# Patient Record
Sex: FEMALE | Race: WHITE | Employment: OTHER | URBAN - METROPOLITAN AREA
[De-identification: names, ages, dates, MRNs, and addresses within clinical notes are randomized per-mention and may not be internally consistent; named-entity substitution may affect disease eponyms.]

---

## 2017-01-04 ENCOUNTER — OFFICE VISIT (OUTPATIENT)
Dept: PEDIATRICS | Facility: CLINIC | Age: 77
End: 2017-01-04
Payer: COMMERCIAL

## 2017-01-04 VITALS
OXYGEN SATURATION: 98 % | BODY MASS INDEX: 30.45 KG/M2 | HEART RATE: 88 BPM | WEIGHT: 161.3 LBS | SYSTOLIC BLOOD PRESSURE: 118 MMHG | DIASTOLIC BLOOD PRESSURE: 70 MMHG | TEMPERATURE: 98.7 F | HEIGHT: 61 IN

## 2017-01-04 DIAGNOSIS — I10 BENIGN ESSENTIAL HYPERTENSION: Primary | ICD-10-CM

## 2017-01-04 DIAGNOSIS — M72.0 DUPUYTREN'S CONTRACTURE OF BOTH HANDS: ICD-10-CM

## 2017-01-04 DIAGNOSIS — R32 URINARY INCONTINENCE, UNSPECIFIED TYPE: ICD-10-CM

## 2017-01-04 DIAGNOSIS — G47.00 PERSISTENT INSOMNIA: ICD-10-CM

## 2017-01-04 DIAGNOSIS — M19.91 PRIMARY OSTEOARTHRITIS, UNSPECIFIED SITE: ICD-10-CM

## 2017-01-04 PROCEDURE — 99214 OFFICE O/P EST MOD 30 MIN: CPT | Performed by: PEDIATRICS

## 2017-01-04 RX ORDER — TRAZODONE HYDROCHLORIDE 100 MG/1
100 TABLET ORAL
Qty: 30 TABLET | Refills: 3 | Status: CANCELLED | OUTPATIENT
Start: 2017-01-04

## 2017-01-04 RX ORDER — TRAZODONE HYDROCHLORIDE 50 MG/1
100 TABLET, FILM COATED ORAL AT BEDTIME
Qty: 60 TABLET | Refills: 11 | Status: SHIPPED | OUTPATIENT
Start: 2017-01-04 | End: 2017-03-22

## 2017-01-04 NOTE — PATIENT INSTRUCTIONS
Split dosing of trazodone ordered    Try tylenol at night for arthritis - dose of 500-1000mg at night to control your pain.    Let me know if your hands start to bother you    Ok to keep off lisinopril - just keep an eye on your blood pressure every 1-2 months    Start kegel exercises for your bladder - let me know if worsening    The next time we do labs for your physical, we will check your thyroid.  Your last physical was 7/13/16.

## 2017-01-04 NOTE — PROGRESS NOTES
"  SUBJECTIVE:                                                    Krystal Parra is a 76 year old female who presents to clinic today for the following health issues:      Hypertension Follow-up      Outpatient blood pressures are being checked [facility].  Results are less than 120/80.    Low Salt Diet: no added salt       Amount of exercise or physical activity: 2-3 days/week for an average of 30-45 minutes    Problems taking medications regularly: No but patient has not been taking lisinopril for the past month     Medication side effects: cough from lisinopril, improved since no longer taking   Diet: low salt      Stopped lisinopril about one month ago and has been keeping a close eye on her pressures.   No cardiac symptoms.    Medication Followup of Trazodone 100 MG    Taking Medication as prescribed: yes however patient would like 50 MG tabs so that she is able to take one tab before bed and one tab during night     Side Effects:  None    Medication Helping Symptoms:  Yes    No side effects, controls symptoms well.       Arthritis - has everywhere - known in lower back.  Able to manage symptoms, but wondering what is the safest pain regimen OTC.    Dupytren's contractures in both hands - do not bother her and unchanged from our last visit    Starting to notice some urinary leakage - doesn't seem to be related to cough/laugh or urgency.  Improving slightly since cough related to lisinopril is improving.      Problem list and histories reviewed & adjusted, as indicated.  Additional history: as documented    Problem list, Medication list, Allergies, and Medical/Social/Surgical histories reviewed in EPIC and updated as appropriate.    ROS:  Constitutional, msk, cardiovascular, pulmonary, gi and gu systems are negative, except as otherwise noted.    OBJECTIVE:                                                    /70 mmHg  Pulse 88  Temp(Src) 98.7  F (37.1  C) (Tympanic)  Ht 5' 1.02\" (1.55 m)  " Wt 161 lb 4.8 oz (73.165 kg)  BMI 30.45 kg/m2  SpO2 98%  Body mass index is 30.45 kg/(m^2).  GENERAL: healthy, alert and no distress  RESP: lungs clear to auscultation - no rales, rhonchi or wheezes  CV: regular rates and rhythm, normal S1 S2, no S3 or S4 and no murmur, click or rub  MS: palmar contractures, nontender and without limitation in hand ROM bilaterally  PSYCH: mentation appears normal, affect normal/bright    Diagnostic Test Results:  none      ASSESSMENT/PLAN:                                                        ICD-10-CM    1. Benign essential hypertension I10 Doing well off medications - continue to hold lisinopril and follow.  Patient has bp checked regularly.   2. Persistent insomnia G47.00 traZODone (DESYREL) 50 MG tablet  Continue, but with lower dose tablets.  Tolerates well without side effect.   3. Dupuytren's contracture of both hands M72.0 Doesn't bother patient and she wishes to monitor - to alert me if progressing for referral to hand specialist   4. Primary osteoarthritis, unspecified site M19.91 Discussed use of tylenol and appropriate dosing   5. Urinary incontinence, unspecified type R32 Improving after cough improved from lisinopril - plan to monitor and patient to start kegel exercises.  To alert me if worsening.       Patient Instructions   Split dosing of trazodone ordered    Try tylenol at night for arthritis - dose of 500-1000mg at night to control your pain.    Let me know if your hands start to bother you    Ok to keep off lisinopril - just keep an eye on your blood pressure every 1-2 months    Start kegel exercises for your bladder - let me know if worsening    The next time we do labs for your physical, we will check your thyroid.  Your last physical was 7/13/16.        Tasha Winkler MD  [facility]

## 2017-01-04 NOTE — NURSING NOTE
"Chief Complaint   Patient presents with     Hypertension     Recheck Medication       Initial /70 mmHg  Pulse 88  Temp(Src) 98.7  F (37.1  C) (Tympanic)  Ht 5' 1.02\" (1.55 m)  Wt 161 lb 4.8 oz (73.165 kg)  BMI 30.45 kg/m2  SpO2 98% Estimated body mass index is 30.45 kg/(m^2) as calculated from the following:    Height as of this encounter: 5' 1.02\" (1.55 m).    Weight as of this encounter: 161 lb 4.8 oz (73.165 kg).  BP completed using cuff size: regular    "

## 2017-01-04 NOTE — MR AVS SNAPSHOT
"              After Visit Summary   1/4/2017    Krystal Parra    MRN: [id number]           Patient Information     Date Of Birth          1940        Visit Information        Provider Department      1/4/2017 3:00 PM Tasha Winkler MD [facility]        Today's Diagnoses     Persistent insomnia    -  1     Benign essential hypertension           Care Instructions    Split dosing of trazodone ordered    Try tylenol at night for arthritis - dose of 500-1000mg at night to control your pain.    Let me know if your hands start to bother you    Ok to keep off lisinopril - just keep an eye on your blood pressure every 1-2 months    Start kegel exercises for your bladder - let me know if worsening    The next time we do labs for your physical, we will check your thyroid.  Your last physical was 7/13/16.        Follow-ups after your visit        Who to contact     If you have questions or need follow up information about today's clinic visit or your schedule please contact [facility] directly at [phone].  Normal or non-critical lab and imaging results will be communicated to you by [employer], letter or phone within 4 business days after the clinic has received the results. If you do not hear from us within 7 days, please contact the clinic through [email or web address] or phone. If you have a critical or abnormal lab result, we will notify you by phone as soon as possible.  Submit refill requests through [employer] or call your pharmacy and they will forward the refill request to us. Please allow 3 business days for your refill to be completed.          Additional Information About Your Visit        [employer] Information     [employer] lets you send messages to your doctor, view your test results, renew your prescriptions, schedule appointments and more. To sign up, go to [email or web address] . Click on \"Log in\" on the left side of the screen, which will take you to the Welcome page. Then click on \"Sign " "up Now\" on the right side of the page.     You will be asked to enter the access code listed below, as well as some personal information. Please follow the directions to create your username and password.     Your access code is: [email or web address]  Expires: 2017  3:40 PM     Your access code will  in 90 days. If you need help or a new code, please call your [facility] clinic or [phone].        Care EveryWhere ID     This is your Care EveryWhere ID. This could be used by other organizations to access your [facility] medical records  [id number]        Your Vitals Were     Pulse Temperature Height BMI (Body Mass Index) Pulse Oximetry       88 98.7  F (37.1  C) (Tympanic) 5' 1.02\" (1.55 m) 30.45 kg/m2 98%        Blood Pressure from Last 3 Encounters:   17 118/70   10/13/16 118/61   16 120/82    Weight from Last 3 Encounters:   17 161 lb 4.8 oz (73.165 kg)   16 160 lb 6.4 oz (72.757 kg)   16 159 lb (72.122 kg)              Today, you had the following     No orders found for display         Today's Medication Changes          These changes are accurate as of: 17  3:40 PM.  If you have any questions, ask your nurse or doctor.               These medicines have changed or have updated prescriptions.        Dose/Directions    * traZODone 100 MG tablet   Commonly known as:  DESYREL   This may have changed:  Another medication with the same name was added. Make sure you understand how and when to take each.   Used for:  Persistent insomnia   Changed by:  Syed Rojas MD        Dose:  100 mg   Take 1 tablet (100 mg) by mouth nightly as needed for sleep   Quantity:  30 tablet   Refills:  3       * traZODone 50 MG tablet   Commonly known as:  DESYREL   This may have changed:  You were already taking a medication with the same name, and this prescription was added. Make sure you understand how and when to take each.   Used for:  Persistent insomnia   Changed by:  Tasha Winkler " MD Luis Fernando        Dose:  100 mg   Take 2 tablets (100 mg) by mouth At Bedtime   Quantity:  60 tablet   Refills:  11       * Notice:  This list has 2 medication(s) that are the same as other medications prescribed for you. Read the directions carefully, and ask your doctor or other care provider to review them with you.      Stop taking these medicines if you haven't already. Please contact your care team if you have questions.     lisinopril 5 MG tablet   Commonly known as:  PRINIVIL/ZESTRIL   Stopped by:  Tasha Winkler MD                Where to get your medicines      These medications were sent to [id number] - [place] - [address]  [address], [place] [address]     Phone:  [phone]    - traZODone 50 MG tablet             Primary Care Provider Office Phone # Fax #    Tasha Winkler [phone]       [facility] [address] [place] [address]        Thank you!     Thank you for choosing [facility]  for your care. Our goal is always to provide you with excellent care. Hearing back from our patients is one way we can continue to improve our services. Please take a few minutes to complete the written survey that you may receive in the mail after your visit with us. Thank you!             Your Updated Medication List - Protect others around you: Learn how to safely use, store and throw away your medicines at www.disposemymeds.org.          This list is accurate as of: 1/4/17  3:40 PM.  Always use your most recent med list.                   Brand Name Dispense Instructions for use    aspirin 81 MG EC tablet      Take 1 tablet (81 mg) by mouth daily       fish oil-omega-3 fatty acids 1000 MG capsule      Take 2 g by mouth 2 times daily       GINKOBA PO          OVER-THE-COUNTER      Take 1 tablet by mouth daily (Melaleuca vitamins)       * traZODone 100 MG tablet    DESYREL    30 tablet    Take 1 tablet (100 mg) by mouth nightly  as needed for sleep       * traZODone 50 MG tablet    DESYREL    60 tablet    Take 2 tablets (100 mg) by mouth At Bedtime       vitamin D 2000 UNITS Caps      Take 1 tablet by mouth daily.       * Notice:  This list has 2 medication(s) that are the same as other medications prescribed for you. Read the directions carefully, and ask your doctor or other care provider to review them with you.

## 2017-03-22 ENCOUNTER — OFFICE VISIT (OUTPATIENT)
Dept: PEDIATRICS | Facility: CLINIC | Age: 77
End: 2017-03-22
Payer: COMMERCIAL

## 2017-03-22 VITALS
DIASTOLIC BLOOD PRESSURE: 80 MMHG | TEMPERATURE: 98.2 F | SYSTOLIC BLOOD PRESSURE: 122 MMHG | HEART RATE: 92 BPM | WEIGHT: 161 LBS | HEIGHT: 62 IN | OXYGEN SATURATION: 99 % | BODY MASS INDEX: 29.63 KG/M2

## 2017-03-22 DIAGNOSIS — J40 BRONCHITIS: Primary | ICD-10-CM

## 2017-03-22 PROCEDURE — 99213 OFFICE O/P EST LOW 20 MIN: CPT | Performed by: PEDIATRICS

## 2017-03-22 RX ORDER — AZITHROMYCIN 250 MG/1
TABLET, FILM COATED ORAL
Qty: 6 TABLET | Refills: 0 | Status: SHIPPED | OUTPATIENT
Start: 2017-03-22 | End: 2017-07-18

## 2017-03-22 NOTE — MR AVS SNAPSHOT
"              After Visit Summary   3/22/2017    Krystal Parra    MRN: [id number]           Patient Information     Date Of Birth          1940        Visit Information        Provider Department      3/22/2017 2:40 PM [facility]ler MD [facility]        Today's Diagnoses     Bronchitis    -  1      Care Instructions    Start azithromycin for your cough - take 2 pills today, then 1 pill daily for 4 days thereafter.    Keep pushing fluids - tea with honey    Think about delsym for cough        Follow-ups after your visit        Who to contact     If you have questions or need follow up information about today's clinic visit or your schedule please contact [facility] directly at [phone].  Normal or non-critical lab and imaging results will be communicated to you by MyChart, letter or phone within 4 business days after the clinic has received the results. If you do not hear from us within 7 days, please contact the clinic through [employer] or phone. If you have a critical or abnormal lab result, we will notify you by phone as soon as possible.  Submit refill requests through [employer] or call your pharmacy and they will forward the refill request to us. Please allow 3 business days for your refill to be completed.          Additional Information About Your Visit        MyChart Information     [employer] lets you send messages to your doctor, view your test results, renew your prescriptions, schedule appointments and more. To sign up, go to [email or web address] . Click on \"Log in\" on the left side of the screen, which will take you to the Welcome page. Then click on \"Sign up Now\" on the right side of the page.     You will be asked to enter the access code listed below, as well as some personal information. Please follow the directions to create your username and password.     Your access code is: [email or web address]  Expires: 2017  4:40 PM     Your access code will  in 90 days. " "If you need help or a new code, please call your [facility] clinic or [phone].        Care EveryWhere ID     This is your Care EveryWhere ID. This could be used by other organizations to access your [facility] medical records  [id number]        Your Vitals Were     Pulse Temperature Height Pulse Oximetry BMI (Body Mass Index)       92 98.2  F (36.8  C) (Tympanic) 5' 1.5\" (1.562 m) 99% 29.93 kg/m2        Blood Pressure from Last 3 Encounters:   03/22/17 122/80   01/04/17 118/70   10/13/16 118/61    Weight from Last 3 Encounters:   03/22/17 161 lb (73 kg)   01/04/17 161 lb 4.8 oz (73.2 kg)   09/14/16 160 lb 6.4 oz (72.8 kg)              Today, you had the following     No orders found for display         Today's Medication Changes          These changes are accurate as of: 3/22/17  3:08 PM.  If you have any questions, ask your nurse or doctor.               Start taking these medicines.        Dose/Directions    azithromycin 250 MG tablet   Commonly known as:  ZITHROMAX   Used for:  Bronchitis   Started by:  Tasha Winkler MD        Two tablets first day, then one tablet daily for four days.   Quantity:  6 tablet   Refills:  0            Where to get your medicines      These medications were sent to [facility] [place], [place] - [address]  [address], [place] [address]     Phone:  [phone]     azithromycin 250 MG tablet                Primary Care Provider Office Phone # Fax #    Tasha Winkler [phone]       [address] [place] [address]        Thank you!     Thank you for choosing [facility]  for your care. Our goal is always to provide you with excellent care. Hearing back from our patients is one way we can continue to improve our services. Please take a few minutes to complete the written survey that you may receive in the mail after your visit with us. Thank you!             Your Updated " Medication List - Protect others around you: Learn how to safely use, store and throw away your medicines at www.disposemymeds.org.          This list is accurate as of: 3/22/17  3:08 PM.  Always use your most recent med list.                   Brand Name Dispense Instructions for use    aspirin 81 MG EC tablet      Take 1 tablet (81 mg) by mouth daily       azithromycin 250 MG tablet    ZITHROMAX    6 tablet    Two tablets first day, then one tablet daily for four days.       fish oil-omega-3 fatty acids 1000 MG capsule      Take 2 g by mouth 2 times daily       GINKOBA PO          OVER-THE-COUNTER      Take 1 tablet by mouth daily (Melaleuca vitamins)       traZODone 100 MG tablet    DESYREL    30 tablet    Take 1 tablet (100 mg) by mouth nightly as needed for sleep       vitamin D 2000 UNITS Caps      Take 1 tablet by mouth daily.

## 2017-03-22 NOTE — NURSING NOTE
"Chief Complaint   Patient presents with     URI       Initial /80 (BP Location: Right arm, Patient Position: Chair, Cuff Size: Adult Regular)  Pulse 92  Temp 98.2  F (36.8  C) (Tympanic)  Ht 5' 1.5\" (1.562 m)  Wt 161 lb (73 kg)  SpO2 99%  BMI 29.93 kg/m2 Estimated body mass index is 29.93 kg/(m^2) as calculated from the following:    Height as of this encounter: 5' 1.5\" (1.562 m).    Weight as of this encounter: 161 lb (73 kg).  Medication Reconciliation: complete  "

## 2017-03-22 NOTE — PATIENT INSTRUCTIONS
Start azithromycin for your cough - take 2 pills today, then 1 pill daily for 4 days thereafter.    Keep pushing fluids - tea with honey    Think about delsym for cough

## 2017-03-22 NOTE — PROGRESS NOTES
"  SUBJECTIVE:                                                    Krystal Parra is a 77 year old female who presents to clinic today for the following health issues:      RESPIRATORY SYMPTOMS      Duration: started last week    Description  nasal congestion, rhinorrhea, facial pain/pressure, cough and hoarse voice    Severity: moderate    Accompanying signs and symptoms: productive cough     History (predisposing factors):  none    Precipitating or alleviating factors: None    Therapies tried and outcome:  rest and fluids oral decongestant       Started with sore throat, then cough, then blowing nose about a week ago.   Sore on sides from coughing.  Occasionally coughing up phlegm.  Propping self up at night so doesn't cough as much.  Slight fevers at night and feeling chilled.  Good appetite.  Sleep impaired by cough.    Facial pain started yesterday.  No headache.  History of sinus infections in the past.      Going to see her sister next week in a care center in [place] - doesn't want to get her ill sister sick.    Feeling a little chest tightness, no wheezing.      Problem list and histories reviewed & adjusted, as indicated.  Additional history: as documented      Reviewed and updated as needed this visit by clinical staff       Reviewed and updated as needed this visit by Provider         ROS:  Constitutional, HEENT, cardiovascular, pulmonary, gi and neuro systems are negative, except as otherwise noted.    OBJECTIVE:                                                    /80 (BP Location: Right arm, Patient Position: Chair, Cuff Size: Adult Regular)  Pulse 92  Temp 98.2  F (36.8  C) (Tympanic)  Ht 5' 1.5\" (1.562 m)  Wt 161 lb (73 kg)  SpO2 99%  BMI 29.93 kg/m2  Body mass index is 29.93 kg/(m^2).  GENERAL: alert and no distress  EYES: Eyes grossly normal to inspection, PERRL and conjunctivae and sclerae normal  HENT: normal cephalic/atraumatic, both ears: clear effusion, nasal mucosa edematous , " oropharynx clear, oral mucous membranes moist, sinuses: mild maxillary tenderness on both sides and erythematous posterior OP without exudate  NECK:supple, no LAD  RESP: frequent harsh, deep cough, no rales, no increased work of breathing, no wheeze, moving air well  CV: regular rate and rhythm, normal S1 S2, no S3 or S4, no murmur, click or rub, no peripheral edema and peripheral pulses strong  NEURO: Normal strength and tone, mentation intact and speech normal  PSYCH: mentation appears normal, affect normal/bright       ASSESSMENT/PLAN:                                                        ICD-10-CM    1. Bronchitis J40 azithromycin (ZITHROMAX) 250 MG tablet    Discussed that symptoms may still be viral in nature.  Elected to treat as she is traveling in the near future to visit her chronically ill sister.  To alert me if not improving or worsening over time.       Patient Instructions   Start azithromycin for your cough - take 2 pills today, then 1 pill daily for 4 days thereafter.    Keep pushing fluids - tea with honey    Think about delsym for cough      Tasha Winkler MD  [facility] [place]

## 2017-07-18 ENCOUNTER — OFFICE VISIT (OUTPATIENT)
Dept: PEDIATRICS | Facility: CLINIC | Age: 77
End: 2017-07-18
Payer: COMMERCIAL

## 2017-07-18 VITALS
TEMPERATURE: 99.2 F | HEIGHT: 62 IN | SYSTOLIC BLOOD PRESSURE: 126 MMHG | WEIGHT: 161 LBS | DIASTOLIC BLOOD PRESSURE: 80 MMHG | BODY MASS INDEX: 29.63 KG/M2 | HEART RATE: 79 BPM | OXYGEN SATURATION: 98 %

## 2017-07-18 DIAGNOSIS — I10 BENIGN ESSENTIAL HYPERTENSION: ICD-10-CM

## 2017-07-18 DIAGNOSIS — Z86.73 HISTORY OF TIA (TRANSIENT ISCHEMIC ATTACK): ICD-10-CM

## 2017-07-18 DIAGNOSIS — I35.1 MODERATE AORTIC REGURGITATION: ICD-10-CM

## 2017-07-18 DIAGNOSIS — E78.5 HYPERLIPIDEMIA LDL GOAL <100: ICD-10-CM

## 2017-07-18 DIAGNOSIS — Z00.00 ROUTINE GENERAL MEDICAL EXAMINATION AT A HEALTH CARE FACILITY: Primary | ICD-10-CM

## 2017-07-18 DIAGNOSIS — R22.1 NECK MASS: ICD-10-CM

## 2017-07-18 DIAGNOSIS — G47.00 PERSISTENT INSOMNIA: ICD-10-CM

## 2017-07-18 PROCEDURE — 90714 TD VACC NO PRESV 7 YRS+ IM: CPT | Performed by: PEDIATRICS

## 2017-07-18 PROCEDURE — 99397 PER PM REEVAL EST PAT 65+ YR: CPT | Mod: 25 | Performed by: PEDIATRICS

## 2017-07-18 PROCEDURE — 90471 IMMUNIZATION ADMIN: CPT | Performed by: PEDIATRICS

## 2017-07-18 PROCEDURE — 99212 OFFICE O/P EST SF 10 MIN: CPT | Mod: 25 | Performed by: PEDIATRICS

## 2017-07-18 RX ORDER — TRAZODONE HYDROCHLORIDE 50 MG/1
100 TABLET, FILM COATED ORAL
Qty: 180 TABLET | Refills: 3 | Status: SHIPPED | OUTPATIENT
Start: 2017-07-18 | End: 2018-07-23

## 2017-07-18 NOTE — NURSING NOTE
Screening Questionnaire for Adult Immunization    Are you sick today?   No   Do you have allergies to medications, food, a vaccine component or latex?   Yes   Have you ever had a serious reaction after receiving a vaccination?   No   Do you have a long-term health problem with heart disease, lung disease, asthma, kidney disease, metabolic disease (e.g. diabetes), anemia, or other blood disorder?   No   Do you have cancer, leukemia, HIV/AIDS, or any other immune system problem?   No   In the past 3 months, have you taken medications that affect  your immune system, such as prednisone, other steroids, or anticancer drugs; drugs for the treatment of rheumatoid arthritis, Crohn s disease, or psoriasis; or have you had radiation treatments?   No   Have you had a seizure, or a brain or other nervous system problem?   No   During the past year, have you received a transfusion of blood or blood     products, or been given immune (gamma) globulin or antiviral drug?   No   For women: Are you pregnant or is there a chance you could become        pregnant during the next month?   No   Have you received any vaccinations in the past 4 weeks?   No     Immunization questionnaire was positive for at least one answer.  Notified provider.      MNVFC doesn't apply on this patient    Per orders of Dr. Winkler, injection of TD given by Mary Ramirez. Patient instructed to remain in clinic for 15 minutes afterwards, and to report any adverse reaction to me immediately.       Screening performed by Mary Ramirez on 7/18/2017 at 12:04 PM.

## 2017-07-18 NOTE — Clinical Note
Please call Krystal to let her know that I forgot to remind her that it is time to do a repeat echocardiogram to follow her leaky aortic valve.  I placed an order for this to be done at our clinic.  Can you help her schedule?  Thanks!

## 2017-07-18 NOTE — MR AVS SNAPSHOT
After Visit Summary   7/18/2017    Krystal Parra    MRN: [id number]           Patient Information     Date Of Birth          1940        Visit Information        Provider Department      7/18/2017 11:20 AM Tasha Winkler MD [facility] [place]        Today's Diagnoses     Routine general medical examination at a health care facility    -  1    Hyperlipidemia LDL goal <100        Benign essential hypertension        Persistent insomnia        Neck mass          Care Instructions    Tetanus shot today    Stop at the lab downstairs on your way out    [facility] Radiology: [phone] - call to schedule your thyroid ultrasound    Keep up your incredible exercise!    Trazodone refilled today.          Preventive Health Recommendations    Female Ages 65 +    Yearly exam:     See your health care provider every year in order to  o Review health changes.   o Discuss preventive care.    o Review your medicines if your doctor has prescribed any.      You no longer need a yearly Pap test unless you've had an abnormal Pap test in the past 10 years. If you have vaginal symptoms, such as bleeding or discharge, be sure to talk with your provider about a Pap test.      Every 1 to 2 years, have a mammogram.  If you are over 69, talk with your health care provider about whether or not you want to continue having screening mammograms.      Every 10 years, have a colonoscopy. Or, have a yearly FIT test (stool test). These exams will check for colon cancer.       Have a cholesterol test every 5 years, or more often if your doctor advises it.       Have a diabetes test (fasting glucose) every three years. If you are at risk for diabetes, you should have this test more often.       At age 65, have a bone density scan (DEXA) to check for osteoporosis (brittle bone disease).    Shots:    Get a flu shot each year.    Get a tetanus shot every 10 years.    Talk to your doctor about your pneumonia vaccines. There  are now two you should receive - Pneumovax (PPSV 23) and Prevnar (PCV 13).    Talk to your doctor about the shingles vaccine.    Talk to your doctor about the hepatitis B vaccine.    Nutrition:     Eat at least 5 servings of fruits and vegetables each day.      Eat whole-grain bread, whole-wheat pasta and brown rice instead of white grains and rice.      Talk to your provider about Calcium and Vitamin D.     Lifestyle    Exercise at least 150 minutes a week (30 minutes a day, 5 days a week). This will help you control your weight and prevent disease.      Limit alcohol to one drink per day.      No smoking.       Wear sunscreen to prevent skin cancer.       See your dentist twice a year for an exam and cleaning.      See your eye doctor every 1 to 2 years to screen for conditions such as glaucoma, macular degeneration and cataracts.          Follow-ups after your visit        Future tests that were ordered for you today     Open Future Orders        Priority Expected Expires Ordered    US Thyroid Routine  7/18/2018 7/18/2017            Who to contact     If you have questions or need follow up information about today's clinic visit or your schedule please contact [facility] directly at [phone].  Normal or non-critical lab and imaging results will be communicated to you by MyChart, letter or phone within 4 business days after the clinic has received the results. If you do not hear from us within 7 days, please contact the clinic through WALTOPhart or phone. If you have a critical or abnormal lab result, we will notify you by phone as soon as possible.  Submit refill requests through Epic! or call your pharmacy and they will forward the refill request to us. Please allow 3 business days for your refill to be completed.          Additional Information About Your Visit        WALTOPharCtrip Information     Epic! lets you send messages to your doctor, view your test results, renew your prescriptions, schedule  "appointments and more. To sign up, go to [email or web address] . Click on \"Log in\" on the left side of the screen, which will take you to the Welcome page. Then click on \"Sign up Now\" on the right side of the page.     You will be asked to enter the access code listed below, as well as some personal information. Please follow the directions to create your username and password.     Your access code is: [email or web address]  Expires: 10/16/2017 12:00 PM     Your access code will  in 90 days. If you need help or a new code, please call your [facility] clinic or [phone].        Care EveryWhere ID     This is your Care EveryWhere ID. This could be used by other organizations to access your [facility] medical records  [id number]        Your Vitals Were     Pulse Temperature Height Pulse Oximetry BMI (Body Mass Index)       79 99.2  F (37.3  C) (Tympanic) 5' 1.5\" (1.562 m) 98% 29.93 kg/m2        Blood Pressure from Last 3 Encounters:   17 126/80   17 122/80   17 118/70    Weight from Last 3 Encounters:   17 161 lb (73 kg)   17 161 lb (73 kg)   17 161 lb 4.8 oz (73.2 kg)              We Performed the Following     Comprehensive metabolic panel     Lipid panel reflex to direct LDL     TD PRESERV FREE >=7 YRS ADS IM     TSH with free T4 reflex          Today's Medication Changes          These changes are accurate as of: 17 12:00 PM.  If you have any questions, ask your nurse or doctor.               These medicines have changed or have updated prescriptions.        Dose/Directions    * traZODone 100 MG tablet   Commonly known as:  DESYREL   This may have changed:  Another medication with the same name was added. Make sure you understand how and when to take each.   Used for:  Persistent insomnia   Changed by:  Syed Rojas MD        Dose:  100 mg   Take 1 tablet (100 mg) by mouth nightly as needed for sleep   Quantity:  30 tablet   Refills:  3       * traZODone 50 MG tablet "   Commonly known as:  DESYREL   This may have changed:  You were already taking a medication with the same name, and this prescription was added. Make sure you understand how and when to take each.   Used for:  Persistent insomnia   Changed by:  Tasha Winkler MD        Dose:  100 mg   Take 2 tablets (100 mg) by mouth nightly as needed for sleep   Quantity:  180 tablet   Refills:  3       * Notice:  This list has 2 medication(s) that are the same as other medications prescribed for you. Read the directions carefully, and ask your doctor or other care provider to review them with you.         Where to get your medicines      These medications were sent to [id number] - [place], [place] - [address]  [address], [place] [address]     Phone:  [phone]     traZODone 50 MG tablet                Primary Care Provider Office Phone # Fax #    Tasha Winkler [phone]       [facility] [address] [place] [address]        Equal Access to Services     [facility] AH: Hadii aad ku hadasho Soomaali, waaxda luqadaha, qaybta kaalmada adeegyada, waxay idiin hayaan margo mccloudarabassam temple . So [facility] [phone].    ATENCIÓN: Si habla español, tiene a freire disposición servicios gratuitos de asistencia lingüística. [facility] [phone].    We comply with applicable federal civil rights laws and [place] laws. We do not discriminate on the basis of race, color, national origin, age, disability sex, sexual orientation or gender identity.            Thank you!     Thank you for choosing [facility]  for your care. Our goal is always to provide you with excellent care. Hearing back from our patients is one way we can continue to improve our services. Please take a few minutes to complete the written survey that you may receive in the mail after your visit with us. Thank you!             Your Updated Medication List - Protect others around you:  Learn how to safely use, store and throw away your medicines at www.disposemymeds.org.          This list is accurate as of: 7/18/17 12:00 PM.  Always use your most recent med list.                   Brand Name Dispense Instructions for use Diagnosis    aspirin 81 MG EC tablet      Take 1 tablet (81 mg) by mouth daily    Transient cerebral ischemia, unspecified type       fish oil-omega-3 fatty acids 1000 MG capsule      Take 2 g by mouth 2 times daily        GINKOBA PO           OVER-THE-COUNTER      Take 1 tablet by mouth daily (Melaleuca vitamins)        * traZODone 100 MG tablet    DESYREL    30 tablet    Take 1 tablet (100 mg) by mouth nightly as needed for sleep    Persistent insomnia       * traZODone 50 MG tablet    DESYREL    180 tablet    Take 2 tablets (100 mg) by mouth nightly as needed for sleep    Persistent insomnia       vitamin D 2000 UNITS Caps      Take 1 tablet by mouth daily.        * Notice:  This list has 2 medication(s) that are the same as other medications prescribed for you. Read the directions carefully, and ask your doctor or other care provider to review them with you.

## 2017-07-18 NOTE — PROGRESS NOTES
SUBJECTIVE:   Krystal Parra is a 77 year old female who presents for Preventive Visit.      Are you in the first 12 months of your Medicare coverage?  No    Physical   Annual:     Getting at least 3 servings of Calcium per day::  Yes    Bi-annual eye exam::  Yes    Dental care twice a year::  Yes    Sleep apnea or symptoms of sleep apnea::  Daytime drowsiness    Diet::  Regular (no restrictions)    Taking medications regularly::  Not Applicable    Medication side effects::  Not applicable    Additional concerns today::  YES      COGNITIVE SCREEN  1) Repeat 3 items (Banana, Sunrise, Chair)    2) Clock draw: NORMAL  3) 3 item recall: Recalls 3 objects  Results: NORMAL clock, 1-2 items recalled: COGNITIVE IMPAIRMENT LESS LIKELY    Mini-CogTM Copyright S Jean. Licensed by the author for use in [employer] [employer]; reprinted with permission (jaskaran@[facility]). All rights reserved.        Reviewed and updated as needed this visit by clinical staff  Tobacco  Allergies  Med Hx  Surg Hx  Fam Hx  Soc Hx        Reviewed and updated as needed this visit by Provider        Social History   Substance Use Topics     Smoking status: Passive Smoke Exposure - Never Smoker     Smokeless tobacco: Never Used      Comment:  smoked in house     Alcohol use No       The patient does not drink >3 drinks per day nor >7 drinks per week.          Today's PHQ-2 Score:   PHQ-2 ( 1999 Pfizer) 7/18/2017   Q1: Little interest or pleasure in doing things 0   Q2: Feeling down, depressed or hopeless 0   PHQ-2 Score 0   Q1: Little interest or pleasure in doing things Not at all   Q2: Feeling down, depressed or hopeless Not at all   PHQ-2 Score 0       Do you feel safe in your environment - Yes    Do you have a Health Care Directive?: Yes: Patient states has Advance Directive and will bring in a copy to clinic.    Current providers sharing in care for this patient include:   Patient Care Team:  Tasha Winkler MD as PCP - General  "(Internal Medicine)      Hearing impairment: No    Ability to successfully perform activities of daily living: Yes, no assistance needed     Fall risk:  Fallen 2 or more times in the past year?: No  Any fall with injury in the past year?: No    Home safety:  none identified      The following health maintenance items are reviewed in Epic and correct as of today:  Health Maintenance   Topic Date Due     TSH Q1 YEAR  01/15/2017     FALL RISK ASSESSMENT  06/16/2017     TETANUS Q10 YR  10/04/2017     DEXA Q3 YR  05/15/2018     MAMMO Q2 YR  07/20/2018     ADVANCE DIRECTIVE PLANNING Q5 YRS  07/15/2020     LIPID MONITORING Q5 YEARS  06/08/2021     COLONOSCOPY Q10 YR  10/15/2023     PNEUMOCOCCAL  Addressed     HPI:  Three weeks ago, was painting and could feel something strange in anterior neck.  When swallows, if she thinks about, she feels it - notices it when head is down.  Not painful - just an awareness of something in her anterior neck.    Blood pressure at home has been normal.      Intermittent swelling of the left ankle that doesn't bother her very much.      No new cardiac or neurologic symptoms.  Taking aspiring daily.    Lumbar disc herniation - goes to chiropractor every 5-7 days for adjustments and this keeps her functioning.     Goes to [facility] three times per week - hips and legs get so tired from her back that she is spent.  Back pain limits her yard work.      Bladder leakage - stress incontinence.  Uses small pad daily.  Doing kegel exercises.    Sleep - better with the trazodone - uses every night.  Denies side effects.      ROS:  Constitutional, HEENT, cardiovascular, pulmonary, GI, , musculoskeletal, neuro, skin, endocrine and psych systems are negative, except as otherwise noted.    OBJECTIVE:   /80 (BP Location: Right arm, Patient Position: Right side, Cuff Size: Adult Regular)  Pulse 79  Temp 99.2  F (37.3  C) (Tympanic)  Ht 5' 1.5\" (1.562 m)  Wt 161 lb (73 kg)  SpO2 98%  BMI 29.93 kg/m2 " "Estimated body mass index is 29.93 kg/(m^2) as calculated from the following:    Height as of this encounter: 5' 1.5\" (1.562 m).    Weight as of this encounter: 161 lb (73 kg).  EXAM:   GENERAL: healthy, alert and no distress  EYES: Eyes grossly normal to inspection, PERRL and conjunctivae and sclerae normal  HENT: ear canals and TM's normal, nose and mouth without ulcers or lesions  NECK: no adenopathy, thyroid nodule palpable - right side of gland and trachea midline and normal to palpation  RESP: lungs clear to auscultation - no rales, rhonchi or wheezes  CV: regular rate and rhythm, normal S1 S2, no S3 or S4, no murmur, click or rub, no peripheral edema and peripheral pulses strong  ABDOMEN: soft, nontender, no hepatosplenomegaly, no masses and bowel sounds normal  MS: no gross musculoskeletal defects noted, no edema  SKIN: no suspicious lesions or rashes  NEURO: Normal strength and tone, mentation intact and speech normal  PSYCH: mentation appears normal, affect normal/bright    ASSESSMENT / PLAN:       ICD-10-CM    1. Routine general medical examination at a health care facility Z00.00 TD PRESERV FREE >=7 YRS ADS IM     TSH with free T4 reflex     Lipid panel reflex to direct LDL     Comprehensive metabolic panel                  2. Hyperlipidemia LDL goal <100 E78.5 Recheck lipids today   3. Benign essential hypertension I10 Has been well controlled now off of medications - follow - patient tracks at home   4. Persistent insomnia G47.00 traZODone (DESYREL) 50 MG tablet  Well controlled, continue current medications  No side effects   5. Neck mass R22.1 US Thyroid - feels like thyroid nodule - ultrasound for further information   6. History of TIA (transient ischemic attack) Z86.73 On aspirin daily   7. Moderate aortic regurgitation I35.1 Due for repeat echocardiolgram       End of Life Planning:  Patient currently has an advanced directive: Yes.  Practitioner is supportive of decision.    COUNSELING:  Special " "attention given to:       Regular exercise       Healthy diet/nutrition       Vision screening        Estimated body mass index is 29.93 kg/(m^2) as calculated from the following:    Height as of this encounter: 5' 1.5\" (1.562 m).    Weight as of this encounter: 161 lb (73 kg).  Weight management plan: Discussed healthy diet and exercise guidelines and patient will follow up in 12 months in clinic to re-evaluate.   reports that she is a non-smoker but has been exposed to tobacco smoke. She has never used smokeless tobacco.      Appropriate preventive services were discussed with this patient, including applicable screening as appropriate for cardiovascular disease, diabetes, osteopenia/osteoporosis, and glaucoma.  As appropriate for age/gender, discussed screening for colorectal cancer, prostate cancer, breast cancer, and cervical cancer. Checklist reviewing preventive services available has been given to the patient.    Reviewed patients plan of care and provided an AVS. The Intermediate Care Plan ( asthma action plan, low back pain action plan, and migraine action plan) for Krystal meets the Care Plan requirement. This Care Plan has been established and reviewed with the Patient.    Counseling Resources:  ATP IV Guidelines  Pooled Cohorts Equation Calculator  Breast Cancer Risk Calculator  FRAX Risk Assessment  ICSI Preventive Guidelines  Dietary Guidelines for Americans, 2010  USDA's MyPlate  ASA Prophylaxis  Lung CA Screening    Tasha Winkler MD  [facility] [place]  "

## 2017-07-18 NOTE — PATIENT INSTRUCTIONS
Tetanus shot today    Stop at the lab downstairs on your way out    [facility] Radiology: [phone] - call to schedule your thyroid ultrasound    Keep up your incredible exercise!    Trazodone refilled today.          Preventive Health Recommendations    Female Ages 65 +    Yearly exam:     See your health care provider every year in order to  o Review health changes.   o Discuss preventive care.    o Review your medicines if your doctor has prescribed any.      You no longer need a yearly Pap test unless you've had an abnormal Pap test in the past 10 years. If you have vaginal symptoms, such as bleeding or discharge, be sure to talk with your provider about a Pap test.      Every 1 to 2 years, have a mammogram.  If you are over 69, talk with your health care provider about whether or not you want to continue having screening mammograms.      Every 10 years, have a colonoscopy. Or, have a yearly FIT test (stool test). These exams will check for colon cancer.       Have a cholesterol test every 5 years, or more often if your doctor advises it.       Have a diabetes test (fasting glucose) every three years. If you are at risk for diabetes, you should have this test more often.       At age 65, have a bone density scan (DEXA) to check for osteoporosis (brittle bone disease).    Shots:    Get a flu shot each year.    Get a tetanus shot every 10 years.    Talk to your doctor about your pneumonia vaccines. There are now two you should receive - Pneumovax (PPSV 23) and Prevnar (PCV 13).    Talk to your doctor about the shingles vaccine.    Talk to your doctor about the hepatitis B vaccine.    Nutrition:     Eat at least 5 servings of fruits and vegetables each day.      Eat whole-grain bread, whole-wheat pasta and brown rice instead of white grains and rice.      Talk to your provider about Calcium and Vitamin D.     Lifestyle    Exercise at least 150 minutes a week (30 minutes a day, 5 days a week). This will help you  control your weight and prevent disease.      Limit alcohol to one drink per day.      No smoking.       Wear sunscreen to prevent skin cancer.       See your dentist twice a year for an exam and cleaning.      See your eye doctor every 1 to 2 years to screen for conditions such as glaucoma, macular degeneration and cataracts.

## 2017-07-18 NOTE — NURSING NOTE
"Chief Complaint   Patient presents with     Wellness Visit       Initial /80 (BP Location: Right arm, Patient Position: Right side, Cuff Size: Adult Regular)  Pulse 79  Temp 99.2  F (37.3  C) (Tympanic)  Ht 5' 1.5\" (1.562 m)  Wt 161 lb (73 kg)  SpO2 98%  BMI 29.93 kg/m2 Estimated body mass index is 29.93 kg/(m^2) as calculated from the following:    Height as of this encounter: 5' 1.5\" (1.562 m).    Weight as of this encounter: 161 lb (73 kg).  Medication Reconciliation: complete  "

## 2017-07-19 DIAGNOSIS — Z00.00 ROUTINE GENERAL MEDICAL EXAMINATION AT A HEALTH CARE FACILITY: ICD-10-CM

## 2017-07-19 LAB
ALBUMIN SERPL-MCNC: 3.2 G/DL (ref 3.4–5)
ALP SERPL-CCNC: 64 U/L (ref 40–150)
ALT SERPL W P-5'-P-CCNC: 28 U/L (ref 0–50)
ANION GAP SERPL CALCULATED.3IONS-SCNC: 7 MMOL/L (ref 3–14)
AST SERPL W P-5'-P-CCNC: 15 U/L (ref 0–45)
BILIRUB SERPL-MCNC: 0.3 MG/DL (ref 0.2–1.3)
BUN SERPL-MCNC: 17 MG/DL (ref 7–30)
CALCIUM SERPL-MCNC: 9.2 MG/DL (ref 8.5–10.1)
CHLORIDE SERPL-SCNC: 109 MMOL/L (ref 94–109)
CHOLEST SERPL-MCNC: 228 MG/DL
CO2 SERPL-SCNC: 28 MMOL/L (ref 20–32)
CREAT SERPL-MCNC: 0.8 MG/DL (ref 0.52–1.04)
GFR SERPL CREATININE-BSD FRML MDRD: 69 ML/MIN/1.7M2
GLUCOSE SERPL-MCNC: 94 MG/DL (ref 70–99)
HDLC SERPL-MCNC: 53 MG/DL
LDLC SERPL CALC-MCNC: 153 MG/DL
NONHDLC SERPL-MCNC: 175 MG/DL
POTASSIUM SERPL-SCNC: 4.3 MMOL/L (ref 3.4–5.3)
PROT SERPL-MCNC: 7 G/DL (ref 6.8–8.8)
SODIUM SERPL-SCNC: 144 MMOL/L (ref 133–144)
T4 FREE SERPL-MCNC: 0.88 NG/DL (ref 0.76–1.46)
TRIGL SERPL-MCNC: 109 MG/DL
TSH SERPL DL<=0.005 MIU/L-ACNC: 5.42 MU/L (ref 0.4–4)

## 2017-07-19 PROCEDURE — 36415 COLL VENOUS BLD VENIPUNCTURE: CPT | Performed by: PEDIATRICS

## 2017-07-19 PROCEDURE — 84439 ASSAY OF FREE THYROXINE: CPT | Performed by: PEDIATRICS

## 2017-07-19 PROCEDURE — 80061 LIPID PANEL: CPT | Performed by: PEDIATRICS

## 2017-07-19 PROCEDURE — 84443 ASSAY THYROID STIM HORMONE: CPT | Performed by: PEDIATRICS

## 2017-07-19 PROCEDURE — 80053 COMPREHEN METABOLIC PANEL: CPT | Performed by: PEDIATRICS

## 2017-07-19 NOTE — LETTER
[facility]  [address]  [place] [address]                  [phone]   July 20, 2017    Krystal Parra  [address] [place] [address]      Dear Krystal,    It was wonderful to see you in clinic this week.  Your lab work returned and is released for your review and records.      Results of your electrolytes, kidney function, liver function, and blood sugar remain normal.   Your LDL (bad) cholesterol is up slightly compared to last year.    Results of your thyroid function remain slightly outside of the normal range, but are stable over time.  We will continue to watch all of these labs.      Please contact me with any new questions or concerns.    Warm regards,    Tasha Winkler MD  Internal Medicine - Pediatrics      Results for orders placed or performed in visit on 07/19/17   TSH with free T4 reflex   Result Value Ref Range    TSH 5.42 (H) 0.40 - 4.00 mU/L   Lipid panel reflex to direct LDL   Result Value Ref Range    Cholesterol 228 (H) <200 mg/dL    Triglycerides 109 <150 mg/dL    HDL Cholesterol 53 >49 mg/dL    LDL Cholesterol Calculated 153 (H) <100 mg/dL    Non HDL Cholesterol 175 (H) <130 mg/dL   Comprehensive metabolic panel   Result Value Ref Range    Sodium 144 133 - 144 mmol/L    Potassium 4.3 3.4 - 5.3 mmol/L    Chloride 109 94 - 109 mmol/L    Carbon Dioxide 28 20 - 32 mmol/L    Anion Gap 7 3 - 14 mmol/L    Glucose 94 70 - 99 mg/dL    Urea Nitrogen 17 7 - 30 mg/dL    Creatinine 0.80 0.52 - 1.04 mg/dL    GFR Estimate 69 >60 mL/min/1.7m2    GFR Estimate If Black 84 >60 mL/min/1.7m2    Calcium 9.2 8.5 - 10.1 mg/dL    Bilirubin Total 0.3 0.2 - 1.3 mg/dL    Albumin 3.2 (L) 3.4 - 5.0 g/dL    Protein Total 7.0 6.8 - 8.8 g/dL    Alkaline Phosphatase 64 40 - 150 U/L    ALT 28 0 - 50 U/L    AST 15 0 - 45 U/L   T4 free   Result Value Ref Range    T4 Free 0.88 0.76 - 1.46 ng/dL

## 2017-07-20 ENCOUNTER — HOSPITAL ENCOUNTER (OUTPATIENT)
Dept: ULTRASOUND IMAGING | Facility: CLINIC | Age: 77
Discharge: HOME | End: 2017-07-20
Attending: PEDIATRICS | Admitting: PEDIATRICS
Payer: MEDICARE

## 2017-07-20 DIAGNOSIS — E04.1 THYROID NODULE: Primary | ICD-10-CM

## 2017-07-20 DIAGNOSIS — R22.1 NECK MASS: ICD-10-CM

## 2017-07-20 PROCEDURE — 76536 US EXAM OF HEAD AND NECK: CPT

## 2017-08-08 ENCOUNTER — HOSPITAL ENCOUNTER (OUTPATIENT)
Dept: CARDIOLOGY | Facility: CLINIC | Age: 77
End: 2017-08-08
Attending: PEDIATRICS
Payer: COMMERCIAL

## 2017-08-08 DIAGNOSIS — I35.1 MODERATE AORTIC REGURGITATION: ICD-10-CM

## 2017-08-08 PROCEDURE — 93306 TTE W/DOPPLER COMPLETE: CPT | Performed by: INTERNAL MEDICINE

## 2017-09-05 ENCOUNTER — OFFICE VISIT (OUTPATIENT)
Dept: ENDOCRINOLOGY | Facility: CLINIC | Age: 77
End: 2017-09-05
Payer: COMMERCIAL

## 2017-09-05 VITALS
HEART RATE: 80 BPM | TEMPERATURE: 98.1 F | HEIGHT: 61 IN | SYSTOLIC BLOOD PRESSURE: 128 MMHG | BODY MASS INDEX: 30.19 KG/M2 | WEIGHT: 159.9 LBS | DIASTOLIC BLOOD PRESSURE: 56 MMHG | OXYGEN SATURATION: 98 %

## 2017-09-05 DIAGNOSIS — E04.1 THYROID NODULE: Primary | ICD-10-CM

## 2017-09-05 DIAGNOSIS — E03.8 SUBCLINICAL HYPOTHYROIDISM: ICD-10-CM

## 2017-09-05 PROCEDURE — 99204 OFFICE O/P NEW MOD 45 MIN: CPT | Performed by: INTERNAL MEDICINE

## 2017-09-05 NOTE — PROGRESS NOTES
Name: Krystal Parra  Seen at the request of Tasha Winkler for thyroid nodule.   HPI:  Krystal Parra is a 77 year old female who presents for the evaluation of thyroid nodule .  In July 2017- when she was bending the neck - feels like something in throat.  So had US thyroid done which showed thyroid  Nodule as noted below.  Its is not painful  But more like discomfort.  Not tender to touch.    H/o thyroid nodule X 10 years back-- was seen by endo and had FNA and was benign per her report.   clinic. Records requested.    History of radiation exposure: NO  FH of thyroid problem: no thyroid Cancer  History of thyroid dysfunction: h/o subclinical hypothyroidism. Not on repalcement.   Palpitations:  No  Changes to hair or skin: No  Diarrhea/Constipation:No  Changes in vision:No  Dysphagia or Shortness of breath:No  Tremors:No  Difficulty sleeping:Yes: does not sleep well. Chronic problem.  Changes in weight: No  Wt Readings from Last 2 Encounters:   09/05/17 72.5 kg (159 lb 14.4 oz)   07/18/17 73 kg (161 lb)     PMH/PSH:  Past Medical History:   Diagnosis Date     Bone spur 4th toe Right      Lumbar disc herniation      Moderate aortic regurgitation 6/18/2016     Osteopenia      Pathologic fracture of distal radius and ulna     right in 2003, left 1992     Past Surgical History:   Procedure Laterality Date     C LIGATE FALLOPIAN TUBE,POSTPARTUM      Tubal Ligation     cataract  2011    bilateral     HC TOOTH EXTRACTION W/FORCEP       Family Hx:  Family History   Problem Relation Age of Onset     Cardiovascular Mother      Mild MI 80's     CEREBROVASCULAR DISEASE Father      DIABETES Father      Neurologic Disorder Sister      brain tumor     Lipids Sister      Hypertension Sister      GASTROINTESTINAL DISEASE Sister      diverticulitis     CANCER Sister      Breast cancer     Thyroid disease: No           Social Hx:  Social History     Social History     Marital status:      Spouse name: N/A  "    Number of children: 4     Years of education: N/A     Occupational History     Retired      Social History Main Topics     Smoking status: Passive Smoke Exposure - Never Smoker     Smokeless tobacco: Never Used      Comment:  smoked in house     Alcohol use No     Drug use: No     Sexual activity: Not Currently     Other Topics Concern     Parent/Sibling W/ Cabg, Mi Or Angioplasty Before 65f 55m? No     Social History Narrative          MEDICATIONS:  has a current medication list which includes the following prescription(s): trazodone, trazodone, ginkgo biloba, aspirin, OVER-THE-COUNTER, fish oil-omega-3 fatty acids, and vitamin d.    Review of Systems  10 point ROS neg other than the symptoms noted above in the HPI.    Physical Exam   VS: /56 (BP Location: Right arm, Patient Position: Chair, Cuff Size: Adult Regular)  Pulse 80  Temp 98.1  F (36.7  C) (Oral)  Ht 1.549 m (5' 1\")  Wt 72.5 kg (159 lb 14.4 oz)  SpO2 98%  BMI 30.21 kg/m2  GENERAL: AXOX3, NAD, well dressed, answering questions appropriately, appears stated age.  HEENT: OP clear, no LAD, no TM, non-tender, no exopthalmous, no proptosis, EOMI, no lig lag, no retraction  NECK: Thyroid normal in size, non tender, a small nodule on right side??  CV: RRR, no rubs, gallops, no murmurs  LUNGS: CTAB, no wheezes, rales, or ronchi  ABDOMEN: +BS  EXTREMITIES: no edema, +pulses, no rashes, no lesions  NEUROLOGY: CN grossly intact, + DTR upper and lower extremity, no tremors  MSK: grossly intact  SKIN: no rashes, no lesions    LABS:  TFTs:  ENDO THYROID LABS-[facility] Latest Ref Rng & Units 7/19/2017 1/15/2016   TSH 0.40 - 4.00 mU/L 5.42 (H) 5.68 (H)   T4 FREE 0.76 - 1.46 ng/dL 0.88 0.97     ENDO THYROID LABS-[facility] Latest Ref Rng & Units 4/20/2015 2/25/2014   TSH 0.40 - 4.00 mU/L 8.12 (H) 8.38 (H)   T4 FREE 0.76 - 1.46 ng/dL 0.91 0.78     Thyroid Ultrasound:  ULTRASOUND THYROID 7/20/2017 1:13 PM      HISTORY: Localized swelling, mass and lump, neck. "      FINDINGS: Thyroid ultrasound demonstrates a normal sized gland. The  right lobe measures 3.8 x 1.5 x 1.6 cm. The left lobe measures 3.1 x  1.2 x 0.9 cm. The isthmus is normal in thickness. Thyroid parenchyma  is mildly heterogeneous in echotexture.     Thyroid nodules as follows:   Right Lobe:   1. Slightly hypoechoic upper pole nodule measures 1.3 x 1.0 x 0.7 cm.  2. Cystic lesion lower pole with single calcification measures 0.8 x  0.7 x 0.6 cm and is most likely a colloid cyst.     Isthmus: None.     Left Lobe: None.         IMPRESSION: Normal-sized thyroid gland with hypoechoic upper pole  right lobe nodule. Cystic lesion lower pole right thyroid lobe is of  doubtful clinical significance.    All pertinent notes, labs, and images personally reviewed by me.     A/P  Ms.Marie DENILSON Parra is a 77 year old here for the evaluation of thyroid nodule:  #1 Thyroid Nodule:  Thyroid nodules are common and are frequently benign. Data suggest that the prevalence of palpable thyroid nodules is 3% to 7% in [place]; the prevalence is as high as 50% based on ultrasonography (US) or autopsy data. All patients with a palpable thyroid nodule, however, should undergo US examination. US-guided FNA (US-FNA) is recommended for nodules ?10 mm; US-FNA is suggested for nodules <10 mm only if clinical information or US features are suspicious.  The frequency of thyroid nodules in general population was discussed. Also discussed possibility of malignancy, potential for thyroid autonomy. Discussed possible compressive symptoms and signs to watch for.  Discussed possible outcomes of biopsy including possible benign, possible malignancy and possible AUS. If AUS indication for molecular marker testing.  No h/o radiation  No FH of thyroid Cancer  As noted about thyroid ultrasound showing two small nodules on the right side of thyroid gland.  Nodule #1-solid, hypoechoic, taller than wide-- recommend biopsy of this nodule.  Nodule  #2-subcentimeter and cystic.  We'll continue to monitor this.    #2 subclinical hypothyroidism:  TPO negative.  Clinically looks euthyroid.  Not on replacement.  -- Continue to monitor    More than 50% of the time spent with Ms. Parra on counseling / coordinating her care.Total appointment time was 25 minutes.      Follow-up:  2 weeks after biopsy.    Amy Hernandez MD  Endocrinology   [facility]    Cc: Tasha Winkler

## 2017-09-05 NOTE — NURSING NOTE
"Chief Complaint   Patient presents with     New Patient     Thyroid Nodule        Initial /56 (BP Location: Right arm, Patient Position: Chair, Cuff Size: Adult Regular)  Pulse 80  Temp 98.1  F (36.7  C) (Oral)  Ht 5' 1\" (1.549 m)  Wt 159 lb 14.4 oz (72.5 kg)  SpO2 98%  BMI 30.21 kg/m2 Estimated body mass index is 30.21 kg/(m^2) as calculated from the following:    Height as of this encounter: 5' 1\" (1.549 m).    Weight as of this encounter: 159 lb 14.4 oz (72.5 kg).  Medication Reconciliation: complete   Avril Garcia MA    \  "

## 2017-09-05 NOTE — MR AVS SNAPSHOT
After Visit Summary   2017    Krystal Parra    MRN: [id number]           Patient Information     Date Of Birth          1940        Visit Information        Provider Department      2017 1:00 PM Amy Hernandez MD [facility]        Today's Diagnoses     Thyroid nodule    -  1    Subclinical hypothyroidism          Care Instructions    [facility]   Dr Hernandez, Endocrinology Department      [facility]   [address] [address]  Appointment [phone]  Fax: [phone]   Monday and Tuesday         [facility]   [address] [place] [address]  Appointment [phone]  Fax: [phone]  Wednesday and Thursday            [facility] radiology scheduleing  [phone]   [facility] Radiology scheduling  [phone]     Please call and schedule the recommended test as discussed in clinic visit. These are the numbers to call.    Follow up in 2 weeks after biopsy.          Follow-ups after your visit        Future tests that were ordered for you today     Open Future Orders        Priority Expected Expires Ordered    US Biopsy Thyroid Fine Needle Aspiration Routine  2018            Who to contact     If you have questions or need follow up information about today's clinic visit or your schedule please contact [facility] directly at [phone].  Normal or non-critical lab and imaging results will be communicated to you by MyChart, letter or phone within 4 business days after the clinic has received the results. If you do not hear from us within 7 days, please contact the clinic through MyChart or phone. If you have a critical or abnormal lab result, we will notify you by phone as soon as possible.  Submit refill requests through [employer] or call your pharmacy and they will forward the refill request  "to us. Please allow 3 business days for your refill to be completed.          Additional Information About Your Visit        [employer] Information     [employer] lets you send messages to your doctor, view your test results, renew your prescriptions, schedule appointments and more. To sign up, go to [email or web address] . Click on \"Log in\" on the left side of the screen, which will take you to the Welcome page. Then click on \"Sign up Now\" on the right side of the page.     You will be asked to enter the access code listed below, as well as some personal information. Please follow the directions to create your username and password.     Your access code is: [email or web address]  Expires: 10/16/2017 12:00 PM     Your access code will  in 90 days. If you need help or a new code, please call your [facility] clinic or [phone].        Care EveryWhere ID     This is your [facility] EveryWhere ID. This could be used by other organizations to access your [facility] medical records  [id number]        Your Vitals Were     Pulse Temperature Height Pulse Oximetry BMI (Body Mass Index)       80 98.1  F (36.7  C) (Oral) 1.549 m (5' 1\") 98% 30.21 kg/m2        Blood Pressure from Last 3 Encounters:   17 128/56   17 126/80   17 122/80    Weight from Last 3 Encounters:   17 72.5 kg (159 lb 14.4 oz)   17 73 kg (161 lb)   17 73 kg (161 lb)               Primary Care Provider Office Phone # Fax #    Tasha Winkler [phone] [address] [place] [address]        Equal Access to Services     : Roslyn Vicente, mariaelena barnett, brett mendez, sury garcia. So [facility] [phone].    ATENCIÓN: Si habla español, tiene a freire disposición servicios gratuitos de asistencia lingüística. Llame al [phone].    We comply with applicable federal civil rights laws and [place] laws. We do not discriminate on " the basis of race, color, national origin, age, disability sex, sexual orientation or gender identity.            Thank you!     Thank you for choosing [facility]  for your care. Our goal is always to provide you with excellent care. Hearing back from our patients is one way we can continue to improve our services. Please take a few minutes to complete the written survey that you may receive in the mail after your visit with us. Thank you!             Your Updated Medication List - Protect others around you: Learn how to safely use, store and throw away your medicines at www.disposemymeds.org.          This list is accurate as of: 9/5/17  1:24 PM.  Always use your most recent med list.                   Brand Name Dispense Instructions for use Diagnosis    aspirin 81 MG EC tablet      Take 1 tablet (81 mg) by mouth daily    Transient cerebral ischemia, unspecified type       fish oil-omega-3 fatty acids 1000 MG capsule      Take 2 g by mouth 2 times daily        GINKOBA PO           OVER-THE-COUNTER      Take 1 tablet by mouth daily (Melaleuca vitamins)        * traZODone 100 MG tablet    DESYREL    30 tablet    Take 1 tablet (100 mg) by mouth nightly as needed for sleep    Persistent insomnia       * traZODone 50 MG tablet    DESYREL    180 tablet    Take 2 tablets (100 mg) by mouth nightly as needed for sleep    Persistent insomnia       vitamin D 2000 UNITS Caps      Take 1 tablet by mouth daily.        * Notice:  This list has 2 medication(s) that are the same as other medications prescribed for you. Read the directions carefully, and ask your doctor or other care provider to review them with you.

## 2017-09-05 NOTE — PATIENT INSTRUCTIONS
[facility]   Dr Hernandez, Endocrinology Department      [facility]   [address] [address]  Appointment [phone]  Fax: [phone]   Monday and Tuesday         [place] [address] [place] [address]  Appointment [phone]  Fax: [phone]  Wednesday and Thursday            [facility] radiology scheduleing  [phone]   [facility] Radiology scheduling  [phone]     Please call and schedule the recommended test as discussed in clinic visit. These are the numbers to call.    Follow up in 2 weeks after biopsy.

## 2017-09-07 ENCOUNTER — TELEPHONE (OUTPATIENT)
Dept: GENERAL RADIOLOGY | Facility: CLINIC | Age: 77
End: 2017-09-07

## 2017-09-13 ENCOUNTER — HOSPITAL ENCOUNTER (OUTPATIENT)
Dept: ULTRASOUND IMAGING | Facility: CLINIC | Age: 77
Discharge: HOME | End: 2017-09-13
Attending: INTERNAL MEDICINE | Admitting: INTERNAL MEDICINE
Payer: MEDICARE

## 2017-09-13 DIAGNOSIS — E04.1 THYROID NODULE: ICD-10-CM

## 2017-09-13 PROCEDURE — 88173 CYTOPATH EVAL FNA REPORT: CPT | Mod: 26 | Performed by: INTERNAL MEDICINE

## 2017-09-13 PROCEDURE — 00000102 ZZHCL STATISTIC CYTO WRIGHT STAIN TC: Performed by: INTERNAL MEDICINE

## 2017-09-13 PROCEDURE — 25000125 ZZHC RX 250: Performed by: RADIOLOGY

## 2017-09-13 PROCEDURE — 88173 CYTOPATH EVAL FNA REPORT: CPT | Performed by: INTERNAL MEDICINE

## 2017-09-13 PROCEDURE — 76942 ECHO GUIDE FOR BIOPSY: CPT

## 2017-09-13 RX ADMIN — LIDOCAINE HYDROCHLORIDE 3 ML: 10 INJECTION, SOLUTION EPIDURAL; INFILTRATION; INTRACAUDAL; PERINEURAL at 13:06

## 2017-09-13 NOTE — PROCEDURES
RADIOLOGY PROCEDURE NOTE  Patient name: Krystal Parra  MRN: [id number]  : 1940    Pre-procedure diagnosis:  Right thyroid nodule  Post-procedure diagnosis: Same    Procedure Date/Time: 2017  1:20 PM  Procedure: US guided right thyroid nodule FNA.  3 samples obtained and sent to lab for evaluation.  No complications.  Tolerated well.  Estimated blood loss: < 5 ml  Specimen(s) collected with description: 3 FNA samples  The patient tolerated the procedure well with no immediate complications.  Significant findings:  Please see above.    See imaging dictation for procedural details.    Provider name: Gildardo Vides  Assistant(s):None

## 2017-09-13 NOTE — PROGRESS NOTES
Right thyroid nodule biopsy completed per Dr. Vides without difficulty, patient tolerated well. All discharge criteria were met and patient discharged to home ambulatory in stable condition by self.

## 2017-09-15 LAB — COPATH REPORT: NORMAL

## 2017-09-18 NOTE — PROGRESS NOTES
Labs noted. Will discuss in next clinic visit.9/19/17  FNA c/w benign pathology.  This is reassuring.

## 2017-09-19 ENCOUNTER — OFFICE VISIT (OUTPATIENT)
Dept: ENDOCRINOLOGY | Facility: CLINIC | Age: 77
End: 2017-09-19
Payer: COMMERCIAL

## 2017-09-19 VITALS
TEMPERATURE: 98.1 F | BODY MASS INDEX: 29.59 KG/M2 | HEART RATE: 76 BPM | WEIGHT: 156.7 LBS | SYSTOLIC BLOOD PRESSURE: 114 MMHG | DIASTOLIC BLOOD PRESSURE: 72 MMHG | OXYGEN SATURATION: 99 % | HEIGHT: 61 IN

## 2017-09-19 DIAGNOSIS — E04.1 THYROID NODULE: Primary | ICD-10-CM

## 2017-09-19 DIAGNOSIS — E03.8 SUBCLINICAL HYPOTHYROIDISM: ICD-10-CM

## 2017-09-19 PROCEDURE — 99213 OFFICE O/P EST LOW 20 MIN: CPT | Performed by: INTERNAL MEDICINE

## 2017-09-19 NOTE — PROGRESS NOTES
Name: Krystal Parra  Seen at the request of Tasha Winkler for thyroid nodule.   HPI:  Krystal Parra is a 77 year old female who presents for the evaluation of thyroid nodule .  In July 2017- when she was bending the neck - feels like something in throat.  So had US thyroid done which showed thyroid  Nodule as noted below.  Its is not painful  But more like discomfort.  Not tender to touch.    H/o thyroid nodule X 10 years back-- was seen by endo and had FNA and was benign per her report.   clinic. Records requested.    S/p FNA 2017- right nodule- c/w benign finding as noted below.    History of radiation exposure: NO  FH of thyroid problem: no thyroid Cancer  History of thyroid dysfunction: h/o subclinical hypothyroidism. Not on repalcement.   Palpitations:  No  Changes to hair or skin: No  Diarrhea/Constipation:No  Changes in vision:No  Dysphagia or Shortness of breath:No  Tremors:No  Difficulty sleeping:Yes: does not sleep well. Chronic problem.  Changes in weight: No  Wt Readings from Last 2 Encounters:   09/19/17 71.1 kg (156 lb 11.2 oz)   09/05/17 72.5 kg (159 lb 14.4 oz)     PMH/PSH:  Past Medical History:   Diagnosis Date     Bone spur 4th toe Right      Lumbar disc herniation      Moderate aortic regurgitation 6/18/2016     Osteopenia      Pathologic fracture of distal radius and ulna     right in 2003, left 1992     Past Surgical History:   Procedure Laterality Date     C LIGATE FALLOPIAN TUBE,POSTPARTUM      Tubal Ligation     cataract  2011    bilateral     HC TOOTH EXTRACTION W/FORCEP       Family Hx:  Family History   Problem Relation Age of Onset     Cardiovascular Mother      Mild MI 80's     CEREBROVASCULAR DISEASE Father      DIABETES Father      Neurologic Disorder Sister      brain tumor     Lipids Sister      Hypertension Sister      GASTROINTESTINAL DISEASE Sister      diverticulitis     CANCER Sister      Breast cancer     Thyroid disease: No           Social Hx:  Social  "History     Social History     Marital status:      Spouse name: N/A     Number of children: 4     Years of education: N/A     Occupational History     Retired      Social History Main Topics     Smoking status: Passive Smoke Exposure - Never Smoker     Smokeless tobacco: Never Used      Comment:  smoked in house     Alcohol use No     Drug use: No     Sexual activity: Not Currently     Other Topics Concern     Parent/Sibling W/ Cabg, Mi Or Angioplasty Before 65f 55m? No     Social History Narrative          MEDICATIONS:  has a current medication list which includes the following prescription(s): trazodone, trazodone, ginkgo biloba, aspirin, OVER-THE-COUNTER, fish oil-omega-3 fatty acids, and vitamin d.    Review of Systems  10 point ROS neg other than the symptoms noted above in the HPI.    Physical Exam   VS: /72 (BP Location: Right arm, Patient Position: Chair, Cuff Size: Adult Regular)  Pulse 76  Temp 98.1  F (36.7  C) (Oral)  Ht 1.549 m (5' 1\")  Wt 71.1 kg (156 lb 11.2 oz)  SpO2 99%  BMI 29.61 kg/m2  GENERAL: AXOX3, NAD, well dressed, answering questions appropriately, appears stated age.  HEENT: OP clear, no LAD, no TM, non-tender, no exopthalmous, no proptosis, EOMI, no lig lag, no retraction  NECK: Thyroid normal in size, non tender, a small nodule on right side??  CV: RRR, no rubs, gallops, no murmurs  LUNGS: CTAB, no wheezes, rales, or ronchi  ABDOMEN: +BS  EXTREMITIES: no edema, +pulses, no rashes, no lesions  NEUROLOGY: CN grossly intact, + DTR upper and lower extremity, no tremors  MSK: grossly intact  SKIN: no rashes, no lesions    LABS:  TFTs:  ENDO THYROID LABS-[facility] Latest Ref Rng & Units 7/19/2017 1/15/2016   TSH 0.40 - 4.00 mU/L 5.42 (H) 5.68 (H)   T4 FREE 0.76 - 1.46 ng/dL 0.88 0.97     ENDO THYROID LABS-[facility] Latest Ref Rng & Units 4/20/2015 2/25/2014   TSH 0.40 - 4.00 mU/L 8.12 (H) 8.38 (H)   T4 FREE 0.76 - 1.46 ng/dL 0.91 0.78     Thyroid Ultrasound:  ULTRASOUND THYROID " 7/20/2017 1:13 PM      HISTORY: Localized swelling, mass and lump, neck.      FINDINGS: Thyroid ultrasound demonstrates a normal sized gland. The  right lobe measures 3.8 x 1.5 x 1.6 cm. The left lobe measures 3.1 x  1.2 x 0.9 cm. The isthmus is normal in thickness. Thyroid parenchyma  is mildly heterogeneous in echotexture.     Thyroid nodules as follows:   Right Lobe:   1. Slightly hypoechoic upper pole nodule measures 1.3 x 1.0 x 0.7 cm.  2. Cystic lesion lower pole with single calcification measures 0.8 x  0.7 x 0.6 cm and is most likely a colloid cyst.     Isthmus: None.     Left Lobe: None.         IMPRESSION: Normal-sized thyroid gland with hypoechoic upper pole  right lobe nodule. Cystic lesion lower pole right thyroid lobe is of  doubtful clinical significance.    FNA 2017:  SPECIMEN/STAIN PROCESS:   FNA-thyroid, right thyroid nodule        Pap-Cyto x 5, Richardson's stain-cyto x 1     ----------------------------------------------------------------     CYTOLOGIC INTERPRETATION:     FNA-thyroid, right thyroid nodule:   Benign.  See description.   Consistent with a benign nodule (includes adenomatoid nodule, colloid   nodule, etc.).     The [place] implied risk of malignancy and recommended clinical   management:   Benign has a 0-3% risk of malignancy, recommended management is clinical   follow-up.     Specimen Adequacy: Satisfactory for evaluation.     All pertinent notes, labs, and images personally reviewed by me.     A/P  Ms.Marie DENILSON Parra is a 77 year old here for the evaluation of thyroid nodule:  #1 Thyroid Nodule:  Thyroid nodules are common and are frequently benign. Data suggest that the prevalence of palpable thyroid nodules is 3% to 7% in [place]; the prevalence is as high as 50% based on ultrasonography (US) or autopsy data. All patients with a palpable thyroid nodule, however, should undergo US examination. US-guided FNA (US-FNA) is recommended for nodules ?10 mm; US-FNA is suggested for  nodules <10 mm only if clinical information or US features are suspicious.  The frequency of thyroid nodules in general population was discussed. Also discussed possibility of malignancy, potential for thyroid autonomy. Discussed possible compressive symptoms and signs to watch for.  Discussed possible outcomes of biopsy including possible benign, possible malignancy and possible AUS. If AUS indication for molecular marker testing.  No h/o radiation  No FH of thyroid Cancer  As noted about thyroid ultrasound showing two small nodules on the right side of thyroid gland.  Nodule #1-solid, hypoechoic, taller than wide-- s/p FNA 2017- benign  Nodule #2-subcentimeter and cystic.  We'll continue to monitor this.  Will recommend repeat thyroid US in 1-2 year to assess for interval change in size and characteristics of thyroid nodules.    #2 subclinical hypothyroidism:  TPO negative.  Clinically looks euthyroid.  Not on replacement.  -- Continue to monitor    More than 50% of the time spent with Ms. Parra on counseling / coordinating her care.Total appointment time was 15 minutes.      Follow-up:  lizet Hernandez MD  Endocrinology   [facility]    Cc: Tasha Winkler

## 2017-09-19 NOTE — PATIENT INSTRUCTIONS
[facility]   Dr Hernandez, Endocrinology Department      [facility]   [address]  [place] [address]  Appointment [phone]  Fax: [phone]  [place]: Monday and Tuesday         [place] [address]  [place] [address]  Appointment [phone]  Fax: [phone]  [place]: Wednesday and Thursday            Will recommend repeat thyroid US in 1-2 year to assess for interval change in size and characteristics of thyroid nodules.

## 2017-09-19 NOTE — MR AVS SNAPSHOT
After Visit Summary   2017    Krystal Parra    MRN: [id number]           Patient Information     Date Of Birth          1940        Visit Information        Provider Department      2017 11:30 AM Amy Hernandez MD [facility]        Today's Diagnoses     Thyroid nodule    -  1    Subclinical hypothyroidism          Care Instructions    [facility] & [place] locations   Dr Hernandez, Endocrinology Department      [facility]   [address]  [place] [address]  Appointment [phone]  Fax: [phone]  [place]: Monday and Tuesday         [facility]   [address]  [place] [address]  Appointment [phone]  Fax: [phone]  [place]: Wednesday and Thursday            Will recommend repeat thyroid US in 1-2 year to assess for interval change in size and characteristics of thyroid nodules.                 Follow-ups after your visit        Who to contact     If you have questions or need follow up information about today's clinic visit or your schedule please contact [facility] directly at [phone].  Normal or non-critical lab and imaging results will be communicated to you by MyChart, letter or phone within 4 business days after the clinic has received the results. If you do not hear from us within 7 days, please contact the clinic through MyChart or phone. If you have a critical or abnormal lab result, we will notify you by phone as soon as possible.  Submit refill requests through Eloquii or call your pharmacy and they will forward the refill request to us. Please allow 3 business days for your refill to be completed.          Additional Information About Your Visit        Nuevolutionhart Information     Eloquii lets you send messages to your doctor, view your test results, renew your prescriptions, schedule appointments and more. To sign  "up, go to [email or web address] . Click on \"Log in\" on the left side of the screen, which will take you to the Welcome page. Then click on \"Sign up Now\" on the right side of the page.     You will be asked to enter the access code listed below, as well as some personal information. Please follow the directions to create your username and password.     Your access code is: [email or web address]  Expires: 10/16/2017 12:00 PM     Your access code will  in 90 days. If you need help or a new code, please call your [place] clinic or [phone].        Care EveryWhere ID     This is your Care EveryWhere ID. This could be used by other organizations to access your [place] medical records  [id number]        Your Vitals Were     Pulse Temperature Height Pulse Oximetry BMI (Body Mass Index)       76 98.1  F (36.7  C) (Oral) 1.549 m (5' 1\") 99% 29.61 kg/m2        Blood Pressure from Last 3 Encounters:   17 114/72   17 128/56   17 126/80    Weight from Last 3 Encounters:   17 71.1 kg (156 lb 11.2 oz)   17 72.5 kg (159 lb 14.4 oz)   17 73 kg (161 lb)              Today, you had the following     No orders found for display       Primary Care Provider Office Phone # Fax #    Tasha Winkler [phone] [address] [place] [address]        Equal Access to Services     [facility]: Hadii yemi schmidt hadasho Socheyanne, waaxda luqadaha, qaybta kaalmada sury mendez . So [facility] [phone].    ATENCIÓN: Si habla español, tiene a freire disposición servicios gratuitos de asistencia lingüística. Llame al [phone].    We comply with applicable federal civil rights laws and [place] laws. We do not discriminate on the basis of race, color, national origin, age, disability sex, sexual orientation or gender identity.            Thank you!     Thank you for choosing [facility] [place]  for your care. Our goal is always to " provide you with excellent care. Hearing back from our patients is one way we can continue to improve our services. Please take a few minutes to complete the written survey that you may receive in the mail after your visit with us. Thank you!             Your Updated Medication List - Protect others around you: Learn how to safely use, store and throw away your medicines at www.disposemymeds.org.          This list is accurate as of: 9/19/17 11:48 AM.  Always use your most recent med list.                   Brand Name Dispense Instructions for use Diagnosis    aspirin 81 MG EC tablet      Take 1 tablet (81 mg) by mouth daily    Transient cerebral ischemia, unspecified type       fish oil-omega-3 fatty acids 1000 MG capsule      Take 2 g by mouth 2 times daily        GINKOBA PO           OVER-THE-COUNTER      Take 1 tablet by mouth daily (Melaleuca vitamins)        * traZODone 100 MG tablet    DESYREL    30 tablet    Take 1 tablet (100 mg) by mouth nightly as needed for sleep    Persistent insomnia       * traZODone 50 MG tablet    DESYREL    180 tablet    Take 2 tablets (100 mg) by mouth nightly as needed for sleep    Persistent insomnia       vitamin D 2000 UNITS Caps      Take 1 tablet by mouth daily.        * Notice:  This list has 2 medication(s) that are the same as other medications prescribed for you. Read the directions carefully, and ask your doctor or other care provider to review them with you.

## 2017-09-19 NOTE — NURSING NOTE
"Chief Complaint   Patient presents with     RECHECK     follow up thyroid nodule and hypothyroidism        Initial /72 (BP Location: Right arm, Patient Position: Chair, Cuff Size: Adult Regular)  Pulse 76  Temp 98.1  F (36.7  C) (Oral)  Ht 1.549 m (5' 1\")  Wt 71.1 kg (156 lb 11.2 oz)  SpO2 99%  BMI 29.61 kg/m2 Estimated body mass index is 29.61 kg/(m^2) as calculated from the following:    Height as of this encounter: 1.549 m (5' 1\").    Weight as of this encounter: 71.1 kg (156 lb 11.2 oz).  Medication Reconciliation: complete     ENDOCRINOLOGY INTAKE FORM    Patient Name:  Krystal Parra  :  1940    Is patient Diabetic?   No  Does patient have non-diabetic or other endocrine issues?  Yes: thyroid nodule and subclinical hypothyroidism     Vitals: /72 (BP Location: Right arm, Patient Position: Chair, Cuff Size: Adult Regular)  Pulse 76  Temp 98.1  F (36.7  C) (Oral)  Ht 1.549 m (5' 1\")  Wt 71.1 kg (156 lb 11.2 oz)  SpO2 99%  BMI 29.61 kg/m2  BMI= Body mass index is 29.61 kg/(m^2).    Flu vaccine:  No  Pneumonia vaccine:  Yes: 05    Smoking and Alcohol use:  Social History   Substance Use Topics     Smoking status: Passive Smoke Exposure - Never Smoker     Smokeless tobacco: Never Used      Comment:  smoked in house     Alcohol use No     Staff Signature:  Romelia Rose CMA ([facility])        "

## 2017-10-30 ENCOUNTER — DOCUMENTATION ONLY (OUTPATIENT)
Dept: ENDOCRINOLOGY | Facility: CLINIC | Age: 77
End: 2017-10-30

## 2017-10-30 NOTE — PROGRESS NOTES
Reviewed records from  clinic.  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  The nodule was unchanged in size.  The patient was opposed to any further fine needle aspiration biopsy.  Present plans are observation and the patient to return in one year.

## 2018-07-09 ENCOUNTER — TELEPHONE (OUTPATIENT)
Dept: PEDIATRICS | Facility: CLINIC | Age: 78
End: 2018-07-09

## 2018-07-09 DIAGNOSIS — E78.5 HYPERLIPIDEMIA LDL GOAL <100: ICD-10-CM

## 2018-07-09 DIAGNOSIS — E03.8 SUBCLINICAL HYPOTHYROIDISM: Primary | ICD-10-CM

## 2018-07-09 DIAGNOSIS — I10 BENIGN ESSENTIAL HYPERTENSION: ICD-10-CM

## 2018-07-09 DIAGNOSIS — E04.1 THYROID NODULE: ICD-10-CM

## 2018-07-09 NOTE — TELEPHONE ENCOUNTER
Patient calling requesting lab orders. She has a lab appointment scheduled for 7/19 and the following week she has an appointment with Dr Winkler and would like to review the labs at that appointment. She would like the yearly routine labs ordered that Dr Winkler recommends.

## 2018-07-19 ENCOUNTER — DOCUMENTATION ONLY (OUTPATIENT)
Dept: LAB | Facility: CLINIC | Age: 78
End: 2018-07-19

## 2018-07-19 DIAGNOSIS — E04.1 THYROID NODULE: ICD-10-CM

## 2018-07-19 DIAGNOSIS — I10 BENIGN ESSENTIAL HYPERTENSION: ICD-10-CM

## 2018-07-19 DIAGNOSIS — E03.8 SUBCLINICAL HYPOTHYROIDISM: ICD-10-CM

## 2018-07-19 DIAGNOSIS — E78.5 HYPERLIPIDEMIA LDL GOAL <100: ICD-10-CM

## 2018-07-19 LAB
ALBUMIN SERPL-MCNC: 3.2 G/DL (ref 3.4–5)
ALP SERPL-CCNC: 56 U/L (ref 40–150)
ALT SERPL W P-5'-P-CCNC: 25 U/L (ref 0–50)
ANION GAP SERPL CALCULATED.3IONS-SCNC: 5 MMOL/L (ref 3–14)
AST SERPL W P-5'-P-CCNC: 14 U/L (ref 0–45)
BILIRUB SERPL-MCNC: 0.5 MG/DL (ref 0.2–1.3)
BUN SERPL-MCNC: 23 MG/DL (ref 7–30)
CALCIUM SERPL-MCNC: 8.8 MG/DL (ref 8.5–10.1)
CHLORIDE SERPL-SCNC: 108 MMOL/L (ref 94–109)
CHOLEST SERPL-MCNC: 227 MG/DL
CO2 SERPL-SCNC: 29 MMOL/L (ref 20–32)
CREAT SERPL-MCNC: 0.8 MG/DL (ref 0.52–1.04)
GFR SERPL CREATININE-BSD FRML MDRD: 69 ML/MIN/1.7M2
GLUCOSE SERPL-MCNC: 96 MG/DL (ref 70–99)
HDLC SERPL-MCNC: 50 MG/DL
LDLC SERPL CALC-MCNC: 155 MG/DL
NONHDLC SERPL-MCNC: 177 MG/DL
POTASSIUM SERPL-SCNC: 4 MMOL/L (ref 3.4–5.3)
PROT SERPL-MCNC: 6.8 G/DL (ref 6.8–8.8)
SODIUM SERPL-SCNC: 142 MMOL/L (ref 133–144)
T4 FREE SERPL-MCNC: 0.85 NG/DL (ref 0.76–1.46)
TRIGL SERPL-MCNC: 111 MG/DL
TSH SERPL DL<=0.005 MIU/L-ACNC: 5.09 MU/L (ref 0.4–4)

## 2018-07-19 PROCEDURE — 84439 ASSAY OF FREE THYROXINE: CPT | Performed by: PEDIATRICS

## 2018-07-19 PROCEDURE — 80053 COMPREHEN METABOLIC PANEL: CPT | Performed by: PEDIATRICS

## 2018-07-19 PROCEDURE — 84443 ASSAY THYROID STIM HORMONE: CPT | Performed by: PEDIATRICS

## 2018-07-19 PROCEDURE — 85025 COMPLETE CBC W/AUTO DIFF WBC: CPT | Performed by: NURSE PRACTITIONER

## 2018-07-19 PROCEDURE — 80061 LIPID PANEL: CPT | Performed by: PEDIATRICS

## 2018-07-19 PROCEDURE — 36415 COLL VENOUS BLD VENIPUNCTURE: CPT | Performed by: PEDIATRICS

## 2018-07-19 PROCEDURE — 85027 COMPLETE CBC AUTOMATED: CPT | Performed by: PEDIATRICS

## 2018-07-19 NOTE — PROGRESS NOTES
When we run CBC, we noticed that patient's Lymphocyte percentage is 50.4, which is slightly higher than normal. Our protocol require us to check if you want to add differential.  Thank you,  Adelaida malave

## 2018-07-20 LAB
BASOPHILS # BLD AUTO: 0 10E9/L (ref 0–0.2)
BASOPHILS NFR BLD AUTO: 0.5 %
DIFFERENTIAL METHOD BLD: NORMAL
EOSINOPHIL # BLD AUTO: 0.4 10E9/L (ref 0–0.7)
EOSINOPHIL NFR BLD AUTO: 6.7 %
ERYTHROCYTE [DISTWIDTH] IN BLOOD BY AUTOMATED COUNT: 13.4 % (ref 10–15)
ERYTHROCYTE [DISTWIDTH] IN BLOOD BY AUTOMATED COUNT: NORMAL % (ref 10–15)
HCT VFR BLD AUTO: 36.4 % (ref 35–47)
HCT VFR BLD AUTO: NORMAL % (ref 35–47)
HGB BLD-MCNC: 12 G/DL (ref 11.7–15.7)
HGB BLD-MCNC: NORMAL G/DL (ref 11.7–15.7)
LYMPHOCYTES # BLD AUTO: 3.2 10E9/L (ref 0.8–5.3)
LYMPHOCYTES NFR BLD AUTO: 50.4 %
MCH RBC QN AUTO: 30.8 PG (ref 26.5–33)
MCH RBC QN AUTO: NORMAL PG (ref 26.5–33)
MCHC RBC AUTO-ENTMCNC: 33 G/DL (ref 31.5–36.5)
MCHC RBC AUTO-ENTMCNC: NORMAL G/DL (ref 31.5–36.5)
MCV RBC AUTO: 93 FL (ref 78–100)
MCV RBC AUTO: NORMAL FL (ref 78–100)
MONOCYTES # BLD AUTO: 0.6 10E9/L (ref 0–1.3)
MONOCYTES NFR BLD AUTO: 9.4 %
NEUTROPHILS # BLD AUTO: 2.1 10E9/L (ref 1.6–8.3)
NEUTROPHILS NFR BLD AUTO: 33 %
PLATELET # BLD AUTO: 207 10E9/L (ref 150–450)
PLATELET # BLD AUTO: NORMAL 10E9/L (ref 150–450)
RBC # BLD AUTO: 3.9 10E12/L (ref 3.8–5.2)
RBC # BLD AUTO: NORMAL 10E12/L (ref 3.8–5.2)
WBC # BLD AUTO: 6.4 10E9/L (ref 4–11)
WBC # BLD AUTO: NORMAL 10E9/L (ref 4–11)

## 2018-07-23 ENCOUNTER — OFFICE VISIT (OUTPATIENT)
Dept: PEDIATRICS | Facility: CLINIC | Age: 78
End: 2018-07-23
Payer: COMMERCIAL

## 2018-07-23 VITALS
OXYGEN SATURATION: 98 % | SYSTOLIC BLOOD PRESSURE: 118 MMHG | HEART RATE: 76 BPM | DIASTOLIC BLOOD PRESSURE: 76 MMHG | BODY MASS INDEX: 30.4 KG/M2 | TEMPERATURE: 98.3 F | HEIGHT: 61 IN | WEIGHT: 161 LBS

## 2018-07-23 DIAGNOSIS — E03.8 SUBCLINICAL HYPOTHYROIDISM: ICD-10-CM

## 2018-07-23 DIAGNOSIS — Z12.31 VISIT FOR SCREENING MAMMOGRAM: ICD-10-CM

## 2018-07-23 DIAGNOSIS — I35.1 MODERATE AORTIC REGURGITATION: ICD-10-CM

## 2018-07-23 DIAGNOSIS — Z78.0 ASYMPTOMATIC MENOPAUSAL STATE: ICD-10-CM

## 2018-07-23 DIAGNOSIS — Z00.00 MEDICARE ANNUAL WELLNESS VISIT, SUBSEQUENT: Primary | ICD-10-CM

## 2018-07-23 DIAGNOSIS — G47.00 PERSISTENT INSOMNIA: ICD-10-CM

## 2018-07-23 DIAGNOSIS — M85.80 OSTEOPENIA, UNSPECIFIED LOCATION: ICD-10-CM

## 2018-07-23 DIAGNOSIS — M51.26 DISPLACEMENT OF LUMBAR INTERVERTEBRAL DISC WITHOUT MYELOPATHY: ICD-10-CM

## 2018-07-23 DIAGNOSIS — I10 BENIGN ESSENTIAL HYPERTENSION: ICD-10-CM

## 2018-07-23 PROCEDURE — 99397 PER PM REEVAL EST PAT 65+ YR: CPT | Performed by: PEDIATRICS

## 2018-07-23 RX ORDER — TRAZODONE HYDROCHLORIDE 50 MG/1
100 TABLET, FILM COATED ORAL
Qty: 180 TABLET | Refills: 3 | Status: SHIPPED | OUTPATIENT
Start: 2018-07-23 | End: 2019-07-29

## 2018-07-23 NOTE — PROGRESS NOTES
SUBJECTIVE:   Krystal Parra is a 78 year old female who presents for Preventive Visit.      Are you in the first 12 months of your Medicare Part B coverage?  No    Healthy Habits:    Do you get at least three servings of calcium containing foods daily (dairy, green leafy vegetables, etc.)? yes    Amount of exercise or daily activities, outside of work: 2 day(s) per week    Problems taking medications regularly No    Medication side effects: possibly, tight muscles but could be related to back     Have you had an eye exam in the past two years? yes    Do you see a dentist twice per year? yes    Do you have sleep apnea, excessive snoring or daytime drowsiness?daytime drowsiness       Ability to successfully perform activities of daily living: Yes, no assistance needed    Home safety:  none identified     Hearing impairment: Yes, Difficulty following a conversation in a noisy restaurant or crowded room.    Fall risk:  Fallen 2 or more times in the past year?: No  Any fall with injury in the past year?: No        COGNITIVE SCREEN  1) Repeat 3 items (Leader, Season, Table)    2) Clock draw: NORMAL  3) 3 item recall: Recalls 3 objects  Results: 3 items recalled: COGNITIVE IMPAIRMENT LESS LIKELY    Mini-CogTM Copyright S Jean. Licensed by the author for use in [facility]; reprinted with permission (soparas@[facility]). All rights reserved.              Reviewed and updated as needed this visit by clinical staff  Tobacco  Allergies  Meds  Med Hx  Surg Hx  Fam Hx  Soc Hx        Reviewed and updated as needed this visit by Provider        Social History   Substance Use Topics     Smoking status: Passive Smoke Exposure - Never Smoker     Smokeless tobacco: Never Used      Comment:  smoked in house     Alcohol use No       If you drink alcohol do you typically have >3 drinks per day or >7 drinks per week? No                        Today's PHQ-2 Score:   PHQ-2 ( 1999 Pfizer) 7/23/2018 7/18/2017   Q1:  Little interest or pleasure in doing things 0 0   Q2: Feeling down, depressed or hopeless 0 0   PHQ-2 Score 0 0   Q1: Little interest or pleasure in doing things - Not at all   Q2: Feeling down, depressed or hopeless - Not at all   PHQ-2 Score - 0       Do you feel safe in your environment - Yes    Do you have a Health Care Directive?: No: Advance care planning reviewed with patient; information given to patient to review.    Current providers sharing in care for this patient include:   Patient Care Team:  Tasha Winkler MD as PCP - General (Internal Medicine)    The following health maintenance items are reviewed in Epic and correct as of today:  Health Maintenance   Topic Date Due     DEXA Q3 YR  05/15/2018     MAMMO Q2 YR  07/20/2018     TSH Q1 YEAR  07/19/2019     FALL RISK ASSESSMENT  07/23/2019     PHQ-2 Q1 YR  07/23/2019     ADVANCE DIRECTIVE PLANNING Q5 YRS  07/15/2020     LIPID MONITORING Q5 YEARS  07/19/2023     COLONOSCOPY Q10 YR  10/15/2023     TETANUS Q10 YR  07/18/2027     PNEUMOCOCCAL  Addressed        Hx of HTN - most recently, blood pressure has been well controlled    Going to [facility] twice per week - sometimes makes it there 3 times.  Likes the recumbent bike - walking is difficult due to chronic back pain.      Lumbar disc herniation with associated numbness in the left leg - chronic.    Hx of TIA - taking aspirin daily and has had no new neurologic symptoms.    Thyroid nodule - repeat ultrasound in 1-2 years.  REviewed recent endocrinology visit.  Patient hasn't noticed any changes.      Insomnia - stable on trazodone.  Wakes up once at night to go to bathroom, falls back asleep relatively easily.   No side effects related to trazodone use at present dose.    Still gets hot flashes and those wake her up.    No new cardiac symptoms.      Hx of moderate AR - patient wishes to defer repeat echocardiogram until next year - she is aware of cardiac symptoms for which to watch and will keep me  "updated.      ROS:  Constitutional, HEENT, cardiovascular, pulmonary, GI, , musculoskeletal, neuro, skin, endocrine and psych systems are negative, except as otherwise noted.    OBJECTIVE:   /76 (BP Location: Right arm, Patient Position: Right side, Cuff Size: Adult Regular)  Pulse 76  Temp 98.3  F (36.8  C) (Tympanic)  Ht 5' 1\" (1.549 m)  Wt 161 lb (73 kg)  SpO2 98%  BMI 30.42 kg/m2 Estimated body mass index is 30.42 kg/(m^2) as calculated from the following:    Height as of this encounter: 5' 1\" (1.549 m).    Weight as of this encounter: 161 lb (73 kg).  EXAM:   GENERAL: healthy, alert and no distress  EYES: Eyes grossly normal to inspection, PERRL and conjunctivae and sclerae normal  HENT: ear canals and TM's normal, nose and mouth without ulcers or lesions  NECK: no adenopathy, no asymmetry, masses, or scars and thyroid normal to palpation  RESP: lungs clear to auscultation - no rales, rhonchi or wheezes  CV: regular rate and rhythm, normal S1 S2, no S3 or S4, no murmur, click or rub, no peripheral edema and peripheral pulses strong  ABDOMEN: soft, nontender, no hepatosplenomegaly, no masses and bowel sounds normal  MS: no gross musculoskeletal defects noted, no edema  SKIN: no suspicious lesions or rashes  NEURO: Normal strength and tone, mentation intact and speech normal  PSYCH: mentation appears normal, affect normal/bright    Diagnostic Test Results:  Reviewed with patient results from last week    ASSESSMENT / PLAN:       ICD-10-CM    1. Medicare annual wellness visit, subsequent Z00.00 Reviewed labs today   2. Lumbar disc herniation M51.26 Stable symptoms, follow   3. Subclinical hypothyroidism E03.9 TSH stable on recheck, clinically euthyroid - due for follow up thyroid nodule US next year   4. Moderate aortic regurgitation - due for repeat echocardiogram 8/2018 I35.1 Patient wishes to defer echocardiogram until next year to follow AR - no new cardiac symptoms.  Reviewed symptoms for which " "to seek medical attention in the intermim   5. Benign essential hypertension I10 Well controlled off medications, follow   6. Persistent insomnia G47.00 traZODone (DESYREL) 50 MG tablet  Well controlled, continue current medications  Discussed risks of medication   7. Visit for screening mammogram Z12.31 *MA Screening Digital Bilateral   8. Osteopenia, unspecified location M85.80 DX Hip/Pelvis/Spine   9. Asymptomatic menopausal state Z78.0 DX Hip/Pelvis/Spine       End of Life Planning:  Patient currently has an advanced directive: Yes.  Practitioner is supportive of decision.    COUNSELING:  Special attention given to:       Regular exercise       Healthy diet/nutrition       Vision screening       Immunizations    Declined: Pneumococcal and Zoster due to Concerns about side effects/safety               Osteoporosis Prevention/Bone Health       Colon cancer screening    BP Readings from Last 1 Encounters:   07/23/18 118/76     Estimated body mass index is 30.42 kg/(m^2) as calculated from the following:    Height as of this encounter: 5' 1\" (1.549 m).    Weight as of this encounter: 161 lb (73 kg).      Weight management plan: Discussed healthy diet and exercise guidelines and patient will follow up in 12 months in clinic to re-evaluate.     reports that she is a non-smoker but has been exposed to tobacco smoke. She has never used smokeless tobacco.      Appropriate preventive services were discussed with this patient, including applicable screening as appropriate for cardiovascular disease, diabetes, osteopenia/osteoporosis, and glaucoma.  As appropriate for age/gender, discussed screening for colorectal cancer, prostate cancer, breast cancer, and cervical cancer. Checklist reviewing preventive services available has been given to the patient.    Reviewed patients plan of care and provided an AVS. The Intermediate Care Plan ( asthma action plan, low back pain action plan, and migraine action plan) for Krystal meets " the Care Plan requirement. This Care Plan has been established and reviewed with the Patient.    Counseling Resources:  ATP IV Guidelines  Pooled Cohorts Equation Calculator  Breast Cancer Risk Calculator  FRAX Risk Assessment  ICSI Preventive Guidelines  Dietary Guidelines for Americans, 2010  USDA's MyPlate  ASA Prophylaxis  Lung CA Screening    Tasha Winkler MD  [facility]

## 2018-07-23 NOTE — MR AVS SNAPSHOT
After Visit Summary   7/23/2018    Krystal Parra    MRN: [id number]           Patient Information     Date Of Birth          1940        Visit Information        Provider Department      7/23/2018 11:00 AM Tasha Winkler MD [facility]        Today's Diagnoses     Lumbar disc herniation    -  1    Subclinical hypothyroidism        Moderate aortic regurgitation - due for repeat echocardiogram 8/2018        Benign essential hypertension        Persistent insomnia        Visit for screening mammogram        Osteopenia, unspecified location        Asymptomatic menopausal state          Care Instructions    Schedule your mammogram and bone density test at your convenience - my schedulers can help with this        Preventive Health Recommendations    Female Ages 65 +    Yearly exam:     See your health care provider every year in order to  o Review health changes.   o Discuss preventive care.    o Review your medicines if your doctor has prescribed any.      You no longer need a yearly Pap test unless you've had an abnormal Pap test in the past 10 years. If you have vaginal symptoms, such as bleeding or discharge, be sure to talk with your provider about a Pap test.      Every 1 to 2 years, have a mammogram.  If you are over 69, talk with your health care provider about whether or not you want to continue having screening mammograms.      Every 10 years, have a colonoscopy. Or, have a yearly FIT test (stool test). These exams will check for colon cancer.       Have a cholesterol test every 5 years, or more often if your doctor advises it.       Have a diabetes test (fasting glucose) every three years. If you are at risk for diabetes, you should have this test more often.       At age 65, have a bone density scan (DEXA) to check for osteoporosis (brittle bone disease).    Shots:    Get a flu shot each year.    Get a tetanus shot every 10 years.    Talk to your doctor about your  pneumonia vaccines. There are now two you should receive - Pneumovax (PPSV 23) and Prevnar (PCV 13).    Talk to your pharmacist about the shingles vaccine.    Talk to your doctor about the hepatitis B vaccine.    Nutrition:     Eat at least 5 servings of fruits and vegetables each day.      Eat whole-grain bread, whole-wheat pasta and brown rice instead of white grains and rice.      Get adequate Calcium and Vitamin D.     Lifestyle    Exercise at least 150 minutes a week (30 minutes a day, 5 days a week). This will help you control your weight and prevent disease.      Limit alcohol to one drink per day.      No smoking.       Wear sunscreen to prevent skin cancer.       See your dentist twice a year for an exam and cleaning.      See your eye doctor every 1 to 2 years to screen for conditions such as glaucoma, macular degeneration and cataracts.          Follow-ups after your visit        Follow-up notes from your care team     Return in about 1 year (around 7/23/2019) for Routine Visit.      Future tests that were ordered for you today     Open Future Orders        Priority Expected Expires Ordered    *MA Screening Digital Bilateral Routine  7/23/2019 7/23/2018    DX Hip/Pelvis/Spine Routine  7/23/2019 7/23/2018            Who to contact     If you have questions or need follow up information about today's clinic visit or your schedule please [facility] directly at [phone].  Normal or non-critical lab and imaging results will be communicated to you by MyChart, letter or phone within 4 business days after the clinic has received the results. If you do not hear from us within 7 days, please contact the clinic through MyChart or phone. If you have a critical or abnormal lab result, we will notify you by phone as soon as possible.  Submit refill requests through AisleBuyer or call your pharmacy and they will forward the refill request to us. Please allow 3 business days for your refill to be  "completed.          Additional Information About Your Visit        Disrupt CKharGlobal Locate Information     Rethink Books lets you send messages to your doctor, view your test results, renew your prescriptions, schedule appointments and more. To sign up, go to [email or web address] . Click on \"Log in\" on the left side of the screen, which will take you to the Welcome page. Then click on \"Sign up Now\" on the right side of the page.     You will be asked to enter the access code listed below, as well as some personal information. Please follow the directions to create your username and password.     Your access code is: [email or web address]  Expires: 10/21/2018 11:38 AM     Your access code will  in 90 days. If you need help or a new code, please call your [place] clinic or [phone].        Care EveryWhere ID     This is your Care EveryWhere ID. This could be used by other organizations to access your [place] medical records  [id number]        Your Vitals Were     Pulse Temperature Height Pulse Oximetry BMI (Body Mass Index)       76 98.3  F (36.8  C) (Tympanic) 5' 1\" (1.549 m) 98% 30.42 kg/m2        Blood Pressure from Last 3 Encounters:   18 118/76   17 114/72   17 128/56    Weight from Last 3 Encounters:   18 161 lb (73 kg)   17 156 lb 11.2 oz (71.1 kg)   17 159 lb 14.4 oz (72.5 kg)                 Where to get your medicines      These medications were sent to [facility]  [place] - [address]  [address], [place] [address]     Phone:  [phone]     traZODone 50 MG tablet          Primary Care Provider Office Phone # Fax #    Tasha Winkler [phone]       [facility] [address] [place] [address]        Equal Access to Services     [facility]: Roslyn Vicente, mariaelena [place], sury pfeifferaan ah. [facility] [phone].    ATENCIÓN: Si efra mishra a freire " disposición servicios gratuitos de asistencia lingüística. Amaury jalloh [phone].    We comply with applicable federal civil rights laws and [place] laws. We do not discriminate on the basis of race, color, national origin, age, disability, sex, sexual orientation, or gender identity.            Thank you!     Thank you for choosing [facility] [place]  for your care. Our goal is always to provide you with excellent care. Hearing back from our patients is one way we can continue to improve our services. Please take a few minutes to complete the written survey that you may receive in the mail after your visit with us. Thank you!             Your Updated Medication List - Protect others around you: Learn how to safely use, store and throw away your medicines at www.disposemymeds.org.          This list is accurate as of 7/23/18 11:38 AM.  Always use your most recent med list.                   Brand Name Dispense Instructions for use Diagnosis    aspirin 81 MG EC tablet      Take 1 tablet (81 mg) by mouth daily    Transient cerebral ischemia, unspecified type       fish oil-omega-3 fatty acids 1000 MG capsule      Take 2 g by mouth 2 times daily        GINKOBA PO           OVER-THE-COUNTER      Take 1 tablet by mouth daily (Melaleuca vitamins)        traZODone 50 MG tablet    DESYREL    180 tablet    Take 2 tablets (100 mg) by mouth nightly as needed for sleep    Persistent insomnia       vitamin D 2000 units Caps      Take 1 tablet by mouth daily.

## 2018-07-23 NOTE — PATIENT INSTRUCTIONS
Schedule your mammogram and bone density test at your convenience - my schedulers can help with this        Preventive Health Recommendations    Female Ages 65 +    Yearly exam:     See your health care provider every year in order to  o Review health changes.   o Discuss preventive care.    o Review your medicines if your doctor has prescribed any.      You no longer need a yearly Pap test unless you've had an abnormal Pap test in the past 10 years. If you have vaginal symptoms, such as bleeding or discharge, be sure to talk with your provider about a Pap test.      Every 1 to 2 years, have a mammogram.  If you are over 69, talk with your health care provider about whether or not you want to continue having screening mammograms.      Every 10 years, have a colonoscopy. Or, have a yearly FIT test (stool test). These exams will check for colon cancer.       Have a cholesterol test every 5 years, or more often if your doctor advises it.       Have a diabetes test (fasting glucose) every three years. If you are at risk for diabetes, you should have this test more often.       At age 65, have a bone density scan (DEXA) to check for osteoporosis (brittle bone disease).    Shots:    Get a flu shot each year.    Get a tetanus shot every 10 years.    Talk to your doctor about your pneumonia vaccines. There are now two you should receive - Pneumovax (PPSV 23) and Prevnar (PCV 13).    Talk to your pharmacist about the shingles vaccine.    Talk to your doctor about the hepatitis B vaccine.    Nutrition:     Eat at least 5 servings of fruits and vegetables each day.      Eat whole-grain bread, whole-wheat pasta and brown rice instead of white grains and rice.      Get adequate Calcium and Vitamin D.     Lifestyle    Exercise at least 150 minutes a week (30 minutes a day, 5 days a week). This will help you control your weight and prevent disease.      Limit alcohol to one drink per day.      No smoking.       Wear sunscreen to  prevent skin cancer.       See your dentist twice a year for an exam and cleaning.      See your eye doctor every 1 to 2 years to screen for conditions such as glaucoma, macular degeneration and cataracts.

## 2018-07-26 PROBLEM — E04.1 THYROID NODULE: Status: ACTIVE | Noted: 2017-09-05

## 2018-08-22 ENCOUNTER — RADIANT APPOINTMENT (OUTPATIENT)
Dept: BONE DENSITY | Facility: CLINIC | Age: 78
End: 2018-08-22
Attending: PEDIATRICS
Payer: COMMERCIAL

## 2018-08-22 ENCOUNTER — RADIANT APPOINTMENT (OUTPATIENT)
Dept: MAMMOGRAPHY | Facility: CLINIC | Age: 78
End: 2018-08-22
Attending: PEDIATRICS
Payer: COMMERCIAL

## 2018-08-22 DIAGNOSIS — Z12.31 VISIT FOR SCREENING MAMMOGRAM: ICD-10-CM

## 2018-08-22 DIAGNOSIS — M85.80 OSTEOPENIA, UNSPECIFIED LOCATION: ICD-10-CM

## 2018-08-22 DIAGNOSIS — Z78.0 ASYMPTOMATIC MENOPAUSAL STATE: ICD-10-CM

## 2018-08-22 PROCEDURE — 77085 DXA BONE DENSITY AXL VRT FX: CPT | Performed by: FAMILY MEDICINE

## 2018-08-22 PROCEDURE — 77067 SCR MAMMO BI INCL CAD: CPT | Mod: TC

## 2018-08-22 PROCEDURE — 77081 DXA BONE DENSITY APPENDICULR: CPT | Performed by: FAMILY MEDICINE

## 2019-05-02 ENCOUNTER — OFFICE VISIT (OUTPATIENT)
Dept: PEDIATRICS | Facility: CLINIC | Age: 79
End: 2019-05-02
Payer: COMMERCIAL

## 2019-05-02 VITALS
HEIGHT: 61 IN | SYSTOLIC BLOOD PRESSURE: 122 MMHG | DIASTOLIC BLOOD PRESSURE: 62 MMHG | BODY MASS INDEX: 31.15 KG/M2 | WEIGHT: 165 LBS | HEART RATE: 88 BPM | OXYGEN SATURATION: 98 % | TEMPERATURE: 98 F

## 2019-05-02 DIAGNOSIS — M51.26 DISPLACEMENT OF LUMBAR INTERVERTEBRAL DISC WITHOUT MYELOPATHY: Primary | ICD-10-CM

## 2019-05-02 DIAGNOSIS — M54.16 LUMBAR RADICULOPATHY: ICD-10-CM

## 2019-05-02 DIAGNOSIS — D22.9 MULTIPLE PIGMENTED NEVI: ICD-10-CM

## 2019-05-02 DIAGNOSIS — M43.16 SPONDYLOLISTHESIS OF LUMBAR REGION: ICD-10-CM

## 2019-05-02 PROCEDURE — 99214 OFFICE O/P EST MOD 30 MIN: CPT | Performed by: PEDIATRICS

## 2019-05-02 ASSESSMENT — MIFFLIN-ST. JEOR: SCORE: 1150.82

## 2019-05-02 NOTE — PATIENT INSTRUCTIONS
[facility] [phone] - call to set up a repeat MRI    Expect a phone call to set up a visit with the spine specialist    Come back to see me for your annual visit at the end of July

## 2019-05-06 ENCOUNTER — HOSPITAL ENCOUNTER (OUTPATIENT)
Dept: MRI IMAGING | Facility: CLINIC | Age: 79
Discharge: HOME | End: 2019-05-06
Attending: PEDIATRICS | Admitting: PEDIATRICS
Payer: COMMERCIAL

## 2019-05-06 DIAGNOSIS — M54.16 LUMBAR RADICULOPATHY: ICD-10-CM

## 2019-05-06 DIAGNOSIS — M51.26 DISPLACEMENT OF LUMBAR INTERVERTEBRAL DISC WITHOUT MYELOPATHY: ICD-10-CM

## 2019-05-06 PROCEDURE — 72148 MRI LUMBAR SPINE W/O DYE: CPT

## 2019-05-13 ENCOUNTER — OFFICE VISIT (OUTPATIENT)
Dept: NEUROSURGERY | Facility: CLINIC | Age: 79
End: 2019-05-13
Attending: NEUROLOGICAL SURGERY
Payer: COMMERCIAL

## 2019-05-13 VITALS
HEIGHT: 62 IN | SYSTOLIC BLOOD PRESSURE: 135 MMHG | DIASTOLIC BLOOD PRESSURE: 78 MMHG | BODY MASS INDEX: 29.44 KG/M2 | WEIGHT: 160 LBS | HEART RATE: 89 BPM

## 2019-05-13 DIAGNOSIS — M54.16 LUMBAR RADICULOPATHY: Primary | ICD-10-CM

## 2019-05-13 PROCEDURE — G0463 HOSPITAL OUTPT CLINIC VISIT: HCPCS

## 2019-05-13 PROCEDURE — 99203 OFFICE O/P NEW LOW 30 MIN: CPT | Performed by: NURSE PRACTITIONER

## 2019-05-13 ASSESSMENT — MIFFLIN-ST. JEOR: SCORE: 1144.01

## 2019-05-13 ASSESSMENT — PAIN SCALES - GENERAL: PAINLEVEL: MILD PAIN (2)

## 2019-05-13 NOTE — PROGRESS NOTES
[facility]  Neurosurgery Clinic Visit      CC: low back and left leg pain    Primary care Provider: Tasha Winkler      Reason For Visit:   I was asked by Dr. Tasha Winkler to consult on the patient for lumbar radiculopathy.      HPI: Krystal Parra is a 79 year old female with a history of chronic low back pain with worsening over the past few months. She describes low back pain that raidates to the left posterior thigh to the knee. She reports intermittent numbness in her left calf as well. She states lifting, standing, and walking aggravate the pain. She intermittently takes tylenol with some relief. She denies alleviating factors. She underwent PT and injection therapy years ago, but nothing recent. She has not had surgery.      Past Medical History:   Diagnosis Date     Bone spur 4th toe Right      Lumbar disc herniation      Moderate aortic regurgitation 6/18/2016     Osteopenia      Pathologic fracture of distal radius and ulna     right in 2003, left 1992       Past Medical History reviewed with patient during visit.    Past Surgical History:   Procedure Laterality Date     C LIGATE FALLOPIAN TUBE,POSTPARTUM      Tubal Ligation     cataract  2011    bilateral     HC TOOTH EXTRACTION W/FORCEP       Past Surgical History reviewed with patient during visit.    Current Outpatient Medications   Medication     aspirin EC 81 MG EC tablet     Cholecalciferol (VITAMIN D) 2000 UNIT CAPS     fish oil-omega-3 fatty acids (FISH OIL) 1000 MG capsule     Ginkgo Biloba (GINKOBA PO)     OVER-THE-COUNTER     traZODone (DESYREL) 50 MG tablet     No current facility-administered medications for this visit.        Allergies   Allergen Reactions     Sulfa Drugs        Social History     Socioeconomic History     Marital status:      Spouse name: Not on file     Number of children: 4     Years of education: Not on file     Highest education level: Not on file   Occupational History      Occupation: Retired   Social Needs     Financial resource strain: Not on file     Food insecurity:     Worry: Not on file     Inability: Not on file     Transportation needs:     Medical: Not on file     Non-medical: Not on file   Tobacco Use     Smoking status: Passive Smoke Exposure - Never Smoker     Smokeless tobacco: Never Used     Tobacco comment:  smoked in house   Substance and Sexual Activity     Alcohol use: No     Drug use: No     Sexual activity: Not Currently   Lifestyle     Physical activity:     Days per week: Not on file     Minutes per session: Not on file     Stress: Not on file   Relationships     Social connections:     Talks on phone: Not on file     Gets together: Not on file     Attends Buddhism service: Not on file     Active member of club or organization: Not on file     Attends meetings of clubs or organizations: Not on file     Relationship status: Not on file     Intimate partner violence:     Fear of current or ex partner: Not on file     Emotionally abused: Not on file     Physically abused: Not on file     Forced sexual activity: Not on file   Other Topics Concern     Parent/sibling w/ CABG, MI or angioplasty before 65F 55M? No   Social History Narrative     Not on file       Family History   Problem Relation Age of Onset     Cardiovascular Mother         Mild MI 80's     Cerebrovascular Disease Father      Diabetes Father      Neurologic Disorder Sister         brain tumor     Lipids Sister      Hypertension Sister      Gastrointestinal Disease Sister         diverticulitis     Cancer Sister         Breast cancer         ROS: 10 point ROS neg other than the symptoms noted above in the HPI.    Vital Signs:       Examination:  Constitutional:  Alert, well nourished, NAD.  Memory: recent and remote memory   HEENT: Normocephalic, atraumatic.   Pulm:  Without shortness of breath   CV:  No pitting edema of BLE.    Neurological:  Awake  Alert  Oriented x 3  Speech clear  Motor exam     Hip Flexor:                Right: 5/5  Left:  5/5  Hip Adductor:             Right:  5/5  Left:  5/5  Hip Abductor:             Right:  5/5  Left:  5/5  Gastroc Soleus:        Right:  5/5  Left:  5/5  Tib/Ant:                      Right:  5/5  Left:  5/5  EHL:                          Right:  5/5  Left:  5/5   Sensation decreased in left lateral calf  Muscle tone to bilateral upper and lower extremities   Gait: Able to stand from a seated position. Normal non-antalgic, non-myelopathic gait.  Able to heel/toe walk without loss of balance    Lumbar examination reveals no tenderness of the spine or paraspinous muscles.  Hip height is symmetrical. Negative SI joint, sciatic notch or greater trochanteric tenderness to palpation bilaterally.  Straight leg raise is negative bilaterally.      Imaging: Lumbar MRI 5/6/2019  IMPRESSION:  1. Multilevel degenerative disc and facet disease. Scoliosis.  2. Central stenosis as follows: Very severe L4-L5, severe L3-L4,  mild-moderate L2-L3, mild L1-L2.  3. Multilevel foraminal stenosis, greatest on the right at L3-L4  (moderate-severe).      Assessment/Plan:   Krystal Parra is a 79 year old female with a history of chronic low back pain with worsening over the past few months. She describes low back pain that raidates to the left posterior thigh to the knee. She reports intermittent numbness in her left calf as well. Discussed lumbar MRI results. Recommended physical therapy and injection at this time. She states she is hesitent to initiate PT as she states this is why she had to undergo the injection last time. She states she is willing to try it again. She is agreeable to an injection. Recommended her to follow up if pain persists. She verbalized understanding and agreement.    Patient Instructions   -Physical therapy ordered. They will contact you to schedule.  -Lumbar injection ordered. They will contact you to schedule.  -Please contact the clinic if pain persists at  [phone].      Jasmin Ho CNP  [facility]  [address] [place] [address]    Tel [phone]  Pager [phone]

## 2019-05-13 NOTE — PATIENT INSTRUCTIONS
-Physical therapy ordered. They will contact you to schedule.  -Lumbar injection ordered. They will contact you to schedule.  -Please contact the clinic if pain persists at [phone].

## 2019-05-13 NOTE — LETTER
"    5/13/2019         RE: Krystal Parra  [address] [place] [address]        Dear Colleague,    Thank you for referring your patient, Krystal Parra, to the [facility]. Please see a copy of my visit note below.    Krystal Parra is a 79 year old female who presents for:  Chief Complaint   Patient presents with     Neurologic Problem        Initial Vitals:  There were no vitals taken for this visit. Estimated body mass index is 31.18 kg/m  as calculated from the following:    Height as of 5/2/19: 5' 1\" (1.549 m).    Weight as of 5/2/19: 165 lb (74.8 kg).. There is no height or weight on file to calculate BSA. BP completed using cuff size: regular  Data Unavailable        Nursing Comments: evaluation of lumbar disc, history of back pain        Leslie Lindsey      [facility]  Neurosurgery Clinic Visit      CC: low back and left leg pain    Primary care Provider: Tasha Winkler      Reason For Visit:   I was asked by Dr. Tasha Winkler to consult on the patient for lumbar radiculopathy.      HPI: Krystal Parra is a 79 year old female with a history of chronic low back pain with worsening over the past few months. She describes low back pain that raidates to the left posterior thigh to the knee. She reports intermittent numbness in her left calf as well. She states lifting, standing, and walking aggravate the pain. She intermittently takes tylenol with some relief. She denies alleviating factors. She underwent PT and injection therapy years ago, but nothing recent. She has not had surgery.      Past Medical History:   Diagnosis Date     Bone spur 4th toe Right      Lumbar disc herniation      Moderate aortic regurgitation 6/18/2016     Osteopenia      Pathologic fracture of distal radius and ulna     right in 2003, left 1992       Past Medical History reviewed with patient during visit.    Past Surgical History:   Procedure Laterality Date     C LIGATE " FALLOPIAN TUBE,POSTPARTUM      Tubal Ligation     cataract  2011    bilateral     HC TOOTH EXTRACTION W/FORCEP       Past Surgical History reviewed with patient during visit.    Current Outpatient Medications   Medication     aspirin EC 81 MG EC tablet     Cholecalciferol (VITAMIN D) 2000 UNIT CAPS     fish oil-omega-3 fatty acids (FISH OIL) 1000 MG capsule     Ginkgo Biloba (GINKOBA PO)     OVER-THE-COUNTER     traZODone (DESYREL) 50 MG tablet     No current facility-administered medications for this visit.        Allergies   Allergen Reactions     Sulfa Drugs        Social History     Socioeconomic History     Marital status:      Spouse name: Not on file     Number of children: 4     Years of education: Not on file     Highest education level: Not on file   Occupational History     Occupation: Retired   Social Needs     Financial resource strain: Not on file     Food insecurity:     Worry: Not on file     Inability: Not on file     Transportation needs:     Medical: Not on file     Non-medical: Not on file   Tobacco Use     Smoking status: Passive Smoke Exposure - Never Smoker     Smokeless tobacco: Never Used     Tobacco comment:  smoked in house   Substance and Sexual Activity     Alcohol use: No     Drug use: No     Sexual activity: Not Currently   Lifestyle     Physical activity:     Days per week: Not on file     Minutes per session: Not on file     Stress: Not on file   Relationships     Social connections:     Talks on phone: Not on file     Gets together: Not on file     Attends Church service: Not on file     Active member of club or organization: Not on file     Attends meetings of clubs or organizations: Not on file     Relationship status: Not on file     Intimate partner violence:     Fear of current or ex partner: Not on file     Emotionally abused: Not on file     Physically abused: Not on file     Forced sexual activity: Not on file   Other Topics Concern     Parent/sibling w/  CABG, MI or angioplasty before 65F 55M? No   Social History Narrative     Not on file       Family History   Problem Relation Age of Onset     Cardiovascular Mother         Mild MI 80's     Cerebrovascular Disease Father      Diabetes Father      Neurologic Disorder Sister         brain tumor     Lipids Sister      Hypertension Sister      Gastrointestinal Disease Sister         diverticulitis     Cancer Sister         Breast cancer         ROS: 10 point ROS neg other than the symptoms noted above in the HPI.    Vital Signs:       Examination:  Constitutional:  Alert, well nourished, NAD.  Memory: recent and remote memory   HEENT: Normocephalic, atraumatic.   Pulm:  Without shortness of breath   CV:  No pitting edema of BLE.    Neurological:  Awake  Alert  Oriented x 3  Speech clear  Motor exam    Hip Flexor:                Right: 5/5  Left:  5/5  Hip Adductor:             Right:  5/5  Left:  5/5  Hip Abductor:             Right:  5/5  Left:  5/5  Gastroc Soleus:        Right:  5/5  Left:  5/5  Tib/Ant:                      Right:  5/5  Left:  5/5  EHL:                          Right:  5/5  Left:  5/5   Sensation decreased in left lateral calf  Muscle tone to bilateral upper and lower extremities   Gait: Able to stand from a seated position. Normal non-antalgic, non-myelopathic gait.  Able to heel/toe walk without loss of balance    Lumbar examination reveals no tenderness of the spine or paraspinous muscles.  Hip height is symmetrical. Negative SI joint, sciatic notch or greater trochanteric tenderness to palpation bilaterally.  Straight leg raise is negative bilaterally.      Imaging: Lumbar MRI 5/6/2019  IMPRESSION:  1. Multilevel degenerative disc and facet disease. Scoliosis.  2. Central stenosis as follows: Very severe L4-L5, severe L3-L4,  mild-moderate L2-L3, mild L1-L2.  3. Multilevel foraminal stenosis, greatest on the right at L3-L4  (moderate-severe).      Assessment/Plan:   Krystal Parra is a 79 year  old female with a history of chronic low back pain with worsening over the past few months. She describes low back pain that raidates to the left posterior thigh to the knee. She reports intermittent numbness in her left calf as well. Discussed lumbar MRI results. Recommended physical therapy and injection at this time. She states she is hesitent to initiate PT as she states this is why she had to undergo the injection last time. She states she is willing to try it again. She is agreeable to an injection. Recommended her to follow up if pain persists. She verbalized understanding and agreement.    Patient Instructions   -Physical therapy ordered. They will contact you to schedule.  -Lumbar injection ordered. They will contact you to schedule.  -Please contact the clinic if pain persists at [phone].      Jasmin Ho CNP  [facility]  [address] [address]    Tel [phone]  Pager [phone]      Again, thank you for allowing me to participate in the care of your patient.        Sincerely,        Jasmin Ho NP

## 2019-05-13 NOTE — PROGRESS NOTES
"Krystal Parra is a 79 year old female who presents for:  Chief Complaint   Patient presents with     Neurologic Problem        Initial Vitals:  There were no vitals taken for this visit. Estimated body mass index is 31.18 kg/m  as calculated from the following:    Height as of 5/2/19: 5' 1\" (1.549 m).    Weight as of 5/2/19: 165 lb (74.8 kg).. There is no height or weight on file to calculate BSA. BP completed using cuff size: regular  Data Unavailable        Nursing Comments: evaluation of lumbar disc, history of back pain        Leslie Lindsey"

## 2019-05-14 ENCOUNTER — TELEPHONE (OUTPATIENT)
Dept: PALLIATIVE MEDICINE | Facility: CLINIC | Age: 79
End: 2019-05-14

## 2019-05-15 NOTE — TELEPHONE ENCOUNTER
Pre-screening Questions for Radiology Injections:    Injection to be done at which interventional clinic site? [facility]    Instruct patient to arrive as directed prior to the scheduled appointment time:    [place] minutes before      [place]: 30 minutes before; if IV needed 1 hour before     Procedure ordered by Jasmin Ho    Procedure ordered? Lumbar Epidural Steroid Injection    What insurance would patient like us to bill for this procedure? Medicare, [employer]      Worker's comp or MVA (motor vehicle accident) -Any injection DO NOT SCHEDULE and route to Ana Lilia Bedolla.      [employer] insurance - For SI joint injections, DO NOT SCHEDULE and route Ana Lilia Bedolla.       [employer] - Any injection besides hip/shoulder/knee joint DO NOT SCHEDULE and route to Ana Lilia Bedolla. She will obtain PA and call pt back to schedule procedure or notify pt of denial.        [employer]-Route to Ana Lilia for review        IF SCHEDULING IN [place] AND NEEDS A PA, IT IS OKAY TO SCHEDULE. [place] HANDLES THEIR OWN PA'S AFTER THE PATIENT IS SCHEDULED. PLEASE SCHEDULE AT LEAST 1 WEEK OUT SO A PA CAN BE OBTAINED.      Any chance of pregnancy? NO   If YES, do NOT schedule and route to RN pool    Is an  needed? No     Patient has a drive home? (mandatory) YES: INFORMED    Is patient taking any blood thinners (plavix, coumadin, jantoven, warfarin, heparin, pradaxa or dabigatran )? No   If hold needed, do NOT schedule, route to RN pool     Is patient taking any aspirin products (includes Excedrin and Fiorinal)? Yes - Pt takes 81mg daily; instructed to hold 0 day(s) prior to procedure.      If more than 325mg/day do NOT schedule; route to RN pool     For CERVICAL procedures, hold all aspirin products for 6 days.     Tell pt that if aspirin product is not held for 6 days, the procedure WILL BE cancelled.      Does the patient have a bleeding or clotting disorder? No     If YES, okay to schedule AND route to RN nurse  pool    For any patients with platelet count <100, must be forwarded to provider    Is patient diabetic?  No  If YES, have them bring their glucometer.    Does patient have an active infection or treated for one within the past week? No     Is patient currently taking any antibiotics?  No     For patients on chronic, preventative, or prophylactic antibiotics, procedures may be scheduled.     For patients on antibiotics for active or recent infection:antibiotic course must have been completed for 4 days    Is patient currently taking any steroid medications? (i.e. Prednisone, Medrol)  No     For patients on steroid medications, course must have been completed for 4 days    Reviewed with patient:  If you are started on any steroids or antibiotics between now and your appointment, you must contact us because the procedure may need to be cancelled.  Yes    Is patient actively being treated for cancer or immunocompromised? No  If YES, do NOT schedule and route to RN pool     Are you able to get on and off an exam table with minimal or no assistance? Yes  If NO, do NOT schedule and route to RN pool    Are you able to roll over and lay on your stomach with minimal or no assistance? Yes  If NO, do NOT schedule and route to RN pool     Any allergies to contrast dye, iodine, shellfish, or numbing and steroid medications? No  If YES, route to RN pool AND add allergy information to appointment notes    Allergies: Sulfa drugs      Has the patient had a flu shot or any other vaccinations within 7 days before or after the procedure.  No     Does patient have an MRI/CT?  YES: MRI  (SI joint, hip injections, lumbar sympathetic blocks, and stellate ganglion blocks do not require an MRI)    Was the MRI done w/in the last 3 years?  Yes    Was MRI done at [place]? Yes      If not, where was it done? N/A       If MRI was not done at [place], [facility] or [facility] do NOT schedule and route to nursing.  If pt has an imaging disc, the  injection may be scheduled but pt has to bring disc to appt. If they show up w/out disc the injection cannot be done    Reminders (please tell patient if applicable):       Instructed pt to arrive 30 minutes early for IV start if this is for a cervical procedure, ALL sympathetic (stellate ganglion, hypogastric, or lumbar sympathetic block) and all sedation procedures (RFA, spinal cord stimulation trials).  Not Applicable   -IVs are not routinely placed for Dr. Kilgore cervical cases   -Dr. Whyte: IVs for cervical ESIs and cervical TBDs (not CMBBs/facet inj)      If NPO for sedation, informed patient that it is okay to take medications with sips of water (except if they are to hold blood thinners).  Not Applicable   *DO take blood pressure medication if it is prescribed*      If this is for a cervical MERRILL, informed patient that aspirin needs to be held for 6 days.   Not Applicable      For all patients not having spinal cord stimulator (SCS) trials or radiofrequency ablations (RFAs), informed patient:    IV sedation is not provided for this procedure.  If you feel that an oral anti-anxiety medication is needed, you can discuss this further with your referring provider or primary care provider.  The Pain Clinic provider will discuss specifics of what the procedure includes at your appointment.  Most procedures last 10-20 minutes.  We use numbing medications to help with any discomfort during the procedure.  Not Applicable      Do not schedule procedures requiring IV placement in the first appointment of the day or first appointment after lunch. Do NOT schedule at 0745, 0815 or 1245. N/A      For patients 85 or older we recommend having an adult stay w/ them for the remainder of the day.   N/A    Does the patient have any questions?  NO  Sharon Nails  [facility]

## 2019-05-20 NOTE — PROGRESS NOTES
[facility] - Procedure Note    Date of Service: 5/21/2019    Procedure performed: Caudal epidural steroid injection with fluoroscopic guidance  Diagnosis: Lumbar spondylosis; Lumbar radiculitis/radiculopathy  : Mabel Murray MD  Anesthesia: None  Complications: None    Indications: Krystal Parra is a 79 year old female who is seen at the request of Jasmin Ho NP for a caudal epidural steroid injection. The patient describes bilateral low back pain with radiation into the posterior left lower extremity. The worst of the pain is in the bilateral low back. The patient has been exhibiting symptoms consistent with lumbar intraspinal inflammation and radiculopathy. Symptoms have been persistent, disabling, and intermittently severe. The patient reports minimal improvement with conservative treatment, including medications.    MRI was done on 5/6/2019 which showed   FINDINGS: Five lumbar vertebrae are assumed. Multilevel degenerative  disc disease. Levoscoliosis. Left lateral listhesis at L3-L4 and  L4-L5. Grade 1-2 degenerative spondylolisthesis at L4-L5. Incidentally  noted L4 hemangioma.      Findings by specific level:     There is facet degenerative change as follows: Mild right T11-T12,  mild left T12-L1, mild right L1-L2, mild bilateral L2-L3, mild right  and moderate left L3-L4, moderate right and moderate-prominent left  L4-L5, mild bilateral L5-S1.     There is ligamentum flavum thickening at L4-L5, L3-L4, L2-L3, L1-L2.  Based upon sagittal imaging there is disc bulging at T10-T11. There is  disc bulging at each level from L1 to S1.   These findings combine to result in central stenosis as follows: Mild  L1-L2, mild/moderate L2-L3, severe L3-L4, very severe L4-L5.   There is right foraminal stenosis as follows: Moderate-severe L3-L4,  mild/moderate L2-L3, mild/moderate L1-L2.   There is left foraminal stenosis as follows: Moderate L5-S1, moderate  L4-L5, mild/moderate  L3-L4.     Compare with the previous exam, the central stenosis at L3-L4 is  increased, and the central stenosis at L4-L5 is similar.                                                                       IMPRESSION:  1. Multilevel degenerative disc and facet disease. Scoliosis.  2. Central stenosis as follows: Very severe L4-L5, severe L3-L4,  mild-moderate L2-L3, mild L1-L2.  3. Multilevel foraminal stenosis, greatest on the right at L3-L4  (moderate-severe).      Allergies:      Allergies   Allergen Reactions     Sulfa Drugs       Vitals:  /85   Pulse 75   SpO2 98%     Review of Systems: The patient denies recent fever, chills, illness, use of antibiotics or anticoagulants. All other 10-point review of systems negative.   0.5 conttrast    Procedure: The procedure and risks were explained, and informed written consent was obtained from the patient. Risks include but are not limited to: infection, bleeding, increased pain, and damage to soft tissue, nerve, muscle, and vasculature structures. After getting informed consent, patient was brought into the procedure suite and was placed in a prone position on the procedure table. A Pause for the Cause was performed. Patient was prepped and draped in sterile fashion.     The sacral hiatus and cornua were palpated.  Under lateral fluoroscopic guidance sacral hiatus was identified.  A total of 3mL of Lidocaine 1%  used to anesthetize the skin and the needle track at a skin entry site.  A 22 gauge 5 inch spinal needle was advanced through the sacral hiatus using intermittent fluoroscopy. The needle angle was decreased to allow entrance into the sacral canal and epidural space.  This was verified in both the AP and lateral view for correct alignment.       The position was then inspected from anteroposterior and lateral views, and the needle adjusted appropriately.  After negative aspiration for heme and CSF, a total of 0.5 mL of Omnipaque-300 was injected using  static and continuous fluoroscopy confirming appropriate position, into the epidural space, with no intravascular or intrathecal uptake. 9.5 mL of Omnipaque-300 was wasted.    2 mL of 1% lidocaine, 2 mL of preservative free saline, with 40 mg of triamcinolone was injected.  The needle was removed. Hemostasis was achieved, the area was cleaned, and bandaids were placed when appropriate. Images were saved to PACS.    The patient tolerated the procedure well, and was taken to the recovery room, and there was no evidence of procedural complications. No new sensory or motor deficits were noted following the procedure. The patient was stable and able to ambulate on discharge home. Post-procedure instructions were provided.     Pre-procedure pain score: 4/10 in the back, 4/10 in the leg  Post-procedure pain score: 1/10 in the back, 1/10 in the leg    Assessment/Plan: Krystal Parra is a 79 year old female s/p caudal epidural steroid injection today for lumbar spondylosis, radiculitis/radiculopathy.     1. Following today's procedure, the patient was advised to contact the [facility] for any of the following:   Fever, chills, or night sweats   New onset of pain, numbness, or weakness   Any questions/concerns regarding the procedure  If unable to contact the Pain Center, the patient was instructed to go to a local Emergency Room for any complications.   2. The patient will receive a follow-up call in 1 week.  3. The patient should follow-up with the referring provider in 2 weeks for post-procedure evaluation.    Mabel Murray MD  [facility]

## 2019-05-21 ENCOUNTER — RADIOLOGY INJECTION OFFICE VISIT (OUTPATIENT)
Dept: PALLIATIVE MEDICINE | Facility: CLINIC | Age: 79
End: 2019-05-21
Payer: COMMERCIAL

## 2019-05-21 ENCOUNTER — ANCILLARY PROCEDURE (OUTPATIENT)
Dept: GENERAL RADIOLOGY | Facility: CLINIC | Age: 79
End: 2019-05-21
Attending: PHYSICAL MEDICINE & REHABILITATION
Payer: COMMERCIAL

## 2019-05-21 VITALS — HEART RATE: 75 BPM | SYSTOLIC BLOOD PRESSURE: 170 MMHG | DIASTOLIC BLOOD PRESSURE: 85 MMHG | OXYGEN SATURATION: 98 %

## 2019-05-21 DIAGNOSIS — M48.061 SPINAL STENOSIS OF LUMBAR REGION WITHOUT NEUROGENIC CLAUDICATION: Primary | ICD-10-CM

## 2019-05-21 DIAGNOSIS — M54.16 LUMBAR RADICULOPATHY: ICD-10-CM

## 2019-05-21 PROCEDURE — 62323 NJX INTERLAMINAR LMBR/SAC: CPT | Performed by: PHYSICAL MEDICINE & REHABILITATION

## 2019-05-21 NOTE — NURSING NOTE
Pre-procedure Intake    Have you been fasting? NA    If yes, for how long?     Are you taking a prescribed blood thinner such as coumadin, Plavix, Xarelto?    No    If yes, when did you take your last dose?     Do you take aspirin?  Yes -   ASA    If cervical procedure, have you held aspirin for 6 days?   NA    Do you have any allergies to contrast dye, iodine, steroid and/or numbing medications?  NO    Are you currently taking antibiotics or have an active infection?  NO    Have you had a fever/elevated temperature within the past week? NO    Are you currently taking oral steroids? NO    Do you have a ? Yes       Are you pregnant or breastfeeding?  NO    Are the vital signs normal?  Yes    Anabel ERICKSON-RN Care Coordinator  [facility]

## 2019-05-21 NOTE — NURSING NOTE
Discharge Information    IV Discontiued Time:  NA    Amount of Fluid Infused:  NA    Discharge Criteria = When patient returns to baseline or as per MD order    Consciousness:  Pt is fully awake    Circulation:  BP +/- 20% of pre-procedure level    Respiration:  Patient is able to breathe deeply    O2 Sat:  Patient is able to maintain O2 Sat >92% on room air    Activity:  Moves 4 extremities on command    Ambulation:  Patient is able to stand and walk or stand and pivot into wheelchair    Dressing:  Clean/dry or No Dressing    Notes:   Discharge instructions and AVS given to patient    Patient meets criteria for discharge?  YES    Admitted to PCU?  No    Responsible adult present to accompany patient home?  Yes    Signature/Title:    Anabel Blancas RN Care Coordinator  [facility] [place]

## 2019-05-21 NOTE — PATIENT INSTRUCTIONS
[facility] Procedure Discharge Instructions    Today you saw:  Dr. Mabel Murray     Your procedure: Lumbar  Epidural steroid injection     Medications used:  Lidocaine (anesthetic)  Bupivacaine (anesthetic)   Ropivicaine (anesthetic)    Betamethasone (steroid)     Dexamethasone (steroid)       Kenalog (steroid)  Omnipaque (contrast)    Gadolinium (contrast)  Omniscan      If you were holding your blood thinning medication, please restart taking it: N/A          Be cautious when walking as numbness and/or weakness in the legs may occur up to 6-8 hours after the procedure due to effect of the local anesthetic    Do not drive for 6 hours. The effect of the local anesthetic could slow your reflexes.     Avoid strenuous activity for the first 24 hours. You may resume your regular activities after that.     You may shower, however avoid swimming, tub baths or hot tubs for 24 hours following your procedure    You may have a mild to moderate increase in pain for several days following the injection.      You may use ice packs for 10-15 minutes, 3 to 4 times a day at the injection site for comfort    Do not use heat to painful areas for 6 to 8 hours. This will give the local anesthetic time to wear off and prevent you from accidentally burning your skin.    Unless you have been directed to avoid the use of anti-inflammatory medications (NSAIDS-ibuprofen, Aleve, Motrin), you may use these medications or Tylenol for pain control if needed.     With diabetes, check your blood sugar more frequently than usual as your blood sugar may be higher than normal for 10-14 days following a steroid injection. Contact your doctor who manages your diabetes if your blood sugar is higher than usual    Possible side effects of steroids that you may experience include flushing, elevated blood pressure, increased appetite, mild headaches and restlessness.  All of these symptoms will get better with time.    It may take up to 14 days  for the steroid medication to start working although you may feel the effect as early as a few days after the procedure.     Follow up with your referring provider in 2-3 weeks      If you experience any of the following, call the pain center line during work hours at [phone] or on-call physician after hours at [phone]:  -Fever over 100 degree F  -Swelling, bleeding, redness, drainage, warmth at the injection site  -Progressive weakness or numbness in your legs   -Loss of bowel or bladder function  -Unusual headache that is not relieved by Tylenol or your regular headache medication  -Unusual new onset of pain that is not improving

## 2019-05-29 ENCOUNTER — TELEPHONE (OUTPATIENT)
Dept: PALLIATIVE MEDICINE | Facility: CLINIC | Age: 79
End: 2019-05-29

## 2019-05-29 NOTE — TELEPHONE ENCOUNTER
Patient had a Caudal epidural steroid injection   on 5/21/19.  Called patient for an update.      Left message that we were calling for an update about how s/he was doing after the injection.  LM that if she has any problems or questions to call the clinic at [phone].

## 2019-06-14 ENCOUNTER — TRANSFERRED RECORDS (OUTPATIENT)
Dept: HEALTH INFORMATION MANAGEMENT | Facility: CLINIC | Age: 79
End: 2019-06-14

## 2019-06-19 ENCOUNTER — THERAPY VISIT (OUTPATIENT)
Dept: PHYSICAL THERAPY | Facility: CLINIC | Age: 79
End: 2019-06-19
Attending: NURSE PRACTITIONER
Payer: COMMERCIAL

## 2019-06-19 DIAGNOSIS — G89.29 CHRONIC LOW BACK PAIN: ICD-10-CM

## 2019-06-19 DIAGNOSIS — M54.50 CHRONIC LOW BACK PAIN: ICD-10-CM

## 2019-06-19 DIAGNOSIS — M54.16 LUMBAR RADICULOPATHY: ICD-10-CM

## 2019-06-19 PROCEDURE — 97110 THERAPEUTIC EXERCISES: CPT | Mod: GP | Performed by: PHYSICAL THERAPIST

## 2019-06-19 PROCEDURE — 97161 PT EVAL LOW COMPLEX 20 MIN: CPT | Mod: GP | Performed by: PHYSICAL THERAPIST

## 2019-06-19 NOTE — PROGRESS NOTES
[facility] Initial Evaluation  Subjective:  The history is provided by the patient. No  was used.   Krystal Parra being seen for low back pain.   Date of Onset: MD clancy 5/13/2019. Where condition occurred: for unknown reasons.Problem occurred: unknown  and reported as 4/10 on pain scale. General health as reported by patient is excellent. Pertinent medical history includes:  History of fractures, osteoarthritis, sleep disorder/apnea and stroke.  Medical allergies: other. Other medical allergies details: sulfa.  Surgeries include:  Other. Other surgery history details: hysterectomy.  Current medications:  Sleep medication.     Pain is described as aching and is intermittent. Pain is the same all the time. Since onset symptoms are unchanged. Special tests:  MRI. Previous treatment includes chiropractic. There was mild improvement following previous treatment.   Patient is retired.   Barriers include:  None as reported by patient.  Red flags:  None as reported by patient.  Type of problem:  Lumbar   Condition occurred with:  Insidious onset and degenerative joint disease. This is a recurrent condition   Problem details: Reports chronic, recurrent LBP for 30-40years. States the pain comes and goes, increases with walking (15-20 minutes) and standing (5 min max). Pain improves while sitting. Reports pain in the R buttock and L LE pain stopping at the knee. Recently underwent a steroid injection on 5/21/2019, feels this helped with the pain while transferring in/out of a car; however, did not help the low back pain. Also works with a chiropractor twice per week, reports temporary relief. To note, the patient underwent a [facility] physical therapy program many years, resulting in increased radicular symptoms. As a result, pt is fearful of PT..   Patient reports pain:  Lower lumbar spine. Radiates to:  Gluteals right and thigh left. Associated symptoms:  Numbness, loss of  motion/stiffness and loss of strength. Symptoms are exacerbated by walking, standing and lifting and relieved by rest, other and NSAID's (sitting).                      Objective:  Standing Alignment:        Lumbar:  Lordosis decr            Gait:    Gait Type:  Antalgic     Deviations:  Lumbar:  Trunk lean RGeneral Deviations:  Stance time decr and reanna decr               Lumbar/SI Evaluation  ROM:    AROM Lumbar:   Flexion:        Knees painful  Ext:                    15%   Side Bend:        Left:     Right:   Rotation:           Left:     Right:   Side Glide:        Left:     Right:         Strength: TrA Contraction: fair, Glute Strength: fair  Lumbar Myotomes:  normal                Lumbar Dermtomes:  normal                Neural Tension/Mobility:    Left side:  SLR w/DF positive.     Right side:   SLR w/DF  negative.   Lumbar Palpation:    Tenderness present at Left:    Quadratus Lumborum; Erector Spinae; PSIS and Vertebral  Tenderness present at Right: Quadratus Lumborum; Erector Spinae; PSIS and Vertebral                                                     General     ROS    Assessment/Plan:    Patient is a 79 year old female with lumbar complaints.    Patient has the following significant findings with corresponding treatment plan.                Diagnosis 1:  Chronic low back pain  Pain -  hot/cold therapy, manual therapy, education, directional preference exercise and home program  Decreased ROM/flexibility - manual therapy and therapeutic exercise  Decreased strength - therapeutic exercise and therapeutic activities  Impaired gait - gait training  Impaired muscle performance - neuro re-education  Decreased function - therapeutic activities  Impaired posture - neuro re-education    Therapy Evaluation Codes:   1) History comprised of:   Personal factors that impact the plan of care:      Age, Anxiety and Time since onset of symptoms.    Comorbidity factors that impact the plan of care are:       Osteoarthritis, Sleep disorder/apnea and Stroke.     Medications impacting care: Sleep.  2) Examination of Body Systems comprised of:   Body structures and functions that impact the plan of care:      Lumbar spine.   Activity limitations that impact the plan of care are:      Lifting, Standing and Walking.  3) Clinical presentation characteristics are:   Stable/Uncomplicated.  4) Decision-Making    Low complexity using standardized patient assessment instrument and/or measureable assessment of functional outcome.  Cumulative Therapy Evaluation is: Low complexity.    Previous and current functional limitations:  (See Goal Flow Sheet for this information)    Short term and Long term goals: (See Goal Flow Sheet for this information)     Communication ability:  Patient appears to be able to clearly communicate and understand verbal and written communication and follow directions correctly.  Treatment Explanation - The following has been discussed with the patient:   RX ordered/plan of care  Anticipated outcomes  Possible risks and side effects  This patient would benefit from PT intervention to resume normal activities.   Rehab potential is good.    Frequency:  1 X week, once daily  Duration:  for 6 weeks  Discharge Plan:  Achieve all LTG.  Independent in home treatment program.  Reach maximal therapeutic benefit.    Please refer to the daily flowsheet for treatment today, total treatment time and time spent performing 1:1 timed codes.

## 2019-06-26 ENCOUNTER — THERAPY VISIT (OUTPATIENT)
Dept: PHYSICAL THERAPY | Facility: CLINIC | Age: 79
End: 2019-06-26
Attending: NURSE PRACTITIONER
Payer: COMMERCIAL

## 2019-06-26 DIAGNOSIS — G89.29 CHRONIC LOW BACK PAIN: ICD-10-CM

## 2019-06-26 DIAGNOSIS — M54.50 CHRONIC LOW BACK PAIN: ICD-10-CM

## 2019-06-26 PROCEDURE — 97110 THERAPEUTIC EXERCISES: CPT | Mod: GP | Performed by: PHYSICAL THERAPIST

## 2019-06-26 PROCEDURE — 97112 NEUROMUSCULAR REEDUCATION: CPT | Mod: GP | Performed by: PHYSICAL THERAPIST

## 2019-07-03 ENCOUNTER — THERAPY VISIT (OUTPATIENT)
Dept: PHYSICAL THERAPY | Facility: CLINIC | Age: 79
End: 2019-07-03
Attending: NURSE PRACTITIONER
Payer: COMMERCIAL

## 2019-07-03 DIAGNOSIS — M54.50 CHRONIC LOW BACK PAIN: ICD-10-CM

## 2019-07-03 DIAGNOSIS — G89.29 CHRONIC LOW BACK PAIN: ICD-10-CM

## 2019-07-03 PROCEDURE — 97110 THERAPEUTIC EXERCISES: CPT | Mod: GP | Performed by: PHYSICAL THERAPIST

## 2019-07-03 PROCEDURE — 97112 NEUROMUSCULAR REEDUCATION: CPT | Mod: GP | Performed by: PHYSICAL THERAPIST

## 2019-07-11 ENCOUNTER — THERAPY VISIT (OUTPATIENT)
Dept: PHYSICAL THERAPY | Facility: CLINIC | Age: 79
End: 2019-07-11
Payer: COMMERCIAL

## 2019-07-11 DIAGNOSIS — G89.29 CHRONIC LOW BACK PAIN: ICD-10-CM

## 2019-07-11 DIAGNOSIS — M54.50 CHRONIC LOW BACK PAIN: ICD-10-CM

## 2019-07-11 PROCEDURE — 97112 NEUROMUSCULAR REEDUCATION: CPT | Mod: GP | Performed by: PHYSICAL THERAPIST

## 2019-07-11 PROCEDURE — 97110 THERAPEUTIC EXERCISES: CPT | Mod: GP | Performed by: PHYSICAL THERAPIST

## 2019-07-11 NOTE — PROGRESS NOTES
Subjective:  HPI                    Objective:  System    Physical Exam    General     ROS    Assessment/Plan:    DISCHARGE REPORT    Progress reporting period is from 6/19/2019 to 7/11/2019.       SUBJECTIVE  Subjective changes noted by patient:  No change.      Subjective: Reporting an increase in generalized lower body soreness, with occasional radiation down B LE. Did notice an increase in pain and soreness after last session, as a result stopped the clamshell and went back to the TrA contraction without the march, has continued the low back stretches.    Current pain level is 2/10(increases to 7/10 when standing).     Previous pain level was 4/10.   Changes in function:  None  Adverse reaction to treatment or activity: None    OBJECTIVE  Changes noted in objective findings:  Yes, improved lumbar ROM and core strength    Objective:   AROM Lumbar Flx: distal shins, Ext: 25%; TrA Contraction: good;   Gait: guarded, decreased reanna, decreased stride length     ASSESSMENT/PLAN  Updated problem list and treatment plan: Diagnosis 1:  Chronic low back pain  Pain -  home program  Decreased ROM/flexibility - home program  Impaired gait - home program  Impaired muscle performance - home program  Decreased function - home program  STG/LTGs have been met or progress has been made towards goals:  None  Assessment of Progress: The patient's progress has plateaued.  Self Management Plans:  Patient is independent in a home treatment program.  I have re-evaluated this patient and find that the nature, scope, duration and intensity of the therapy is appropriate for the medical condition of the patient.  Krystal continues to require the following intervention to meet STG and LTG's:  PT intervention is no longer required to meet STG/LTG.    Recommendations:  This patient is ready to be discharged from therapy and continue their home treatment program.  This patient would benefit from further evaluation.    Please refer to the daily  flowsheet for treatment today, total treatment time and time spent performing 1:1 timed codes.

## 2019-07-22 ENCOUNTER — TELEPHONE (OUTPATIENT)
Dept: NEUROSURGERY | Facility: CLINIC | Age: 79
End: 2019-07-22

## 2019-07-22 DIAGNOSIS — M54.16 LUMBAR RADICULOPATHY: Primary | ICD-10-CM

## 2019-07-22 NOTE — TELEPHONE ENCOUNTER
Patient called nurse line to follow-up from [facility] and PT. She saw Jasmin BAUER in clinic on 5/13/19 for chronic low back pain that radiates to left posterior thigh and knee. She has been working with PT, but pain continues. Caudal MERRILL was completed on 5/21/19, which provided mild pain relief at left SI joint area. Today, patient reports continued pain in low back and right leg. She would like to know next steps at this time.     Will discuss with care team and contact patient with recommendation.

## 2019-07-22 NOTE — TELEPHONE ENCOUNTER
Reviewed with Dr. Lazaro. We can order CT lumbar spine and scoli XR, then follow-up in clinic to discuss surgical options. Patient also being treated for osteoporosis by PCP.    Discussed option of CT and XR with patient. She voiced being more interested in repeat [facility] vs surgical options at this point.     Placed order for repeat [facility]. Advised patient to call back if symptoms persist, or if she would like to follow-up with Dr. Lazaro. Patient voiced agreement with plan.

## 2019-07-29 ENCOUNTER — OFFICE VISIT (OUTPATIENT)
Dept: PEDIATRICS | Facility: CLINIC | Age: 79
End: 2019-07-29
Payer: COMMERCIAL

## 2019-07-29 VITALS
TEMPERATURE: 98.3 F | SYSTOLIC BLOOD PRESSURE: 132 MMHG | HEART RATE: 95 BPM | BODY MASS INDEX: 30.43 KG/M2 | HEIGHT: 61 IN | DIASTOLIC BLOOD PRESSURE: 58 MMHG | WEIGHT: 161.2 LBS | RESPIRATION RATE: 14 BRPM | OXYGEN SATURATION: 99 %

## 2019-07-29 DIAGNOSIS — I35.1 MODERATE AORTIC REGURGITATION: ICD-10-CM

## 2019-07-29 DIAGNOSIS — G47.00 PERSISTENT INSOMNIA: ICD-10-CM

## 2019-07-29 DIAGNOSIS — E04.1 THYROID NODULE: ICD-10-CM

## 2019-07-29 DIAGNOSIS — Z00.00 MEDICARE ANNUAL WELLNESS VISIT, SUBSEQUENT: Primary | ICD-10-CM

## 2019-07-29 DIAGNOSIS — E03.8 SUBCLINICAL HYPOTHYROIDISM: ICD-10-CM

## 2019-07-29 DIAGNOSIS — M54.16 LUMBAR RADICULOPATHY: ICD-10-CM

## 2019-07-29 DIAGNOSIS — N95.1 MENOPAUSAL SYNDROME (HOT FLASHES): ICD-10-CM

## 2019-07-29 LAB
ERYTHROCYTE [DISTWIDTH] IN BLOOD BY AUTOMATED COUNT: 14.1 % (ref 10–15)
HCT VFR BLD AUTO: 37.9 % (ref 35–47)
HGB BLD-MCNC: 12.6 G/DL (ref 11.7–15.7)
MCH RBC QN AUTO: 31.1 PG (ref 26.5–33)
MCHC RBC AUTO-ENTMCNC: 33.2 G/DL (ref 31.5–36.5)
MCV RBC AUTO: 94 FL (ref 78–100)
PLATELET # BLD AUTO: 211 10E9/L (ref 150–450)
RBC # BLD AUTO: 4.05 10E12/L (ref 3.8–5.2)
WBC # BLD AUTO: 7.3 10E9/L (ref 4–11)

## 2019-07-29 PROCEDURE — 85027 COMPLETE CBC AUTOMATED: CPT | Performed by: PEDIATRICS

## 2019-07-29 PROCEDURE — 36415 COLL VENOUS BLD VENIPUNCTURE: CPT | Performed by: PEDIATRICS

## 2019-07-29 PROCEDURE — 84443 ASSAY THYROID STIM HORMONE: CPT | Performed by: PEDIATRICS

## 2019-07-29 PROCEDURE — 80053 COMPREHEN METABOLIC PANEL: CPT | Performed by: PEDIATRICS

## 2019-07-29 PROCEDURE — 99397 PER PM REEVAL EST PAT 65+ YR: CPT | Performed by: PEDIATRICS

## 2019-07-29 RX ORDER — TRAZODONE HYDROCHLORIDE 50 MG/1
100 TABLET, FILM COATED ORAL
Qty: 180 TABLET | Refills: 3 | Status: SHIPPED | OUTPATIENT
Start: 2019-07-29 | End: 2021-04-13

## 2019-07-29 ASSESSMENT — ENCOUNTER SYMPTOMS
SORE THROAT: 0
EYE PAIN: 0
NERVOUS/ANXIOUS: 0
HEMATOCHEZIA: 0
HEMATURIA: 0
NAUSEA: 0
FEVER: 0
HEADACHES: 0
COUGH: 1
SHORTNESS OF BREATH: 0
WEAKNESS: 0
JOINT SWELLING: 0
PARESTHESIAS: 0
ABDOMINAL PAIN: 0
CHILLS: 0
DIARRHEA: 0
DYSURIA: 0
CONSTIPATION: 0
HEARTBURN: 1
PALPITATIONS: 0
ARTHRALGIAS: 1
BREAST MASS: 0
MYALGIAS: 0
DIZZINESS: 0
FREQUENCY: 0

## 2019-07-29 ASSESSMENT — MIFFLIN-ST. JEOR: SCORE: 1133.58

## 2019-07-29 ASSESSMENT — ACTIVITIES OF DAILY LIVING (ADL): CURRENT_FUNCTION: HOUSEWORK REQUIRES ASSISTANCE

## 2019-07-29 NOTE — PROGRESS NOTES
"SUBJECTIVE:   Krystal Parra is a 79 year old female who presents for Preventive Visit.  Are you in the first 12 months of your Medicare coverage?  No    Healthy Habits:     In general, how would you rate your overall health?  Good    Frequency of exercise:  None    Do you usually eat at least 4 servings of fruit and vegetables a day, include whole grains    & fiber and avoid regularly eating high fat or \"junk\" foods?  Yes    Taking medications regularly:  Yes    Medication side effects:  None    Ability to successfully perform activities of daily living:  Housework requires assistance    Home Safety:  No safety concerns identified    Hearing Impairment:  Difficulty following a conversation in a noisy restaurant or crowded room and no hearing concerns    In the past 6 months, have you been bothered by leaking of urine? Yes    In general, how would you rate your overall mental or emotional health?  Excellent      PHQ-2 Total Score: 0    Additional concerns today:  Yes    Do you feel safe in your environment? Yes  Do you have a Health Care Directive? Yes: Patient states has Advance Directive and will bring in a copy to clinic.    Fall risk  Fallen 2 or more times in the past year?: No  Any fall with injury in the past year?: No    Cognitive Screening   1) Repeat 3 items (Leader, Season, Table)    2) Clock draw: NORMAL  3) 3 item recall: Recalls 3 objects  Results: 3 items recalled: COGNITIVE IMPAIRMENT LESS LIKELY    Mini-CogTM Copyright JOHNATHAN Pena. Licensed by the author for use in [facility]; reprinted with permission (jaskaran@.[facility]). All rights reserved.      Do you have sleep apnea, excessive snoring or daytime drowsiness?: no    Reviewed and updated as needed this visit by clinical staff  Tobacco  Allergies  Med Hx  Surg Hx  Fam Hx  Soc Hx        Reviewed and updated as needed this visit by Provider        Social History     Tobacco Use     Smoking status: Passive Smoke Exposure - Never Smoker     " Smokeless tobacco: Never Used     Tobacco comment:  smoked in house   Substance Use Topics     Alcohol use: No         Alcohol Use 7/29/2019   Prescreen: >3 drinks/day or >7 drinks/week? Not Applicable   Prescreen: >3 drinks/day or >7 drinks/week? -         Current providers sharing in care for this patient include:   Patient Care Team:  Tasha Winkler MD as PCP - General (Internal Medicine)  Tasha Winkler MD as Assigned PCP    The following health maintenance items are reviewed in Epic and correct as of today:  Health Maintenance   Topic Date Due     ANNUAL REVIEW OF HM ORDERS  1940     ZOSTER IMMUNIZATION (1 of 2) 02/22/1988     PHQ-2  01/01/2019     FALL RISK ASSESSMENT  07/23/2019     TSH W/FREE T4 REFLEX  07/19/2019     MEDICARE ANNUAL WELLNESS VISIT  07/23/2019     ADVANCE CARE PLANNING  07/15/2020     MAMMO SCREENING  08/22/2020     DEXA  08/22/2021     LIPID  07/19/2023     COLONOSCOPY  10/15/2023     DTAP/TDAP/TD IMMUNIZATION (4 - Td) 07/18/2027     IPV IMMUNIZATION  Aged Out     MENINGITIS IMMUNIZATION  Aged Out     Lumbar radiculopathy - has been very busy since our last visit with physical therapy and seeing Dr Murray.   First MERRILL didn't help and got worse with physical therapy.  Planned 2nd MERRILL tomorrow.      Moderate AR - no new cardiac symptoms.  Politely declines repeat echocardiogram at this time.      Thyroid nodule - due for repeat US this fall.  Last evaluated by Dr Hernandez in 2017 - 2 nodules previously present, one s/p FNA 2017 that was benign.  Recommended US in 1-2 years for interval size change and characteristics of nodule surveillance.  Patient politely declines repeat US today.    Hx of HTN - bp has been under better control recently without any antihypertensive agents - continue to follow.    Hx of TIA - taking asa daily, no new neurologic concerns apart from lumbar radiculopathy as above.  With her last TIA, had weakness of her left side that rapidly  "resolved.      Insomnia - on trazodone - has switched to taking 1 tablet with melatonin.  Still doesn't sleep great.      Still gets hot flashes at night that wake her up - usually twice or three times per night.  More rare in the daytime hours.    Thyroid studies slightly abnormal last year.    Heartburn - gets better with drinking water, happens intermittently    Cough - recently had a head cold and has a residual cough.        Review of Systems   Constitutional: Negative for chills and fever.   HENT: Negative for congestion, ear pain, hearing loss and sore throat.    Eyes: Negative for pain and visual disturbance.   Respiratory: Positive for cough. Negative for shortness of breath.    Cardiovascular: Negative for chest pain, palpitations and peripheral edema.   Gastrointestinal: Positive for heartburn. Negative for abdominal pain, constipation, diarrhea, hematochezia and nausea.   Breasts:  Negative for tenderness, breast mass and discharge.   Genitourinary: Positive for vaginal discharge. Negative for dysuria, frequency, genital sores, hematuria, pelvic pain, urgency and vaginal bleeding.   Musculoskeletal: Positive for arthralgias. Negative for joint swelling and myalgias.   Skin: Negative for rash.   Neurological: Negative for dizziness, weakness, headaches and paresthesias.   Psychiatric/Behavioral: Negative for mood changes. The patient is not nervous/anxious.        OBJECTIVE:   /58 (BP Location: Right arm, Patient Position: Sitting, Cuff Size: Adult Regular)   Pulse 95   Temp 98.3  F (36.8  C) (Oral)   Resp 14   Ht 1.549 m (5' 1\")   Wt 73.1 kg (161 lb 3.2 oz)   SpO2 99%   BMI 30.46 kg/m   Estimated body mass index is 30.46 kg/m  as calculated from the following:    Height as of this encounter: 1.549 m (5' 1\").    Weight as of this encounter: 73.1 kg (161 lb 3.2 oz).  Physical Exam  GENERAL: healthy, alert and no distress  EYES: Eyes grossly normal to inspection, PERRL and conjunctivae and " sclerae normal  HENT: ear canals and TM's normal, nose and mouth without ulcers or lesions  NECK: no adenopathy, no asymmetry, masses, or scars and thyroid normal to palpation  RESP: lungs clear to auscultation - no rales, rhonchi or wheezes  CV: regular rates and rhythm, normal S1 S2, no S3 or S4, no murmur, click or rub, peripheral pulses strong and trace bilateral lower extremity pitting edema to feet    ABDOMEN: soft, nontender, no hepatosplenomegaly, no masses and bowel sounds normal  MS: no gross musculoskeletal defects noted, no edema  SKIN: no suspicious lesions or rashes  NEURO: Normal strength and tone, mentation intact and speech normal  PSYCH: mentation appears normal, affect normal/bright    Diagnostic Test Results:  pending    ASSESSMENT / PLAN:       ICD-10-CM    1. Medicare annual wellness visit, subsequent Z00.00 Comprehensive metabolic panel     coenzyme Q10 (CO-Q10) 30 MG capsule     CBC with platelets   2. Moderate aortic regurgitation - due for repeat echocardiogram 8/2018 I35.1 CBC with platelets    Politely declines repeat echocardiogram at this time, plan for next year and patient to alert me sooner with changes   3. Lumbar radiculopathy M54.16 coenzyme Q10 (CO-Q10) 30 MG capsule     CBC with platelets    Has repeat MERRILL tomorrow   4. Subclinical hypothyroidism E03.9 TSH with free T4 reflex  Due for repeat labs   5. Thyroid nodule E04.1 TSH with free T4 reflex     coenzyme Q10 (CO-Q10) 30 MG capsule  Politely declines repeat US at this time - to alert me with any changes   6. Persistent insomnia G47.00 traZODone (DESYREL) 50 MG tablet  Under reasonable control without side effects - continue   7. Menopausal syndrome (hot flashes) N95.1 traZODone (DESYREL) 50 MG tablet     CBC with platelets       End of Life Planning:  Patient currently has an advanced directive: Yes.  Practitioner is supportive of decision.    COUNSELING:  Special attention given to:       Regular exercise       Healthy  "diet/nutrition       Vision screening       Aspirin Prophylaxsis    Estimated body mass index is 30.46 kg/m  as calculated from the following:    Height as of this encounter: 1.549 m (5' 1\").    Weight as of this encounter: 73.1 kg (161 lb 3.2 oz).    Weight management plan: Discussed healthy diet and exercise guidelines     reports that she is a non-smoker but has been exposed to tobacco smoke. She has never used smokeless tobacco.      Appropriate preventive services were discussed with this patient, including applicable screening as appropriate for cardiovascular disease, diabetes, osteopenia/osteoporosis, and glaucoma.  As appropriate for age/gender, discussed screening for colorectal cancer, prostate cancer, breast cancer, and cervical cancer. Checklist reviewing preventive services available has been given to the patient.    Reviewed patients plan of care and provided an AVS. The Intermediate Care Plan ( asthma action plan, low back pain action plan, and migraine action plan) for Krystal meets the Care Plan requirement. This Care Plan has been established and reviewed with the Patient.    Counseling Resources:  ATP IV Guidelines  Pooled Cohorts Equation Calculator  Breast Cancer Risk Calculator  FRAX Risk Assessment  ICSI Preventive Guidelines  Dietary Guidelines for Americans, 2010  [facility]'s MyPlate  ASA Prophylaxis  Lung CA Screening    Tasha Winkler MD  [facility]    Identified Health Risks:  "

## 2019-07-29 NOTE — PATIENT INSTRUCTIONS
Send spine injection tomorrow    Stop for labs on your way out today on the 1st floor    Keep me posted with any changes

## 2019-07-29 NOTE — PROGRESS NOTES
[facility] - Procedure Note    Date of Service: 7/30/2019    Procedure performed: Caudal epidural steroid injection with fluoroscopic guidance  Diagnosis: Lumbar spondylosis; Lumbar radiculitis/radiculopathy  : Mabel Murray MD  Anesthesia: None  Complications: None    Indications: Krystal Parra is a 79 year old female who is seen at the request of Jasmin Ho NP for a caudal epidural steroid injection. The patient describes bilateral low back pain with radiation into the posterior left lower extremity. The worst of the pain is in the bilateral low back, right > left. The patient has been exhibiting symptoms consistent with lumbar intraspinal inflammation and radiculopathy. Symptoms have been persistent, disabling, and intermittently severe. The patient reports minimal improvement with conservative treatment, including medications.    This is a repeat injection. She had a caudal MERRILL on 5/21/2019 without improvement in her main areas of pain.    MRI was done on 5/6/2019 which showed   FINDINGS: Five lumbar vertebrae are assumed. Multilevel degenerative  disc disease. Levoscoliosis. Left lateral listhesis at L3-L4 and  L4-L5. Grade 1-2 degenerative spondylolisthesis at L4-L5. Incidentally  noted L4 hemangioma.      Findings by specific level:     There is facet degenerative change as follows: Mild right T11-T12,  mild left T12-L1, mild right L1-L2, mild bilateral L2-L3, mild right  and moderate left L3-L4, moderate right and moderate-prominent left  L4-L5, mild bilateral L5-S1.     There is ligamentum flavum thickening at L4-L5, L3-L4, L2-L3, L1-L2.  Based upon sagittal imaging there is disc bulging at T10-T11. There is  disc bulging at each level from L1 to S1.   These findings combine to result in central stenosis as follows: Mild  L1-L2, mild/moderate L2-L3, severe L3-L4, very severe L4-L5.   There is right foraminal stenosis as follows: Moderate-severe L3-L4,  mild/moderate  L2-L3, mild/moderate L1-L2.   There is left foraminal stenosis as follows: Moderate L5-S1, moderate  L4-L5, mild/moderate L3-L4.     Compare with the previous exam, the central stenosis at L3-L4 is  increased, and the central stenosis at L4-L5 is similar.                                                                       IMPRESSION:  1. Multilevel degenerative disc and facet disease. Scoliosis.  2. Central stenosis as follows: Very severe L4-L5, severe L3-L4,  mild-moderate L2-L3, mild L1-L2.  3. Multilevel foraminal stenosis, greatest on the right at L3-L4  (moderate-severe).      Allergies:      Allergies   Allergen Reactions     Sulfa Drugs       Vitals:  /68   Pulse 80   SpO2 98%     Review of Systems: The patient denies recent fever, chills, illness, use of antibiotics or anticoagulants. All other 10-point review of systems negative.   0.5 conttrast    Procedure: The procedure and risks were explained, and informed written consent was obtained from the patient. Risks include but are not limited to: infection, bleeding, increased pain, and damage to soft tissue, nerve, muscle, and vasculature structures. After getting informed consent, patient was brought into the procedure suite and was placed in a prone position on the procedure table. A Pause for the Cause was performed. Patient was prepped and draped in sterile fashion.     The sacral hiatus and cornua were palpated.  Under lateral fluoroscopic guidance sacral hiatus was identified.  A total of 3mL of Lidocaine 1%  used to anesthetize the skin and the needle track at a skin entry site.  A 22 gauge 5 inch spinal needle was advanced through the sacral hiatus using intermittent fluoroscopy. The needle angle was decreased to allow entrance into the sacral canal and epidural space.  This was verified in both the AP and lateral view for correct alignment.       The position was then inspected from anteroposterior and lateral views, and the needle  adjusted appropriately.  After negative aspiration for heme and CSF, a total of 1 mL of Omnipaque-300 was injected using static and continuous fluoroscopy confirming appropriate position, into the epidural space, with no intravascular or intrathecal uptake. 9 mL of Omnipaque-300 was wasted.    2 mL of 1% lidocaine, 2 mL of preservative free saline, with 40 mg of triamcinolone was injected.  The needle was removed. Hemostasis was achieved, the area was cleaned, and bandaids were placed when appropriate. Images were saved to PACS.    The patient tolerated the procedure well, and was taken to the recovery room, and there was no evidence of procedural complications. No new sensory or motor deficits were noted following the procedure. The patient was stable and able to ambulate on discharge home. Post-procedure instructions were provided.     Pre-procedure pain score: 2/10 in the back, 2/10 in the leg  Post-procedure pain score: 2/10 in the back, 3/10 in the leg    Assessment/Plan: Krystal Parra is a 79 year old female s/p caudal epidural steroid injection today for lumbar spondylosis, radiculitis/radiculopathy.     1. Following today's procedure, the patient was advised to contact the [facility] for any of the following:   Fever, chills, or night sweats   New onset of pain, numbness, or weakness   Any questions/concerns regarding the procedure  If unable to contact the Pain Center, the patient was instructed to go to a local Emergency Room for any complications.   2. The patient will receive a follow-up call in 1 week.  3. The patient should follow-up with the referring provider in 2 weeks for post-procedure evaluation.    Mabel Murray MD  [facility]

## 2019-07-29 NOTE — LETTER
[facility]  [address]  [place] [address]                  [phone]   July 30, 2019    Krystal Parra  [address] [place] [address]      Dear Krystal,    Here is a summary of your recent test results:    It was wonderful to see you in clinic this week!  Please find your recent lab results enclosed for your review and records.   I'm happy to report that your thyroid function is now in the normal range.     Results of your blood counts, kidney function, and liver function are normal.  Your blood sugar is flagged as being high, but this is only because the lab assumes you were fasting.  As a non fasting lab, your glucose result is normal.     Please contact me with any new questions or concerns.     Your test results are enclosed.             Thank you very much for choosing [facility]    Best regards,    Tasha Winkler MD        Results for orders placed or performed in visit on 07/29/19   TSH with free T4 reflex   Result Value Ref Range    TSH 3.16 0.40 - 4.00 mU/L   Comprehensive metabolic panel   Result Value Ref Range    Sodium 140 133 - 144 mmol/L    Potassium 4.3 3.4 - 5.3 mmol/L    Chloride 111 (H) 94 - 109 mmol/L    Carbon Dioxide 22 20 - 32 mmol/L    Anion Gap 7 3 - 14 mmol/L    Glucose 111 (H) 70 - 99 mg/dL    Urea Nitrogen 24 7 - 30 mg/dL    Creatinine 0.66 0.52 - 1.04 mg/dL    GFR Estimate 83 >60 mL/min/[1.73_m2]    GFR Estimate If Black >90 >60 mL/min/[1.73_m2]    Calcium 8.8 8.5 - 10.1 mg/dL    Bilirubin Total 0.4 0.2 - 1.3 mg/dL    Albumin 3.2 (L) 3.4 - 5.0 g/dL    Protein Total 7.0 6.8 - 8.8 g/dL    Alkaline Phosphatase 59 40 - 150 U/L    ALT 32 0 - 50 U/L    AST 20 0 - 45 U/L   CBC with platelets   Result Value Ref Range    WBC 7.3 4.0 - 11.0 10e9/L    RBC Count 4.05 3.8 - 5.2 10e12/L    Hemoglobin 12.6 11.7 - 15.7 g/dL    Hematocrit 37.9 35.0 - 47.0 %    MCV 94 78 - 100 fl    MCH 31.1 26.5 - 33.0 pg    MCHC 33.2 31.5 - 36.5 g/dL     RDW 14.1 10.0 - 15.0 %    Platelet Count 211 150 - 450 10e9/L

## 2019-07-30 ENCOUNTER — RADIOLOGY INJECTION OFFICE VISIT (OUTPATIENT)
Dept: PALLIATIVE MEDICINE | Facility: CLINIC | Age: 79
End: 2019-07-30
Payer: COMMERCIAL

## 2019-07-30 ENCOUNTER — ANCILLARY PROCEDURE (OUTPATIENT)
Dept: GENERAL RADIOLOGY | Facility: CLINIC | Age: 79
End: 2019-07-30
Attending: PHYSICAL MEDICINE & REHABILITATION
Payer: COMMERCIAL

## 2019-07-30 VITALS — SYSTOLIC BLOOD PRESSURE: 136 MMHG | DIASTOLIC BLOOD PRESSURE: 68 MMHG | OXYGEN SATURATION: 98 % | HEART RATE: 80 BPM

## 2019-07-30 DIAGNOSIS — M48.061 SPINAL STENOSIS OF LUMBAR REGION WITHOUT NEUROGENIC CLAUDICATION: Primary | ICD-10-CM

## 2019-07-30 DIAGNOSIS — M54.16 LUMBAR RADICULOPATHY: ICD-10-CM

## 2019-07-30 LAB
ALBUMIN SERPL-MCNC: 3.2 G/DL (ref 3.4–5)
ALP SERPL-CCNC: 59 U/L (ref 40–150)
ALT SERPL W P-5'-P-CCNC: 32 U/L (ref 0–50)
ANION GAP SERPL CALCULATED.3IONS-SCNC: 7 MMOL/L (ref 3–14)
AST SERPL W P-5'-P-CCNC: 20 U/L (ref 0–45)
BILIRUB SERPL-MCNC: 0.4 MG/DL (ref 0.2–1.3)
BUN SERPL-MCNC: 24 MG/DL (ref 7–30)
CALCIUM SERPL-MCNC: 8.8 MG/DL (ref 8.5–10.1)
CHLORIDE SERPL-SCNC: 111 MMOL/L (ref 94–109)
CO2 SERPL-SCNC: 22 MMOL/L (ref 20–32)
CREAT SERPL-MCNC: 0.66 MG/DL (ref 0.52–1.04)
GFR SERPL CREATININE-BSD FRML MDRD: 83 ML/MIN/{1.73_M2}
GLUCOSE SERPL-MCNC: 111 MG/DL (ref 70–99)
POTASSIUM SERPL-SCNC: 4.3 MMOL/L (ref 3.4–5.3)
PROT SERPL-MCNC: 7 G/DL (ref 6.8–8.8)
SODIUM SERPL-SCNC: 140 MMOL/L (ref 133–144)
TSH SERPL DL<=0.005 MIU/L-ACNC: 3.16 MU/L (ref 0.4–4)

## 2019-07-30 PROCEDURE — 62323 NJX INTERLAMINAR LMBR/SAC: CPT | Performed by: PHYSICAL MEDICINE & REHABILITATION

## 2019-07-30 NOTE — PATIENT INSTRUCTIONS
[facility] Procedure Discharge Instructions    Today you saw:   Dr. Mabel Murray      Your procedure:  Epidural steroid injection    Medications used:  Lidocaine (anesthetic)  Kenalog (steroid) Normal Saline  Omnipaque (contrast)              Be cautious when walking as numbness and/or weakness in the legs may occur up to 6-8 hours after the procedure due to effect of the local anesthetic    Do not drive for 6 hours. The effect of the local anesthetic could slow your reflexes.     Avoid strenuous activity for the first 24 hours. You may resume your regular activities after that.     You may shower, however avoid swimming, tub baths or hot tubs for 24 hours following your procedure    You may have a mild to moderate increase in pain for several days following the injection.      You may use ice packs for 10-15 minutes, 3 to 4 times a day at the injection site for comfort    Do not use heat to painful areas for 6 to 8 hours. This will give the local anesthetic time to wear off and prevent you from accidentally burning your skin.    Unless you have been directed to avoid the use of anti-inflammatory medications (NSAIDS-ibuprofen, Aleve, Motrin), you may use these medications or Tylenol for pain control if needed.     With diabetes, check your blood sugar more frequently than usual as your blood sugar may be higher than normal for 10-14 days following a steroid injection. Contact your doctor who manages your diabetes if your blood sugar is higher than usual    Possible side effects of steroids that you may experience include flushing, elevated blood pressure, increased appetite, mild headaches and restlessness.  All of these symptoms will get better with time.    It may take up to 14 days for the steroid medication to start working although you may feel the effect as early as a few days after the procedure.     Follow up with your referring provider in 2-3 weeks      If you experience any of the following,  call the pain center line during work hours at [phone] or on-call physician after hours at [phone]:  -Fever over 100 degree F  -Swelling, bleeding, redness, drainage, warmth at the injection site  -Progressive weakness or numbness in your legs or arms  -Loss of bowel or bladder function  -Unusual headache that is not relieved by Tylenol or your regular headache medication  -Unusual new onset of pain that is not improving

## 2019-07-30 NOTE — NURSING NOTE
Discharge Information    IV Discontiued Time:  NA    Amount of Fluid Infused:  NA    Discharge Criteria = When patient returns to baseline or as per MD order    Consciousness:  Pt is fully awake    Circulation:  BP +/- 20% of pre-procedure level    Respiration:  Patient is able to breathe deeply    O2 Sat:  Patient is able to maintain O2 Sat >92% on room air    Activity:  Moves 4 extremities on command    Ambulation:  Patient is able to stand and walk or stand and pivot into wheelchair    Dressing:  Clean/dry or No Dressing    Notes:   Discharge instructions and AVS given to patient    Patient meets criteria for discharge?  YES    Admitted to PCU?  No    Responsible adult present to accompany patient home?  Yes    Signature/Title:    Avril Caballero  RN Care Coordinator  [facility]

## 2019-11-30 ENCOUNTER — OFFICE VISIT (OUTPATIENT)
Dept: URGENT CARE | Facility: URGENT CARE | Age: 79
End: 2019-11-30
Payer: COMMERCIAL

## 2019-11-30 VITALS
SYSTOLIC BLOOD PRESSURE: 122 MMHG | RESPIRATION RATE: 20 BRPM | OXYGEN SATURATION: 96 % | DIASTOLIC BLOOD PRESSURE: 60 MMHG | BODY MASS INDEX: 30.23 KG/M2 | TEMPERATURE: 98.1 F | HEART RATE: 85 BPM | WEIGHT: 160 LBS

## 2019-11-30 DIAGNOSIS — J20.8 ACUTE BRONCHITIS DUE TO OTHER SPECIFIED ORGANISMS: Primary | ICD-10-CM

## 2019-11-30 PROCEDURE — 99202 OFFICE O/P NEW SF 15 MIN: CPT | Performed by: FAMILY MEDICINE

## 2019-11-30 RX ORDER — BENZONATATE 100 MG/1
100 CAPSULE ORAL 3 TIMES DAILY PRN
Qty: 30 CAPSULE | Refills: 1 | Status: SHIPPED | OUTPATIENT
Start: 2019-11-30 | End: 2020-01-27

## 2019-11-30 NOTE — PATIENT INSTRUCTIONS

## 2019-12-02 NOTE — PROGRESS NOTES
SUBJECTIVE:  Krystal Parra, a 79 year old female scheduled an appointment to discuss the following issues:  Acute bronchitis due to other specified organisms    Medical, social, surgical, and family histories reviewed.    Urgent Care    URI (coughing, fever on and off 3 weeks)     Onset of symptoms with 3-4 days of cough, thought it was a common cold initially.  This has lingered for 3 weeks.  Increased cough and fever last night, fever up to 100.6F.  No hx of asthma or COPD or diabetes.  No SOB.  Pt's late  smoked before he  32 years ago.      ROS:  See HPI.  No nausea/vomiting.  No chest pain/SOB.  No BM/urine problems.  No dizziness or syncope.      OBJECTIVE:  /60 (BP Location: Right arm)   Pulse 85   Temp 98.1  F (36.7  C) (Tympanic)   Resp 20   Wt 72.6 kg (160 lb)   SpO2 96%   BMI 30.23 kg/m    EXAM:  GENERAL APPEARANCE:  alert and minimal distress; afebrile; no cyanosis or accessory muscle use; moist mucus membrane, not septic  EYES: Eyes grossly normal to inspection, PERRL and conjunctivae and sclerae normal  HENT: ear canals and TM's normal and nose and mouth without ulcers or lesions  NECK: no adenopathy, no asymmetry, masses, or scars and neck normal to palpation  RESP: mild scattered rhonchi, no crackles or wheezes; good air entry otherwise  CV: regular rates and rhythm, normal S1 S2, no S3 or S4 and no murmur, click or rub  LYMPHATICS: no cervical adenopathy  ABDOMEN: soft, nontender, without hepatosplenomegaly or masses and bowel sounds normal  MS: extremities normal- no gross deformities noted  SKIN: no suspicious lesions or rashes  NEURO: Normal strength and tone, mentation intact and speech normal    ASSESSMENT/PLAN:  (J20.8) Acute bronchitis due to other specified organisms  (primary encounter diagnosis)  Comments:  Offered CXR but pt declined  Plan: amoxicillin-clavulanate (AUGMENTIN) 875-125 MG         tablet, benzonatate (TESSALON) 100 MG capsule   Care instructions  given.  Pt to f/up PCP within 1 week for recheck, sooner if no improvement or worsening.  Warning signs and symptoms explained.

## 2019-12-03 ENCOUNTER — TELEPHONE (OUTPATIENT)
Dept: PEDIATRICS | Facility: CLINIC | Age: 79
End: 2019-12-03

## 2019-12-03 NOTE — TELEPHONE ENCOUNTER
Pt called back to check status of request. Please contact to discuss and advise when available.    Suzy Langley on 12/3/2019 at 2:06 PM

## 2019-12-03 NOTE — TELEPHONE ENCOUNTER
Reason for call:  Symptom   Symptom or request: uri     Duration (how long have symptoms been present): a while  Have you been treated for this before? Yes    Additional comments: Seen in  11/30  Not feeling better.  Would like to speak to a nurse    Phone number to reach patient:  Home number on file [phone] (home)    Best Time:  any    Can we leave a detailed message on this number?  YES

## 2019-12-03 NOTE — TELEPHONE ENCOUNTER
"Returned call to patient. Patient wondering if she is having side effects to medications reports she feels \"lousy\" still and has been having some mild nausea. Advised to be taking medication with food or small snack and continue to push fluids and gt plenty of rest. Feeling \"lousy\" may be due to illness vs med SE. Patient verbalized understanding.   "

## 2019-12-09 ENCOUNTER — TELEPHONE (OUTPATIENT)
Dept: PEDIATRICS | Facility: CLINIC | Age: 79
End: 2019-12-09

## 2019-12-09 NOTE — TELEPHONE ENCOUNTER
Dr. Winkler-    She has been taking Augmentin for bronchitis.Has taken 17 of the 20 pills. She is just so nauseated she can't take it again. Is hoping you will be ok with that.    She feels much better. Cough is much improved. No fever.     Understands Dr. Winkler will not be in until Wednesday. Is ok to wait for her return to address this then.   Sandy Anne RN on 12/9/2019 at 9:30 AM

## 2019-12-09 NOTE — TELEPHONE ENCOUNTER
Spoke with Pt, given info. Will call back if nausea persists     Mechelle Del Rosario MA on 12/9/2019 at 2:56 PM

## 2019-12-12 NOTE — TELEPHONE ENCOUNTER
Patient calling. Finished with the Augmentin course and still feeling nauseated.   No other symptoms reported.     Advised patient to take Prilosec prn and/or Maalox or Pepto-Bismul.     Try this and call back if not improved.     Patient agrees with plan.

## 2019-12-12 NOTE — TELEPHONE ENCOUNTER
Pt is calling back and has the nausea and have not taking the medication since Sunday night. Please call her.  [phone].

## 2020-01-13 ENCOUNTER — OFFICE VISIT (OUTPATIENT)
Dept: NEUROSURGERY | Facility: CLINIC | Age: 80
End: 2020-01-13
Attending: NEUROLOGICAL SURGERY
Payer: COMMERCIAL

## 2020-01-13 VITALS
WEIGHT: 160 LBS | DIASTOLIC BLOOD PRESSURE: 82 MMHG | SYSTOLIC BLOOD PRESSURE: 142 MMHG | HEIGHT: 62 IN | RESPIRATION RATE: 18 BRPM | TEMPERATURE: 98.3 F | OXYGEN SATURATION: 97 % | BODY MASS INDEX: 29.44 KG/M2 | HEART RATE: 90 BPM

## 2020-01-13 DIAGNOSIS — M54.16 LUMBAR RADICULOPATHY: Primary | ICD-10-CM

## 2020-01-13 DIAGNOSIS — Z78.0 ASYMPTOMATIC AGE-RELATED POSTMENOPAUSAL STATE: ICD-10-CM

## 2020-01-13 PROCEDURE — G0463 HOSPITAL OUTPT CLINIC VISIT: HCPCS

## 2020-01-13 PROCEDURE — 99213 OFFICE O/P EST LOW 20 MIN: CPT | Performed by: NEUROLOGICAL SURGERY

## 2020-01-13 ASSESSMENT — MIFFLIN-ST. JEOR: SCORE: 1136.07

## 2020-01-13 NOTE — PROGRESS NOTES
Krystal Parra is a 79 year old female with a history of chronic low back pain with worsening over the past few months. She describes low back pain that raidates to the left posterior thigh to the knee. She reports intermittent numbness in her left calf as well. She states lifting, standing, and walking aggravate the pain. She intermittently takes tylenol with some relief. She denies alleviating factors. She underwent PT and injection therapy years ago, but nothing recent. She has not had surgery.    Continued back pain as above.  PT, Chiropractic care, and [facility] without improvement.  Imaging with lumbar scoliosis, spondylolisthesis and stenosis.  Daily, severe back pain, without radiculopathy.  Osteoporosis diagnosed in 2018, not treated to this point.      Past Medical History:   Diagnosis Date     Bone spur 4th toe Right      Lumbar disc herniation      Moderate aortic regurgitation 6/18/2016     Osteopenia      Pathologic fracture of distal radius and ulna     right in 2003, left 1992       Past Medical History reviewed with patient during visit.    Past Surgical History:   Procedure Laterality Date     C LIGATE FALLOPIAN TUBE,POSTPARTUM      Tubal Ligation     cataract  2011    bilateral     HC TOOTH EXTRACTION W/FORCEP       Past Surgical History reviewed with patient during visit.    Current Outpatient Medications   Medication     aspirin EC 81 MG EC tablet     Cholecalciferol (VITAMIN D) 2000 UNIT CAPS     coenzyme Q10 (CO-Q10) 30 MG capsule     fish oil-omega-3 fatty acids (FISH OIL) 1000 MG capsule     Ginkgo Biloba (GINKOBA PO)     OVER-THE-COUNTER     traZODone (DESYREL) 50 MG tablet     No current facility-administered medications for this visit.        Allergies   Allergen Reactions     Sulfa Drugs        Social History     Socioeconomic History     Marital status:      Spouse name: Not on file     Number of children: 4     Years of education: Not on file     Highest education level: Not on file    Occupational History     Occupation: Retired   Social Needs     Financial resource strain: Not on file     Food insecurity:     Worry: Not on file     Inability: Not on file     Transportation needs:     Medical: Not on file     Non-medical: Not on file   Tobacco Use     Smoking status: Passive Smoke Exposure - Never Smoker     Smokeless tobacco: Never Used     Tobacco comment:  smoked in house   Substance and Sexual Activity     Alcohol use: No     Drug use: No     Sexual activity: Not Currently   Lifestyle     Physical activity:     Days per week: Not on file     Minutes per session: Not on file     Stress: Not on file   Relationships     Social connections:     Talks on phone: Not on file     Gets together: Not on file     Attends L[employer] day service: Not on file     Active member of club or organization: Not on file     Attends meetings of clubs or organizations: Not on file     Relationship status: Not on file     Intimate partner violence:     Fear of current or ex partner: Not on file     Emotionally abused: Not on file     Physically abused: Not on file     Forced sexual activity: Not on file   Other Topics Concern     Parent/sibling w/ CABG, MI or angioplasty before 65F 55M? No   Social History Narrative     Not on file       Family History   Problem Relation Age of Onset     Cardiovascular Mother         Mild MI 80's     Cerebrovascular Disease Father      Diabetes Father      Neurologic Disorder Sister         brain tumor     Lipids Sister      Hypertension Sister      Gastrointestinal Disease Sister         diverticulitis     Cancer Sister         Breast cancer         ROS: 10 point ROS neg other than the symptoms noted above in the HPI.    Vital Signs:       Examination:  Constitutional:  Alert, well nourished, NAD.  Memory: recent and remote memory   HEENT: Normocephalic, atraumatic.   Pulm:  Without shortness of breath   CV:  No pitting edema of BLE.    Neurological:  Awake  Alert  Oriented x  3  Speech clear  Motor exam    Hip Flexor:                Right: 5/5  Left:  5/5  Hip Adductor:             Right:  5/5  Left:  5/5  Hip Abductor:             Right:  5/5  Left:  5/5  Gastroc Soleus:        Right:  5/5  Left:  5/5  Tib/Ant:                      Right:  5/5  Left:  5/5  EHL:                          Right:  5/5  Left:  5/5   Sensation decreased in left lateral calf  Muscle tone to bilateral upper and lower extremities   Gait: Able to stand from a seated position. Normal non-antalgic, non-myelopathic gait.  Able to heel/toe walk without loss of balance    Lumbar examination reveals no tenderness of the spine or paraspinous muscles.  Hip height is symmetrical. Negative SI joint, sciatic notch or greater trochanteric tenderness to palpation bilaterally.  Straight leg raise is negative bilaterally.      Imaging: Lumbar MRI 5/6/2019  IMPRESSION:  1. Multilevel degenerative disc and facet disease. Scoliosis.  2. Central stenosis as follows: Very severe L4-L5, severe L3-L4,  mild-moderate L2-L3, mild L1-L2.  3. Multilevel foraminal stenosis, greatest on the right at L3-L4  (moderate-severe).      Assessment/Plan:     Will order [facility] brace  Bone Health referral  Pain clinic referral to discuss RFA and SI joint injection options

## 2020-01-13 NOTE — NURSING NOTE
"Krystal Parra is a 79 year old female who presents for:  No chief complaint on file.       Initial Vitals:  BP (!) 142/82   Pulse 90   Ht 5' 1.5\" (1.562 m)   Wt 160 lb (72.6 kg)   BMI 29.74 kg/m   Estimated body mass index is 29.74 kg/m  as calculated from the following:    Height as of this encounter: 5' 1.5\" (1.562 m).    Weight as of this encounter: 160 lb (72.6 kg).. Body surface area is 1.77 meters squared. BP completed using cuff size: hayde Christie CMA    "

## 2020-01-13 NOTE — PATIENT INSTRUCTIONS
1. Order placed for pain referral. Please call the number highlighted to make appointment with Dr. Pineda  2. Order placed for Endocrinology. Please call the number highlighted to make an appointment.  3. LSO brace referral placed.    Please call our clinic with any questions or concerns: [phone]

## 2020-01-13 NOTE — LETTER
1/13/2020         RE: Krystal Parra  [address]  [place] [address]        Dear Colleague,    Thank you for referring your patient, Krystal Parra, to the [facility]. Please see a copy of my visit note below.    Krystal Parra is a 79 year old female with a history of chronic low back pain with worsening over the past few months. She describes low back pain that raidates to the left posterior thigh to the knee. She reports intermittent numbness in her left calf as well. She states lifting, standing, and walking aggravate the pain. She intermittently takes tylenol with some relief. She denies alleviating factors. She underwent PT and injection therapy years ago, but nothing recent. She has not had surgery.    Continued back pain as above.  PT, Chiropractic care, and [facility] without improvement.  Imaging with lumbar scoliosis, spondylolisthesis and stenosis.  Daily, severe back pain, without radiculopathy.  Osteoporosis diagnosed in 2018, not treated to this point.      Past Medical History:   Diagnosis Date     Bone spur 4th toe Right      Lumbar disc herniation      Moderate aortic regurgitation 6/18/2016     Osteopenia      Pathologic fracture of distal radius and ulna     right in 2003, left 1992       Past Medical History reviewed with patient during visit.    Past Surgical History:   Procedure Laterality Date     C LIGATE FALLOPIAN TUBE,POSTPARTUM      Tubal Ligation     cataract  2011    bilateral     HC TOOTH EXTRACTION W/FORCEP       Past Surgical History reviewed with patient during visit.    Current Outpatient Medications   Medication     aspirin EC 81 MG EC tablet     Cholecalciferol (VITAMIN D) 2000 UNIT CAPS     coenzyme Q10 (CO-Q10) 30 MG capsule     fish oil-omega-3 fatty acids (FISH OIL) 1000 MG capsule     Ginkgo Biloba (GINKOBA PO)     OVER-THE-COUNTER     traZODone (DESYREL) 50 MG tablet     No current facility-administered medications for this visit.         Allergies   Allergen Reactions     Sulfa Drugs        Social History     Socioeconomic History     Marital status:      Spouse name: Not on file     Number of children: 4     Years of education: Not on file     Highest education level: Not on file   Occupational History     Occupation: Retired   Social Needs     Financial resource strain: Not on file     Food insecurity:     Worry: Not on file     Inability: Not on file     Transportation needs:     Medical: Not on file     Non-medical: Not on file   Tobacco Use     Smoking status: Passive Smoke Exposure - Never Smoker     Smokeless tobacco: Never Used     Tobacco comment:  smoked in house   Substance and Sexual Activity     Alcohol use: No     Drug use: No     Sexual activity: Not Currently   Lifestyle     Physical activity:     Days per week: Not on file     Minutes per session: Not on file     Stress: Not on file   Relationships     Social connections:     Talks on phone: Not on file     Gets together: Not on file     Attends [employer] service: Not on file     Active member of club or organization: Not on file     Attends meetings of clubs or organizations: Not on file     Relationship status: Not on file     Intimate partner violence:     Fear of current or ex partner: Not on file     Emotionally abused: Not on file     Physically abused: Not on file     Forced sexual activity: Not on file   Other Topics Concern     Parent/sibling w/ CABG, MI or angioplasty before 65F 55M? No   Social History Narrative     Not on file       Family History   Problem Relation Age of Onset     Cardiovascular Mother         Mild MI 80's     Cerebrovascular Disease Father      Diabetes Father      Neurologic Disorder Sister         brain tumor     Lipids Sister      Hypertension Sister      Gastrointestinal Disease Sister         diverticulitis     Cancer Sister         Breast cancer         ROS: 10 point ROS neg other than the symptoms noted above in the  HPI.    Vital Signs:       Examination:  Constitutional:  Alert, well nourished, NAD.  Memory: recent and remote memory   HEENT: Normocephalic, atraumatic.   Pulm:  Without shortness of breath   CV:  No pitting edema of BLE.    Neurological:  Awake  Alert  Oriented x 3  Speech clear  Motor exam    Hip Flexor:                Right: 5/5  Left:  5/5  Hip Adductor:             Right:  5/5  Left:  5/5  Hip Abductor:             Right:  5/5  Left:  5/5  Gastroc Soleus:        Right:  5/5  Left:  5/5  Tib/Ant:                      Right:  5/5  Left:  5/5  EHL:                          Right:  5/5  Left:  5/5   Sensation decreased in left lateral calf  Muscle tone to bilateral upper and lower extremities   Gait: Able to stand from a seated position. Normal non-antalgic, non-myelopathic gait.  Able to heel/toe walk without loss of balance    Lumbar examination reveals no tenderness of the spine or paraspinous muscles.  Hip height is symmetrical. Negative SI joint, sciatic notch or greater trochanteric tenderness to palpation bilaterally.  Straight leg raise is negative bilaterally.      Imaging: Lumbar MRI 5/6/2019  IMPRESSION:  1. Multilevel degenerative disc and facet disease. Scoliosis.  2. Central stenosis as follows: Very severe L4-L5, severe L3-L4,  mild-moderate L2-L3, mild L1-L2.  3. Multilevel foraminal stenosis, greatest on the right at L3-L4  (moderate-severe).      Assessment/Plan:     Will order O brace  Bone Health referral  Pain clinic referral to discuss RFA and SI joint injection options    Again, thank you for allowing me to participate in the care of your patient.        Sincerely,        Koffi Lazaro MD

## 2020-01-23 NOTE — PROGRESS NOTES
[facility]     Date of visit: 1/27/2020    Reason for consultation:    Krystal Parra is a 79 year old female who is seen in consultation today at the request of her neurosurgeon, Dr. Lazaro, for evaluation of her pain issues and recommendations for management, with specific emphasis on  Reason for Referral: Evaluation for comprehensive services- patients will be evaluated if appropriate for comprehensive service including medication changes, procedures, pain psychology, and pain physical therapy.  While involved with comprehensive services, pain providers will work with referring provider/PCP to stabilize appropriate medication management, with long-term plan of transition of prescribing back to referring provider/PCP upon completion of comprehensive services.      Please complete the following questions:    Do you have any specific questions for the pain specialist? No    Are there any red flags that may impact the assessment or management of the patient? None      What is your diagnosis for the patient's pain? Lumbar Radiculopathy     Please see the [facility] health questionnaire which the patient completed and reviewed with me in detail.    Review of [place] Prescription Monitoring Program (): No concern for abuse or misuse of controlled medications based on this report.     Pain medications are being prescribed by NA.     Subjective:    Chief Complaint:    Chief Complaint   Patient presents with     Pain       Pain history:  Krystal Parra is a 79 year old female who presents for initial evaluation of chief complaint of low back pain.      She first started having problems with low back pain 50 or more years ago. States pain developed during a pregnancy and has continued intermittently since. Her pain has been most problematic for the last 15-20 years. She did have two epidural steroid injections about 15 years ago, the first injection with a couple days of  benefit, the second with long lasting left leg pain relief that still persists. She tried physical therapy without benefit. She had a stem cell injection last year without benefit. She goes to a chiropractor weekly, she hasn't had pain relief for the last 1-2 years but thinks it must be beneficial overall. She was referred to neurosurgery, had a visit with Jasmin LUDWIG CNP. She recommended an injection and physical therapy. She completed physical therapy with [facility], states she liked this but found that it made her pain worse. She had a caudal epidural steroid injection with Dr. Murray on 5/21/19 and 7/30/19, denies significant benefit with either injection. She had follow up with Dr. Lazaro earlier this month. Dr. Lazaro informed her that surgery was not recommended, there were too many issues that needed to be addressed. He recommended a LSO brace, and pain management referral for consideration of a lumbar radiofrequency ablation and SI joint dysfunction. She has tried wearing the brace but isn't sure if it is helpful overall, thinks it is difficult to put on. The pain is located in very low back and upper buttocks. Her pain is most bothersome when she stands or walks for longer than a minute. Only very rare radiation into left lateral leg. Her most bothersome pain is in left upper buttock, is also present on the right but not as bothersome. Denies numbness or tingling. Denies weakness.     Pain description:  Location: low back   Quality: sharp and miserable  Severity/Intensity: 1/10 at best, 10/10 at worst, 7/10 on average  Aggravating factors include: on feet for a minute, turning  Relieving factors include: sitting or lying down    The patient otherwise denies bowel or bladder incontinence, parasthesias, weakness, saddle anesthesia, unintentional weight loss, or fever/chills/sweats.     Krystal Parra has been seen at a pain clinic in the past.  [facility] for injections only.    Pain  Treatments:  ([facility]--helped; [facility]--Helped initially; [facility]--Somewhat helpful; [facility]--No help; W--worse; [facility]--side effects; ?--Unsure if helpful)   1. Medications:       Current pain medications:   Tylenol 1000mg at bedtime and occasional 1000mg prn- [facility]   Ibuprofen 400mg prn- [facility], a couple days a week     Trazodone 100mg at bedtime- [facility], not taking lately as doesn't like medication, wants to restart  Current calculated MME: 0    1. Previous Pain Relevant Medications:  NOTE: This medication information taken from patient's intake form, not medical records.    Opiates: Tylenol with codeine (years ago)- [facility]   NSAIDS: ibuprofen- [facility]    Muscle Relaxants: no   Anti-migraine mediations: no   Anti-depressants: no   Sleep aids: Trazodone- [facility], Benadryl- [facility]   Anxiolytics: no   Neuropathics: no    Topicals: years ago cannot remember   Other medications not covered above: Tylenol- [facility]    2. Physical Therapy: [facility]- [facility], [facility], previously as well [facility]  3. Pain Psychology: no  4. Surgery: no, Dr. Lazaro recommended against surgery  5. Injections: caudal epidural steroid injection with Dr. Murray on 7/30/2019- [facility]  Caudal epidural steroid injection with Dr. Murray on 5/21/2019- [facility]  6. Chiropractic: in [facility] 1-2x weekly- [facility], her friend is a chiropractor  7. Acupuncture: yes years ago- [facility]  8. TENS Unit: yes- [facility], short term, years ago    Imaging:  MRI of lumbar spine was completed on 5/6/19 and shows:    There is facet degenerative change as follows: Mild right T11-T12,  mild left T12-L1, mild right L1-L2, mild bilateral L2-L3, mild right  and moderate left L3-L4, moderate right and moderate-prominent left  L4-L5, mild bilateral L5-S1.     There is ligamentum flavum thickening at L4-L5, L3-L4, L2-L3, L1-L2.  Based upon sagittal imaging there is disc bulging at T10-T11. There is  disc bulging at each level from L1 to S1.   These findings combine to result in central stenosis as follows: Mild  L1-L2, mild/moderate L2-L3, severe L3-L4, very  severe L4-L5.   There is right foraminal stenosis as follows: Moderate-severe L3-L4,  mild/moderate L2-L3, mild/moderate L1-L2.   There is left foraminal stenosis as follows: Moderate L5-S1, moderate  L4-L5, mild/moderate L3-L4.     Compare with the previous exam, the central stenosis at L3-L4 is  increased, and the central stenosis at L4-L5 is similar.                                                                       IMPRESSION:  1. Multilevel degenerative disc and facet disease. Scoliosis.  2. Central stenosis as follows: Very severe L4-L5, severe L3-L4,  mild-moderate L2-L3, mild L1-L2.  3. Multilevel foraminal stenosis, greatest on the right at L3-L4  (moderate-severe).             Past Medical History:  Past Medical History:   Diagnosis Date     Bone spur 4th toe Right      Lumbar disc herniation      Moderate aortic regurgitation 6/18/2016     Osteopenia      Pathologic fracture of distal radius and ulna     right in 2003, left 1992       Past Surgical History:  Past Surgical History:   Procedure Laterality Date     C LIGATE FALLOPIAN TUBE,POSTPARTUM      Tubal Ligation     cataract  2011    bilateral     HC TOOTH EXTRACTION W/FORCEP         Medications:  Current Outpatient Medications   Medication Sig Dispense Refill     aspirin EC 81 MG EC tablet Take 1 tablet (81 mg) by mouth daily       Cholecalciferol (VITAMIN D) 2000 UNIT CAPS Take 1 tablet by mouth daily.       coenzyme Q10 (CO-Q10) 30 MG capsule Take 1 capsule (30 mg) by mouth daily 30 capsule 0     fish oil-omega-3 fatty acids (FISH OIL) 1000 MG capsule Take 2 g by mouth 2 times daily        Ginkgo Biloba (GINKOBA PO)        OVER-THE-COUNTER Take 1 tablet by mouth daily (Melaleuca vitamins)       traZODone (DESYREL) 50 MG tablet Take 2 tablets (100 mg) by mouth nightly as needed for sleep 180 tablet 3       Allergies:     Allergies   Allergen Reactions     Sulfa Drugs        Social History:  Home situation: lives in a house  Support system:  grandson, his wife, and great grandson live downstairs  Occupation/Schooling: retired 2003  Tobacco use: no  Drug use: no  Alcohol use: no  History of chemical dependency treatment: no  Mental health admissions: no    Family history:  Family History   Problem Relation Age of Onset     Cardiovascular Mother         Mild MI 80's     Cerebrovascular Disease Father      Diabetes Father      Neurologic Disorder Sister         brain tumor     Lipids Sister      Hypertension Sister      Gastrointestinal Disease Sister         diverticulitis     Cancer Sister         Breast cancer       Review of Systems:    POSTIVE IN BOLD  GENERAL: fever/chills, fatigue, general unwell feeling, weight gain/loss.  HEAD/EYES:  headache, dizziness, or vision changes.    EARS/NOSE/THROAT: nosebleeds, hearing loss, sinus infection, earache, tinnitus.  IMMUNE:  allergies, cancer, immune deficiency, or infections.  SKIN:  itching, rash, hives  HEME/Lymphatic: anemia, easy bruising, easy bleeding.  RESPIRATORY: cough, wheezing, or shortness of breath  CARDIOVASCULAR/Circulation: extremity edema, syncope, hypertension, tachycardia, or angina.  GASTROINTESTINAL: abdominal pain, nausea/emesis, diarrhea, constipation,  hematochezia, or melena.  ENDOCRINE:  diabetes, steroid use,  thyroid disease or osteoporosis.  MUSCULOSKELETAL: joint pain, stiffness, neck pain, back pain, arthritis, or gout.  GENITOURINARY: frequency, urgency, dysuria, difficulty voiding, hematuria or incontinence.  NEUROLOGIC: weakness, numbness, paresthesias, seizure, tremor, stroke or memory loss.  PSYCHIATRIC: depression, anxiety, stress, suicidal thoughts or mood swings.     Objective:    Physical Exam:  Vitals:    01/27/20 0951   BP: (!) 144/81   Pulse: 88   SpO2: 97%   Weight: 72.6 kg (160 lb 1.6 oz)     Exam:  Constitutional: Well developed, well nourished, appears stated age. Overweight.  HEENT: Head atraumatic, normocephalic. Eyes without conjunctival injection or  jaundice. Oropharynx clear. Neck supple. No obvious neck masses.  Skin: No rash, lesions, or petechiae of exposed skin.   Extremities: Peripheral pulses intact. No clubbing, cyanosis, or edema.  Psychiatric/mental status: Alert, without lethargy or stupor. Speech fluent. Appropriate affect. Mood normal. Able to follow commands without difficulty.     Musculoskeletal exam:  Unable to walk on the heels and toes. Patient has antalgic gait favoring the left side.   Normal bulk and tone. Unremarkable spinal curvature.     Cervical spine:  Range of motion decreased.   Tenderness in the cervical paraspinal muscles.No    Thoracic spine:    Kyphosis. No   Tenderness in the thoracic paraspinal muscles.No    Lumbar spine:    Flex:  90 degrees   Ext: 20 degrees   Tenderness in the lumbar paraspinal muscles.No   Rotation/ext to right: painful    Rotation/ext to left: painful     Focal tenderness: bilateral SI joint and left GT tenderness. No gluteal or piriformis tenderness.  Straight leg raise: negative   FADIR: positive bilateral     Hip exam:   Normal internal and external range of motion bilaterally. INDRA positive bilateral. + sacral thrust bilateral. + Gaenslen bilateral. - compression test bilateral.     Neurologic exam:  CN:  Cranial nerves 2-12 are grossly intact  Motor:  5/5 UE and LE strength  Strength:       C4 (shoulder shrug)  symmetric 5/5       C5 (shoulder abduction) symmetric 5/5       C6 (elbow flexion) symmetric 5/5       C7 (elbow extension) symmetric 5/5       C8 (finger abduction, thumb flexion) symmetric 5/5    Reflexes:     Biceps:     R:  2/4 L: 2/4   Brachioradialis   R:  2/4 L: 2/4   Patella:  R:  0/4 L: 0/4   Achilles:  R:  2/4 L: 2/4    Sensory:   Light touch: normal bilateral upper and lower extremities    No allodynia, dysesthesia, or hyperalgesia.    DIRE Score for ongoing opioid management is calculated as follows:   Diagnosis = 3 pts (advanced condition; severe pain/objective  findings)   Intractability = 3 pts (patient fully engaged but inadequate response to treatments)   Risk    Psych = 3 pts (no significant personality dysfunction/mental illness; good communication with clinic)    Chem Hlth = 3 pts (no history of chemical dependency; not drug-focused)   Reliability = 3 pts (highly reliable with meds, appointments, treatments)   Social = 3 pts (supportive family/close relationships; involved in work/school; no isolation)   (Psych + Chem hlth + Reliability + Social) = 18     Efficacy = 2 pts (moderate benefit/function; low med dose; too early/not tried meds)         DIRE Score = 20        7-13: likely NOT suitable candidate for long-term opioid analgesia       14-21: may be a suitable candidate for long-term opioid analgesia     Assessment:  Krystal Parra is a 79 year old female with a past medical history significant for hyperlipidemia, TIA, HTN, and moderate aortic regurgitation who presents with complaints of low back pain.     1. Low back pain- etiology likely multifactorial including severe multilevel foraminal and central stenosis and multilevel facet arthropathy. Also seems to be a component of bilateral SI joint dysfunction.  2. Mental Health - the patient's mental health concernsaffect her experience of pain and contribute to her clinically significant distress.    1. Lumbosacral spondylosis without myelopathy    2. Spinal stenosis of lumbar region with neurogenic claudication    3. SI (sacroiliac) joint dysfunction        Plan:  The following recommendations were given to the patient. Diagnosis, treatment options, risks, benefits, and alternatives were discussed, and all questions were answered. The patient expressed understanding of the plan for management.     I am recommending a multidisciplinary treatment plan to help this patient better manage her pain.  This includes:      1.  Pain Physical Therapy:     Encouraged Krystal to continue regular stretches and exercises. We  discussed pool therapy as an option. She may do very well in the pool. She will consider and let me know if interested.    2.  Pain Psychologist to address relaxation, behavioral change, coping style, and other factors important to improvement.  NO   3.  Medication Management:     1. Krystal is only taking 1000mg of Tylenol at bedtime but with very good pain benefit. We discussed a dose increase to 1000mg BID and she is interested. Can take up to 3000mg.     2. Recommended a trial of lidocaine patches 4%.    3. May consider low dose gabapentin, Cymbalta, or Robaxin prn. A low dose opioid taken only prn severe pain may also be appropriate.    4.  Potential procedures: we discussed that Krystal's pain is clearly multifactorial. We reviewed a lumbar radiofrequency ablation and SI joint injections as recommended by Dr. Lazaro. We will start with bilateral SI joint injections and evaluate how much pain relief she has with this. We will likely then proceed with lumbar medial branch block to radiofrequency ablation process.    5.  Referrals: okay to continue chiropractic care, once weekly should be sufficient.    6.  Follow up with OZIEL Delgado CNP in 3-4 weeks.    7.  Krystal to follow up with Primary Care provider regarding elevated blood pressure.      Review of Electronic Chart: Today I have also reviewed available medical information in the patient's medical record at [facility] including relevant provider notes, laboratory work, and imaging.       I spent 60 minutes of time face to face with the patient.  Greater than 50% of this time was spent in patient counseling and/or coordination of care regarding principles of multidisciplinary care, medication management, and treatment options as discussed above.      OZIEL Delgado CNP  [facility]

## 2020-01-27 ENCOUNTER — TELEPHONE (OUTPATIENT)
Dept: PALLIATIVE MEDICINE | Facility: CLINIC | Age: 80
End: 2020-01-27

## 2020-01-27 ENCOUNTER — OFFICE VISIT (OUTPATIENT)
Dept: PALLIATIVE MEDICINE | Facility: CLINIC | Age: 80
End: 2020-01-27
Payer: COMMERCIAL

## 2020-01-27 VITALS
HEART RATE: 88 BPM | OXYGEN SATURATION: 97 % | SYSTOLIC BLOOD PRESSURE: 144 MMHG | WEIGHT: 160.1 LBS | DIASTOLIC BLOOD PRESSURE: 81 MMHG | BODY MASS INDEX: 29.76 KG/M2

## 2020-01-27 DIAGNOSIS — M53.3 SI (SACROILIAC) JOINT DYSFUNCTION: ICD-10-CM

## 2020-01-27 DIAGNOSIS — M47.817 LUMBOSACRAL SPONDYLOSIS WITHOUT MYELOPATHY: Primary | ICD-10-CM

## 2020-01-27 DIAGNOSIS — M48.062 SPINAL STENOSIS OF LUMBAR REGION WITH NEUROGENIC CLAUDICATION: ICD-10-CM

## 2020-01-27 PROCEDURE — 99215 OFFICE O/P EST HI 40 MIN: CPT | Performed by: NURSE PRACTITIONER

## 2020-01-27 PROCEDURE — 99207 ZZC DOWN CODE DUE TO SUBSEQUENT EXAM: CPT | Performed by: NURSE PRACTITIONER

## 2020-01-27 ASSESSMENT — PAIN SCALES - GENERAL: PAINLEVEL: NO PAIN (1)

## 2020-01-27 NOTE — PATIENT INSTRUCTIONS
1.  Pain Physical Therapy:     Continue regular stretches and exercises. Think about pool therapy and let me know if interested.    2.  Pain Psychologist to address relaxation, behavioral change, coping style, and other factors important to improvement.  NO   3.  Medication Management:     1. Try taking scheduled Tylenol 1000mg twice daily. See if this allows you to be more active.    2.  Consider a trial of lidocaine patches 4%, apply one patch as needed for 12 hours.    4.  Potential procedures: bilateral SI joint injections ordered today. They will call to schedule.     5.  Referrals: okay to continue chiropractic care, once weekly should be sufficient.    6.  Follow up with OZIEL Delgado CNP in 3-4 weeks.       ----------------------------------------------------------------  Clinic Number:  [phone]     Call with any questions about your care and for scheduling assistance.     Calls are returned Monday through Friday between 8 AM and 4:30 PM. We usually get back to you within 2 business days depending on the issue/request.    If we are prescribing your medications:    For opioid medication refills, call the clinic or send a Sqeeqee message 7 days in advance.  Please include:    Name of requested medication    Name of the pharmacy.    For non-opioid medications, call your pharmacy directly to request a refill. Please allow 3-4 days to be processed.     Per [place] State Law:    All controlled substance prescriptions must be filled within 30 days of being written.      For those controlled substances allowing refills, pickup must occur within 30 days of last fill.      We believe regular attendance is key to your success in our program!      Any time you are unable to keep your appointment we ask that you call us at least 24 hours in advance to cancel.This will allow us to offer the appointment time to another patient.     Multiple missed appointments may lead to dismissal from the clinic.

## 2020-01-27 NOTE — TELEPHONE ENCOUNTER
"Pre-screening Questions for Radiology Injections:    Injection to be done at which interventional clinic site? [facility]    Instruct patient to arrive as directed prior to the scheduled appointment time:    [place] minutes before      [place]: 30 minutes before; if IV needed 1 hour before     Dr. Pelletier-no IV needed for Cervical MERRILL; please instruct to arrive 30\" early    Procedure ordered by Shanika Buitrago    Procedure ordered? bilateral SI joint injections      Transforaminal Cervical MERRILL - no pain provider currently performing    What insurance would patient like us to bill for this procedure? [employer]      Worker's comp or MVA (motor vehicle accident) -Any injection DO NOT SCHEDULE and route to Ana Lilia Bedolla.      [employer] insurance - For SI joint injections, DO NOT SCHEDULE and route Ana Lilia Graeme.       [employer] - Any injection besides hip/shoulder/knee joint DO NOT SCHEDULE and route to Ana Lilia Graeme. She will obtain PA and call pt back to schedule procedure or notify pt of denial.       [employer]-Route to Ana Lilia for review      **BCBS- ALL need to be routed to Ana Lilia for review if a PA is needed**      IF SCHEDULING IN [place] AND NEEDS A PA, IT IS OKAY TO SCHEDULE. [place] HANDLES THEIR OWN PA'S AFTER THE PATIENT IS SCHEDULED. PLEASE SCHEDULE AT LEAST 1 WEEK OUT SO A PA CAN BE OBTAINED.    Any chance of pregnancy? NO   If YES, do NOT schedule and route to RN pool    Is an  needed? No     Patient has a drive home? (mandatory) YES: Informed    Is patient taking any blood thinners (i.e. plavix, coumadin, jantoven, warfarin, heparin, pradaxa or dabigatran, etc)? No   If hold needed, do NOT schedule, route to RN pool     Is patient taking any aspirin products (includes Excedrin and Fiorinal)? Yes - Pt takes 81mg daily; instructed to hold 0 day(s) prior to procedure.      If more than 325mg/day do NOT schedule; route to RN pool     For CERVICAL procedures, hold all aspirin products for 6 " days.     Tell pt that if aspirin product is not held for 6 days, the procedure WILL BE cancelled.      Does the patient have a bleeding or clotting disorder? No     If YES, okay to schedule AND route to RN nurse pool    For any patients with platelet count <100, must be forwarded to provider    Is patient diabetic?  No  If YES, instruct them to bring their glucometer.    Does patient have an active infection or treated for one within the past week? No     Is patient currently taking any antibiotics?  No     For patients on chronic, preventative, or prophylactic antibiotics, procedures may be scheduled.     For patients on antibiotics for active or recent infection:antibiotic course must have been completed for 4 days    Is patient currently taking any steroid medications? (i.e. Prednisone, Medrol)  No     For patients on steroid medications, course must have been completed for 4 days    Reviewed with patient:  If you are started on any steroids or antibiotics between now and your appointment, you must contact us because the procedure may need to be cancelled.  Yes    Is patient actively being treated for cancer or immunocompromised? No  If YES, do NOT schedule and route to RN pool     Are you able to get on and off an exam table with minimal or no assistance? Yes  If NO, do NOT schedule and route to RN pool    Are you able to roll over and lay on your stomach with minimal or no assistance? Yes  If NO, do NOT schedule and route to RN pool     Any allergies to contrast dye, iodine, shellfish, or numbing and steroid medications? No  If YES, route to RN pool AND add allergy information to appointment notes    Allergies: Sulfa drugs      Has the patient had a flu shot or any other vaccinations within 7 days before or after the procedure.  No     Does patient have an MRI/CT?  Not Applicable  Check Procedure Scheduling Grid to see if required.      Was the MRI done within the last 3 years?  NA    If yes, [facility], [facility], [facility] etc?       If no, do not schedule and route to RN pool    If MRI was not done at [facility], [facility] or [facility] do NOT schedule and route to RN pool.      If pt has an imaging disc, the injection MAY be scheduled but pt has to bring disc to appt.     If they show up without the disc the injection cannot be done    Reminders (please tell patient if applicable):       Instructed pt to arrive 30 minutes early for IV start if required. (Check Procedure Scheduling Grid)  Not Applicable      If celiac plexus block, informed patient NPO for 6 hours and that it is okay to take medications with sips of water, especially blood pressure medications  Not Applicable         If this is for a cervical procedure, informed patient that aspirin needs to be held for 6 days.   Not Applicable      For all patients not having spinal cord stimulator (SCS) trials or radiofrequency ablations (RFAs), informed patient:    IV sedation is not provided for this procedure.  If you feel that an oral anti-anxiety medication is needed, you can discuss this further with your referring provider or primary care provider.  The Pain Clinic provider will discuss specifics of what the procedure includes at your appointment.  Most procedures last 10-20 minutes.  We use numbing medications to help with any discomfort during the procedure.  Not Applicable      Do not schedule procedures requiring IV placement in the first appointment of the day or first appointment after lunch. Do NOT schedule at 0745, 0815 or 1245.       For patients 85 or older we recommend having an adult stay w/ them for the remainder of the day.       Does the patient have any questions?  NO  Sharon Cano  [facility]

## 2020-01-28 NOTE — PROGRESS NOTES
[facility] - Procedure Note    Date of Service: 1/30/2020  Procedure performed: Bilateral sacroiliac joint injection  Diagnosis: Sacroiliac joint pain  : Tran Kilgore MD  Anesthesia: none    Indications: Krystal Parra is a 79 year old female who is seen at the request of Shanika Buitrago CNP for a sacroiliac joint injection. The patient describes bilateral low back and buttock pain that does not radiate. Exam shows full strength and sensation in both lower extremities, tenderness of the sacroiliac (SI) joint, and positive FABERE bilaterally. The patient reports minimal improvement with conservative treatment, including PT, previous injections and medications.    S/P caudal MERRILL by Dr. Murray on 7/30/2019 that resulted in no pain relief.     Imaging:  MRI LUMBAR SPINE completed on 5/6/2019 showed:  FINDINGS: Five lumbar vertebrae are assumed. Multilevel degenerative  disc disease. Levoscoliosis. Left lateral listhesis at L3-L4 and  L4-L5. Grade 1-2 degenerative spondylolisthesis at L4-L5. Incidentally  noted L4 hemangioma.      Findings by specific level:     There is facet degenerative change as follows: Mild right T11-T12,  mild left T12-L1, mild right L1-L2, mild bilateral L2-L3, mild right  and moderate left L3-L4, moderate right and moderate-prominent left  L4-L5, mild bilateral L5-S1.     There is ligamentum flavum thickening at L4-L5, L3-L4, L2-L3, L1-L2.  Based upon sagittal imaging there is disc bulging at T10-T11. There is  disc bulging at each level from L1 to S1.   These findings combine to result in central stenosis as follows: Mild  L1-L2, mild/moderate L2-L3, severe L3-L4, very severe L4-L5.   There is right foraminal stenosis as follows: Moderate-severe L3-L4,  mild/moderate L2-L3, mild/moderate L1-L2.   There is left foraminal stenosis as follows: Moderate L5-S1, moderate  L4-L5, mild/moderate L3-L4.     Compare with the previous exam, the central stenosis at  L3-L4 is  increased, and the central stenosis at L4-L5 is similar.                                                                     IMPRESSION:  1. Multilevel degenerative disc and facet disease. Scoliosis.  2. Central stenosis as follows: Very severe L4-L5, severe L3-L4,  mild-moderate L2-L3, mild L1-L2.  3. Multilevel foraminal stenosis, greatest on the right at L3-L4  (moderate-severe).      Allergies:      Allergies   Allergen Reactions     Sulfa Drugs         Vitals:  BP (!) 144/79   Pulse 94   SpO2 99%     Options/alternatives, benefits and risks were discussed with the patient including bleeding, infection, tissue trauma, exposure to radiation, reaction to medications including seizure, nerve injury, weakness, and numbness.  Questions were answered to her satisfaction and she agrees to proceed. Voluntary informed consent was obtained and signed.     Procedure:  After getting informed consent, patient was brought into the procedure suite and was placed in a prone position on the procedure table.   A Pause for the Cause was performed.  Patient was prepped and draped in sterile fashion.     After identifying the bilateral SI joint, the C-arm was rotated to a obliquely to obtain the best view of the inferior angle of the joint.  A total of 2 ml of Lidocaine 1%  was used to anesthetize the skin at a skin entry site coaxial with the fluoroscopy beam at this location.  A 22 gauge 3.5 inch needle was advanced under intermittent fluoroscopy until it was felt to enter the SI joint.    A total of 1 ml of Omnipaque-300 was injected, confirming appropriate position, with spread into the intraarticular space, with no intravascular uptake noted.  9 ml of Omnipaque-300 was wasted. Location was verified in lateral view.    1 ml of 1% lidocaine, 1 ml of 0.5% bupivacaine with 40 mg of kenalog was injected.  The needle was removed. Hemostasis was achieved, the area was cleaned, and bandaids were placed when  appropriate.  The patient tolerated the procedure well, and was taken to the recovery room.    Images were saved to PACS.    Pre-procedure pain score: 4/10  Post-procedure pain score: 4/10  Following today's procedure, the patient was advised to contact the Pain Management Center for any of the following:   Fever, chills, or night sweats   New onset of pain, numbness, or weakness   Any questions/concerns regarding the procedure    -f/u with the referring provider      DONNA GIBSON MD   Pain Management & Addiction Medicine

## 2020-01-30 ENCOUNTER — RADIOLOGY INJECTION OFFICE VISIT (OUTPATIENT)
Dept: PALLIATIVE MEDICINE | Facility: CLINIC | Age: 80
End: 2020-01-30
Payer: COMMERCIAL

## 2020-01-30 ENCOUNTER — ANCILLARY PROCEDURE (OUTPATIENT)
Dept: GENERAL RADIOLOGY | Facility: CLINIC | Age: 80
End: 2020-01-30
Attending: ANESTHESIOLOGY
Payer: COMMERCIAL

## 2020-01-30 VITALS — OXYGEN SATURATION: 99 % | DIASTOLIC BLOOD PRESSURE: 79 MMHG | HEART RATE: 94 BPM | SYSTOLIC BLOOD PRESSURE: 144 MMHG

## 2020-01-30 DIAGNOSIS — M46.1 SACROILIITIS (H): Primary | ICD-10-CM

## 2020-01-30 DIAGNOSIS — M53.3 SACROILIAC JOINT PAIN: ICD-10-CM

## 2020-01-30 PROCEDURE — 27096 INJECT SACROILIAC JOINT: CPT | Mod: 50 | Performed by: ANESTHESIOLOGY

## 2020-01-30 NOTE — PATIENT INSTRUCTIONS
[facility] Procedure Discharge Instructions    Today you saw:   Dr. Tran Kilgore     Your procedure: Sacro-iliac joint injection     Medications used:  Lidocaine (anesthetic)  Bupivacaine (anesthetic)   Kenalog (steroid)  Omnipaque (contrast)            Be cautious when walking as numbness and/or weakness in the legs may occur up to 6-8 hours after the procedure due to effect of the local anesthetic    Do not drive for 6 hours. The effect of the local anesthetic could slow your reflexes.     Avoid strenuous activity for the first 24 hours. You may resume your regular activities after that.     You may shower, however avoid swimming, tub baths or hot tubs for 24 hours following your procedure    You may have a mild to moderate increase in pain for several days following the injection.      You may use ice packs for 10-15 minutes, 3 to 4 times a day at the injection site for comfort    Do not use heat to painful areas for 6 to 8 hours. This will give the local anesthetic time to wear off and prevent you from accidentally burning your skin.    Unless you have been directed to avoid the use of anti-inflammatory medications (NSAIDS-ibuprofen, Aleve, Motrin), you may use these medications or Tylenol for pain control if needed.     With diabetes, check your blood sugar more frequently than usual as your blood sugar may be higher than normal for 10-14 days following a steroid injection. Contact your doctor who manages your diabetes if your blood sugar is higher than usual    Possible side effects of steroids that you may experience include flushing, elevated blood pressure, increased appetite, mild headaches and restlessness.  All of these symptoms will get better with time.    It may take up to 14 days for the steroid medication to start working although you may feel the effect as early as a few days after the procedure.     Follow up with your referring provider in 2-3 weeks      If you experience any  of the following, call the pain center line during work hours at [phone] or on-call physician after hours at [phone]:  -Fever over 100 degree F  -Swelling, bleeding, redness, drainage, warmth at the injection site  -Progressive weakness or numbness in your legs  -Loss of bowel or bladder function  -Unusual new onset of pain that is not improving

## 2020-01-30 NOTE — NURSING NOTE
Pre-procedure Intake    Have you been fasting? No    If yes, for how long?     Are you taking a prescribed blood thinner such as coumadin, Plavix, Xarelto?    No    If yes, when did you take your last dose?     Do you take aspirin?  Yes -   ASA    If cervical procedure, have you held aspirin for 6 days?   NA    Do you have any allergies to contrast dye, iodine, steroid and/or numbing medications?  NO    Are you currently taking antibiotics or have an active infection?  NO    Have you had a fever/elevated temperature within the past week? NO    Are you currently taking oral steroids? NO    Do you have a ? Yes       Are you pregnant or breastfeeding?  NO    Are the vital signs normal?  No:

## 2020-01-30 NOTE — NURSING NOTE
Discharge Information    IV Discontiued Time:  NA    Amount of Fluid Infused:  NA    Discharge Criteria = When patient returns to baseline or as per MD order    Consciousness:  Pt is fully awake    Circulation:  BP +/- 20% of pre-procedure level    Respiration:  Patient is able to breathe deeply    O2 Sat:  Patient is able to maintain O2 Sat >92% on room air    Activity:  Moves 4 extremities on command    Ambulation:  Patient is able to stand and walk or stand and pivot into wheelchair    Dressing:  Clean/dry or No Dressing    Notes:   Discharge instructions and AVS given to patient    Patient meets criteria for discharge?  YES    Admitted to PCU?  No    Responsible adult present to accompany patient home?  Yes    Signature/Title:    Avril Caballero RN  RN Care Coordinator  [facility] [place]

## 2020-02-21 NOTE — PROGRESS NOTES
[facility]     Date of visit: 2/24/2020    Chief complaint:   Chief Complaint   Patient presents with     Pain       Interval history:  Krystal Parra is a 80 year old female last seen by me on 1/27/2020.      Recommendations/plan at the last visit included:          1.  Pain Physical Therapy:                Encouraged Krystal to continue regular stretches and exercises. We discussed pool therapy as an option. She may do very well in the pool. She will consider and let me know if interested.               2.  Pain Psychologist to address relaxation, behavioral change, coping style, and other factors important to improvement.  NO              3.  Medication Management:                           1. Krystal is only taking 1000mg of Tylenol at bedtime but with very good pain benefit. We discussed a dose increase to 1000mg BID and she is interested. Can take up to 3000mg.                           2. Recommended a trial of lidocaine patches 4%.                          3. May consider low dose gabapentin, Cymbalta, or Robaxin prn. A low dose opioid taken only prn severe pain may also be appropriate.               4.  Potential procedures: we discussed that Krystal's pain is clearly multifactorial. We reviewed a lumbar radiofrequency ablation and SI joint injections as recommended by Dr. Lazaro. We will start with bilateral SI joint injections and evaluate how much pain relief she has with this. We will likely then proceed with lumbar medial branch block to radiofrequency ablation process.               5.  Referrals: okay to continue chiropractic care, once weekly should be sufficient.               6.  Follow up with OZIEL Delgado CNP in 3-4 weeks.               7.  Krystal to follow up with Primary Care provider regarding elevated blood pressure.    Since her last visit, Krystal Parra reports:  -Her pain is the same as it was at last visit.   -She had a bilateral SI joint injection with Dr. Kilgore  on 1/30/19. Denies initial benefit, later reports partial benefit. She states since the injection she is able to stand a little longer without as much pain. She also notes less severe stabbing pain in left upper buttock. Most bothersome pain is still present.  -She increased the amount of Tylenol she is taking to 1000mg BID most days. She notes she forget at times. She has found that she has some relief with this and is moving around a bit easier.   -She bought lidocaine patches but hasn't tried yet as she wasn't sure when she should.  -She may be interested in a lumbar radiofrequency ablation, does have some questions about this.   -She continues regular stretches and exercises on a daily basis.   -She continues chiropractic care with her friend with good benefit.     Pain Information:   Pain quality: tiring    Pain timing: constant     Pain rating: intensity ranges from 1/10 to 10/10, and averages 3/10 on a 0-10 scale.   Aggravating factors include: standing   Relieving factors include: sitting or lying down    Current pain medications:               Tylenol 1000mg BID- [facility], recently started taking BID- about 1/2 days of the week, allows her to be more active              Ibuprofen 400mg prn- [facility], a couple days a week                 Trazodone 100mg at bedtime- [facility], not taking lately as doesn't like medication, wants to restart  Current MME: 0    Review of [place] Prescription Monitoring Program (): No concern for abuse or misuse of controlled medications based on this report.     Annual Controlled Substance Agreement/UDS due date: NA    Past pain treatments:  1. Previous Pain Relevant Medications:  NOTE: This medication information taken from patient's intake form, not medical records.               Opiates: Tylenol with codeine (years ago)- [facility]              NSAIDS: ibuprofen- [facility]              Muscle Relaxants: no              Anti-migraine mediations: no              Anti-depressants: no               "Sleep aids: Trazodone- [facility], Benadryl- [facility]              Anxiolytics: no              Neuropathics: no                       Topicals: years ago cannot remember              Other medications not covered above: Tylenol- [facility]     2. Physical Therapy: [facility]- [facility], [facility], previously as well [facility]  3. Pain Psychology: no  4. Surgery: no, Dr. Lazaro recommended against surgery  5. Injections: bilateral SI joint injection with Dr. Kilgore on 1/30/19- [facility]  caudal epidural steroid injection with Dr. Murray on 7/30/2019- [facility]  caudal epidural steroid injection with Dr. Murray on 5/21/2019- [facility]  6. Chiropractic: in [place], goes 1-2x weekly- [facility], her friend is a chiropractor  7. Acupuncture: yes years ago- [facility]  8. TENS Unit: yes- [facility], short term, years ago    Medications:  Current Outpatient Medications   Medication Sig Dispense Refill     aspirin EC 81 MG EC tablet Take 1 tablet (81 mg) by mouth daily       Cholecalciferol (VITAMIN D) 2000 UNIT CAPS Take 1 tablet by mouth daily.       coenzyme Q10 (CO-Q10) 30 MG capsule Take 1 capsule (30 mg) by mouth daily 30 capsule 0     fish oil-omega-3 fatty acids (FISH OIL) 1000 MG capsule Take 2 g by mouth 2 times daily        Ginkgo Biloba (GINKOBA PO)        OVER-THE-COUNTER Take 1 tablet by mouth daily (Melaleuca vitamins)       traZODone (DESYREL) 50 MG tablet Take 2 tablets (100 mg) by mouth nightly as needed for sleep 180 tablet 3       Medical History: any changes in medical history since they were last seen? No    Review of Systems:  The 14 system ROS was reviewed from the intake questionnaire, and is positive for: itching, joint pain, arthritis, back pain, weakness in hips- baseline  Any bowel or bladder problems: denies  Mood: \"fine\"     Physical Exam:  Blood pressure (!) 155/78, pulse 95, SpO2 96 %.  General: A&O x4, no signs of distress.  Gait: Normal  Neuro exam: 5/5 upper and lower extremity strength.     Imaging:  MRI of lumbar spine was completed on 5/6/19 and shows:     There is " facet degenerative change as follows: Mild right T11-T12,  mild left T12-L1, mild right L1-L2, mild bilateral L2-L3, mild right  and moderate left L3-L4, moderate right and moderate-prominent left  L4-L5, mild bilateral L5-S1.     There is ligamentum flavum thickening at L4-L5, L3-L4, L2-L3, L1-L2.  Based upon sagittal imaging there is disc bulging at T10-T11. There is  disc bulging at each level from L1 to S1.   These findings combine to result in central stenosis as follows: Mild  L1-L2, mild/moderate L2-L3, severe L3-L4, very severe L4-L5.   There is right foraminal stenosis as follows: Moderate-severe L3-L4,  mild/moderate L2-L3, mild/moderate L1-L2.   There is left foraminal stenosis as follows: Moderate L5-S1, moderate  L4-L5, mild/moderate L3-L4.     Compare with the previous exam, the central stenosis at L3-L4 is  increased, and the central stenosis at L4-L5 is similar.         IMPRESSION:  1. Multilevel degenerative disc and facet disease. Scoliosis.  2. Central stenosis as follows: Very severe L4-L5, severe L3-L4,  mild-moderate L2-L3, mild L1-L2.  3. Multilevel foraminal stenosis, greatest on the right at L3-L4  (moderate-severe).      Assessment:   Krystal Parra is a 80 year old female with a past medical history significant for hyperlipidemia, TIA, HTN, and moderate aortic regurgitation who presents with complaints of low back pain.      1. Low back pain- etiology likely multifactorial including severe multilevel foraminal and central stenosis and multilevel facet arthropathy. Also seems to be a component of bilateral SI joint dysfunction.  2. Mental Health - the patient's mental health concernsaffect her experience of pain and contribute to her clinically significant distress.     1. Lumbar facet arthropathy    2. Lumbosacral spondylosis without myelopathy    3. Spinal stenosis of lumbar region with neurogenic claudication        Plan:     1.  Pain Physical Therapy:    Recommended Krystal clark  practicing regular stretches and exercises   2.  Pain Psychologist to address relaxation, behavioral change, coping style, and other factors important to improvement.  NO   3.  Medication Management:     1. Increase of Tylenol to BID has given some added pain benefit. Encouraged Krystal continue Tylenol 1000mg BID prn.      2. Krystal purchased lidocaine patches but hasn't started using yet, wasn't sure when she should. Advised she trial during active days and monitor benefit.    3. May consider low dose gabapentin, Cymbalta, or Robaxin. Briefly reviewed as options but Krystal would like to hold off on medications for now.    4.  Potential procedures: SI joint injections were minimally helpful, may be able to stand longer and have less severe pain. Axial low back pain is most bothersome. Discussed lumbar medial branch block to radiofrequency ablation process and she is interested. First lumbar medial branch block ordered today. They will call to schedule.   5.  Referrals: okay to continue chiropractic care, once weekly should be sufficient.    6.  Follow up with OZIEL Delgado CNP in 4-6 weeks.    7.  Krystal to follow up with Primary Care provider regarding elevated blood pressure.    I spent 30 minutes of time face to face with the patient.  Greater than 50% of this time was spent in patient counseling and/or coordination of care.    Shanika LUDWIG CNP  [facility]

## 2020-02-24 ENCOUNTER — OFFICE VISIT (OUTPATIENT)
Dept: PALLIATIVE MEDICINE | Facility: CLINIC | Age: 80
End: 2020-02-24
Payer: COMMERCIAL

## 2020-02-24 VITALS — SYSTOLIC BLOOD PRESSURE: 155 MMHG | OXYGEN SATURATION: 96 % | HEART RATE: 95 BPM | DIASTOLIC BLOOD PRESSURE: 78 MMHG

## 2020-02-24 DIAGNOSIS — M47.816 LUMBAR FACET ARTHROPATHY: Primary | ICD-10-CM

## 2020-02-24 DIAGNOSIS — M48.062 SPINAL STENOSIS OF LUMBAR REGION WITH NEUROGENIC CLAUDICATION: ICD-10-CM

## 2020-02-24 DIAGNOSIS — M47.817 LUMBOSACRAL SPONDYLOSIS WITHOUT MYELOPATHY: ICD-10-CM

## 2020-02-24 PROCEDURE — 99214 OFFICE O/P EST MOD 30 MIN: CPT | Performed by: NURSE PRACTITIONER

## 2020-02-24 ASSESSMENT — PAIN SCALES - GENERAL: PAINLEVEL: MILD PAIN (3)

## 2020-02-24 NOTE — PATIENT INSTRUCTIONS
1.  Pain Physical Therapy:     Continue regular stretches and exercises   2.  Pain Psychologist to address relaxation, behavioral change, coping style, and other factors important to improvement.  NO   3.  Medication Management:     1. Continue Tylenol 1000mg twice daily as needed.     2. Start using lidocaine patches on active days and monitor benefit.    3. May consider low dose gabapentin, Cymbalta, or Robaxin as needed in the future.    4.  Potential procedures: first lumbar medial branch block ordered today. They will call to schedule. If this goes well, you will have your second lumbar medial branch block. If this goes well, you will have a lumbar radiofrequency ablation.     5.  Referrals: okay to continue chiropractic care, once weekly should be sufficient.    6.  Follow up with OZIEL Delagdo CNP in 4-6 weeks.       ----------------------------------------------------------------  Clinic Number:  [phone]     Call with any questions about your care and for scheduling assistance.     Calls are returned Monday through Friday between 8 AM and 4:30 PM. We usually get back to you within 2 business days depending on the issue/request.    If we are prescribing your medications:    For opioid medication refills, call the clinic or send a Adisn message 7 days in advance.  Please include:    Name of requested medication    Name of the pharmacy.    For non-opioid medications, call your pharmacy directly to request a refill. Please allow 3-4 days to be processed.     Per [place] State Law:    All controlled substance prescriptions must be filled within 30 days of being written.      For those controlled substances allowing refills, pickup must occur within 30 days of last fill.      We believe regular attendance is key to your success in our program!      Any time you are unable to keep your appointment we ask that you call us at least 24 hours in advance to cancel.This will allow us to offer the appointment  time to another patient.     Multiple missed appointments may lead to dismissal from the clinic.

## 2020-02-25 ENCOUNTER — TELEPHONE (OUTPATIENT)
Dept: PALLIATIVE MEDICINE | Facility: CLINIC | Age: 80
End: 2020-02-25

## 2020-02-25 NOTE — TELEPHONE ENCOUNTER
Pre-screening questions for MBB Injections:    Injection to be done at which interventional clinic site? [facility]     Instruct patient to arrive as directed prior to the scheduled appointment time:    Wyandria and Ashley: 30 minutes before      Procedure ordered by Dr. Buitrago    Procedure ordered? Lumbar Medial Branch Block    What insurance would patient like us to bill for this procedure? [employer]      Worker's comp- Any injection DO NOT SCHEDULE and route to Ana Lilia Bedolla.      [employer] insurance - If scheduling an SI joint injection DO NOT SCHEDULE and route to Ana Lilia Bedolla.       MBBs must be scheduled with elapsed time interval of at least 2 weeks and not more than 6 months between the First MBB and the Second MBB for insurance purposes       [employer] - Any injection besides hip/shoulder/knee joint DO NOT SCHEDULE and route to Ana Lilia Bedolla. She will obtain PA and call pt back to schedule procedure or notify pt of denial.       HP [employer]- PA required for all MBB's      **BCBS- ALL need to be routed to Ana Lilia for review if a PA is needed**    Any chance of pregnancy? NO   If YES, do NOT schedule and route to RN pool    Is an  needed? No     Patient has a drive home? (mandatory) Yes     Is patient taking any blood thinners (plavix, coumadin, jantoven, warfarin, heparin, pradaxa or dabigatran )? No    If hold needed, do NOT schedule, route to RN pool     Is patient taking any aspirin products? Yes - Pt takes 81mg daily; instructed to hold *0* day(s) prior to procedure.      If more than 325mg/day, OK to schedule; Instruct pt to decrease to less than 325 mg for 7 days AND route to RN pool    For CERVICAL procedures, hold all aspirin products for 6 days.     Tell pt that if aspirin product is not held for 6 days, the procedure WILL BE cancelled.      Does the patient have a bleeding or clotting disorder? No    If YES, okay to schedule AND route to RN nurse pool    **For any patients with  platelet count <100, must be forwarded to provider**    Is patient diabetic? NO If YES, have them bring their glucometer.    Does patient have an active infection or treated for one within the past week? No    Is patient currently taking any antibiotics?  No    For patients on chronic, preventative, or prophylactic antibiotics, procedures may be scheduled.     For patients on antibiotics for active or recent infection:antibiotic course must have been completed for 4 days    Is patient currently taking any steroid medications? (i.e. Prednisone, Medrol)  No     For patients on steroid medications, course must have been completed for 4 days    Is patient actively being treated for cancer or immunocompromised? No   If YES, do NOT schedule and route to RN    Are you able to get on and off an exam table with minimal or no assistance? Yes   If NO, do NOT schedule and route to RN    Are you able to roll over and lay on your stomach with minimal or no assistance? Yes   If NO, do NOT schedule and route to RN    Any allergies to contrast dye, iodine, shellfish, or numbing and steroid medications? No  (If so, inform nursing and note in scheduling comments.)    Allergies: Sulfa drugs     Has the patient had a flu shot or any other vaccinations within 7 days before or after the procedure.  No     Does patient have an MRI/CT?  YES: 2019  Check Procedure Scheduling Grid to see if required.      Was the MRI done within the last 3 years?  Yes    If yes, where was the MRI done i.e.[facility], [facility], [facility], [place], [facility] etc? [facility]      If no, do not schedule and route to nursing    If MRI was not done at [place], [facility] or [facility], [facility] do NOT schedule and route to nursing.      If pt has an imaging disc, the injection MAY be scheduled but pt has to bring disc to appt.     If they show up without the disc the injection cannot be done      Medial Branch Block Pre-Procedure Instructions    It is okay to take long acting pain  medications (if you are on them) the day of the procedure but try not to take any short acting medications unless absolutely necessary.    YES: INFORMED   Long acting meds would include: Gabapentin (Neurontin), MS Contin, Oxycontin        Short acting meds would include:  Percocet, Oxycodone, Vicodin, Ibuprofen     The day of the procedure, you should try to do things that provoke your pain, since the injection is being done to see if it will relieve your pain . YES: INFORMED     If your pain level is a 4 out of 10 or less on the day of the procedu re, please call [phone] to reschedule.  YES: INFORMED     Reminders:      If you are started on any steroids or antibiotics between now and your appointment, you must contact us because it may affect our ability to perform your procedure INFORMED      Instructed pt to arrive 30 minutes early for IV start if required. (Check Procedure Scheduling Grid) INot Applicable       If this is for a cervical MBB aspirin needs to be held for 6 days.  Not Applicable       Do not schedule procedures requiring IV placement in the first appointment of the day or first appointment after lunch. Do NOT schedule at 0745, 0815 or 1245.        For patients 85 or older we recommend having an adult stay w/ them for the remainder of the day.      Does the patient have any questions? NO    Sharon OVALLE    [facility]

## 2020-03-02 NOTE — PROGRESS NOTES
[facility] - Procedure Note    Date of Service: 3/4/2020    Procedure performed: Bilateral L3,4,5 medial branch blocks  Diagnosis: Lumbar spondylosis; Lumbar facet arthropathy  : Tran Kilgore MD & Zina Houston MD (pain fellow)     Indications: Krystal Parra is a 80 year old female who is seen at the request of Shanika Buitrago CNP for lumbar medial branch blocks. The patient describes pain with extension/rotation. The patient reports minimal improvement with conservative treatment, including previous injections including caudal MERRILL's and bilateral SI joint injections, medications and a home exercise program.    MRI of the LUMBAR SPINE was done on 5/6/2019 which showed   FINDINGS: Five lumbar vertebrae are assumed. Multilevel degenerative  disc disease. Levoscoliosis. Left lateral listhesis at L3-L4 and  L4-L5. Grade 1-2 degenerative spondylolisthesis at L4-L5. Incidentally  noted L4 hemangioma.      Findings by specific level:     There is facet degenerative change as follows: Mild right T11-T12,  mild left T12-L1, mild right L1-L2, mild bilateral L2-L3, mild right  and moderate left L3-L4, moderate right and moderate-prominent left  L4-L5, mild bilateral L5-S1.     There is ligamentum flavum thickening at L4-L5, L3-L4, L2-L3, L1-L2.  Based upon sagittal imaging there is disc bulging at T10-T11. There is  disc bulging at each level from L1 to S1.   These findings combine to result in central stenosis as follows: Mild  L1-L2, mild/moderate L2-L3, severe L3-L4, very severe L4-L5.   There is right foraminal stenosis as follows: Moderate-severe L3-L4,  mild/moderate L2-L3, mild/moderate L1-L2.   There is left foraminal stenosis as follows: Moderate L5-S1, moderate  L4-L5, mild/moderate L3-L4.     Compare with the previous exam, the central stenosis at L3-L4 is  increased, and the central stenosis at L4-L5 is similar.                                                                      IMPRESSION:  1. Multilevel degenerative disc and facet disease. Scoliosis.  2. Central stenosis as follows: Very severe L4-L5, severe L3-L4,  mild-moderate L2-L3, mild L1-L2.  3. Multilevel foraminal stenosis, greatest on the right at L3-L4  (moderate-severe).      Allergies:      Allergies   Allergen Reactions     Sulfa Drugs         Vitals:  BP (!) 148/62   Pulse 87   SpO2 99%     Review of Systems: The patient denies recent fever, chills, illness, use of antibiotics or anticoagulants. All other 10-point review of systems negative.     Procedure:   Options/alternatives, benefits and risks were discussed with the patient including bleeding, infection, tissue trauma, exposure to radiation, reaction to medications, spinal cord injury, weakness, numbness and paralysis.  Questions were answered to her satisfaction and she agrees to proceed. Voluntary informed consent was obtained and signed.     After getting informed consent, patient was brought into the procedure suite and was placed in a prone position on the procedure table.   A Pause for the Cause was performed.  Patient was prepped and draped in sterile fashion.     Under AP fluoroscopic guidance the L3, L4, L5 vertebral bodies were identified. The C-arm was rotated to the oblique view to afford optimal visualization the pedicles.  Under intermittent fluoroscopy, 25 gauge 3.5 inch Quinke spinal needles were positioned inferior and lateral to the intersection of the transverse process and pedicle at the RIGHT L4 & L5 levels, as well as the corresponding sacral alar notch. The needle positions were verified and optimized from the AP and lateral views.    The procedure was repeated on the LEFT side    The anatomic targets for the L3 & L4 medial nerve and L5 dorsal ramus (which functionally incorporates the medial branch) were the  L4 & L5 transverse processes and sacral alar notch, with laterality as described above, resulting in blockade of the L4/5 and  L5/S1 facet joints.    After negative aspiration, Bupivacaine 0.5% 0.5 ml was injected at each location. The needles were removed. Hemostasis was achieved, the area was cleaned, and bandaids were placed when appropriate. The patient tolerated the procedure well, and was taken to the recovery room. Images were saved to PACS.    Assessment/Plan: Krystal Parra is a 80 year old female s/p bilateral medial branch block today for lumbar spondylosis, facet arthropathy.     Pre procecedure pain score: 5/10   Post procedure pain score: 2/10.   The patient will continue to monitor progress, and they were given a pain diary to complete at home.  They will either fax or mail this back to us or bring it to their next appointment. We will determine the treatment plan after we review the diary.      1. The patient was advised to contact the Pain Management Center for any of the following:   Fever, chills, or night sweats   New onset of pain, numbness, or weakness   Any questions/concerns regarding the procedure  If unable to contact the Pain Center, the patient was instructed to go to a local Emergency Room for any complications.   2. The patient will receive a follow-up call in 1 week.  3. We will await the pain diary to determent next step of the treatment plan and call patient to schedule.      DONNA GIBSON MD   Pain Management & Addiction Medicine

## 2020-03-04 ENCOUNTER — ANCILLARY PROCEDURE (OUTPATIENT)
Dept: GENERAL RADIOLOGY | Facility: CLINIC | Age: 80
End: 2020-03-04
Attending: ANESTHESIOLOGY
Payer: COMMERCIAL

## 2020-03-04 ENCOUNTER — RADIOLOGY INJECTION OFFICE VISIT (OUTPATIENT)
Dept: PALLIATIVE MEDICINE | Facility: CLINIC | Age: 80
End: 2020-03-04
Attending: NURSE PRACTITIONER
Payer: COMMERCIAL

## 2020-03-04 VITALS — HEART RATE: 80 BPM | SYSTOLIC BLOOD PRESSURE: 158 MMHG | OXYGEN SATURATION: 97 % | DIASTOLIC BLOOD PRESSURE: 74 MMHG

## 2020-03-04 DIAGNOSIS — M47.816 FACET ARTHROPATHY, LUMBAR: ICD-10-CM

## 2020-03-04 DIAGNOSIS — M47.819 FACET ARTHROPATHY: Primary | ICD-10-CM

## 2020-03-04 PROCEDURE — 64494 INJ PARAVERT F JNT L/S 2 LEV: CPT | Mod: 50 | Performed by: ANESTHESIOLOGY

## 2020-03-04 PROCEDURE — 64493 INJ PARAVERT F JNT L/S 1 LEV: CPT | Mod: 50 | Performed by: ANESTHESIOLOGY

## 2020-03-04 NOTE — PATIENT INSTRUCTIONS
[facility]   Lumbar Medial Branch Block Discharge Instructions      Your procedure was performed by:  Dr. Tran Kilgore     Medications used: lidocaine, bupivicaine     You will need to complete the Pain Scale Log form and return it to us as soon as possible.  Once we have received the form, we will review it and call you to determine the next steps.     The form can be faxed to [phone] or mailed to:   [facility]    [address], [address] [id number]      You may resume your regular activity after the injection.    Be cautious with walking since you may have numbness and/or weakness in the lower extremities for up to 6-8 hours after the procedure due to the effects of the local anesthetic.    Avoid driving for 6 hours. The local anesthetic could slow your reflexes    You may shower, however no swimming or tub baths or hot tubs for 24 hours following your procedure.    Your pain will return after the numbing medications have worn off.  You may use your current pain medications as needed.    Unless you have been directed to avoid the use of anti-inflammatory medications (NSAIDS), you may use medications such as ibuprofen, Aleve or Tylenol for pain control if needed. Some people find it helpful to alternate ibuprofen and Tylenol every 3 hours for a couple of days.    You may use ice packs 10-15 minutes three to four times a day at the injection site for comfort.     Do not use heat to painful areas for 6 to 8 hours. This will give the local anesthetic time to wear off and prevent you from accidentally burning your skin.     If you experience any of the following, call the Pain Clinic during work hours (Monday through Friday 8 am-4:30 pm) at [phone] or the Provider Line after hours at [phone]:  -Fever over 100 degree F  -Swelling, bleeding, redness, drainage, warmth at the injection site  -Progressive  weakness or numbness in your legs   -If lumbar, call if you have a loss of bowel or bladder function  -Unusual new onset of pain that is not improving

## 2020-03-04 NOTE — NURSING NOTE
Discharge Information    IV Discontiued Time:  NA    Amount of Fluid Infused:  NA    Discharge Criteria = When patient returns to baseline or as per MD order    Consciousness:  Pt is fully awake    Circulation:  BP +/- 20% of pre-procedure level    Respiration:  Patient is able to breathe deeply    O2 Sat:  Patient is able to maintain O2 Sat >92% on room air    Activity:  Moves 4 extremities on command    Ambulation:  Patient is able to stand and walk or stand and pivot into wheelchair    Dressing:  Clean/dry or No Dressing    Notes:   Discharge instructions and AVS given to patient    Patient meets criteria for discharge?  YES    Admitted to PCU?  No    Responsible adult present to accompany patient home?  Yes    Signature/Title:    Shara Sanchez RN Care Coordinator  [facility] [place]

## 2020-03-04 NOTE — NURSING NOTE
Pre-procedure Intake    Have you been fasting? NA    If yes, for how long?     Are you taking a prescribed blood thinner such as coumadin, Plavix, Xarelto?    No    If yes, when did you take your last dose?     Do you take aspirin?  Yes -   ASA    If cervical procedure, have you held aspirin for 6 days?   NA    Do you have any allergies to contrast dye, iodine, steroid and/or numbing medications?  NO    Are you currently taking antibiotics or have an active infection?  NO    Have you had a fever/elevated temperature within the past week? NO    Are you currently taking oral steroids? NO    Do you have a ? Yes       Are you pregnant or breastfeeding?  Not Applicable    Are the vital signs normal?  No: BP:148/62

## 2020-03-09 NOTE — TELEPHONE ENCOUNTER
Lumbar MBB#1  pain data reviewed. Max relief obtained:     At rest:  Hr 1 60%, Hr 2 80%, Hr 3-5 100%  Left facet load:  Hr 1 33%, Hr 2 67 %, Hr 3 100%, Hr 4 67%, Hr 5 100%  Right facet load:  Hr 1 50%, Hr 2 75 %, Hr 3-5 100%  Normal activity:  Hr 1-5 20%     Pt would like to proceed with MBB  #2.  Has had PT in the past at [facility]. 5 sessions       Routing to JOHNATHAN Bedolla for insurance verification.    Shara Sanchez RN  Care Coordinator  [facility]

## 2020-03-09 NOTE — TELEPHONE ENCOUNTER
**2020- per [facility], there is not a medical policy for them directly. Patients need to be aware that there is not a guarantee of coverage and they should contact their insurance company directly.    [facility] Medicare products follow Medicare guidelines  Per Medicare medical policy-No PA required        RFA  o Dx of arthropathy, spondylosis, or sprain; failed conservative treatment including 4 weeks of PT or an unfavorable evaluation; 2 successful workups resulting in >80-90% pain relief  o Indications:   - Patient must have history of at least 3 months of moderate to severe pain with functional impairment and pain is inadequately responsive to conservative care such as NSAIDs, acetaminophen, physical therapy (as tolerated).  - Pain is predominantly axial and, with the possible exception of facet joint cysts, not associated with radiculopathy or neurogenic claudication.  - There is no non-facet pathology that could explain the source of the patient s pain, such as fracture, tumor, infection, or significant deformity.Clinical assessment implicates the facet joint as the putative source of pain    OKAY TO SCHEDULE LMBB #2      Ana Lilia JONES    [facility]

## 2020-03-10 NOTE — TELEPHONE ENCOUNTER
Pre-screening questions for MBB Injections:    Injection to be done at which interventional clinic site? [facility]     Instruct patient to arrive as directed prior to the scheduled appointment time:    Wyandria and Ashley: 30 minutes before      Procedure ordered by Dr. Kilgore    Procedure ordered? Lumbar Medial Branch Block    What insurance would patient like us to bill for this procedure? [employer]       Worker's comp- Any injection DO NOT SCHEDULE and route to Ana Lilia Bedolla.      [employer] insurance - If scheduling an SI joint injection DO NOT SCHEDULE and route to Ana Lilia Bedolla.       MBBs must be scheduled with elapsed time interval of at least 2 weeks and not more than 6 months between the First MBB and the Second MBB for insurance purposes       [employer] - Any injection besides hip/shoulder/knee joint DO NOT SCHEDULE and route to Ana Lilia Bedolla. She will obtain PA and call pt back to schedule procedure or notify pt of denial.       [employer]- PA required for all MBB's      **BCBS- ALL need to be routed to Ana Lilia for review if a PA is needed**    Any chance of pregnancy? NO   If YES, do NOT schedule and route to RN pool    Is an  needed? No     Patient has a drive home? (mandatory) Yes     Is patient taking any blood thinners (plavix, coumadin, jantoven, warfarin, heparin, pradaxa or dabigatran )? No    If hold needed, do NOT schedule, route to RN pool     Is patient taking any aspirin products? Yes - Pt takes 81mg daily; instructed to hold 0 day(s) prior to procedure.      If more than 325mg/day, OK to schedule; Instruct pt to decrease to less than 325 mg for 7 days AND route to RN pool    For CERVICAL procedures, hold all aspirin products for 6 days.     Tell pt that if aspirin product is not held for 6 days, the procedure WILL BE cancelled.      Does the patient have a bleeding or clotting disorder? No    If YES, okay to schedule AND route to RN nurse pool    **For any patients with  platelet count <100, must be forwarded to provider**    Is patient diabetic? No If YES, have them bring their glucometer.    Does patient have an active infection or treated for one within the past week? No    Is patient currently taking any antibiotics?  No    For patients on chronic, preventative, or prophylactic antibiotics, procedures may be scheduled.     For patients on antibiotics for active or recent infection:antibiotic course must have been completed for 4 days    Is patient currently taking any steroid medications? (i.e. Prednisone, Medrol)  No     For patients on steroid medications, course must have been completed for 4 days    Is patient actively being treated for cancer or immunocompromised? No   If YES, do NOT schedule and route to RN    Are you able to get on and off an exam table with minimal or no assistance? Yes   If NO, do NOT schedule and route to RN    Are you able to roll over and lay on your stomach with minimal or no assistance? Yes   If NO, do NOT schedule and route to RN    Any allergies to contrast dye, iodine, shellfish, or numbing and steroid medications? No  (If so, inform nursing and note in scheduling comments.)    Allergies: Sulfa drugs     Has the patient had a flu shot or any other vaccinations within 7 days before or after the procedure.  No     Does patient have an MRI/CT?  Not Applicable  Check Procedure Scheduling Grid to see if required.      Was the MRI done within the last 3 years?  NA    If yes, where was the MRI done i.e.[facility], [facility], [place], [facility] etc?       If no, do not schedule and route to nursing    If MRI was not done at [place], [facility] or [facility] do NOT schedule and route to nursing.      If pt has an imaging disc, the injection MAY be scheduled but pt has to bring disc to appt.     If they show up without the disc the injection cannot be done      Medial Branch Block Pre-Procedure Instructions    It is okay to take long acting pain  medications (if you are on them) the day of the procedure but try not to take any short acting medications unless absolutely necessary.    YES: Informed    Long acting meds would include: Gabapentin (Neurontin), MS Contin, Oxycontin        Short acting meds would include:  Percocet, Oxycodone, Vicodin, Ibuprofen     The day of the procedure, you should try to do things that provoke your pain, since the injection is being done to see if it will relieve your pain . YES: Informed      If your pain level is a 4 out of 10 or less on the day of the procedu re, please call [phone] to reschedule.  YES: Informed      Reminders:      If you are started on any steroids or antibiotics between now and your appointment, you must contact us because it may affect our ability to perform your procedure       Instructed pt to arrive 30 minutes early for IV start if required. (Check Procedure Scheduling Grid) INot Applicable       If this is for a cervical MBB aspirin needs to be held for 6 days.  Not Applicable       Do not schedule procedures requiring IV placement in the first appointment of the day or first appointment after lunch. Do NOT schedule at 0745, 0815 or 1245.        For patients 85 or older we recommend having an adult stay w/ them for the remainder of the day.      Does the patient have any questions?

## 2020-03-11 NOTE — TELEPHONE ENCOUNTER
MBB #2 scheduled on 03/182020 with Dr Kilgore.    Shara Sanchez RN  Care Coordinator  [facility]

## 2020-03-17 ENCOUNTER — TELEPHONE (OUTPATIENT)
Dept: PALLIATIVE MEDICINE | Facility: CLINIC | Age: 80
End: 2020-03-17

## 2020-03-17 NOTE — TELEPHONE ENCOUNTER
Called Krystal.  Pt canceled her MBB with Dr Kilgore for tomorrow 03/18/2020. Her daughter does not want her to leave her home.   Advised her to call us back in 2-3 weeks to see what the status is to get her rescheduled for the procedure.  She is wondering if her ins will allow her to proceed with the MBB #2, how long do they typically let someone wait to have the MBB #2.  We have the data from the first MBB. Insurance usually will except data for about 6-12 months.  She felt reassured. She will call back at a later date then to set up the MBB #2.  Advised if she is having pain issues to call us and we can discuss this with Shanika Buitrago, or since we are not seeing patients in clinic, we might be able to do a telephone visit.  She states she understands.    Shara Sanchez RN  Care Coordinator  [facility]

## 2020-03-17 NOTE — TELEPHONE ENCOUNTER
Pt called to cancel injection 3/18 with Dr. Kiglore, but would like to speak with nursing.    Sharon OVALLE    [facility]

## 2020-03-19 NOTE — TELEPHONE ENCOUNTER
Called patient as she canceled MBB to advise per below.     Rescheduling pain procedure due to COVID 19      We re taking every precaution to prevent the spread of COVID-19. Our top priority is to protect and care for our patients. Changes are being made across the country to cancel most elective procedures, given the risk of exposure to COVID 19 to you and our staff.  In addition, if you are getting a steroid, receiving steroids can decrease your body s ability to fight infection.  This would be a big concern during this COVID-19 pandemic.      We will be cancelling your upcoming procedure.      Assess if patient is currently on blood thinner hold:  Plan (if yes):       As we continue to monitor the impact of COVID-19, we ask that you reach out to us in a couple of weeks to find out if we are able to reschedule your procedure at [phone].    If you have questions between now and when call us in a few weeks, please do not hesitate to call us.    Do you have any additional questions about COVID-19?    Please visit the [place] Department of Health ([facility]) or Centers for Disease Control (CDC) websites for the latest information on COVID-19.    Please take care of yourself during this time and practice recommended safety measures including social distancing and good hand hygiene. If you develop symptoms, please contact [email or web address] or [phone].    Document in appt note, cancel visit, and close encounter.      Avril Caballero RN on 3/19/2020 at 11:46 AM

## 2020-06-04 ENCOUNTER — TELEPHONE (OUTPATIENT)
Dept: PALLIATIVE MEDICINE | Facility: CLINIC | Age: 80
End: 2020-06-04

## 2020-06-04 NOTE — TELEPHONE ENCOUNTER
Screening questions for MBB Injections:    Injection to be done at which interventional clinic site? [facility] [place]     Instruct patient to arrive as directed prior to the scheduled appointment time:    [place] and [place]: 30 minutes before      Procedure ordered by Dr. Kilgore    Procedure ordered? Lumbar Medial Branch Block    What insurance would patient like us to bill for this procedure? [employer]      Worker's comp- Any injection DO NOT SCHEDULE and route to Ana Lilia Bedolla.      [employer] insurance - If scheduling an SI joint injection DO NOT SCHEDULE and route to Ana Lilia Bedolla.       MBBs must be scheduled with elapsed time interval of at least 2 weeks and not more than 6 months between the First MBB and the Second MBB for insurance purposes       [employer] - Any injection besides hip/shoulder/knee joint DO NOT SCHEDULE and route to Ana Lilia Bedolla. She will obtain PA and call pt back to schedule procedure or notify pt of denial.       [employer]- PA required for all MBB's      **[employer]- ALL need to be routed to Ana Lilia for review if a PA is needed**    Any chance of pregnancy? NO   If YES, do NOT schedule and route to RN pool    Is an  needed? No     Patient has a drive home? (mandatory) Yes     Is patient taking any blood thinners (plavix, coumadin, jantoven, warfarin, heparin, pradaxa or dabigatran )? No    If hold needed, do NOT schedule, route to RN pool     Is patient taking any aspirin products? Yes - Pt takes 81mg daily; instructed to hold 0 day(s) prior to procedure.      If more than 325mg/day, OK to schedule; Instruct pt to decrease to less than 325 mg for 7 days AND route to RN pool    For CERVICAL procedures, hold all aspirin products for 6 days.     Tell pt that if aspirin product is not held for 6 days, the procedure WILL BE cancelled.      Does the patient have a bleeding or clotting disorder? No    If YES, okay to schedule AND route to RN nurse pool    **For any patients with platelet  count <100, must be forwarded to provider**    Is patient diabetic? NO If YES, have them bring their glucometer.    Does patient have an active infection or treated for one within the past week? No    Is patient currently taking any antibiotics?  No    For patients on chronic, preventative, or prophylactic antibiotics, procedures may be scheduled.     For patients on antibiotics for active or recent infection:antibiotic course must have been completed for 4 days    Is patient currently taking any steroid medications? (i.e. Prednisone, Medrol)  No     For patients on steroid medications, course must have been completed for 4 days    Is patient actively being treated for cancer or immunocompromised? No   If YES, do NOT schedule and route to RN    Are you able to get on and off an exam table with minimal or no assistance? Yes   If NO, do NOT schedule and route to RN    Are you able to roll over and lay on your stomach with minimal or no assistance? Yes   If NO, do NOT schedule and route to RN    Any allergies to contrast dye, iodine, shellfish, or numbing and steroid medications? No  (If so, inform nursing and note in scheduling comments.)    Allergies: Sulfa drugs     Has the patient had a flu shot or any other vaccinations within 7 days before or after the procedure.  No     Does patient have an MRI/CT?  YES: 2019  Check Procedure Scheduling Grid to see if required.      Was the MRI done within the last 3 years?  Yes    If yes, [facility], [facility], [facility] etc? [facility]      If no, do not schedule and route to nursing    If MRI was not done at [facility], [facility] or [facility] do NOT schedule and route to nursing.      If pt has an imaging disc, the injection MAY be scheduled but pt has to bring disc to appt.     If they show up without the disc the injection cannot be done      Medial Branch Block Pre-Procedure Instructions    It is okay to take long acting pain medications  (if you are on them) the day of the procedure but try not to take any short acting medications unless absolutely necessary.    YES: Informed   Long acting meds would include: Gabapentin (Neurontin), MS Contin, Oxycontin        Short acting meds would include:  Percocet, Oxycodone, Vicodin, Ibuprofen     The day of the procedure, you should try to do things that provoke your pain, since the injection is being done to see if it will relieve your pain . YES: Informed     If your pain level is a 4 out of 10 or less on the day of the procedu re, please call [phone] to reschedule.  YES: Informed     Reminders:      If you are started on any steroids or antibiotics between now and your appointment, you must contact us because it may affect our ability to perform your procedure INFORMED      Instructed pt to arrive 30 minutes early for IV start if required. (Check Procedure Scheduling Grid) INot Applicable       If this is for a cervical MBB aspirin needs to be held for 6 days.  Not Applicable       Do not schedule procedures requiring IV placement in the first appointment of the day or first appointment after lunch. Do NOT schedule at 0745, 0815 or 1245.        For patients 85 or older we recommend having an adult stay w/ them for the remainder of the day.      Does the patient have any questions? BONI OVALLE    [facility]

## 2020-06-04 NOTE — TELEPHONE ENCOUNTER
Pt scheduled 6/10 and pre-screened.    Sharon OVALLE    [facility] Pain Management

## 2020-06-07 NOTE — PROGRESS NOTES
[facility] - Procedure Note    Date of Service: 6/10/2020    Procedure performed: Bilateral L3,4,5 medial branch blocks (medial branch block was done on 3/4/2020)  Diagnosis: Lumbar spondylosis; Lumbar facet arthropathy  : Tran Kilgore MD & Rancho Aguilera MD (pain fellow)      Indications: Krystal Parra is a 80 year old female who is seen at the request of Shanika Buitrago CNP for lumbar medial branch blocks. The patient describes pain with extension/rotation. The patient reports minimal improvement with conservative treatment, including previous injections including caudal MERRILL's and bilateral SI joint injections, medications and a home exercise program.    MRI of the LUMBAR SPINE was done on 5/6/2019 which showed   FINDINGS: Five lumbar vertebrae are assumed. Multilevel degenerative  disc disease. Levoscoliosis. Left lateral listhesis at L3-L4 and  L4-L5. Grade 1-2 degenerative spondylolisthesis at L4-L5. Incidentally  noted L4 hemangioma.      Findings by specific level:     There is facet degenerative change as follows: Mild right T11-T12,  mild left T12-L1, mild right L1-L2, mild bilateral L2-L3, mild right  and moderate left L3-L4, moderate right and moderate-prominent left  L4-L5, mild bilateral L5-S1.     There is ligamentum flavum thickening at L4-L5, L3-L4, L2-L3, L1-L2.  Based upon sagittal imaging there is disc bulging at T10-T11. There is  disc bulging at each level from L1 to S1.   These findings combine to result in central stenosis as follows: Mild  L1-L2, mild/moderate L2-L3, severe L3-L4, very severe L4-L5.   There is right foraminal stenosis as follows: Moderate-severe L3-L4,  mild/moderate L2-L3, mild/moderate L1-L2.   There is left foraminal stenosis as follows: Moderate L5-S1, moderate  L4-L5, mild/moderate L3-L4.     Compare with the previous exam, the central stenosis at L3-L4 is  increased, and the central stenosis at L4-L5 is similar.                                                                      IMPRESSION:  1. Multilevel degenerative disc and facet disease. Scoliosis.  2. Central stenosis as follows: Very severe L4-L5, severe L3-L4,  mild-moderate L2-L3, mild L1-L2.  3. Multilevel foraminal stenosis, greatest on the right at L3-L4  (moderate-severe).      Allergies:      Allergies   Allergen Reactions     Sulfa Drugs         Vitals:  BP (!) 167/72 (BP Location: Left arm, Cuff Size: Adult Large)   Pulse 86   SpO2 98%     Review of Systems: The patient denies recent fever, chills, illness, use of antibiotics or anticoagulants. All other 10-point review of systems negative.     Procedure:   Options/alternatives, benefits and risks were discussed with the patient including bleeding, infection, tissue trauma, exposure to radiation, reaction to medications, spinal cord injury, weakness, numbness and paralysis.  Questions were answered to her satisfaction and she agrees to proceed. Voluntary informed consent was obtained and signed.     After getting informed consent, patient was brought into the procedure suite and was placed in a prone position on the procedure table.   A Pause for the Cause was performed.  Patient was prepped and draped in sterile fashion.     Under AP fluoroscopic guidance the L3, L4, L5 vertebral bodies were identified. The C-arm was rotated to the oblique view to afford optimal visualization the pedicles.  Under intermittent fluoroscopy, 25 gauge 3.5 inch Quinke spinal needles were positioned inferior and lateral to the intersection of the transverse process and pedicle at the RIGHT L4 & L5 levels, as well as the corresponding sacral alar notch. The needle positions were verified and optimized from the AP and lateral views.    The procedure was repeated on the LEFT side    The anatomic targets for the L3 & L4 medial nerve and L5 dorsal ramus (which functionally incorporates the medial branch) were the  L4 & L5 transverse processes and  sacral alar notch, with laterality as described above, resulting in blockade of the L4/5 and L5/S1 facet joints.    After negative aspiration, Lidocaine 2% 0.5 ml was injected at each location. The needles were removed. Hemostasis was achieved, the area was cleaned, and bandaids were placed when appropriate. The patient tolerated the procedure well, and was taken to the recovery room. Images were saved to PACS.    Assessment/Plan: Krystal Parra is a 80 year old female s/p bilateral medial branch block today for lumbar spondylosis, facet arthropathy.     Pre procecedure pain score: 5/10   Post procedure pain score: 2/10.   The patient will continue to monitor progress, and they were given a pain diary to complete at home.  They will either fax or mail this back to us or bring it to their next appointment. We will determine the treatment plan after we review the diary.      1. The patient was advised to contact the Pain Management Center for any of the following:   Fever, chills, or night sweats   New onset of pain, numbness, or weakness   Any questions/concerns regarding the procedure  If unable to contact the Pain Center, the patient was instructed to go to a local Emergency Room for any complications.   2. The patient will receive a follow-up call in 1 week.  3. We will await the pain diary to determent next step of the treatment plan and call patient to schedule.      DONNA GIBSON MD   Pain Management & Addiction Medicine

## 2020-06-10 ENCOUNTER — ANCILLARY PROCEDURE (OUTPATIENT)
Dept: GENERAL RADIOLOGY | Facility: CLINIC | Age: 80
End: 2020-06-10
Attending: ANESTHESIOLOGY
Payer: COMMERCIAL

## 2020-06-10 ENCOUNTER — RADIOLOGY INJECTION OFFICE VISIT (OUTPATIENT)
Dept: PALLIATIVE MEDICINE | Facility: CLINIC | Age: 80
End: 2020-06-10
Payer: COMMERCIAL

## 2020-06-10 VITALS — OXYGEN SATURATION: 98 % | HEART RATE: 86 BPM | SYSTOLIC BLOOD PRESSURE: 167 MMHG | DIASTOLIC BLOOD PRESSURE: 72 MMHG

## 2020-06-10 DIAGNOSIS — M47.816 FACET ARTHROPATHY, LUMBAR: ICD-10-CM

## 2020-06-10 DIAGNOSIS — M47.816 LUMBAR FACET ARTHROPATHY: Primary | ICD-10-CM

## 2020-06-10 PROCEDURE — 64494 INJ PARAVERT F JNT L/S 2 LEV: CPT | Mod: 50 | Performed by: ANESTHESIOLOGY

## 2020-06-10 PROCEDURE — 64493 INJ PARAVERT F JNT L/S 1 LEV: CPT | Mod: 50 | Performed by: ANESTHESIOLOGY

## 2020-06-10 NOTE — NURSING NOTE
Discharge Information    IV Discontiued Time:  NA    Amount of Fluid Infused:  NA    Discharge Criteria = When patient returns to baseline or as per MD order    Consciousness:  Pt is fully awake    Circulation:  BP +/- 20% of pre-procedure level    Respiration:  Patient is able to breathe deeply    O2 Sat:  Patient is able to maintain O2 Sat >92% on room air    Activity:  Moves 4 extremities on command    Ambulation:  Patient is able to stand and walk or stand and pivot into wheelchair    Dressing:  Clean/dry or No Dressing    Notes:   Discharge instructions and AVS given to patient    Patient meets criteria for discharge?  YES    Admitted to PCU?  No    Responsible adult present to accompany patient home?  Yes    Signature/Title:    Shara Sanchez RN Care Coordinator  [facility] [place]

## 2020-06-10 NOTE — PATIENT INSTRUCTIONS
[facility]   Medial Branch Block Discharge Instructions      Your procedure was performed by:  Dr. Tran Kilgore    Medications used: lidocaine     You will need to complete the Pain Scale Log form and return it to us as soon as possible.  Once we have received the form, we will review it and call you to determine the next steps.     The form can be faxed to [phone] or mailed to:   [facility] [place] [facility]    [address], [address] [id number]      You may resume your regular activity after the injection.    Be cautious with walking since you may have numbness and/or weakness in the lower extremities for up to 6-8 hours after the procedure due to the effects of the local anesthetic.    Avoid driving for 6 hours. The local anesthetic could slow your reflexes    You may shower, however no swimming or tub baths or hot tubs for 24 hours following your procedure.    Your pain will return after the numbing medications have worn off.  You may use your current pain medications as needed.    Unless you have been directed to avoid the use of anti-inflammatory medications (NSAIDS), you may use medications such as ibuprofen, Aleve or Tylenol for pain control if needed. Some people find it helpful to alternate ibuprofen and Tylenol every 3 hours for a couple of days.    You may use ice packs 10-15 minutes three to four times a day at the injection site for comfort.     Do not use heat to painful areas for 6 to 8 hours. This will give the local anesthetic time to wear off and prevent you from accidentally burning your skin.     If you experience any of the following, call the Pain Clinic during work hours (Monday through Friday 8 am-4:30 pm) at [phone] or the Provider Line after hours at [phone]:  -Fever over 100 degree F  -Swelling, bleeding, redness, drainage, warmth at the injection site  -Progressive weakness or numbness  in your legs   -If lumbar, call if you have a loss of bowel or bladder function  -Unusual new onset of pain that is not improving

## 2020-06-10 NOTE — PROGRESS NOTES
Pre-procedure Intake    Have you been fasting? NA    If yes, for how long?     Are you taking a prescribed blood thinner such as coumadin, Plavix, Xarelto?    No    If yes, when did you take your last dose?     Do you take aspirin?  Yes -   ASA    If cervical procedure, have you held aspirin for 6 days?   NA    Do you have any allergies to contrast dye, iodine, steroid and/or numbing medications?  NO    Are you currently taking antibiotics or have an active infection?  NO    Have you had a fever/elevated temperature within the past week? NO    Are you currently taking oral steroids? NO    Do you have a ? Yes       Are you pregnant or breastfeeding?  Not Applicable    Are the vital signs normal?  No: BP:159/73

## 2020-06-16 ENCOUNTER — TELEPHONE (OUTPATIENT)
Dept: PALLIATIVE MEDICINE | Facility: CLINIC | Age: 80
End: 2020-06-16

## 2020-06-16 DIAGNOSIS — Z20.822 ENCOUNTER FOR LABORATORY TESTING FOR COVID-19 VIRUS: Primary | ICD-10-CM

## 2020-06-16 NOTE — TELEPHONE ENCOUNTER
See telephone call on 02/25/2020 for MBB #1.    Lumbar MBB#2 pain data reviewed. Max relief obtained:     At rest:      Hr 1-5 67%  Left facet load:   Hr 1-3 67%, Hr 4-5 83%  Right facet load: Hr 1 67%, Hr 2 50%  Hr 3 67% Hr 4-5 83%  Normal activity: Hr-1 33%, Hr-2 17%, Hr-3 33%, Hr-4 50%, Hr-5 33%     PT would like to proceed with RFA.  PT done 08/2019 at [facility]. 4 Secessions.    Routing to JOHNATHAN Bedolla for insurance verification    Shara Sanchez RN  Care Coordinator  [facility] Management

## 2020-06-17 NOTE — TELEPHONE ENCOUNTER
**2020- per [facility], there is not a medical policy for them directly. Patients need to be aware that there is not a guarantee of coverage and they should contact their insurance company directly.  [facility] Medicare products follow Medicare guidelines  Per Medicare medical policy-No PA required        RFA  o Dx of arthropathy, spondylosis, or sprain; failed conservative treatment including 4 weeks of PT or an unfavorable evaluation; 2 successful workups resulting in >80-90% pain relief  o Indications:   - Patient must have history of at least 3 months of moderate to severe pain with functional impairment and pain is inadequately responsive to conservative care such as NSAIDs, acetaminophen, physical therapy (as tolerated).  - Pain is predominantly axial and, with the possible exception of facet joint cysts, not associated with radiculopathy or neurogenic claudication.  - There is no non-facet pathology that could explain the source of the patient s pain, such as fracture, tumor, infection, or significant deformity.Clinical assessment implicates the facet joint as the putative source of pain  Okay to schedule RD JONES    [facility]

## 2020-06-18 NOTE — TELEPHONE ENCOUNTER
Called patient. Advised of Covid PCR need, order placed/signed, she will call if central scheduling does not reach out to her. Discussed light sedation. Phone went silent, called pt back and LM to call back if she had questions.     RFA 07/15/20    Avril ERICKSON, RN Care Coordinator  [facility]  Pain Management

## 2020-07-08 NOTE — TELEPHONE ENCOUNTER
Pt calling to state that she has not been scheduled for her COVID-19 test. Pt also wants to discuss and potential side effects of the RFA.    Sharon OVALLE    [facility] Pain Management

## 2020-07-08 NOTE — TELEPHONE ENCOUNTER
RFA is scheduled 07/15/2020.  She has not been contacted yet to set up her COVID testing.  Advised if she does not hear from anyone by Friday to give us a call and we will give her the COVID Scheduling Department number.  She also had concerns about what to expect for this procedure. Advised it can take up to 8 weeks before she sees the full benefit from the RFA.  She can have pain up to 2 weeks after the procedure.  She will watch for signs of infection. Dr Kilgore will go through the procedure before she begins.  She will go over the risks as well. Pt is concerned because she had an epidural injection in the past and that caused her to limp now when she walks. She thinks there was some sort of nerve damage done. Advised there is always a risk vs benefit. She understands there is no guarantee that a complication can occur. Advised she is in good hands with Dr Kilgore.  Dr Kilgore will do a test on the needles to make sure she is in the right place before proceeding with the burning.  Pt feels better proceeding with the RFA.      Shara Sanchez RN  Care Coordinator  [facility] Pain Management

## 2020-07-10 ENCOUNTER — AMBULATORY - HEALTHEAST (OUTPATIENT)
Dept: FAMILY MEDICINE | Facility: CLINIC | Age: 80
End: 2020-07-10

## 2020-07-10 DIAGNOSIS — Z11.59 ENCOUNTER FOR SCREENING FOR OTHER VIRAL DISEASES: ICD-10-CM

## 2020-07-13 ENCOUNTER — AMBULATORY - HEALTHEAST (OUTPATIENT)
Dept: FAMILY MEDICINE | Facility: CLINIC | Age: 80
End: 2020-07-13

## 2020-07-13 DIAGNOSIS — Z11.59 ENCOUNTER FOR SCREENING FOR OTHER VIRAL DISEASES: ICD-10-CM

## 2020-07-13 NOTE — TELEPHONE ENCOUNTER
Per note on order. Pt to be tested 07/13 at 1pm    Arvil ERICKSON, RN Care Coordinator  [facility]  Pain [facility]

## 2020-07-14 LAB
SARS-COV-2 PCR COMMENT: NORMAL
SARS-COV-2 RNA SPEC QL NAA+PROBE: NEGATIVE
SARS-COV-2 VIRUS SPECIMEN SOURCE: NORMAL

## 2020-07-14 NOTE — TELEPHONE ENCOUNTER
Called lab. Covid tests is NEG. Lab will fax results    Avril ERICKSON, RN Care Coordinator  [facility]  Pain [facility]

## 2020-07-14 NOTE — PROGRESS NOTES
[facility] - Procedure Note    Date of Visit: 7/15/2020    Pre procedure Diagnosis: facet arthropathy   Post procedure Diagnosis: Same  Procedure performed: Bilateral L3,4,5 radiofrequency ablation   Anesthesia: moderate sedation with 2mg versed & 75mcg fentanyl  Complications: NONE  Operators: Tran Kilgore MD     Indications:   Krystal Parra is a 80 year old female was sent by Shanika Buitrago CNP for lumbar RFA.  They have a history of central low back pain that does not radiate to her legs.  Exam shows increased discomfort with extension and rotation and they have tried conservative treatment including PT, previous injections and medications.    Krystal Parra had medial branch blocks on 6/10/2020 and 3/4/3030 showing appropriate pain relief, therefore radiofrequency ablation will be done.     MRI of the LUMBAR SPINE was done on 5/6/2019 which showed   FINDINGS: Five lumbar vertebrae are assumed. Multilevel degenerative  disc disease. Levoscoliosis. Left lateral listhesis at L3-L4 and  L4-L5. Grade 1-2 degenerative spondylolisthesis at L4-L5. Incidentally  noted L4 hemangioma.      Findings by specific level:     There is facet degenerative change as follows: Mild right T11-T12,  mild left T12-L1, mild right L1-L2, mild bilateral L2-L3, mild right  and moderate left L3-L4, moderate right and moderate-prominent left  L4-L5, mild bilateral L5-S1.     There is ligamentum flavum thickening at L4-L5, L3-L4, L2-L3, L1-L2.  Based upon sagittal imaging there is disc bulging at T10-T11. There is  disc bulging at each level from L1 to S1.   These findings combine to result in central stenosis as follows: Mild  L1-L2, mild/moderate L2-L3, severe L3-L4, very severe L4-L5.   There is right foraminal stenosis as follows: Moderate-severe L3-L4,  mild/moderate L2-L3, mild/moderate L1-L2.   There is left foraminal stenosis as follows: Moderate L5-S1, moderate  L4-L5, mild/moderate  L3-L4.     Compare with the previous exam, the central stenosis at L3-L4 is  increased, and the central stenosis at L4-L5 is similar.                                                                     IMPRESSION:  1. Multilevel degenerative disc and facet disease. Scoliosis.  2. Central stenosis as follows: Very severe L4-L5, severe L3-L4,  mild-moderate L2-L3, mild L1-L2.  3. Multilevel foraminal stenosis, greatest on the right at L3-L4  (moderate-severe).      Allergies:      Allergies   Allergen Reactions     Sulfa Drugs         Vitals:  BP (!) 156/65 (BP Location: Left arm, Cuff Size: Adult Large)   Pulse 85   Resp 23   SpO2 95%     Review of Systems: The patient denies recent fever, chills, illness, use of antibiotics or anticoagulants. All other 10-point review of systems negative.     Physical Exam:   Resp: CTA bilaterally  CV: RRR no murmurs, rubs or gallops  Airway:  Mallampati Class II    The patient does NOT have a history of obstructive sleep apnea.       Options/alternatives, benefits and risks were discussed with the patient including bleeding, infection, no pain relief, tissue trauma, exposure to radiation, reaction to medications including seizure, spinal cord injury,increased pain after the procedure, weakness, numbness or sensory changes and headache.   We also discussed risks of sedation, including reaction to medications and cardiovascular collapse.    Questions were answered to her satisfaction and she agrees to proceed. Voluntary informed consent was obtained and signed.     Medications were reviewed:  Yes   Pre-procedure pain score: 4/10    Procedure:  After getting informed consent, patient was brought into the procedure suite and was placed in a prone position on the procedure table.   A Pause for the Cause was performed.  Patient was prepped and draped in sterile fashion.     Krystal Parra had an IV line placed prior the procedure.  The C-arm was positioned in the lateral oblique view  to afford optimal view of the L4-L5 vertebral bodies. Lidocaine 1% was used to anesthetize the skin at each level.  At each level, a 120mm, 20G curved radiofrequency cannula with a 10mm active tip was positioned, overlying the intersection of the transverse process and pedicle at L4 & L5, and was advanced under intermittent fluoroscopy until it contacted the transverse process and notch, and the tip slightly overran that process, just lateral to the mamillary process.  The position of each cannula was verified and optimized in the oblique view and AP views.    In the AP view, another cannula was placed at the sacral alar notch.      Each position was tested for motor and sensory stimulation, and was positioned so that stimulation was negative for stimuli outside the immediate area of the desired lesion.  Sensory stimulation was completed at 50 Hz, with max stimulation up to 0.8V.  Motor stimulation was completed at 2Hz, up to 2.5V.  Lidocaine 1% 0.5 ml was injected at each level, and a 90 second, 80 degree Centigrade lesion was generated.    The needles were then rotated within the pathway of the medial branch, and locations were evaluated with repeat imaging.  Motor testing was again completed, and showed appropriate stimulation.  A second lesion was then generated at each location.    The procedure was then repeated on the other side, using the same technique as described above.    The needles were withdrawn. Hemostasis was achieved, the area was cleaned, and bandaids were placed when appropriate.  The patient tolerated the procedure well, and was taken to the recovery room.    Images were saved to PACS.    Start sedation time: 0910  End sedation time: 0950    Post-procedure pain score: 3/10  Follow-up includes:   -f/u phone call in one week  -post-procedure pain medications: NONE  -f/u with the referring provider      DONNA GIBSON MD   Pain Management & Addiction Medicine

## 2020-07-15 ENCOUNTER — ANCILLARY PROCEDURE (OUTPATIENT)
Dept: GENERAL RADIOLOGY | Facility: CLINIC | Age: 80
End: 2020-07-15
Attending: ANESTHESIOLOGY
Payer: COMMERCIAL

## 2020-07-15 ENCOUNTER — RADIOLOGY INJECTION OFFICE VISIT (OUTPATIENT)
Dept: PALLIATIVE MEDICINE | Facility: CLINIC | Age: 80
End: 2020-07-15
Payer: COMMERCIAL

## 2020-07-15 VITALS
RESPIRATION RATE: 23 BRPM | SYSTOLIC BLOOD PRESSURE: 156 MMHG | DIASTOLIC BLOOD PRESSURE: 65 MMHG | OXYGEN SATURATION: 95 % | HEART RATE: 85 BPM

## 2020-07-15 DIAGNOSIS — G89.18 PAIN ASSOCIATED WITH SURGICAL PROCEDURE: ICD-10-CM

## 2020-07-15 DIAGNOSIS — M47.816 FACET ARTHROPATHY, LUMBAR: ICD-10-CM

## 2020-07-15 DIAGNOSIS — M47.819 FACET ARTHROPATHY: Primary | ICD-10-CM

## 2020-07-15 PROCEDURE — 64636 DESTROY L/S FACET JNT ADDL: CPT | Mod: 50 | Performed by: ANESTHESIOLOGY

## 2020-07-15 PROCEDURE — 99152 MOD SED SAME PHYS/QHP 5/>YRS: CPT | Performed by: ANESTHESIOLOGY

## 2020-07-15 PROCEDURE — 99153 MOD SED SAME PHYS/QHP EA: CPT | Performed by: ANESTHESIOLOGY

## 2020-07-15 PROCEDURE — 64635 DESTROY LUMB/SAC FACET JNT: CPT | Mod: 50 | Performed by: ANESTHESIOLOGY

## 2020-07-15 RX ORDER — FENTANYL CITRATE 50 UG/ML
50 INJECTION, SOLUTION INTRAMUSCULAR; INTRAVENOUS EVERY 5 MIN PRN
Status: DISCONTINUED | OUTPATIENT
Start: 2020-07-15 | End: 2023-06-21

## 2020-07-15 RX ADMIN — FENTANYL CITRATE 25 MCG: 50 INJECTION, SOLUTION INTRAMUSCULAR; INTRAVENOUS at 09:29

## 2020-07-15 RX ADMIN — FENTANYL CITRATE 50 MCG: 50 INJECTION, SOLUTION INTRAMUSCULAR; INTRAVENOUS at 09:10

## 2020-07-15 NOTE — NURSING NOTE
Pre-procedure Intake    Have you been fasting? Yes    If yes, for how long?     Are you taking a prescribed blood thinner such as coumadin, Plavix, Xarelto?    No    If yes, when did you take your last dose?     Do you take aspirin?   YES: ASA    If cervical procedure, have you held aspirin for 6 days?   NA    Do you have any allergies to contrast dye, iodine, steroid and/or numbing medications?  NO    Are you currently taking antibiotics or have an active infection?  NO    Have you had a fever/elevated temperature within the past week? NO    Are you currently taking oral steroids? NO    Do you have a ? Yes       Are you pregnant or breastfeeding?  NO    Are the vital signs normal?  No:

## 2020-07-15 NOTE — NURSING NOTE
IV placed at the Left Inner Arm. 22 gauge needle. Good blood return. Line flushed with 5 mls Normal Saline. Site secured with Tegaderm.     Discharge Information    IV Discontiued Time:  1001. Catheter intact. Site secured with coban.    Amount of Fluid Infused:  225    Discharge Criteria = When patient returns to baseline or as per MD order    Consciousness:  Pt is fully awake    Circulation:  BP +/- 20% of pre-procedure level    Respiration:  Patient is able to breathe deeply    O2 Sat:  Patient is able to maintain O2 Sat >92% on room air    Activity:  Moves 4 extremities on command    Ambulation:  Patient is able to stand and walk or stand and pivot into wheelchair    Dressing:  Clean/dry or No Dressing    Notes:   Discharge instructions and AVS given to patient    Patient meets criteria for discharge?  YES    Admitted to PCU?  No    Responsible adult present to accompany patient home?  Yes    Signature/Title:    Shara Sanchez RN Care Coordinator  [facility] [place]

## 2020-07-15 NOTE — PATIENT INSTRUCTIONS
[facility] Procedure Discharge Instructions       Today you saw:   Dr. Tran Kilgore       Your procedure:  Radiofrequency Nerve Ablation     Procedural Medications:  Lidocaine (anesthetic)       Sedation medications:  Fentanyl - pain.     Versed - relaxation      You have received sedation during your procedure; for the next 24 hours you should not:   -Drive   -Operate machinery   -Drink alcohol   -Sign any legal documents       You may resume your normal diet and medications.     Avoid strenuous activity for the first 24 hours and resume regular activities after that.     Be cautious with walking as numbness and/or weakness in the lower extremities up to 6-8 hours may occur due to effect of local anesthetic     You may shower, however no swimming or tub baths or hot tubs for 24 hours following your procedure     Anticipate pain for up to 2 weeks after this procedure.    You may use ice packs 10-15 minutes three to four times a day at the injection site for comfort     You may use anti-inflammatory medications (such as Ibuprofen or Aleve or Advil) or Tylenol for pain control if necessary           It may take up to 8 weeks to receive relief from the RFA    If you experience any of the following, call the pain center line during work hours at [phone] or on call physician after hours at [phone]:  -Fever over 100 degree F   -Swelling, bleeding, redness, drainage, warmth at the injection site   -Progressive weakness or numbness in your legs   -Loss of bowel or bladder function   -Unusual headache that is not relieved by Tylenol   -Unusual new onset of pain that is not improving

## 2020-07-15 NOTE — NURSING NOTE
MD Time IN: 0900   Genie tion start time:  0910  Sedation end time:  0950    Medications given: fentanyl 75 mcg IV; versed 2 mg IV  Intravenous fluids were administered, normal saline 225 cc's.  Sedation Level Achieved:  Moderate (conscious) sedation

## 2021-01-28 ENCOUNTER — HOSPITAL ENCOUNTER (EMERGENCY)
Facility: CLINIC | Age: 81
Discharge: HOME | End: 2021-01-28
Attending: EMERGENCY MEDICINE | Admitting: EMERGENCY MEDICINE
Payer: COMMERCIAL

## 2021-01-28 ENCOUNTER — APPOINTMENT (OUTPATIENT)
Dept: GENERAL RADIOLOGY | Facility: CLINIC | Age: 81
End: 2021-01-28
Attending: EMERGENCY MEDICINE
Payer: COMMERCIAL

## 2021-01-28 VITALS
BODY MASS INDEX: 28.52 KG/M2 | TEMPERATURE: 99.4 F | HEART RATE: 110 BPM | HEIGHT: 62 IN | DIASTOLIC BLOOD PRESSURE: 98 MMHG | WEIGHT: 155 LBS | OXYGEN SATURATION: 100 % | RESPIRATION RATE: 18 BRPM | SYSTOLIC BLOOD PRESSURE: 179 MMHG

## 2021-01-28 DIAGNOSIS — S93.502A SPRAIN OF LEFT GREAT TOE, INITIAL ENCOUNTER: ICD-10-CM

## 2021-01-28 PROCEDURE — 99283 EMERGENCY DEPT VISIT LOW MDM: CPT

## 2021-01-28 PROCEDURE — 73630 X-RAY EXAM OF FOOT: CPT | Mod: LT

## 2021-01-28 ASSESSMENT — MIFFLIN-ST. JEOR: SCORE: 1116.33

## 2021-01-28 ASSESSMENT — ENCOUNTER SYMPTOMS: NECK PAIN: 0

## 2021-01-29 NOTE — ED PROVIDER NOTES
"  History   Chief Complaint:  Fall       HPI   Krystal Parra is a 80 year old female who presents after a fall. Per the patient, she was using a wheeled walker when she tripped on that and fell forward. She fell forward on her face but denies any facial injury or loss of consciousness. She now complains of left foot pain. She denies neck pain and blood thinners. No other injuries.     Review of Systems   Musculoskeletal: Negative for neck pain.        Foot pain   All other systems reviewed and are negative.      Allergies:  Sulfa Drugs    Medications:  Aspirin 81 MG  Co-Q10  Ginkoba  Desyrel    Past Medical History:    Lumbar disc herniation  Bone spur, 4th toe, right  Moderate aortic regurgitation  Osteopenia  Thyroid nodule  Dupuytren's contracture of both hands  Hypertension   Hyperlipidemia  TIA  Hypothyroidism    Past Surgical History:    Tubal ligation  Cataract surgery, bilateral  Tooth extraction with forcep    Family History:    Mother: Mild MI  Father: Unspecified cerebrovascular disease, diabetes  Sister: Brain tumor, lipids, hypertension, diverticulitis, breast cancer    Social History:  The patient was accompanied to the ER by her son.      Physical Exam     Patient Vitals for the past 24 hrs:   BP Temp Temp src Pulse Resp SpO2 Height Weight   01/28/21 1909 (!) 179/98 99.4  F (37.4  C) Oral 110 18 100 % 1.575 m (5' 2\") 70.3 kg (155 lb)       Physical Exam   Nursing note and vitals reviewed.  Head:  The scalp, face, and head appear normal.  No pain to palpation of orbits.  No facial step-offs.  No contusions.  Eyes:  Conjunctivae are normal  CV:  Mild tachycardia, normal DP pulses, normal capillary refill left great toe  Resp:  No respiratory distress   Musc:  Normal muscular tone    Painful range of motion left great toe, the patient able to actively move it.  No pain at base of fifth metatarsal, nor over medial/lateral malleoli.  No pain over proximal fibula.  Normal range of motion at hip and knee " bilaterally.   Left great toe is bruised/swollen.  Skin:  No rash or lesions noted  Neuro: Speech is normal and fluent. Face is symmetric. Moving all extremities well.   Psych:  Awake. Alert.  Normal affect.  Appropriate interactions.      Emergency Department Course     Imaging:    Foot XR, G/E 3 views, left  Osteopenia. No fractures are evident. Normal joint spacing. Plantar and Achilles calcaneal spurs.  Reading per radiology.    The above imaging workup was performed.     Emergency Department Course:    Reviewed:  I reviewed nursing notes, vitals, past medical history and care everywhere    Assessments:  2222 I obtained history and examined the patient as noted above.     Disposition:  The patient was discharged to home.       Impression & Plan       Medical Decision Making:  Patient presents with left great toe pain following mechanical fall at home.  X-ray negative for fracture.  Patient does have bruising with painful range of motion of the toe.  Discussed possibility of occult fracture.  Plan for orthopedic shoe for comfort which we placed here.  Patient has a walker at home.  Discussed that if pain is resolving over the next week, she likely will not need a follow-up, but for persistent pain, recommend seeing orthopedics/podiatry. She is in stable condition at the time of discharge, indications for return to the ED were discussed as well as follow up. All questions were answered and she and her son are in agreement with the plan.      Diagnosis:    ICD-10-CM    1. Sprain of left great toe, initial encounter  S93.502A        Scribe Disclosure:  I, Fernando Mcgill, am serving as a scribe at 9:35 PM on 1/28/2021 to document services personally performed by Donnell Chapman MD based on my observations and the provider's statements to me.        Donnell Chapman MD  01/29/21 [id number]

## 2021-01-29 NOTE — ED TRIAGE NOTES
Stumble and fell down, sustain injury to left foot. Stated that she did fell on her face, but denies any facial/head pain, no midline tenderness. ABCs intact.

## 2021-04-13 ENCOUNTER — OFFICE VISIT (OUTPATIENT)
Dept: PEDIATRICS | Facility: CLINIC | Age: 81
End: 2021-04-13
Payer: COMMERCIAL

## 2021-04-13 VITALS
BODY MASS INDEX: 30.92 KG/M2 | TEMPERATURE: 98.1 F | OXYGEN SATURATION: 99 % | WEIGHT: 157.5 LBS | RESPIRATION RATE: 20 BRPM | DIASTOLIC BLOOD PRESSURE: 50 MMHG | SYSTOLIC BLOOD PRESSURE: 120 MMHG | HEIGHT: 60 IN | HEART RATE: 95 BPM

## 2021-04-13 DIAGNOSIS — M54.16 LUMBAR RADICULOPATHY: ICD-10-CM

## 2021-04-13 DIAGNOSIS — D64.9 ANEMIA, UNSPECIFIED TYPE: ICD-10-CM

## 2021-04-13 DIAGNOSIS — Z86.73 HISTORY OF TIA (TRANSIENT ISCHEMIC ATTACK) AND STROKE: ICD-10-CM

## 2021-04-13 DIAGNOSIS — G47.00 PERSISTENT INSOMNIA: ICD-10-CM

## 2021-04-13 DIAGNOSIS — Z00.00 MEDICARE ANNUAL WELLNESS VISIT, SUBSEQUENT: Primary | ICD-10-CM

## 2021-04-13 DIAGNOSIS — I10 BENIGN ESSENTIAL HYPERTENSION: ICD-10-CM

## 2021-04-13 DIAGNOSIS — E03.8 SUBCLINICAL HYPOTHYROIDISM: ICD-10-CM

## 2021-04-13 DIAGNOSIS — I35.1 MODERATE AORTIC REGURGITATION: ICD-10-CM

## 2021-04-13 LAB
ALBUMIN SERPL-MCNC: 3.8 G/DL (ref 3.4–5)
ALP SERPL-CCNC: 63 U/L (ref 40–150)
ALT SERPL W P-5'-P-CCNC: 23 U/L (ref 0–50)
ANION GAP SERPL CALCULATED.3IONS-SCNC: 4 MMOL/L (ref 3–14)
AST SERPL W P-5'-P-CCNC: 16 U/L (ref 0–45)
BILIRUB SERPL-MCNC: 0.4 MG/DL (ref 0.2–1.3)
BUN SERPL-MCNC: 23 MG/DL (ref 7–30)
CALCIUM SERPL-MCNC: 9.6 MG/DL (ref 8.5–10.1)
CHLORIDE SERPL-SCNC: 105 MMOL/L (ref 94–109)
CO2 SERPL-SCNC: 28 MMOL/L (ref 20–32)
CREAT SERPL-MCNC: 0.73 MG/DL (ref 0.52–1.04)
ERYTHROCYTE [DISTWIDTH] IN BLOOD BY AUTOMATED COUNT: 13.5 % (ref 10–15)
GFR SERPL CREATININE-BSD FRML MDRD: 76 ML/MIN/{1.73_M2}
GLUCOSE SERPL-MCNC: 98 MG/DL (ref 70–99)
HCT VFR BLD AUTO: 35.7 % (ref 35–47)
HGB BLD-MCNC: 11.6 G/DL (ref 11.7–15.7)
MCH RBC QN AUTO: 30.6 PG (ref 26.5–33)
MCHC RBC AUTO-ENTMCNC: 32.5 G/DL (ref 31.5–36.5)
MCV RBC AUTO: 94 FL (ref 78–100)
PLATELET # BLD AUTO: 242 10E9/L (ref 150–450)
POTASSIUM SERPL-SCNC: 4.3 MMOL/L (ref 3.4–5.3)
PROT SERPL-MCNC: 7.2 G/DL (ref 6.8–8.8)
RBC # BLD AUTO: 3.79 10E12/L (ref 3.8–5.2)
SODIUM SERPL-SCNC: 137 MMOL/L (ref 133–144)
TSH SERPL DL<=0.005 MIU/L-ACNC: 3.94 MU/L (ref 0.4–4)
WBC # BLD AUTO: 6.3 10E9/L (ref 4–11)

## 2021-04-13 PROCEDURE — 80053 COMPREHEN METABOLIC PANEL: CPT | Performed by: PEDIATRICS

## 2021-04-13 PROCEDURE — 99397 PER PM REEVAL EST PAT 65+ YR: CPT | Performed by: PEDIATRICS

## 2021-04-13 PROCEDURE — 85027 COMPLETE CBC AUTOMATED: CPT | Performed by: PEDIATRICS

## 2021-04-13 PROCEDURE — 99213 OFFICE O/P EST LOW 20 MIN: CPT | Mod: 25 | Performed by: PEDIATRICS

## 2021-04-13 PROCEDURE — 84443 ASSAY THYROID STIM HORMONE: CPT | Performed by: PEDIATRICS

## 2021-04-13 PROCEDURE — 36415 COLL VENOUS BLD VENIPUNCTURE: CPT | Performed by: PEDIATRICS

## 2021-04-13 PROCEDURE — 83540 ASSAY OF IRON: CPT | Performed by: PEDIATRICS

## 2021-04-13 PROCEDURE — 83550 IRON BINDING TEST: CPT | Performed by: PEDIATRICS

## 2021-04-13 ASSESSMENT — ENCOUNTER SYMPTOMS
PARESTHESIAS: 0
NAUSEA: 0
MYALGIAS: 0
WEAKNESS: 0
HEMATOCHEZIA: 0
PALPITATIONS: 0
FEVER: 0
ARTHRALGIAS: 1
COUGH: 0
EYE PAIN: 0
HEADACHES: 0
CONSTIPATION: 0
CHILLS: 0
DIZZINESS: 0
NERVOUS/ANXIOUS: 0
ABDOMINAL PAIN: 0
SHORTNESS OF BREATH: 0
DIARRHEA: 0
SORE THROAT: 0
JOINT SWELLING: 0
HEARTBURN: 0
HEMATURIA: 0
DYSURIA: 0
FREQUENCY: 0

## 2021-04-13 ASSESSMENT — MIFFLIN-ST. JEOR: SCORE: 1090.92

## 2021-04-13 ASSESSMENT — ACTIVITIES OF DAILY LIVING (ADL): CURRENT_FUNCTION: HOUSEWORK REQUIRES ASSISTANCE

## 2021-04-13 NOTE — PROGRESS NOTES
"SUBJECTIVE:   Krystal Parra is a 81 year old female who presents for Preventive Visit.  Patient has been advised of split billing requirements and indicates understanding: Yes   Are you in the first 12 months of your Medicare coverage?  No    Healthy Habits:     In general, how would you rate your overall health?  Good    Frequency of exercise:  None    Do you usually eat at least 4 servings of fruit and vegetables a day, include whole grains    & fiber and avoid regularly eating high fat or \"junk\" foods?  Yes    Taking medications regularly:  Not Applicable    Medication side effects:  Not applicable    Ability to successfully perform activities of daily living:  Housework requires assistance    Home Safety:  No safety concerns identified    Hearing Impairment:  Difficulty following a conversation in a noisy restaurant or crowded room and difficulty understanding soft or whispered speech    In the past 6 months, have you been bothered by leaking of urine?  No    In general, how would you rate your overall mental or emotional health?  Excellent      PHQ-2 Total Score: 0    Additional concerns today:  Yes       Do you feel safe in your environment? No  Have you ever done Advance Care Planning? (For example, a Health Directive, POLST, or a discussion with a medical provider or your loved ones about your wishes): No, advance care planning information given to patient to review.  Patient plans to discuss their wishes with loved ones or provider.       Fall risk  Fallen 2 or more times in the past year?: No  Any fall with injury in the past year?: No    Cognitive Screening   1) Repeat 3 items (Leader, Season, Table)    2) Clock draw: NORMAL  3) 3 item recall: Recalls 3 objects  Results: 3 items recalled: COGNITIVE IMPAIRMENT LESS LIKELY    Mini-CogTM Copyright JOHNATHAN Pena. Licensed by the author for use in [facility]; reprinted with permission (jaskaran@.[facility]). All rights reserved.        Reviewed and updated " as needed this visit by clinical staff  Tobacco  Allergies  Meds   Med Hx  Surg Hx  Fam Hx  Soc Hx        Reviewed and updated as needed this visit by Provider                Social History     Tobacco Use     Smoking status: Passive Smoke Exposure - Never Smoker     Smokeless tobacco: Never Used     Tobacco comment:  smoked in house   Substance Use Topics     Alcohol use: No         Alcohol Use 4/13/2021   Prescreen: >3 drinks/day or >7 drinks/week? Not Applicable   Prescreen: >3 drinks/day or >7 drinks/week? -           Current providers sharing in care for this patient include:   Patient Care Team:  Tasha Winkler MD as PCP - General (Internal Medicine)  Tasha Winkler MD as Assigned PCP  Judy Franklin, RN as Personal Advocate & Liaison (PAL)  Koffi Lazaro MD as Assigned Neuroscience Provider    The following health maintenance items are reviewed in Epic and correct as of today:  Health Maintenance Due   Topic Date Due     ANNUAL REVIEW OF  ORDERS  Never done     COVID-19 Vaccine (1) Never done     ZOSTER IMMUNIZATION (1 of 2) Never done     MAMMO SCREENING  08/22/2019     ADVANCE CARE PLANNING  07/15/2020     TSH W/FREE T4 REFLEX  07/29/2020     FALL RISK ASSESSMENT  07/29/2020         Mammogram Screening - Patient over age 75, has elected to discontinue screenings.  Pertinent mammograms are reviewed under the imaging tab.    Chronic back pain - facet arthropathy and lumbar radiculopathy, severe spinal stenosis.  Can only walk around her house, very limited in ability to stand even to dust. Seeing her chiropractor once weekly for adjustments.   Did not get relief from recent injections.  Has been told previously that she is too advanced in age to undergo an extensive surgery.  Back pain limits her severely in her daily life.    Getting help at home with cleaning from her daughter and daughter in law.  Currently has grandson and great grandson living with her and this is a  huge blessing    Sprained left great toe in January - ER visit reviewed - now getting better and can walk on it now, though it remains more swollen than the other side    Aortic regurgitation - last echo 2017, politely declined a repeat at our last visit.  Since that time, has had no new cardiac symptoms, but cannot be very active due to her back issues.    Skin itching - has started taking benadryl at night and that helps.  Itching can be anywhere - no rash.      Ear wax on the left side - has noticed hearing is difficult from that side.    Numbness or tingling in fingers at night.  Uses a brace at night.  Position - fingers that are involved are variable - not always .  Hard to get in a good position that resolves symptoms.      Hx of TIA - no recent events concerning for TIA or CVA.  Takes 81mg ASA daily      Review of Systems   Constitutional: Negative for chills and fever.   HENT: Positive for hearing loss. Negative for congestion, ear pain and sore throat.    Eyes: Negative for pain and visual disturbance.   Respiratory: Negative for cough and shortness of breath.    Cardiovascular: Negative for chest pain, palpitations and peripheral edema.   Gastrointestinal: Negative for abdominal pain, constipation, diarrhea, heartburn, hematochezia and nausea.   Genitourinary: Negative for dysuria, frequency, genital sores, hematuria and urgency.   Musculoskeletal: Positive for arthralgias. Negative for joint swelling and myalgias.   Skin: Negative for rash.   Neurological: Negative for dizziness, weakness, headaches and paresthesias.   Psychiatric/Behavioral: Negative for mood changes. The patient is not nervous/anxious.          OBJECTIVE:   /50 (BP Location: Right arm, Patient Position: Sitting, Cuff Size: Adult Regular)   Pulse 95   Temp 98.1  F (36.7  C) (Tympanic)   Resp 20   Ht 1.524 m (5')   Wt 71.4 kg (157 lb 8 oz)   SpO2 99%   BMI 30.76 kg/m   Estimated body mass index is 30.76 kg/m  as calculated  from the following:    Height as of this encounter: 1.524 m (5').    Weight as of this encounter: 71.4 kg (157 lb 8 oz).  Physical Exam  GENERAL: healthy, alert and no distress  EYES: Eyes grossly normal to inspection, PERRL and conjunctivae and sclerae normal  HENT: ear canals and TM's normal, nose and mouth without ulcers or lesions  NECK: no adenopathy, no asymmetry, masses, or scars and thyroid normal to palpation  RESP: lungs clear to auscultation - no rales, rhonchi or wheezes  CV: regular rates and rhythm, S3 present, no murmur, click or rub, peripheral pulses strong and no peripheral edema  ABDOMEN: soft, nontender, no hepatosplenomegaly, no masses and bowel sounds normal  MS: no gross musculoskeletal defects noted, no edema  SKIN: no suspicious lesions or rashes  NEURO: Normal strength and tone, mentation intact and speech normal  PSYCH: mentation appears normal, affect normal/bright    Diagnostic Test Results:  pending    ASSESSMENT / PLAN:       ICD-10-CM    1. Medicare annual wellness visit, subsequent  Z00.00 TSH with free T4 reflex     CBC with platelets     Comprehensive metabolic panel   2. Benign essential hypertension  I10 Comprehensive metabolic panel   3. Subclinical hypothyroidism  E03.9 TSH with free T4 reflex   4. History of TIA (transient ischemic attack) and stroke  Z86.73 CBC with platelets   5. Moderate aortic regurgitation - due for repeat echocardiogram 8/2018  I35.1 Echocardiogram Complete    Change in cardiac exam noted today, patient feels asymptomatic - recommended echocardiogram and she is agreeable   6. Lumbar radiculopathy  M54.16 Recommended getting additional spine surgery opinion - patient will contemplate and get back to me   7. Persistent insomnia  G47.00 Not current only medication, monitor         COUNSELING:  Reviewed preventive health counseling, as reflected in patient instructions    Estimated body mass index is 30.76 kg/m  as calculated from the following:    Height as  of this encounter: 1.524 m (5').    Weight as of this encounter: 71.4 kg (157 lb 8 oz).    Weight management plan: Discussed healthy diet and exercise guidelines    She reports that she is a non-smoker but has been exposed to tobacco smoke. She has never used smokeless tobacco.      Appropriate preventive services were discussed with this patient, including applicable screening as appropriate for cardiovascular disease, diabetes, osteopenia/osteoporosis, and glaucoma.  As appropriate for age/gender, discussed screening for colorectal cancer, prostate cancer, breast cancer, and cervical cancer. Checklist reviewing preventive services available has been given to the patient.    Reviewed patients plan of care and provided an AVS. The Intermediate Care Plan ( asthma action plan, low back pain action plan, and migraine action plan) for Krystal meets the Care Plan requirement. This Care Plan has been established and reviewed with the Patient.    Counseling Resources:  ATP IV Guidelines  Pooled Cohorts Equation Calculator  Breast Cancer Risk Calculator  Breast Cancer: Medication to Reduce Risk  FRAX Risk Assessment  ICSI Preventive Guidelines  Dietary Guidelines for Americans, 2010  USDA's MyPlate  ASA Prophylaxis  Lung CA Screening    Tasha Winkler MD  [facility]    Identified Health Risks:

## 2021-04-13 NOTE — PATIENT INSTRUCTIONS
Think about seeing another spine specialist for a second opinion.  I think this would be a good    For itching, can try cetrizine (zyrtec) or loratidine (claritin) - daily should help    Keep up wrist brace and let me know if numbness/tingling worsening    Echocardiogram to look at your heart - we'll help you schedule this today to keep tabs on your aortic valve    Labs today    We'll help you get set up for your COVID vaccine

## 2021-04-15 LAB
IRON SATN MFR SERPL: 24 % (ref 15–46)
IRON SERPL-MCNC: 78 UG/DL (ref 35–180)
TIBC SERPL-MCNC: 330 UG/DL (ref 240–430)

## 2021-05-14 ENCOUNTER — IMMUNIZATION (OUTPATIENT)
Dept: NURSING | Facility: CLINIC | Age: 81
End: 2021-05-14
Payer: COMMERCIAL

## 2021-05-14 PROCEDURE — 0001A PR COVID VAC PFIZER DIL RECON 30 MCG/0.3 ML IM: CPT

## 2021-05-14 PROCEDURE — 91300 PR COVID VAC PFIZER DIL RECON 30 MCG/0.3 ML IM: CPT

## 2021-05-27 ENCOUNTER — HOSPITAL ENCOUNTER (OUTPATIENT)
Dept: CARDIOLOGY | Facility: CLINIC | Age: 81
Discharge: HOME | End: 2021-05-27
Attending: PEDIATRICS | Admitting: PEDIATRICS
Payer: COMMERCIAL

## 2021-05-27 DIAGNOSIS — I35.1 MODERATE AORTIC REGURGITATION: ICD-10-CM

## 2021-05-27 PROCEDURE — 93306 TTE W/DOPPLER COMPLETE: CPT | Mod: 26 | Performed by: INTERNAL MEDICINE

## 2021-05-27 PROCEDURE — 93306 TTE W/DOPPLER COMPLETE: CPT

## 2021-06-04 ENCOUNTER — IMMUNIZATION (OUTPATIENT)
Dept: NURSING | Facility: CLINIC | Age: 81
End: 2021-06-04
Attending: INTERNAL MEDICINE
Payer: COMMERCIAL

## 2021-06-04 PROCEDURE — 91300 PR COVID VAC PFIZER DIL RECON 30 MCG/0.3 ML IM: CPT

## 2021-06-04 PROCEDURE — 0002A PR COVID VAC PFIZER DIL RECON 30 MCG/0.3 ML IM: CPT

## 2021-07-07 ENCOUNTER — TELEPHONE (OUTPATIENT)
Dept: PEDIATRICS | Facility: CLINIC | Age: 81
End: 2021-07-07

## 2021-07-07 DIAGNOSIS — R20.0 BILATERAL HAND NUMBNESS: Primary | ICD-10-CM

## 2021-07-07 NOTE — TELEPHONE ENCOUNTER
Next step would be to consider referral to neurology.   If Krystal is agreeable, ok to place referral to Dr Miranda.   If it isn't bothering her too much and she hasn't noticed change in hand strength or increased numbness/tingling during the day, ok to continue to monitor.    Tasha Winkler MD

## 2021-07-07 NOTE — TELEPHONE ENCOUNTER
Dr. Winkler-  She is calling to let you know she is still having the numbness in her fingers. She was supposed to let you know if it persisted. Please advise. Sandy Anne RN on 7/7/2021 at 11:27 AM    OK to leave message. She will be out until this afternoon.     4/13/21  Numbness or tingling in fingers at night.  Uses a brace at night.  Position - fingers that are involved are variable - not always .  Hard to get in a good position that resolves symptoms.

## 2021-07-08 NOTE — TELEPHONE ENCOUNTER
Patient called back.   She does agree to follow up with Neurology.   Referral entered.   Patient given scheduling number.

## 2021-08-25 ENCOUNTER — OFFICE VISIT (OUTPATIENT)
Dept: NEUROLOGY | Facility: CLINIC | Age: 81
End: 2021-08-25
Attending: PEDIATRICS
Payer: COMMERCIAL

## 2021-08-25 VITALS — OXYGEN SATURATION: 97 % | SYSTOLIC BLOOD PRESSURE: 146 MMHG | HEART RATE: 77 BPM | DIASTOLIC BLOOD PRESSURE: 82 MMHG

## 2021-08-25 DIAGNOSIS — M79.642 BILATERAL HAND PAIN: ICD-10-CM

## 2021-08-25 DIAGNOSIS — M79.641 BILATERAL HAND PAIN: ICD-10-CM

## 2021-08-25 DIAGNOSIS — R20.0 BILATERAL HAND NUMBNESS: Primary | ICD-10-CM

## 2021-08-25 PROCEDURE — G0463 HOSPITAL OUTPT CLINIC VISIT: HCPCS

## 2021-08-25 PROCEDURE — 99215 OFFICE O/P EST HI 40 MIN: CPT | Performed by: PSYCHIATRY & NEUROLOGY

## 2021-08-25 NOTE — PROGRESS NOTES
INITIAL NEUROLOGY CONSULTATION    DATE OF VISIT: 8/25/2021  CLINIC LOCATION: [facility]  MRN: [id number]  PATIENT NAME: Krystal Parra  YOB: 1940    PRIMARY CARE PROVIDER: Tasha Winkler MD     REASON FOR VISIT:   Chief Complaint   Patient presents with     Numbness     both hands right is worse     HISTORY OF PRESENT ILLNESS:                                                    Ms. Krystal Parra is 81 year old right handed female patient with past medical history of TIA, lumbar spinal stenosis, subclinical hypothyroidism, hypertension, and Dupuytren's contracture of both hands, who was seen in consultation today requested by Tasha Winkler MD, for bilateral hand numbness. The patient is accompanied by her grandson.    Per patient's report, approximately a year ago she developed intermittent bilateral numbness and tingling with associated burning pain of her hands at night. It involves 3 1/2 fingers and palmar surfaces of the hands. Right hand is much more affected than the left. Symptoms improve after repositioning, but persist and gradually worsen over time. Recommended wrist splints helped somewhat. Denies any additional focal neurological symptoms, aggravating or alleviating factors. No other treatments tried.    Recent laboratory evaluation from April 2021 includes unremarkable CMP, iron studies, TSH (3.94), and low hemoglobin (11.6).    Head CT from 6/7/2016, performed for left-sided weakness, was negative for acute intracranial pathology. Head and neck CTA the time was unrevealing. Brain MRI from the same day demonstrated no acute infarct. 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 Images were personally reviewed and independently interpreted.    No additional useful information is available  in Care Everywhere, which was reviewed.    Review of Systems - the patient endorses insomnia, fatigue, swollen feet, and arthritis. All of them have been previously discussed with other medical providers. Otherwise, she denies any other complaints on 14-point comprehensive review of systems.  PAST MEDICAL/SURGICAL HISTORY:                                                    I personally reviewed patient's past medical and surgical history with the patient at today's visit.  MEDICATIONS:                                                    I personally reviewed patient's medications and allergies with the patient at today's visit.  aspirin EC 81 MG EC tablet, Take 1 tablet (81 mg) by mouth daily  Cholecalciferol (VITAMIN D) 2000 UNIT CAPS, Take 1 tablet by mouth daily.  coenzyme Q10 (CO-Q10) 30 MG capsule, Take 1 capsule (30 mg) by mouth daily  fish oil-omega-3 fatty acids (FISH OIL) 1000 MG capsule, Take 2 g by mouth 2 times daily   Ginkgo Biloba (GINKOBA PO),   OVER-THE-COUNTER, Take 1 tablet by mouth daily (Melaleuca vitamins)    fentaNYL (PF) (SUBLIMAZE) injection 50 mcg  midazolam (VERSED) injection 1 mg      ALLERGIES:                                                      Allergies   Allergen Reactions     Sulfa Drugs      FAMILY/SOCIAL HISTORY:                                                    Family and social history was reviewed with the patient at today's visit.  Family history is positive for stroke (father) and brain tumor (2 sisters).  Never smoker. Denies current alcohol and recreational drug use.  . Lives with grandson. Retired.  REVIEW OF SYSTEMS:                                                    Patient has completed a [facility] Patient Health History, including a 14-system review, which was personally reviewed, and pertinent positives are listed in HPI. She denies any additional problems on the further questioning.  EXAM:                                                    VITAL  SIGNS:   BP (!) 146/72   Pulse 79   SpO2 97%   Mini-Cog Assessment:  Mini Cog Assessment  Clock Draw Score: 2 Normal  3 Item Recall: 3 objects recalled  Mini Cog Total Score: 5  Administered by: : Sharon TRIPATHI    General: pt is in NAD, cooperative.  Skin: normal turgor, moist mucous membranes, no lesions/rashes noticed.  HEENT: ATNC, EOMI, PERRL, white sclera, normal conjunctiva, no nystagmus or ptosis. No carotid bruits bilaterally.  Respiratory: lung sounds clear to auscultation bilaterally, no crackles, wheezes, rhonchi. Symmetric lung excursion, no accessory respiratory muscle use.  Cardiovascular: normal S1/S2, no murmurs/rubs/gallops.   Abdomen: Not distended.  : deferred.    Neurological:  Mental: alert, follows commands, Mini Cog Total Score: 5/5 with 3/3 on memory recall, no aphasia or dysarthria. Fund of knowledge is appropriate for age.  Cranial Nerves:  CN II: visual acuity - able to accurately count fingers with each eye. Visual fields intact, fundi: discs sharp, no papilledema and normal vessels bilaterally.  CN III, IV, VI: EOM intact, pupils equal and reactive  CN V: facial sensation nl  CN VII: face symmetric, no facial droop  CN VIII: hearing normal  CN IX: palate elevation symmetric, uvula at midline  CN XI SCM normal, shoulder shrug nl  CN XII: tongue midline  Motor: Strength: 5/5 in all major groups of all extremities. Normal tone. No abnormal movements. No pronator drift b/l. Phalen's and Tinel's tests are positive on the right and negative on the left.  Reflexes: Triceps, biceps, and brachioradialis reflexes are symmetric, patellar and achilles reflexes are absent bilaterally (suspect chronic). No clonus noted. Toes are down-going b/l.   Sensory:  light touch, pinprick, and vibration reduced in both lower extremities. Romberg: negative.  Coordination: FNF and heel-shin tests intact b/l.   Gait: Normal casual walk, uses a walker for longer distances.  DATA:   LABS:/IMAGING/OTHER STUDIES: I  reviewed pertinent medical records, including personal review of head CT, head and neck CTA, and brain MRI images and Care Everywhere, as detailed in the history of present illness.  ASSESSMENT AND PLAN:      ASSESSMENT: Krystal Parra is a 81 year old female patient with listed above past medical history, who presents with bilateral, right worse than left, hand paresthesias and pain that are gradually worsening over the last year.    We had a detailed discussion with the patient regarding her presenting complaints.  The neurological exam today is suggestive of bilateral, right worse than left carpal tunnel syndrome, in addition to chronic lumbar radiculopathic changes related to her severe lumbar spinal stenosis (unrelated to today's visit). Additional, but less likely differential includes cervical radiculopathies. I would like to proceed with EMG for diagnosis clarification and appropriate management. I suggested to continue using wrist splints meanwhile.    Krystal to follow up with Primary Care provider regarding elevated blood pressure.     DIAGNOSES:    ICD-10-CM    1. Bilateral hand numbness  R20.0      PLAN: At today's visit we thoroughly discussed various diagnostic possibilities for patient's symptoms, necessary evaluation, and the plan, which includes:  Orders Placed This Encounter   Procedures     EMG     No new medications.     Additional recommendations after the work-up.    Next follow-up appointment is in the next 4 weeks or earlier if needed.    Total Time: 61 minutes spent on the date of the encounter doing chart review, history and exam, documentation and further activities per the note.    You Miranda MD  [facility] Neurology  (Chart documentation was completed in part with Dragon voice-recognition software. Even though reviewed, some grammatical, spelling, and word errors may remain.)

## 2021-08-25 NOTE — LETTER
8/25/2021         RE: Krystal Parra  [address]  [place] [address]        Dear Colleague,    Thank you for referring your patient, Krystal Parra, to the [facility] [place]. Please see a copy of my visit note below.    INITIAL NEUROLOGY CONSULTATION    DATE OF VISIT: 8/25/2021  CLINIC LOCATION: [facility]  MRN: [id number]  PATIENT NAME: Krystal Parra  YOB: 1940    PRIMARY CARE PROVIDER: Tasha Winkler MD     REASON FOR VISIT:   Chief Complaint   Patient presents with     Numbness     both hands right is worse     HISTORY OF PRESENT ILLNESS:                                                    Ms. Krystal Parra is 81 year old right handed female patient with past medical history of TIA, lumbar spinal stenosis, subclinical hypothyroidism, hypertension, and Dupuytren's contracture of both hands, who was seen in consultation today requested by Tasha Winkler MD, for bilateral hand numbness. The patient is accompanied by her grandson.    Per patient's report, approximately a year ago she developed intermittent bilateral numbness and tingling with associated burning pain of her hands at night. It involves 3 1/2 fingers and palmar surfaces of the hands. Right hand is much more affected than the left. Symptoms improve after repositioning, but persist and gradually worsen over time. Recommended wrist splints helped somewhat. Denies any additional focal neurological symptoms, aggravating or alleviating factors. No other treatments tried.    Recent laboratory evaluation from April 2021 includes unremarkable CMP, iron studies, TSH (3.94), and low hemoglobin (11.6).    Head CT from 6/7/2016, performed for left-sided weakness, was negative for acute intracranial pathology. Head and neck CTA the time was unrevealing. Brain MRI from the same day demonstrated no acute infarct. 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 Images were personally reviewed and independently interpreted.    No additional useful information is available in Care Everywhere, which was reviewed.    Review of Systems - the patient endorses insomnia, fatigue, swollen feet, and arthritis. All of them have been previously discussed with other medical providers. Otherwise, she denies any other complaints on 14-point comprehensive review of systems.  PAST MEDICAL/SURGICAL HISTORY:                                                    I personally reviewed patient's past medical and surgical history with the patient at today's visit.  MEDICATIONS:                                                    I personally reviewed patient's medications and allergies with the patient at today's visit.  aspirin EC 81 MG EC tablet, Take 1 tablet (81 mg) by mouth daily  Cholecalciferol (VITAMIN D) 2000 UNIT CAPS, Take 1 tablet by mouth daily.  coenzyme Q10 (CO-Q10) 30 MG capsule, Take 1 capsule (30 mg) by mouth daily  fish oil-omega-3 fatty acids (FISH OIL) 1000 MG capsule, Take 2 g by mouth 2 times daily   Ginkgo Biloba (GINKOBA PO),   OVER-THE-COUNTER, Take 1 tablet by mouth daily (Melaleuca vitamins)    fentaNYL (PF) (SUBLIMAZE) injection 50 mcg  midazolam (VERSED) injection 1 mg      ALLERGIES:                                                      Allergies   Allergen Reactions     Sulfa Drugs      FAMILY/SOCIAL HISTORY:                                                    Family and social history was reviewed with the patient at today's visit.  Family history is positive for stroke (father) and brain tumor (2 sisters).  Never smoker. Denies current alcohol and recreational drug use.  . Lives with grandson. Retired.  REVIEW OF SYSTEMS:                                                    Patient has completed a [facility] Patient  Health History, including a 14-system review, which was personally reviewed, and pertinent positives are listed in HPI. She denies any additional problems on the further questioning.  EXAM:                                                    VITAL SIGNS:   BP (!) 146/72   Pulse 79   SpO2 97%   Mini-Cog Assessment:  Mini Cog Assessment  Clock Draw Score: 2 Normal  3 Item Recall: 3 objects recalled  Mini Cog Total Score: 5  Administered by: : Sharon TRIPATHI    General: pt is in NAD, cooperative.  Skin: normal turgor, moist mucous membranes, no lesions/rashes noticed.  HEENT: ATNC, EOMI, PERRL, white sclera, normal conjunctiva, no nystagmus or ptosis. No carotid bruits bilaterally.  Respiratory: lung sounds clear to auscultation bilaterally, no crackles, wheezes, rhonchi. Symmetric lung excursion, no accessory respiratory muscle use.  Cardiovascular: normal S1/S2, no murmurs/rubs/gallops.   Abdomen: Not distended.  : deferred.    Neurological:  Mental: alert, follows commands, Mini Cog Total Score: 5/5 with 3/3 on memory recall, no aphasia or dysarthria. Fund of knowledge is appropriate for age.  Cranial Nerves:  CN II: visual acuity - able to accurately count fingers with each eye. Visual fields intact, fundi: discs sharp, no papilledema and normal vessels bilaterally.  CN III, IV, VI: EOM intact, pupils equal and reactive  CN V: facial sensation nl  CN VII: face symmetric, no facial droop  CN VIII: hearing normal  CN IX: palate elevation symmetric, uvula at midline  CN XI SCM normal, shoulder shrug nl  CN XII: tongue midline  Motor: Strength: 5/5 in all major groups of all extremities. Normal tone. No abnormal movements. No pronator drift b/l. Phalen's and Tinel's tests are positive on the right and negative on the left.  Reflexes: Triceps, biceps, and brachioradialis reflexes are symmetric, patellar and achilles reflexes are absent bilaterally (suspect chronic). No clonus noted. Toes are down-going b/l.   Sensory:   light touch, pinprick, and vibration reduced in both lower extremities. Romberg: negative.  Coordination: FNF and heel-shin tests intact b/l.   Gait: Normal casual walk, uses a walker for longer distances.  DATA:   LABS:/IMAGING/OTHER STUDIES: I reviewed pertinent medical records, including personal review of head CT, head and neck CTA, and brain MRI images and Care Everywhere, as detailed in the history of present illness.  ASSESSMENT AND PLAN:      ASSESSMENT: Krystal Parra is a 81 year old female patient with listed above past medical history, who presents with bilateral, right worse than left, hand paresthesias and pain that are gradually worsening over the last year.    We had a detailed discussion with the patient regarding her presenting complaints.  The neurological exam today is suggestive of bilateral, right worse than left carpal tunnel syndrome, in addition to chronic lumbar radiculopathic changes related to her severe lumbar spinal stenosis (unrelated to today's visit). Additional, but less likely differential includes cervical radiculopathies. I would like to proceed with EMG for diagnosis clarification and appropriate management. I suggested to continue using wrist splints genevieve Rice to follow up with Primary Care provider regarding elevated blood pressure.     DIAGNOSES:    ICD-10-CM    1. Bilateral hand numbness  R20.0      PLAN: At today's visit we thoroughly discussed various diagnostic possibilities for patient's symptoms, necessary evaluation, and the plan, which includes:  Orders Placed This Encounter   Procedures     EMG     No new medications.     Additional recommendations after the work-up.    Next follow-up appointment is in the next 4 weeks or earlier if needed.    Total Time: 61 minutes spent on the date of the encounter doing chart review, history and exam, documentation and further activities per the note.    You Miranda MD  [facility] Neurology  (Chart  documentation was completed in part with Dragon voice-recognition software. Even though reviewed, some grammatical, spelling, and word errors may remain.)              Again, thank you for allowing me to participate in the care of your patient.        Sincerely,        You Miranda MD

## 2021-08-25 NOTE — PATIENT INSTRUCTIONS
AFTER VISIT SUMMARY (AVS):    At today's visit we thoroughly discussed various diagnostic possibilities for your symptoms, necessary evaluation, and the plan, which includes:  Orders Placed This Encounter   Procedures     EMG     No new medications.     Additional recommendations after the work-up.    Next follow-up appointment is in the next 4 weeks or earlier if needed.    Please do not hesitate to call me with any questions or concerns.    Thanks.

## 2021-09-21 ENCOUNTER — OFFICE VISIT (OUTPATIENT)
Dept: NEUROLOGY | Facility: CLINIC | Age: 81
End: 2021-09-21
Attending: PSYCHIATRY & NEUROLOGY
Payer: COMMERCIAL

## 2021-09-21 DIAGNOSIS — M79.641 BILATERAL HAND PAIN: ICD-10-CM

## 2021-09-21 DIAGNOSIS — G56.03 BILATERAL CARPAL TUNNEL SYNDROME: Primary | ICD-10-CM

## 2021-09-21 DIAGNOSIS — M79.642 BILATERAL HAND PAIN: ICD-10-CM

## 2021-09-21 DIAGNOSIS — R20.0 BILATERAL HAND NUMBNESS: ICD-10-CM

## 2021-09-21 PROCEDURE — 95911 NRV CNDJ TEST 9-10 STUDIES: CPT | Performed by: PSYCHIATRY & NEUROLOGY

## 2021-09-21 NOTE — PROGRESS NOTES
[facility]  Electrodiagnostic Laboratory                 Department of Neurology                                                                                                         Test Date:  2021    Patient: Krystal Parra : 1940 Physician: Ruy Perez MD   Sex: Female AGE: 81 year Ref Phys: You Miranda MD   ID#: [id number]   Technician: Marian Almaraz     Clinical Information:    81 year old woman with bilateral hand numbness and tingling, right>left, for the past year. She is also experiencing pain in the right hand. Symptoms are aggravated by repetitive hand motion and she had partial relief with splints. Query carpal tunnel syndrome.     Techniques:    Motor and sensory conduction studies were done with surface recording electrodes.    Results:    Left median motor NCS (APB) showed prolonged distal latency, normal CMAP amplitudes, and normal conduction velocity. Right median motor NCS (APB) showed no response. Bilateral median to ulnar second lumbrical/palmar interossei comparison motor NCSs showed increased latency difference (9 ms on the right, and 3 ms on the left, normal is <0.5 ms). Bilateral ulnar (ADM) motor NCSs were normal. Left median antidromic sensory NCS showed attenuated SNAP amplitude and markedly attenuated conduction velocity. Right median antidromic sensory NCS showed no response. Bilateral ulnar antidromic sensory NCSs were normal.     Interpretation:    Abnormal study. 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 There is no electrodiagnostic evidence of ulnar neuropathy on either side. Please see comment.    Comment: Cervical radiculopathies were not ruled out as needle EMG examination was not performed. The patient's symptoms appear absolutely typical for CTS and consistent with electrodiagnostic findings. It may be  reasonable to address this problem first before exploring additional etiologies.     ___________________________  Ruy Perez MD        Nerve Conduction Studies  Motor Sites      Latency Amplitude Neg. Amp Diff Segment Distance Velocity Neg. Dur Neg Area Diff Temperature Comment   Site (ms) Norm (mV) Norm %  cm m/s Norm ms %  C    Left Median (APB) Motor   Wrist *5.0  < 4.2 5.6  > 5.0  Wrist-APB 8   5.8  32    Elbow 9.1 - 5.7 - 1.79 Elbow-Wrist 20 49  > 48 5.6 -5.0 32    Right Median (APB) Motor   Wrist *NR  < 4.2 *NR  > 5.0  Wrist-APB 8   *NR  23.6    Left Median/Ulnar (Lumb-Dors Int II) Motor        Median (Lumb I)   Wrist 5.8 - 0.34 -      7.6  32         Ulnar (Dorsal Int (manus))   Wrist 2.8 - 2.2 -      4.3  32    Right Median/Ulnar (Lumb-Dors Int II) Motor        Median (Lumb I)   Wrist 12.2 - 0.17 -      7.3  32.1         Ulnar (Dorsal Int (manus))   Wrist 3.2 - 2.6 -      3.7  32.1    Left Ulnar (ADM) Motor   Wrist 2.9  < 3.5 5.6  > 3.0  Wrist-ADM 8   4.9  32.1    Bel Elbow 6.5 - 5.1 - -8.9 Bel Elbow-Wrist 20 56  > 48 5.5 -1.88 32.1    Abv Elbow 8.2 - 5.2 - 1.96 Abv Elbow-Bel Elbow 9.5 56  > 48 6.0 3.2 32.1    Right Ulnar (ADM) Motor   Wrist 2.6  < 3.5 7.2  > 3.0  Wrist-ADM 8   5.0  32.1    Bel Elbow 6.3 - 6.2 - -13.9 Bel Elbow-Wrist 22 59  > 48 5.0 -1.13 32.1    Abv Elbow 8.1 - 6.1 - -1.61 Abv Elbow-Bel Elbow 10 56  > 48 5.2 -4.0 32.1      Sensory Sites      Onset Lat Neg Peak Lat Amp (O-P) Amp (P-P) Segment Distance Velocity Temperature Comment   Site ms ms  V Norm  V  cm m/s Norm  C    Left Median Sensory   Wrist-Dig II 4.2 *5.3 *6  > 10 *9 Wrist-Dig II 14 *33  > 48 32.1    Right Median Sensory   Wrist-Dig II *NR *NR *NR  > 10 *NR Wrist-Dig II 14 *NR  > 48 32.4    Left Ulnar Sensory   Wrist-Dig V 2.5 *3.5 10  > 8 16 Wrist-Dig V 12.5 50  > 48 24.7    Right Ulnar Sensory   Wrist-Dig V 1.85 2.7 12  > 8 *13 Wrist-Dig V 12.5 68  > 48 32            NCS Waveforms:    Motor                     Sensory                  Ultrasound Images:

## 2021-09-21 NOTE — LETTER
2021       RE: Krystal Parra  [address]  [place] [address]     Dear Colleague,    Thank you for referring your patient, Krystal Parra, to the [facility] EMG CLINIC [place] at [facility]. Please see a copy of my visit note below.          [facility]  Electrodiagnostic Laboratory                 Department of Neurology  Test Date:  2021    Patient: Krystal Parra : 1940 Physician: Ruy Perez MD   Sex: Female AGE: 81 year Ref Phys: You Miranda MD   ID#: [id number]   Technician: Marian Almaraz     Clinical Information:    81 year old woman with bilateral hand numbness and tingling, right>left, for the past year. She is also experiencing pain in the right hand. Symptoms are aggravated by repetitive hand motion and she had partial relief with splints. Query carpal tunnel syndrome.     Techniques:    Motor and sensory conduction studies were done with surface recording electrodes.    Results:    Left median motor NCS (APB) showed prolonged distal latency, normal CMAP amplitudes, and normal conduction velocity. Right median motor NCS (APB) showed no response. Bilateral median to ulnar second lumbrical/palmar interossei comparison motor NCSs showed increased latency difference (9 ms on the right, and 3 ms on the left, normal is <0.5 ms). Bilateral ulnar (ADM) motor NCSs were normal. Left median antidromic sensory NCS showed attenuated SNAP amplitude and markedly attenuated conduction velocity. Right median antidromic sensory NCS showed no response. Bilateral ulnar antidromic sensory NCSs were normal.     Interpretation:    Abnormal study. 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 There is no electrodiagnostic evidence of ulnar neuropathy on either side. Please see comment.    Comment: Cervical radiculopathies  were not ruled out as needle EMG examination was not performed. The patient's symptoms appear absolutely typical for CTS and consistent with electrodiagnostic findings. It may be reasonable to address this problem first before exploring additional etiologies.     ___________________________  Ruy Perez MD    Nerve Conduction Studies  Motor Sites      Latency Amplitude Neg. Amp Diff Segment Distance Velocity Neg. Dur Neg Area Diff Temperature Comment   Site (ms) Norm (mV) Norm %  cm m/s Norm ms %  C    Left Median (APB) Motor   Wrist *5.0  < 4.2 5.6  > 5.0  Wrist-APB 8   5.8  32    Elbow 9.1 - 5.7 - 1.79 Elbow-Wrist 20 49  > 48 5.6 -5.0 32    Right Median (APB) Motor   Wrist *NR  < 4.2 *NR  > 5.0  Wrist-APB 8   *NR  23.6    Left Median/Ulnar (Lumb-Dors Int II) Motor        Median (Lumb I)   Wrist 5.8 - 0.34 -      7.6  32         Ulnar (Dorsal Int (manus))   Wrist 2.8 - 2.2 -      4.3  32    Right Median/Ulnar (Lumb-Dors Int II) Motor        Median (Lumb I)   Wrist 12.2 - 0.17 -      7.3  32.1         Ulnar (Dorsal Int (manus))   Wrist 3.2 - 2.6 -      3.7  32.1    Left Ulnar (ADM) Motor   Wrist 2.9  < 3.5 5.6  > 3.0  Wrist-ADM 8   4.9  32.1    Bel Elbow 6.5 - 5.1 - -8.9 Bel Elbow-Wrist 20 56  > 48 5.5 -1.88 32.1    Abv Elbow 8.2 - 5.2 - 1.96 Abv Elbow-Bel Elbow 9.5 56  > 48 6.0 3.2 32.1    Right Ulnar (ADM) Motor   Wrist 2.6  < 3.5 7.2  > 3.0  Wrist-ADM 8   5.0  32.1    Bel Elbow 6.3 - 6.2 - -13.9 Bel Elbow-Wrist 22 59  > 48 5.0 -1.13 32.1    Abv Elbow 8.1 - 6.1 - -1.61 Abv Elbow-Bel Elbow 10 56  > 48 5.2 -4.0 32.1      Sensory Sites      Onset Lat Neg Peak Lat Amp (O-P) Amp (P-P) Segment Distance Velocity Temperature Comment   Site ms ms  V Norm  V  cm m/s Norm  C    Left Median Sensory   Wrist-Dig II 4.2 *5.3 *6  > 10 *9 Wrist-Dig II 14 *33  > 48 32.1    Right Median Sensory   Wrist-Dig II *NR *NR *NR  > 10 *NR Wrist-Dig II 14 *NR  > 48 32.4    Left Ulnar Sensory   Wrist-Dig V 2.5 *3.5 10  > 8 16  Wrist-Dig V 12.5 50  > 48 24.7    Right Ulnar Sensory   Wrist-Dig V 1.85 2.7 12  > 8 *13 Wrist-Dig V 12.5 68  > 48 32            NCS Waveforms:    Motor                    Sensory                Ultrasound Images:    Again, thank you for allowing me to participate in the care of your patient.      Sincerely,    Ruy Perez MD

## 2021-09-23 ENCOUNTER — TELEPHONE (OUTPATIENT)
Dept: NEUROLOGY | Facility: CLINIC | Age: 81
End: 2021-09-23

## 2021-09-23 NOTE — TELEPHONE ENCOUNTER
Outbound call to Krystal.Someone picked up phone and slammed it down before saying anything. Will try again in am

## 2021-09-23 NOTE — TELEPHONE ENCOUNTER
----- Message from You Miranda MD sent at 9/21/2021 12:48 PM CDT -----  Please advise the patient that her EMG demonstrated bilateral carpal tunnel syndrome that we will discuss during upcoming follow-up visit.Thanks,You Miranda MD.

## 2021-09-24 ENCOUNTER — TELEPHONE (OUTPATIENT)
Dept: NEUROLOGY | Facility: CLINIC | Age: 81
End: 2021-09-24

## 2021-09-27 ENCOUNTER — OFFICE VISIT (OUTPATIENT)
Dept: NEUROLOGY | Facility: CLINIC | Age: 81
End: 2021-09-27
Attending: PSYCHIATRY & NEUROLOGY
Payer: COMMERCIAL

## 2021-09-27 VITALS
HEIGHT: 61 IN | DIASTOLIC BLOOD PRESSURE: 76 MMHG | HEART RATE: 83 BPM | OXYGEN SATURATION: 99 % | BODY MASS INDEX: 29.76 KG/M2 | SYSTOLIC BLOOD PRESSURE: 170 MMHG

## 2021-09-27 DIAGNOSIS — G56.03 BILATERAL CARPAL TUNNEL SYNDROME: Primary | ICD-10-CM

## 2021-09-27 PROCEDURE — 99214 OFFICE O/P EST MOD 30 MIN: CPT | Performed by: PSYCHIATRY & NEUROLOGY

## 2021-09-27 PROCEDURE — G0463 HOSPITAL OUTPT CLINIC VISIT: HCPCS

## 2021-09-27 NOTE — PATIENT INSTRUCTIONS
AFTER VISIT SUMMARY (AVS):    At today's visit we thoroughly discussed current symptoms, EMG results (bilateral carpal tunnel syndrome), available treatment options, and the plan.    For your left hand, I prescribed you left wrist splint that you need to wear every night and also during the day only with repetitive wrist movements.    For the right hand, I placed a referral to hand surgery to discuss surgical options.  Meanwhile, please continue wearing your right wrist splint every night and also during the day with repetitive wrist movements.    Next follow-up appointment is on as needed basis.    Please do not hesitate to call me with any questions or concerns.    Thanks.

## 2021-09-27 NOTE — LETTER
9/27/2021         RE: Krystal Parra  [address]  [place] [address]        Dear Colleague,    Thank you for referring your patient, Krystal Parra, to the [facility] NEUROLOGY CLINIC [place]. Please see a copy of my visit note below.    ESTABLISHED PATIENT NEUROLOGY NOTE    DATE OF VISIT: 9/27/2021  CLINIC LOCATION: [facility]  MRN: [id number]  PATIENT NAME: Krystal Parra  YOB: 1940    PCP: Tasha Winkler MD    REASON FOR VISIT:   Chief Complaint   Patient presents with     Follow Up     Review EMG results      SUBJECTIVE:                                                      HISTORY OF PRESENT ILLNESS: Patient is here to follow up regarding bilateral hand numbness. Please refer to my initial note from 8/25/2021 for further information.  The patient is accompanied by her grandson.    Since the last visit, the patient reports no significant changes of her symptoms.  Right hand pain is much more intense than the left hand discomfort.  She is using right wrist splint, but does not have good left wrist splint.  She denies interval development of new focal neurological symptoms.    EMG from 9/25/2021 demonstrated very severe right and moderate left median neuropathy at the wrist, as seen in carpal tunnel syndrome.  No evidence for ulnar neuropathy on either side.  Needle EMG was not performed because patient's symptoms felt typical for carpal tunnel syndrome.  Tabulated data from EMG report were personally reviewed and independently interpreted.    On review of systems, patient endorses no additional active complaints. Medications, allergies, family and social history were also reviewed. There are no changes reported by patient.  REVIEW OF SYSTEMS:                                                    10-system review was completed. Pertinent positives are included in HPI. The remainder of ROS is negative.  EXAM:                                                   "  Physical Exam:   Vitals: BP (!) 170/73   Pulse 92   Ht 1.549 m (5' 1\")   SpO2 96%   BMI 29.76 kg/m      General: pt is in NAD, cooperative.  Skin: normal turgor, moist mucous membranes, no lesions/rashes noticed.  HEENT: ATNC, white sclera, normal conjunctiva.  Respiratory: Symmetric lung excursion, no accessory respiratory muscle use.  Abdomen: Non distended.  Neurological: awake, cooperative, follows commands, no exam changes, compared to the initial visit.  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    I reviewed results with the patient and her grandson as well as available treatment options.  Given noted on EMG severity and that conservative therapy is not effective for her right hand symptoms, I placed a referral to hand surgery to discuss surgical options.  For the left hand we will start with wrist splint and she will come back or discuss surgical options with her hand surgeon if it is not effective.  DME order was signed.    Krystal to follow up with Primary Care provider regarding elevated blood pressure.     Diagnoses:    ICD-10-CM    1. Bilateral carpal tunnel syndrome  G56.03      Plan: At today's visit we thoroughly discussed current symptoms, EMG results (bilateral carpal tunnel syndrome), available treatment options, and the plan.    For her left hand symptoms, I prescribed her left wrist splint that she needs to wear every night and also during the day only with repetitive wrist movements.    For the right hand, I placed a referral to hand surgery to discuss surgical options.  Meanwhile, I advised her to continue wearing the right wrist splint every night and also during the day with repetitive wrist movements.  We " also discussed an option for steroid injection if surgery wait time is long.    Next follow-up appointment is on as needed basis.    Total Time: 31 minutes spent on the date of the encounter doing chart review, history and exam, documentation and further activities per the note.    You Miranda MD  [facility]  (Chart documentation was completed in part with Dragon voice-recognition software. Even though reviewed, some grammatical, spelling, and word errors may remain.)      Again, thank you for allowing me to participate in the care of your patient.        Sincerely,        You Miranda MD

## 2021-09-27 NOTE — PROGRESS NOTES
"ESTABLISHED PATIENT NEUROLOGY NOTE    DATE OF VISIT: 9/27/2021  CLINIC LOCATION: [facility]  MRN: [id number]  PATIENT NAME: Krystal Parra  YOB: 1940    PCP: Tasha Winkler MD    REASON FOR VISIT:   Chief Complaint   Patient presents with     Follow Up     Review EMG results      SUBJECTIVE:                                                      HISTORY OF PRESENT ILLNESS: Patient is here to follow up regarding bilateral hand numbness. Please refer to my initial note from 8/25/2021 for further information.  The patient is accompanied by her grandson.    Since the last visit, the patient reports no significant changes of her symptoms.  Right hand pain is much more intense than the left hand discomfort.  She is using right wrist splint, but does not have good left wrist splint.  She denies interval development of new focal neurological symptoms.    EMG from 9/25/2021 demonstrated very severe right and moderate left median neuropathy at the wrist, as seen in carpal tunnel syndrome.  No evidence for ulnar neuropathy on either side.  Needle EMG was not performed because patient's symptoms felt typical for carpal tunnel syndrome.  Tabulated data from EMG report were personally reviewed and independently interpreted.    On review of systems, patient endorses no additional active complaints. Medications, allergies, family and social history were also reviewed. There are no changes reported by patient.  REVIEW OF SYSTEMS:                                                    10-system review was completed. Pertinent positives are included in HPI. The remainder of ROS is negative.  EXAM:                                                    Physical Exam:   Vitals: BP (!) 170/73   Pulse 92   Ht 1.549 m (5' 1\")   SpO2 96%   BMI 29.76 kg/m      General: pt is in NAD, cooperative.  Skin: normal turgor, moist mucous membranes, no lesions/rashes noticed.  HEENT: ATNC, white sclera, " normal conjunctiva.  Respiratory: Symmetric lung excursion, no accessory respiratory muscle use.  Abdomen: Non distended.  Neurological: awake, cooperative, follows commands, no exam changes, compared to the initial visit.  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    I reviewed results with the patient and her grandson as well as available treatment options.  Given noted on EMG severity and that conservative therapy is not effective for her right hand symptoms, I placed a referral to hand surgery to discuss surgical options.  For the left hand we will start with wrist splint and she will come back or discuss surgical options with her hand surgeon if it is not effective.  DME order was signed.    Krystal to follow up with Primary Care provider regarding elevated blood pressure.     Diagnoses:    ICD-10-CM    1. Bilateral carpal tunnel syndrome  G56.03      Plan: At today's visit we thoroughly discussed current symptoms, EMG results (bilateral carpal tunnel syndrome), available treatment options, and the plan.    For her left hand symptoms, I prescribed her left wrist splint that she needs to wear every night and also during the day only with repetitive wrist movements.    For the right hand, I placed a referral to hand surgery to discuss surgical options.  Meanwhile, I advised her to continue wearing the right wrist splint every night and also during the day with repetitive wrist movements.  We also discussed an option for steroid injection if surgery wait time is long.    Next follow-up appointment is on as needed basis.    Total Time: 31 minutes spent on the date of the encounter doing chart review, history and exam, documentation and  further activities per the note.    You Miranda MD  [facility] Neurology  (Chart documentation was completed in part with Dragon voice-recognition software. Even though reviewed, some grammatical, spelling, and word errors may remain.)

## 2021-10-04 ENCOUNTER — TELEPHONE (OUTPATIENT)
Dept: ORTHOPEDICS | Facility: CLINIC | Age: 81
End: 2021-10-04

## 2021-10-04 ENCOUNTER — OFFICE VISIT (OUTPATIENT)
Dept: ORTHOPEDICS | Facility: CLINIC | Age: 81
End: 2021-10-04
Attending: PSYCHIATRY & NEUROLOGY
Payer: COMMERCIAL

## 2021-10-04 DIAGNOSIS — G56.03 BILATERAL CARPAL TUNNEL SYNDROME: ICD-10-CM

## 2021-10-04 PROCEDURE — 99203 OFFICE O/P NEW LOW 30 MIN: CPT | Performed by: PHYSICIAN ASSISTANT

## 2021-10-04 NOTE — LETTER
10/4/2021         RE: Krystal Parra  [address]  [place] [address]        Dear Colleague,    Thank you for referring your patient, Krystal Parra, to the [facility] ORTHOPEDIC CLINIC [place]. Please see a copy of my visit note below.    HISTORY OF PRESENT ILLNESS:    Krystal Parra is a 81 year old female who is seen in consultation at the request of Dr. Miranda for Bilateral CTS.    Present symptoms:  Symptoms began  2 years(s) ago.  Reports insidious onset without acute precipitating event.  She reports numbness and burning bilateral hand pain that is located mostly at the thumb through radial side of ring finger with the right significantly worse than the left; radiation Absent.  Symptoms are starting to become constant on the right.. Pain is 7/10 in maximal severity, and 1/10 currently.  Symptoms are generally worse with/at Night; better with/at nothing.   Overall the patient feels the condition is  worsening. Other treatment so far has consisted of night bracing with minimal relief on the left and none on the right.  Associated symptoms: weakness.  She denies history of similar or related problems.    Orthopedic PMH: as below.   Past Medical History:   Diagnosis Date     Bone spur 4th toe Right      Lumbar disc herniation      Moderate aortic regurgitation 6/18/2016     Osteopenia      Pathologic fracture of distal radius and ulna     right in 2003, left 1992       Past Surgical History:   Procedure Laterality Date     C LIGATE FALLOPIAN TUBE,POSTPARTUM      Tubal Ligation     cataract  2011    bilateral     HC TOOTH EXTRACTION W/FORCEP         Family History   Problem Relation Age of Onset     Cardiovascular Mother         Mild MI 80's     Cerebrovascular Disease Father      Diabetes Father      Neurologic Disorder Sister         brain tumor     Lipids Sister      Hypertension Sister      Gastrointestinal Disease Sister         diverticulitis     Cancer Sister         Breast cancer        Social History     Socioeconomic History     Marital status:      Spouse name: Not on file     Number of children: 4     Years of education: Not on file     Highest education level: Not on file   Occupational History     Occupation: Retired   Tobacco Use     Smoking status: Passive Smoke Exposure - Never Smoker     Smokeless tobacco: Never Used     Tobacco comment:  smoked in house   Substance and Sexual Activity     Alcohol use: No     Drug use: No     Sexual activity: Not Currently   Other Topics Concern     Parent/sibling w/ CABG, MI or angioplasty before 65F 55M? No   Social History Narrative     Not on file     Social Determinants of Health     Financial Resource Strain:      Difficulty of Paying Living Expenses:    Food Insecurity:      Worried About Running Out of Food in the Last Year:      Ran Out of Food in the Last Year:    Transportation Needs:      Lack of Transportation (Medical):      Lack of Transportation (Non-Medical):    Physical Activity:      Days of Exercise per Week:      Minutes of Exercise per Session:    Stress:      Feeling of Stress :    Social Connections:      Frequency of Communication with Friends and Family:      Frequency of Social Gatherings with Friends and Family:      Attends Anabaptism Services:      Active Member of Clubs or Organizations:      Attends Club or Organization Meetings:      Marital Status:    Intimate Partner Violence:      Fear of Current or Ex-Partner:      Emotionally Abused:      Physically Abused:      Sexually Abused:        Current Outpatient Medications   Medication Sig Dispense Refill     aspirin EC 81 MG EC tablet Take 1 tablet (81 mg) by mouth daily       Cholecalciferol (VITAMIN D) 2000 UNIT CAPS Take 1 tablet by mouth daily.       coenzyme Q10 (CO-Q10) 30 MG capsule Take 1 capsule (30 mg) by mouth daily 30 capsule 0     fish oil-omega-3 fatty acids (FISH OIL) 1000 MG capsule Take 2 g by mouth 2 times daily        Ginkgo Biloba  "(GINKOBA PO)        OVER-THE-COUNTER Take 1 tablet by mouth daily (Melaleuca vitamins)         Allergies   Allergen Reactions     Sulfa Drugs        Patient's past medical, surgical, social and family histories are reviewed today.  There are no significant contributory medical issues.  REVIEW OF SYSTEMS:  CONSTITUTIONAL:  NEGATIVE for fever, chills, change in weight  INTEGUMENTARY/SKIN:  NEGATIVE for worrisome rashes, moles or lesions  EYES:  NEGATIVE for vision changes or irritation  ENT/MOUTH:  NEGATIVE for ear, mouth and throat problems  RESP:  NEGATIVE for significant cough or SOB  BREAST:  NEGATIVE for masses, tenderness or discharge  CV:  NEGATIVE for chest pain, palpitations or peripheral edema  GI:  NEGATIVE for nausea, abdominal pain, heartburn, or change in bowel habits  :  Negative   MUSCULOSKELETAL:  See HPI above  NEURO:  NEGATIVE for weakness, dizziness or paresthesias  ENDOCRINE:  NEGATIVE for temperature intolerance, skin/hair changes  HEME/ALLERGY/IMMUNE:  NEGATIVE for bleeding problems  PSYCHIATRIC:  NEGATIVE for changes in mood or affect      PHYSICAL EXAM:  Vitals: BP (!) 170/73   Pulse 92   Ht 1.549 m (5' 1\")   SpO2 96%   BMI 29.76 kg/m    GENERAL APPEARANCE: healthy, well nourished. Overweight.   NEURO: She is alert and oriented x 3, mentation intact and speech normal  POSTURE: Stands with mild anterior lean, otherwise normal weight bearing line.  PSYCH:  mentation appears normal and affect normal/bright  GAIT: uses 4ww.    MUSCULOSKELETAL: Examination of bilateral hands.  GROSS OBSERVATION:  Visible atrophy at right thenar, mild degenerative deformation changes at digits.  No lesions.  PALPATION: significant atrophy at right thenar.  ROM: Mild loss of opposition, otherwise Wrist and digits othewise symmetric and full.  LIGAMENTOUS:  intact.  STRENGTH:  Opposition right 4/5, left 5-/5  SPECIAL TESTS:  tinels and phanlens positive.    SKIN: , no lesions, erythema noted " bilaterally.  VASCULATURE:  Good capillary refill bilaterally.  SENSATION:  Decreased light touch at right 1-4th radial side digits.  less at left, Otherwise no gross deficits noted.   COORDINATION:  Able to move joint within above stated ROM with good control.    Imaging Interpretation:   None.    EMG from 9/21/2021.  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         ASSESSMENT:  1. Bilateral carpal tunnel syndrome      RIght worse than left with thenar atrophy.  Non operative therapies would be unhelpful.    PLAN:    1.  Schedule right hand CTR under local anesthesia.  2.  Surgery was discussed.  Pt agrees to local outpatient surgery.  3.  I made it very clear that due to the severity and chronicity of the condition that most likely the pain will improve with surgery, however the sensation and weakness may take over a year or not resolve at all.  Pt acknowledged.  4.  Will recover from right hand and make a decision on the left hand.    A total of 15 minutes spent on records and lab review, exam, discussion of treatment.    Davis Salinas PA-C  [facility] - Surgery        Again, thank you for allowing me to participate in the care of your patient.        Sincerely,        Davis Salinas PA-C

## 2021-10-04 NOTE — TELEPHONE ENCOUNTER
Scheduled surgery.     Patient is fully vaccinated and this is a local surgery case. No covid testing needed at this time.     Type of surgery: right carpal tunnel   Location of surgery: Other: [facility]  Date and time of surgery: 10/20/21   Surgeon: Anamaria  Pre-Op Appt Date: local  Post-Op Appt Date: 11/1/21   Packet sent out: Yes  Pre-cert/Authorization completed:  No  Date: 10/4/21      Yokasta Rodriguez Surgery Scheduler

## 2021-10-04 NOTE — PATIENT INSTRUCTIONS
You should receive a call from our surgery scheduler at [phone].    The surgery will be an out patient procedure under local anesthesia.  There will be some mild discomfort a the time of injection.  The procedure takes about 5-10 minutes.    Follow up will be scheduled for 10-14 days after the surgery.    Davis Salinas PA-C  [facility] - Surgery  [facility]  [address] [place]  [address]  [phone] fax  [phone] for scheduling

## 2021-10-20 ENCOUNTER — TRANSFERRED RECORDS (OUTPATIENT)
Dept: HEALTH INFORMATION MANAGEMENT | Facility: CLINIC | Age: 81
End: 2021-10-20

## 2021-10-25 ENCOUNTER — TELEPHONE (OUTPATIENT)
Dept: ORTHOPEDICS | Facility: CLINIC | Age: 81
End: 2021-10-25

## 2021-10-25 NOTE — TELEPHONE ENCOUNTER
Reason for call:  Message from Krystal. Says she had Carpal Tunnel surgery last Wednesday and is calling because her symptoms have not improved.  Please call back.    Phone number to reach patient:  Home number on file [phone] (home)    Best Time:  -    Can we leave a detailed message on this number?  NO

## 2021-10-26 NOTE — TELEPHONE ENCOUNTER
Huddled with care team. Symptoms can take weeks to resolve after CTR and may never resolve depending on the severity of the injury / nerve irritation prior to surgery.    Return call to patient to discuss. Spoke with patient and informed her of above. She states her daughter had the same surgery and had relief right away. Reiterated the above information. Also informed her that at her post op appt with Mario on 11/1/21, he will go over expectations and any necessary future f/u appts.    She confirmed she is not having daily worsening pain although her pain is a little bit worse since surgery. She denies any symptoms / signs of infection (ie. Fever, chills, redness, warmth, drainage from incision).    Instructed her to call us if any symptoms occur prior to her post op appt. Otherwise we will see her on Monday.    She verbalized understanding and was appreciative of call back.    Dariana Rojas MBA, ATC

## 2021-11-01 ENCOUNTER — OFFICE VISIT (OUTPATIENT)
Dept: ORTHOPEDICS | Facility: CLINIC | Age: 81
End: 2021-11-01
Payer: COMMERCIAL

## 2021-11-01 DIAGNOSIS — Z47.89 ORTHOPEDIC AFTERCARE: Primary | ICD-10-CM

## 2021-11-01 PROCEDURE — 99024 POSTOP FOLLOW-UP VISIT: CPT | Performed by: PHYSICIAN ASSISTANT

## 2021-11-01 NOTE — PATIENT INSTRUCTIONS
Incision Care:  Sutures were removed and Steri-Strips applied in usual fashion.  Keep dry 24 hours.  Showering ok after that time, however no soaking or scrubbing of incision for 1 weeks.  Steri-strips will most likely fall off on their own, however they may be removed after 1 weeks with rubbing alcohol if they have not.    If draining or bleeding stops the tape-strips are enough coverage unless you were instructed otherwise or you would like to cover for comfort.  If drainage or bleeding continues please cover with clean dressings.     Gradually increase your activities as you can tolerated them, starting at a level well below what you would normally do.  Lifting may be uncomfortable for a while.    The skin around the incision may peel or have a hard skin edge over the next couple weeks.  Also, the incision may be sensitive to the touch and be prominent due to scar tissue.  You may massage the area when tolerated several times per day, using lotion if needed, to desensitize the area and reduce scar tissue.    Also, the nerve involved may heal for over a year from the time of surgery.  During this time your symptoms may continue to improve or even recur temporarily and then resolve as part of the healing.    Follow up as needed in clinic.

## 2022-01-18 ENCOUNTER — TELEPHONE (OUTPATIENT)
Dept: ORTHOPEDICS | Facility: CLINIC | Age: 82
End: 2022-01-18
Payer: COMMERCIAL

## 2022-01-18 NOTE — TELEPHONE ENCOUNTER
Call was transferred from surgery scheduling. Patient states she wants to have CTR of her LEFT wrist but is still experiencing issues with her right wrist/hand after CTR on 10/20/21 by Dr. Conrad.   She states she is still having numbness/tingling of fingers and it feels like there is sand or grit when she rubs her fingers together. She asks if this is normal.   Discussed that her symptoms diid not sound normal at this point and recommended she schedule an appointment for re-evaluation. Appointment scheduled for 1/20/22 with Dr. Conrad at 1:00 pm.   She verbalized understanding.     JOSE Downey RN

## 2022-01-20 ENCOUNTER — OFFICE VISIT (OUTPATIENT)
Dept: ORTHOPEDICS | Facility: CLINIC | Age: 82
End: 2022-01-20
Payer: COMMERCIAL

## 2022-01-20 ENCOUNTER — TELEPHONE (OUTPATIENT)
Dept: ORTHOPEDICS | Facility: CLINIC | Age: 82
End: 2022-01-20

## 2022-01-20 VITALS
DIASTOLIC BLOOD PRESSURE: 62 MMHG | WEIGHT: 161 LBS | BODY MASS INDEX: 30.4 KG/M2 | HEIGHT: 61 IN | SYSTOLIC BLOOD PRESSURE: 138 MMHG

## 2022-01-20 DIAGNOSIS — G56.02 CARPAL TUNNEL SYNDROME OF LEFT WRIST: Primary | ICD-10-CM

## 2022-01-20 PROCEDURE — 99213 OFFICE O/P EST LOW 20 MIN: CPT | Performed by: ORTHOPAEDIC SURGERY

## 2022-01-20 ASSESSMENT — MIFFLIN-ST. JEOR: SCORE: 1122.67

## 2022-01-20 NOTE — TELEPHONE ENCOUNTER
Scheduled surgery    Patient is fully vaccinated. No covid test needed.     Type of surgery: left carpal tunnel   Location of surgery: Other: [facility]  Date and time of surgery: 3/2/22  Surgeon: Anamaria  Pre-Op Appt Date: local  Post-Op Appt Date: 3/14/22   Packet sent out: Yes  Pre-cert/Authorization completed:  Not Applicable  Date: 1.20.22      Yokasta Rodriguez, Surgery Scheduler

## 2022-01-20 NOTE — LETTER
1/20/2022         RE: Krystal Parra  [address]  [place] [address]        Dear Colleague,    Thank you for referring your patient, Krystal Parra, to the [facility] [place]. Please see a copy of my visit note below.    HISTORY OF PRESENT ILLNESS:    Krystal Parra is a 81 year old female who is seen in follow up for left carpal tunnel syndrome, surgical discussion. She would also like to follow up regarding right hand and wrist symptoms. She is s/p right carpal tunnel release on 10/20/21.   Present symptoms: Patient reports improved right wrist and hand pain since surgery on 10/20/21. She does note more pronounced numb sensation of the right thumb, index and long finger. Sensation of sand like sensation with palpation and pressure of the digits. She reports intermittent numbness of the left hand. She is interested in pursuing left carpal tunnel release before symptoms become more severe limiting her use of her hands.   Treatments tried to this point: right carpal tunnel release, DOS 10/20/21, cock up wrist brace on the left.   Past Medical History: Unchanged from the visit of 10/4/21. Please refer to that note.    REVIEW OF SYSTEMS:  CONSTITUTIONAL:  NEGATIVE for fever, chills, change in weight  INTEGUMENTARY/SKIN:  NEGATIVE for worrisome rashes, moles or lesions  EYES:  NEGATIVE for vision changes or irritation  ENT/MOUTH:  NEGATIVE for ear, mouth and throat problems  RESP:  NEGATIVE for significant cough or SOB  BREAST:  NEGATIVE for masses, tenderness or discharge  CV:  hypertension  GI:  NEGATIVE for nausea, abdominal pain, heartburn, or change in bowel habits  :  Negative   MUSCULOSKELETAL:  See HPI above  NEURO: paresthesias  ENDOCRINE:  NEGATIVE for temperature intolerance, skin/hair changes  HEME/ALLERGY/IMMUNE:  NEGATIVE for bleeding problems  PSYCHIATRIC:  NEGATIVE for changes in mood or affect    PHYSICAL EXAM:  /62 (BP Location: Right arm, Patient Position:  "Chair, Cuff Size: Adult Regular)   Ht 1.549 m (5' 1\")   Wt 73 kg (161 lb)   BMI 30.42 kg/m    Body mass index is 30.42 kg/m .   GENERAL APPEARANCE: healthy, alert and no distress   SKIN: no suspicious lesions or rashes  NEURO: Normal strength and tone, mentation intact and speech normal  VASCULAR:  good pulses, and cappillary refill   LYMPH: no lymphadenopathy   PSYCH:  mentation appears normal, affect normal/bright and patient appearance--    MSK:  Not in acute distress  She uses a walker to ambulate  No noticeable limping noted  Right hand incision is healed well  She has full range of motion of fingers  Persisting significant atrophy of thenar eminence, right  Reported numbness in the fingertips of the thumb and index finger, right    Decreased but not as noticeable numbness of the left thumb, index and long fingers  Not as noticeable but definitely present thenar eminence atrophy, left  Full range of motion fingers    Bilaterally intact  strength and pinching strength      EMG INTERPRETATION:  9/21/21     Results:     Left median motor NCS (APB) showed prolonged distal latency, normal CMAP amplitudes, and normal conduction velocity. Right median motor NCS (APB) showed no response. Bilateral median to ulnar second lumbrical/palmar interossei comparison motor NCSs showed increased latency difference (9 ms on the right, and 3 ms on the left, normal is <0.5 ms). Bilateral ulnar (ADM) motor NCSs were normal. Left median antidromic sensory NCS showed attenuated SNAP amplitude and markedly attenuated conduction velocity. Right median antidromic sensory NCS showed no response. Bilateral ulnar antidromic sensory NCSs were normal.     Interpretation:     Abnormal study. 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 There is no electrodiagnostic evidence of ulnar neuropathy on either side. Please see comment. "     Comment: Cervical radiculopathies were not ruled out as needle EMG examination was not performed. The patient's symptoms appear absolutely typical for CTS and consistent with electrodiagnostic findings. It may be reasonable to address this problem first before exploring additional etiologies.     ___________________________   Ruy Perez MD                     ASSESSMENT:  Chronic left carpal tunnel syndrome of advanced degree   Status post a right carpal tunnel release, improved pain but not the numbness and tingling    PLAN:  We felt that he will be somewhat too early to make a final conclusion as to how much improvement she will have from the surgery on the right side.  Further observation as long as her hand is functional is reasonable.  As we discussed preoperatively, return of the muscle mass is not expected.    The similar situation is present on the left side but not as bad as the right.  Nonetheless from the fact that she has significant thenar eminence atrophy, the outcome from the surgery is less than predictable.    In order to maximize the potential benefit from the surgery, she does not want to wait too long for the left side as she did on the right.  We felt that that would be very reasonable.    He tolerated doing the surgery under local anesthesia quite well.  She wants to have the same anesthesia of the local for the left side.    Left carpal tunnel release will be scheduled according to her convenience.           Linus Conrad MD  Dept. Orthopedic Surgery  [facility]       Disclaimer: This note consists of symbols derived from keyboarding, dictation and/or voice recognition software. As a result, there may be errors in the script that have gone undetected. Please consider this when interpreting information found in this chart.        Again, thank you for allowing me to participate in the care of your patient.        Sincerely,        Linus Conrad MD

## 2022-01-20 NOTE — PROGRESS NOTES
"HISTORY OF PRESENT ILLNESS:    Krystal Parra is a 81 year old female who is seen in follow up for left carpal tunnel syndrome, surgical discussion. She would also like to follow up regarding right hand and wrist symptoms. She is s/p right carpal tunnel release on 10/20/21.   Present symptoms: Patient reports improved right wrist and hand pain since surgery on 10/20/21. She does note more pronounced numb sensation of the right thumb, index and long finger. Sensation of sand like sensation with palpation and pressure of the digits. She reports intermittent numbness of the left hand. She is interested in pursuing left carpal tunnel release before symptoms become more severe limiting her use of her hands.   Treatments tried to this point: right carpal tunnel release, DOS 10/20/21, cock up wrist brace on the left.   Past Medical History: Unchanged from the visit of 10/4/21. Please refer to that note.    REVIEW OF SYSTEMS:  CONSTITUTIONAL:  NEGATIVE for fever, chills, change in weight  INTEGUMENTARY/SKIN:  NEGATIVE for worrisome rashes, moles or lesions  EYES:  NEGATIVE for vision changes or irritation  ENT/MOUTH:  NEGATIVE for ear, mouth and throat problems  RESP:  NEGATIVE for significant cough or SOB  BREAST:  NEGATIVE for masses, tenderness or discharge  CV:  hypertension  GI:  NEGATIVE for nausea, abdominal pain, heartburn, or change in bowel habits  :  Negative   MUSCULOSKELETAL:  See HPI above  NEURO: paresthesias  ENDOCRINE:  NEGATIVE for temperature intolerance, skin/hair changes  HEME/ALLERGY/IMMUNE:  NEGATIVE for bleeding problems  PSYCHIATRIC:  NEGATIVE for changes in mood or affect    PHYSICAL EXAM:  /62 (BP Location: Right arm, Patient Position: Chair, Cuff Size: Adult Regular)   Ht 1.549 m (5' 1\")   Wt 73 kg (161 lb)   BMI 30.42 kg/m    Body mass index is 30.42 kg/m .   GENERAL APPEARANCE: healthy, alert and no distress   SKIN: no suspicious lesions or rashes  NEURO: Normal strength and tone, " mentation intact and speech normal  VASCULAR:  good pulses, and cappillary refill   LYMPH: no lymphadenopathy   PSYCH:  mentation appears normal, affect normal/bright and patient appearance--    MSK:  Not in acute distress  She uses a walker to ambulate  No noticeable limping noted  Right hand incision is healed well  She has full range of motion of fingers  Persisting significant atrophy of thenar eminence, right  Reported numbness in the fingertips of the thumb and index finger, right    Decreased but not as noticeable numbness of the left thumb, index and long fingers  Not as noticeable but definitely present thenar eminence atrophy, left  Full range of motion fingers    Bilaterally intact  strength and pinching strength      EMG INTERPRETATION:  9/21/21     Results:     Left median motor NCS (APB) showed prolonged distal latency, normal CMAP amplitudes, and normal conduction velocity. Right median motor NCS (APB) showed no response. Bilateral median to ulnar second lumbrical/palmar interossei comparison motor NCSs showed increased latency difference (9 ms on the right, and 3 ms on the left, normal is <0.5 ms). Bilateral ulnar (ADM) motor NCSs were normal. Left median antidromic sensory NCS showed attenuated SNAP amplitude and markedly attenuated conduction velocity. Right median antidromic sensory NCS showed no response. Bilateral ulnar antidromic sensory NCSs were normal.     Interpretation:     Abnormal study. 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 There is no electrodiagnostic evidence of ulnar neuropathy on either side. Please see comment.     Comment: Cervical radiculopathies were not ruled out as needle EMG examination was not performed. The patient's symptoms appear absolutely typical for CTS and consistent with electrodiagnostic findings. It may be reasonable to address this problem first before  exploring additional etiologies.     ___________________________   Ruy Perez MD                     ASSESSMENT:  Chronic left carpal tunnel syndrome of advanced degree   Status post a right carpal tunnel release, improved pain but not the numbness and tingling    PLAN:  We felt that he will be somewhat too early to make a final conclusion as to how much improvement she will have from the surgery on the right side.  Further observation as long as her hand is functional is reasonable.  As we discussed preoperatively, return of the muscle mass is not expected.    The similar situation is present on the left side but not as bad as the right.  Nonetheless from the fact that she has significant thenar eminence atrophy, the outcome from the surgery is less than predictable.    In order to maximize the potential benefit from the surgery, she does not want to wait too long for the left side as she did on the right.  We felt that that would be very reasonable.    He tolerated doing the surgery under local anesthesia quite well.  She wants to have the same anesthesia of the local for the left side.    Left carpal tunnel release will be scheduled according to her convenience.           Linus Conrad MD  Dept. Orthopedic Surgery  [facility]       Disclaimer: This note consists of symbols derived from keyboarding, dictation and/or voice recognition software. As a result, there may be errors in the script that have gone undetected. Please consider this when interpreting information found in this chart.

## 2022-02-07 ENCOUNTER — THERAPY VISIT (OUTPATIENT)
Dept: PHYSICAL THERAPY | Facility: CLINIC | Age: 82
End: 2022-02-07
Payer: COMMERCIAL

## 2022-02-07 DIAGNOSIS — M25.572 PAIN IN JOINT INVOLVING ANKLE AND FOOT, LEFT: Primary | ICD-10-CM

## 2022-02-07 PROCEDURE — 97110 THERAPEUTIC EXERCISES: CPT | Mod: GP | Performed by: PHYSICAL THERAPIST

## 2022-02-07 PROCEDURE — 97161 PT EVAL LOW COMPLEX 20 MIN: CPT | Mod: GP | Performed by: PHYSICAL THERAPIST

## 2022-02-07 NOTE — PROGRESS NOTES
Physical Therapy Initial Evaluation  Subjective:  The history is provided by the patient. No  was used.   Patient Health History  Krystal Parra being seen for L ankle pain.     Problem began: 1/21/2022.   Problem occurred: Pt was walking into a store, her purse fell off her walker and she tripped on it.  Pt hurt her L ankle and was in a boot for several weeks and is now on day 3 without the boot.  Pt had x-rays which were negative.     Pain is reported as 7/10 on pain scale.  General health as reported by patient is excellent.                  Current occupation is retired.                     Therapist Generated HPI Evaluation         Type of problem:  Left ankle.          Patient reports pain:  Lateral and medial.  Pain is described as aching   Pain radiates to:  No radiation.   Since onset symptoms are gradually improving.  Associated symptoms:  Loss of motion/stiffness and loss of strength. Symptoms are exacerbated by weight bearing  and relieved by rest.  Special tests included:  X-ray.    Barriers include:  None as reported by patient.                        Objective:    Gait:    Gait Type:  Antalgic   Assistive Devices:  Walker  Deviations:  General Deviations:  Stance time decr, stride length decr and reanna decr          Ankle/Foot Evaluation  ROM:    AROM:    Dorsiflexion:  Left:   2  Right:   15  Plantarflexion:  Left:  30    Right:  60  Inversion: Left:  15     Right:   Eversion: 5     Right:         Strength is not assessed.        EDEMA:   Left ankle edema present at: general                                                              General     ROS    Assessment/Plan:    Patient is a 81 year old female with left side ankle complaints.    Patient has the following significant findings with corresponding treatment plan.                Diagnosis 1:  L ankle pain      Pain -  hot/cold therapy, manual therapy, self management, education and home program  Decreased ROM/flexibility  - manual therapy and therapeutic exercise  Decreased strength - therapeutic exercise and therapeutic activities  Impaired balance - neuro re-education and therapeutic activities  Impaired gait - gait training  Impaired muscle performance - neuro re-education  Decreased function - therapeutic activities    Therapy Evaluation Codes:   1) History comprised of:   Personal factors that impact the plan of care:      Age.    Comorbidity factors that impact the plan of care are:      None.     Medications impacting care: None.  2) Examination of Body Systems comprised of:   Body structures and functions that impact the plan of care:      Ankle.   Activity limitations that impact the plan of care are:      Lifting, Stairs, Standing and Walking.  3) Clinical presentation characteristics are:   Evolving/Changing.  4) Decision-Making    Low complexity using standardized patient assessment instrument and/or measureable assessment of functional outcome.  Cumulative Therapy Evaluation is: Low complexity.    Previous and current functional limitations:  (See Goal Flow Sheet for this information)    Short term and Long term goals: (See Goal Flow Sheet for this information)     Communication ability:  Patient appears to be able to clearly communicate and understand verbal and written communication and follow directions correctly.  Treatment Explanation - The following has been discussed with the patient:   RX ordered/plan of care  Anticipated outcomes  Possible risks and side effects  This patient would benefit from PT intervention to resume normal activities.   Rehab potential is good.    Frequency:  1 X week, once daily  Duration:  for 6 weeks  Discharge Plan:  Achieve all LTG.  Independent in home treatment program.  Reach maximal therapeutic benefit.    Please refer to the daily flowsheet for treatment today, total treatment time and time spent performing 1:1 timed codes.

## 2022-02-07 NOTE — PROGRESS NOTES
[facility]    OUTPATIENT Physical Therapy ORTHOPEDIC EVALUATION  PLAN OF TREATMENT FOR OUTPATIENT REHABILITATION  (COMPLETE FOR INITIAL CLAIMS ONLY)  Patient's Last Name, First Name, M.I.  YOB: 1940  Krystal Parra    Provider s Name:  [facility]   Medical Record No.  [id number]   Start of Care Date:  02/07/22   Onset Date:   01/29/22   Type:     _X__PT   ___OT Medical Diagnosis:    Encounter Diagnosis   Name Primary?     Pain in joint involving ankle and foot, left Yes        Treatment Diagnosis:  L ankle pain        Goals:     02/07/22 0500   Body Part   Goals listed below are for L ankle   Goal #1   Goal #1 ambulation   Previous Functional Level No restrictions   Current Functional Level Minutes patient can walk   Performance Level 2 min, antalgic, walker   STG Target Performance Minutes patient will be able to walk   Performance Level 5 min, walker   Rationale for safe community ambulation   Due Date 03/07/22    LTG Target Performance Minutes patient will be able to  walk   Performance Level 15 min, walker   Rationale for safe community ambulation   Due Date 04/04/22       Therapy Frequency:  1x/week  Predicted Duration of Therapy Intervention:  6 weeks    Davis Beatty PT                 I CERTIFY THE NEED FOR THESE SERVICES FURNISHED UNDER        THIS PLAN OF TREATMENT AND WHILE UNDER MY CARE .             Physician Signature               Date    X_____________________________________________________                             Certification Date From:  02/07/22   Certification Date To:  04/07/22    Referring Provider:  No ref. provider found    Initial Assessment        See Epic Evaluation SOC Date: 02/07/22

## 2022-03-02 ENCOUNTER — TRANSFERRED RECORDS (OUTPATIENT)
Dept: HEALTH INFORMATION MANAGEMENT | Facility: CLINIC | Age: 82
End: 2022-03-02
Payer: COMMERCIAL

## 2022-03-10 ENCOUNTER — OFFICE VISIT (OUTPATIENT)
Dept: ORTHOPEDICS | Facility: CLINIC | Age: 82
End: 2022-03-10
Payer: COMMERCIAL

## 2022-03-10 DIAGNOSIS — Z47.89 ORTHOPEDIC AFTERCARE: Primary | ICD-10-CM

## 2022-03-10 PROCEDURE — 99024 POSTOP FOLLOW-UP VISIT: CPT | Performed by: PHYSICIAN ASSISTANT

## 2022-03-10 NOTE — LETTER
3/10/2022         RE: Krystal Parra  [address]  [place] [address]        Dear Colleague,    Thank you for referring your patient, Krystal Parra, to the [facility] [place]. Please see a copy of my visit note below.    HISTORY OF PRESENT ILLNESS:    Krystal Parra is a 82 year old female who is seen in follow up for Left CTR, DOS 03/02/2022, Dr. RICHARDSON - Carpal tunnel release.  :Left CTR 10/20/2021.  Present symptoms: Pt reports significant improvement to CT symptoms.  Is  keeping covered. Pt is lightly using hand for activities. No new complaints.  States Left CTR has not improved as much; having paresthesias, but pain improved.  She does admit Left CTS was significantly more severe symptom wise prior to surgery.  Denies Chest pain, Calve pain, Fever, Chills.    Current Treatment: Postop.    PHYSICAL EXAM:  There were no vitals taken for this visit.  There is no weight on file to calculate BMI.   GENERAL APPEARANCE: healthy, alert and no distress   PSYCH: mentation appears normal and affect normal/bright    MSK:  Right:  Volar wrist.  Incision clean and dry, Sutures present, healing.  No incisional erythema.   No Ecchymosis.  Edema min at wrist, hand and digits.  CMS: matias incisional numbness, otherwise grossly intact to digits.  AROM: mild restriction in palmar wrist flexion, otherwise WNL.      ASSESSMENT:  Krystal Parra is a 82 year old female  S/P Left CTR, DOS 03/02/2022, Dr. RICHARDSON - CTR.  Symptom improvement on right. Healing.  We discussed that CT symptoms may improve for several months after surgery, however left being symptomatic for years with her age may not recover completely.    PLAN:  - Surgery discussed, images reviewed if applicable, and all questions were answered at this time.  - Sutures removed with sterile technique, steri-strips applied in usual fashion, care instructions given and verbally acknowledged.  - Medications: Taper RX pain meds, OTC PRN.  - Physical  Therapy: As instructed / RICE and PROM.  - AAT    Return to clinic PRN.    Davis Salinas PA-C    Dept. Orthopedic Surgery  [facility]     3/16/2022            Again, thank you for allowing me to participate in the care of your patient.        Sincerely,        Davis Salinas PA-C

## 2022-03-16 NOTE — PROGRESS NOTES
HISTORY OF PRESENT ILLNESS:    Krystal Parra is a 82 year old female who is seen in follow up for Left CTR, DOS 03/02/2022, Dr. RICHARDOSN - Carpal tunnel release.  :Left CTR 10/20/2021.  Present symptoms: Pt reports significant improvement to CT symptoms.  Is  keeping covered. Pt is lightly using hand for activities. No new complaints.  States Left CTR has not improved as much; having paresthesias, but pain improved.  She does admit Left CTS was significantly more severe symptom wise prior to surgery.  Denies Chest pain, Calve pain, Fever, Chills.    Current Treatment: Postop.    PHYSICAL EXAM:  There were no vitals taken for this visit.  There is no weight on file to calculate BMI.   GENERAL APPEARANCE: healthy, alert and no distress   PSYCH: mentation appears normal and affect normal/bright    MSK:  Right:  Volar wrist.  Incision clean and dry, Sutures present, healing.  No incisional erythema.   No Ecchymosis.  Edema min at wrist, hand and digits.  CMS: matias incisional numbness, otherwise grossly intact to digits.  AROM: mild restriction in palmar wrist flexion, otherwise WNL.      ASSESSMENT:  Krystal Parra is a 82 year old female  S/P Left CTR, DOS 03/02/2022, Dr. RICHARDSON - CTR.  Symptom improvement on right. Healing.  We discussed that CT symptoms may improve for several months after surgery, however left being symptomatic for years with her age may not recover completely.    PLAN:  - Surgery discussed, images reviewed if applicable, and all questions were answered at this time.  - Sutures removed with sterile technique, steri-strips applied in usual fashion, care instructions given and verbally acknowledged.  - Medications: Taper RX pain meds, OTC PRN.  - Physical Therapy: As instructed / RICE and PROM.  - AAT    Return to clinic PRN.    Davis Salinas PA-C    Dept. Orthopedic Surgery  [facility]     3/16/2022

## 2022-04-08 PROBLEM — M25.572 PAIN IN JOINT INVOLVING ANKLE AND FOOT, LEFT: Status: RESOLVED | Noted: 2022-02-07 | Resolved: 2022-04-08

## 2022-07-18 ENCOUNTER — NURSE TRIAGE (OUTPATIENT)
Dept: NURSING | Facility: CLINIC | Age: 82
End: 2022-07-18

## 2022-07-19 NOTE — TELEPHONE ENCOUNTER
"Pt reports chest pain across chest under breasts for \"about an hour now\". Pt sounds somewhat short of breath speaking to Writer. Pt reports she had a stressful conversation earlier this evening.    Writer advised pt to hang up and dial 911.    Pt verbalizes understanding and agrees to plan.     Reason for Disposition    [1] Chest pain lasts > 5 minutes AND [2] age > 44    Protocols used: CHEST PAIN-A-AH      "

## 2022-08-08 ENCOUNTER — NURSE TRIAGE (OUTPATIENT)
Dept: NURSING | Facility: CLINIC | Age: 82
End: 2022-08-08

## 2022-08-08 ENCOUNTER — VIRTUAL VISIT (OUTPATIENT)
Dept: URGENT CARE | Facility: CLINIC | Age: 82
End: 2022-08-08
Payer: COMMERCIAL

## 2022-08-08 DIAGNOSIS — U07.1 CLINICAL DIAGNOSIS OF COVID-19: Primary | ICD-10-CM

## 2022-08-08 PROCEDURE — 99442 PR PHYSICIAN TELEPHONE EVALUATION 11-20 MIN: CPT | Mod: CS

## 2022-08-08 NOTE — PROGRESS NOTES
"Krystal is a 82 year old who is being evaluated via a billable telephone visit.      Tested + yesterday.  Sx started 8/6.  Fatigue, cough, body aches. Fever 101F  COVID vaccinated and boosted.  Other family members sick in downstairs.    What phone number would you like to be contacted at? cell  How would you like to obtain your AVS? MyChart    Assessment & Plan     Clinical diagnosis of COVID-19    - nirmatrelvir and ritonavir (PAXLOVID) therapy pack; Take 3 tablets by mouth 2 times daily for 5 days (Take 2 Nirmatrelvir tablets and 1 Ritonavir tablet twice daily for 5 days)    COVID-19 positive patient.  Encounter for consideration of medication intervention. Patient does qualify for a prescription. Full discussion with patient including medication options, risks and benefits. Potential drug interactions reviewed with patient.     Treatment Planned Paxlovid sent to [facility]    Temporary change to home medications:  None     Estimated body mass index is 30.42 kg/m  as calculated from the following:    Height as of 1/20/22: 1.549 m (5' 1\").    Weight as of 1/20/22: 73 kg (161 lb).  GFR Estimate   Date Value Ref Range Status   04/13/2021 76 >60 mL/min/[1.73_m2] Final     Comment:     Non  GFR Calc  Starting 12/18/2018, serum creatinine based estimated GFR (eGFR) will be   calculated using the Chronic Kidney Disease Epidemiology Collaboration   (CKD-EPI) equation.       Shanika Dixon MD  Virtual Urgent Care  [facility] VIRTUAL URGENT CARE    Subjective   Krystal is a 82 year old, presenting for the following health issues:  No chief complaint on file.      HPI   Tested + yesterday.  Sx started 8/6.  Fatigue, cough, body aches. Fever 101F  COVID vaccinated and boosted.  Other family members sick in downstairs.    Review of Systems   Constitutional, HEENT, cardiovascular, pulmonary, GI, , musculoskeletal, neuro, skin, endocrine and psych systems are negative, except as otherwise noted.    "   Objective       Vitals:  No vitals were obtained today due to virtual visit.    Physical Exam   healthy, alert and no distress  PSYCH: Alert and oriented times 3; coherent speech, normal   rate and volume, able to articulate logical thoughts, able   to abstract reason, no tangential thoughts, no hallucinations   or delusions  Her affect is normal and pleasant  RESP: No cough, no audible wheezing, able to talk in full sentences  Remainder of exam unable to be completed due to telephone visits          Phone call duration: 12 minutes    .  ..

## 2022-08-08 NOTE — TELEPHONE ENCOUNTER
Covid screening completed    Pos covid.    Cough and fever .    Care advice given per protocol recommendations. Caller verbalizes understanding    Transferred to scheduling as protocol suggests pcp notification but they are not prescribing paxlovid at this time.    Jasmin Garcia RN  [facility] Nurse Advisor        Reason for Disposition    [1] HIGH RISK for severe COVID complications (e.g., weak immune system, age > 64 years, obesity with BMI of 30 or higher, pregnant, chronic lung disease or other chronic medical condition) AND [2] COVID symptoms (e.g., cough, fever)  (Exceptions: Already seen by PCP and no new or worsening symptoms.)    Additional Information    Negative: SEVERE difficulty breathing (e.g., struggling for each breath, speaks in single words)    Negative: Difficult to awaken or acting confused (e.g., disoriented, slurred speech)    Negative: Bluish (or gray) lips or face now    Negative: Shock suspected (e.g., cold/pale/clammy skin, too weak to stand, low BP, rapid pulse)    Negative: Sounds like a life-threatening emergency to the triager    Negative: SEVERE or constant chest pain or pressure  (Exception: Mild central chest pain, present only when coughing.)    Negative: MODERATE difficulty breathing (e.g., speaks in phrases, SOB even at rest, pulse 100-120)    Negative: Headache and stiff neck (can't touch chin to chest)    Negative: Oxygen level (e.g., pulse oximetry) 90 percent or lower    Negative: Chest pain or pressure  (Exception: MILD central chest pain, present only when coughing)    Negative: Patient sounds very sick or weak to the triager    Negative: MILD difficulty breathing (e.g., minimal/no SOB at rest, SOB with walking, pulse <100)    Negative: Fever > 103 F (39.4 C)    Negative: [1] Fever > 101 F (38.3 C) AND [2] over 60 years of age    Negative: [1] Fever > 100.0 F (37.8 C) AND [2] bedridden (e.g., nursing home patient, CVA, chronic illness, recovering from  surgery)    Protocols used: CORONAVIRUS (COVID-19) DIAGNOSED OR [id number]

## 2022-09-06 ENCOUNTER — OFFICE VISIT (OUTPATIENT)
Dept: DERMATOLOGY | Facility: CLINIC | Age: 82
End: 2022-09-06
Payer: COMMERCIAL

## 2022-09-06 DIAGNOSIS — L57.0 ACTINIC KERATOSIS: Primary | ICD-10-CM

## 2022-09-06 DIAGNOSIS — D18.01 ANGIOMA OF SKIN: ICD-10-CM

## 2022-09-06 DIAGNOSIS — D22.9 NEVUS: ICD-10-CM

## 2022-09-06 DIAGNOSIS — L82.1 SEBORRHEIC KERATOSIS: ICD-10-CM

## 2022-09-06 DIAGNOSIS — L81.4 LENTIGO: ICD-10-CM

## 2022-09-06 DIAGNOSIS — D48.5 NEOPLASM OF UNCERTAIN BEHAVIOR OF SKIN: ICD-10-CM

## 2022-09-06 PROCEDURE — 17003 DESTRUCT PREMALG LES 2-14: CPT | Mod: 59 | Performed by: PHYSICIAN ASSISTANT

## 2022-09-06 PROCEDURE — 99203 OFFICE O/P NEW LOW 30 MIN: CPT | Mod: 25 | Performed by: PHYSICIAN ASSISTANT

## 2022-09-06 PROCEDURE — 11102 TANGNTL BX SKIN SINGLE LES: CPT | Performed by: PHYSICIAN ASSISTANT

## 2022-09-06 PROCEDURE — 88305 TISSUE EXAM BY PATHOLOGIST: CPT | Performed by: DERMATOLOGY

## 2022-09-06 PROCEDURE — 17000 DESTRUCT PREMALG LESION: CPT | Mod: 59 | Performed by: PHYSICIAN ASSISTANT

## 2022-09-06 NOTE — PROGRESS NOTES
HPI:   Chief complaints: Krystal Parra is a pleasant 82 year old female who presents for Full skin cancer screening to rule out skin cancer   Last Skin Exam: about 10 years ago      1st Baseline: no  Personal HX of Skin Cancer: yes BCC about 15 years ago  Personal HX of Malignant Melanoma: no   Family HX of Skin Cancer / Malignant Melanoma: no  Personal HX of Atypical Moles:   no  Risk factors: history of sun exposure and burns  New / Changing lesions:yes few spots  Social History: 8 grandchildren lives in [place] but moving back to [place] in a nfew weeks   On review of systems, there are no further skin complaints, patient is feeling otherwise well.   ROS of the following were done and are negative: Constitutional, Eyes, Ears, Nose,   Mouth, Throat, Cardiovascular, Respiratory, GI, Genitourinary, Musculoskeletal,   Psychiatric, Endocrine, Allergic/Immunologic.    PHYSICAL EXAM:   There were no vitals taken for this visit.  Skin exam performed as follows: Type 2 skin. Mood appropriate  Alert and Oriented X 3. Well developed, well nourished in no distress.  General appearance: Normal  Head including face: Normal  Eyes: conjunctiva and lids: Normal  Mouth: Lips, teeth, gums: Normal  Neck: Normal  Chest-breast/axillae: Normal  Back: Normal  Spleen and liver: Normal  Cardiovascular: Exam of peripheral vascular system by observation for swelling, varicosities, edema: Normal  Genitalia: groin, buttocks: Normal  Extremities: digits/nails (clubbing): Normal  Eccrine and Apocrine glands: Normal  Right upper extremity: Normal  Left upper extremity: Normal  Right lower extremity: Normal  Left lower extremity: Normal  Skin: Scalp and body hair: See below    Pt deferred exam of breasts, groin, buttocks: YES    Other physical findings:  1. Multiple pigmented macules on extremities and trunk  2. Multiple pigmented macules on face, trunk and extremities  3. Multiple vascular papules on trunk, arms and legs  4. Multiple  scattered keratotic plaques  5. 15 mm pink patch on the left chest   6. Pink gritty papule on the right nasal tip x 1, left lateral cheek x 1       Except as noted above, no other signs of skin cancer or melanoma.     ASSESSMENT/PLAN:   Benign Full skin cancer screening today. . Patient with history of BCC (likely)  Advised on monthly self exams and 1 year  Patient Education: Appropriate brochures given.    1. Multiple benign appearing melanocytic nevi on arms, legs and trunk. Discussed ABCDEs of melanoma and sunscreen.   2. Multiple lentigos on arms, legs and trunk. Advised benign, no treatment needed.  3. Multiple scattered angiomas. Advised benign, no treatment needed.   4. Seborrheic keratosis on arms, legs and trunk. Advised benign, no treatment needed.  5. R/o BCC on the left chest. Shave biopsy performed.  Area cleaned and anesthetized with 1% lidocaine with epinephrine.  Dermablade used to remove the lesion and sent to pathology. Bleeding was cauterized. Pt tolerated procedure well with no complications.   6. Actinic keratosis on the right nasal tip x 1, left lateral cheek x 1. As precancerous, cryosurgery performed. Advised on blistering and post-op care. Advised if not resolved in 1-2 months to return for evaluation              Follow-up: yearly/PRN sooner    1.) Patient was asked about new and changing moles. YES  2.) Patient received a complete physical skin examination: YES  3.) Patient was counseled to perform a monthly self skin examination: YES  Scribed By: Stephenie Barbosa, MS, PAADDISON

## 2022-09-06 NOTE — PATIENT INSTRUCTIONS
Wound Care Instructions     FOR SUPERFICIAL WOUNDS     [facility]  [phone]                 AFTER 24 HOURS YOU SHOULD REMOVE THE BANDAGE AND BEGIN DAILY DRESSING CHANGES AS FOLLOWS:     1) Remove Dressing.     2) Clean and dry the area with tap water using a Q-tip or sterile gauze pad.     3) Apply Vaseline, Aquaphor, Polysporin ointment or Bacitracin ointment over entire wound.  Do NOT use Neosporin ointment.     4) Cover the wound with a band-aid, or a sterile non-stick gauze pad and micropore paper tape    REPEAT THESE INSTRUCTIONS AT LEAST ONCE A DAY UNTIL THE WOUND HAS COMPLETELY HEALED.    It is an old wives tale that a wound heals better when it is exposed to air and allowed to dry out. The wound will heal faster with a better cosmetic result if it is kept moist with ointment and covered with a bandage.    **Do not let the wound dry out.**    Supplies Needed:      *Cotton tipped applicators (Q-tips)    *Vaseline, Aquaphor, Polysporin or Bacitracin Ointment (NOT NEOSPORIN)    *Band-aids or non-stick gauze pads and micropore paper tape.      PATIENT INFORMATION:    During the healing process you will notice a number of changes. All wounds develop a small halo of redness surrounding the wound.  This means healing is occurring. Severe itching with extensive redness usually indicates sensitivity to the ointment or bandage tape used to dress the wound.  You should call our office if this develops.      Swelling  and/or discoloration around your surgical site is common, particularly when performed around the eye.    All wounds normally drain.  The larger the wound the more drainage there will be.  After 7-10 days, you will notice the wound beginning to shrink and new skin will begin to grow.  The wound is healed when you can see skin has formed over the entire area.  A healed wound has a healthy, shiny look to the surface and is red to dark pink in color to normalize.  Wounds may  take approximately 4-6 weeks to heal.  Larger wounds may take 6-8 weeks.  After the wound is healed you may discontinue dressing changes.    You may experience a sensation of tightness as your wound heals. This is normal and will gradually subside.    Your healed wound may be sensitive to temperature changes. This sensitivity improves with time, but if you re having a lot of discomfort, try to avoid temperature extremes.    Patients frequently experience itching after their wound appears to have healed because of the continue healing under the skin.  Plain Vaseline will help relieve the itching.      POSSIBLE COMPLICATIONS    BLEEDING:    Leave the bandage in place.  Use tightly rolled up gauze or a cloth to apply direct pressure over the bandage for 30  minutes.  Reapply pressure for an additional 30 minutes if necessary  Use additional gauze and tape to maintain pressure once the bleeding has stopped.

## 2022-09-06 NOTE — LETTER
9/6/2022         RE: Krystal Parra  [address]  [facility] [address]        Dear Colleague,    Thank you for referring your patient, Krystal Parra, to the [facility]. Please see a copy of my visit note below.    HPI:   Chief complaints: Krystal Parra is a pleasant 82 year old female who presents for Full skin cancer screening to rule out skin cancer   Last Skin Exam: about 10 years ago      1st Baseline: no  Personal HX of Skin Cancer: yes BCC about 15 years ago  Personal HX of Malignant Melanoma: no   Family HX of Skin Cancer / Malignant Melanoma: no  Personal HX of Atypical Moles:   no  Risk factors: history of sun exposure and burns  New / Changing lesions:yes few spots  Social History: 8 grandchildren lives in [place] but moving back to [place] in a nfew weeks   On review of systems, there are no further skin complaints, patient is feeling otherwise well.   ROS of the following were done and are negative: Constitutional, Eyes, Ears, Nose,   Mouth, Throat, Cardiovascular, Respiratory, GI, Genitourinary, Musculoskeletal,   Psychiatric, Endocrine, Allergic/Immunologic.    PHYSICAL EXAM:   There were no vitals taken for this visit.  Skin exam performed as follows: Type 2 skin. Mood appropriate  Alert and Oriented X 3. Well developed, well nourished in no distress.  General appearance: Normal  Head including face: Normal  Eyes: conjunctiva and lids: Normal  Mouth: Lips, teeth, gums: Normal  Neck: Normal  Chest-breast/axillae: Normal  Back: Normal  Spleen and liver: Normal  Cardiovascular: Exam of peripheral vascular system by observation for swelling, varicosities, edema: Normal  Genitalia: groin, buttocks: Normal  Extremities: digits/nails (clubbing): Normal  Eccrine and Apocrine glands: Normal  Right upper extremity: Normal  Left upper extremity: Normal  Right lower extremity: Normal  Left lower extremity: Normal  Skin: Scalp and body hair: See below    Pt deferred exam of  breasts, groin, buttocks: YES    Other physical findings:  1. Multiple pigmented macules on extremities and trunk  2. Multiple pigmented macules on face, trunk and extremities  3. Multiple vascular papules on trunk, arms and legs  4. Multiple scattered keratotic plaques  5. 15 mm pink patch on the left chest   6. Pink gritty papule on the right nasal tip x 1, left lateral cheek x 1       Except as noted above, no other signs of skin cancer or melanoma.     ASSESSMENT/PLAN:   Benign Full skin cancer screening today. . Patient with history of BCC (likely)  Advised on monthly self exams and 1 year  Patient Education: Appropriate brochures given.    1. Multiple benign appearing melanocytic nevi on arms, legs and trunk. Discussed ABCDEs of melanoma and sunscreen.   2. Multiple lentigos on arms, legs and trunk. Advised benign, no treatment needed.  3. Multiple scattered angiomas. Advised benign, no treatment needed.   4. Seborrheic keratosis on arms, legs and trunk. Advised benign, no treatment needed.  5. R/o BCC on the left chest. Shave biopsy performed.  Area cleaned and anesthetized with 1% lidocaine with epinephrine.  Dermablade used to remove the lesion and sent to pathology. Bleeding was cauterized. Pt tolerated procedure well with no complications.   6. Actinic keratosis on the right nasal tip x 1, left lateral cheek x 1. As precancerous, cryosurgery performed. Advised on blistering and post-op care. Advised if not resolved in 1-2 months to return for evaluation              Follow-up: yearly/PRN sooner    1.) Patient was asked about new and changing moles. YES  2.) Patient received a complete physical skin examination: YES  3.) Patient was counseled to perform a monthly self skin examination: YES  Scribed By: Stephenie Barbosa, MS, PAADDISON          Again, thank you for allowing me to participate in the care of your patient.        Sincerely,        Stephenie Barbosa PA-C

## 2022-09-08 LAB
PATH REPORT.COMMENTS IMP SPEC: NORMAL
PATH REPORT.COMMENTS IMP SPEC: NORMAL
PATH REPORT.FINAL DX SPEC: NORMAL
PATH REPORT.GROSS SPEC: NORMAL
PATH REPORT.MICROSCOPIC SPEC OTHER STN: NORMAL
PATH REPORT.RELEVANT HX SPEC: NORMAL

## 2022-09-09 ENCOUNTER — TELEPHONE (OUTPATIENT)
Dept: DERMATOLOGY | Facility: CLINIC | Age: 82
End: 2022-09-09

## 2022-09-09 NOTE — TELEPHONE ENCOUNTER
----- Message from Stephenie Barbosa PA-C sent at 9/8/2022  4:54 PM CDT -----  Please notify patient of benign pathology results. No further treatment is needed. Recommend regular skin exams.

## 2022-09-09 NOTE — TELEPHONE ENCOUNTER
Pt returned call and given biopsy results. Pt voiced understanding.    Verito DESOUZA RN  Dermatology   [phone]

## 2022-09-09 NOTE — TELEPHONE ENCOUNTER
Left message on answering machine for patient to call back.    Verito DESOUZA RN  Dermatology   [phone]

## 2022-09-12 ENCOUNTER — OFFICE VISIT (OUTPATIENT)
Dept: PEDIATRICS | Facility: CLINIC | Age: 82
End: 2022-09-12
Payer: COMMERCIAL

## 2022-09-12 VITALS
RESPIRATION RATE: 16 BRPM | WEIGHT: 158.9 LBS | SYSTOLIC BLOOD PRESSURE: 126 MMHG | OXYGEN SATURATION: 98 % | HEIGHT: 60 IN | TEMPERATURE: 98.6 F | BODY MASS INDEX: 31.2 KG/M2 | DIASTOLIC BLOOD PRESSURE: 74 MMHG | HEART RATE: 81 BPM

## 2022-09-12 DIAGNOSIS — I35.1 MODERATE AORTIC REGURGITATION: ICD-10-CM

## 2022-09-12 DIAGNOSIS — M54.16 LUMBAR RADICULOPATHY: ICD-10-CM

## 2022-09-12 DIAGNOSIS — Z86.73 HISTORY OF TIA (TRANSIENT ISCHEMIC ATTACK) AND STROKE: ICD-10-CM

## 2022-09-12 DIAGNOSIS — E03.8 SUBCLINICAL HYPOTHYROIDISM: ICD-10-CM

## 2022-09-12 DIAGNOSIS — M65.30 TRIGGER FINGER, ACQUIRED: ICD-10-CM

## 2022-09-12 DIAGNOSIS — I10 BENIGN ESSENTIAL HYPERTENSION: ICD-10-CM

## 2022-09-12 DIAGNOSIS — K59.00 CONSTIPATION, UNSPECIFIED CONSTIPATION TYPE: ICD-10-CM

## 2022-09-12 DIAGNOSIS — Z00.00 MEDICARE ANNUAL WELLNESS VISIT, SUBSEQUENT: Primary | ICD-10-CM

## 2022-09-12 LAB
ERYTHROCYTE [DISTWIDTH] IN BLOOD BY AUTOMATED COUNT: 14.1 % (ref 10–15)
HCT VFR BLD AUTO: 36.2 % (ref 35–47)
HGB BLD-MCNC: 12.2 G/DL (ref 11.7–15.7)
MCH RBC QN AUTO: 31.1 PG (ref 26.5–33)
MCHC RBC AUTO-ENTMCNC: 33.7 G/DL (ref 31.5–36.5)
MCV RBC AUTO: 92 FL (ref 78–100)
PLATELET # BLD AUTO: 252 10E3/UL (ref 150–450)
RBC # BLD AUTO: 3.92 10E6/UL (ref 3.8–5.2)
WBC # BLD AUTO: 7.5 10E3/UL (ref 4–11)

## 2022-09-12 PROCEDURE — 84443 ASSAY THYROID STIM HORMONE: CPT | Performed by: PEDIATRICS

## 2022-09-12 PROCEDURE — 85027 COMPLETE CBC AUTOMATED: CPT | Performed by: PEDIATRICS

## 2022-09-12 PROCEDURE — 80053 COMPREHEN METABOLIC PANEL: CPT | Performed by: PEDIATRICS

## 2022-09-12 PROCEDURE — 99214 OFFICE O/P EST MOD 30 MIN: CPT | Mod: 25 | Performed by: PEDIATRICS

## 2022-09-12 PROCEDURE — G0438 PPPS, INITIAL VISIT: HCPCS | Performed by: PEDIATRICS

## 2022-09-12 PROCEDURE — 83721 ASSAY OF BLOOD LIPOPROTEIN: CPT | Performed by: PEDIATRICS

## 2022-09-12 PROCEDURE — 36415 COLL VENOUS BLD VENIPUNCTURE: CPT | Performed by: PEDIATRICS

## 2022-09-12 ASSESSMENT — ENCOUNTER SYMPTOMS
CHILLS: 0
SORE THROAT: 0
HEMATURIA: 0
BREAST MASS: 0
JOINT SWELLING: 0
DIZZINESS: 0
HEADACHES: 0
MYALGIAS: 1
ABDOMINAL PAIN: 0
DYSURIA: 0
PARESTHESIAS: 0
ARTHRALGIAS: 1
COUGH: 0
WEAKNESS: 1
HEARTBURN: 0
DIARRHEA: 0
EYE PAIN: 0
FEVER: 0
NAUSEA: 0
CONSTIPATION: 1
SHORTNESS OF BREATH: 0
HEMATOCHEZIA: 0
PALPITATIONS: 0
FREQUENCY: 0
NERVOUS/ANXIOUS: 0

## 2022-09-12 ASSESSMENT — PAIN SCALES - GENERAL: PAINLEVEL: MILD PAIN (2)

## 2022-09-12 ASSESSMENT — ACTIVITIES OF DAILY LIVING (ADL): CURRENT_FUNCTION: TRANSPORTATION REQUIRES ASSISTANCE

## 2022-09-12 NOTE — PROGRESS NOTES
"SUBJECTIVE:   Krystal Parra is a 82 year old female who presents for Preventive Visit.    Patient has been advised of split billing requirements and indicates understanding: Yes  Are you in the first 12 months of your Medicare coverage?  No    Healthy Habits:     In general, how would you rate your overall health?  Excellent    Frequency of exercise:  None    Do you usually eat at least 4 servings of fruit and vegetables a day, include whole grains    & fiber and avoid regularly eating high fat or \"junk\" foods?  No    Taking medications regularly:  Not Applicable    Medication side effects:  Not applicable    Ability to successfully perform activities of daily living:  Transportation requires assistance    Home Safety:  Lack of grab bars in the bathroom    Hearing Impairment:  No hearing concerns    In the past 6 months, have you been bothered by leaking of urine? Yes    In general, how would you rate your overall mental or emotional health?  Excellent      PHQ-2 Total Score: 0    Additional concerns today:  Yes     Concerns today:4th finger on left hand  Irregular BM last 3-4 months - wondering about laxative?  NOT fasting today    Do you feel safe in your environment? Yes    Have you ever done Advance Care Planning? (For example, a Health Directive, POLST, or a discussion with a medical provider or your loved ones about your wishes): No, advance care planning information given to patient to review.  Patient declined advance care planning discussion at this time.    Fall risk  Fallen 2 or more times in the past year?: No  Any fall with injury in the past year?: Yes      Cognitive Screening   1) Repeat 3 items (Leader, Season, Table)    2) Clock draw: NORMAL  3) 3 item recall: Recalls 3 objects  Results: NORMAL clock, 1-2 items recalled: COGNITIVE IMPAIRMENT LESS LIKELY    Mini-CogTM Copyright JOHNATHAN Pena. Licensed by the author for use in [facility]; reprinted with permission ([email or web address]). All rights " reserved.      Do you have sleep apnea, excessive snoring or daytime drowsiness?: no    Reviewed and updated as needed this visit by clinical staff   Tobacco  Allergies    Med Hx  Surg Hx  Fam Hx            Reviewed and updated as needed this visit by Provider                   Social History     Tobacco Use     Smoking status: Passive Smoke Exposure - Never Smoker     Smokeless tobacco: Never Used     Tobacco comment:  smoked in house   Substance Use Topics     Alcohol use: No     Alcohol Use 9/12/2022   Prescreen: >3 drinks/day or >7 drinks/week? Not Applicable   Prescreen: >3 drinks/day or >7 drinks/week? -         Current providers sharing in care for this patient include:   Patient Care Team:  Tasha Winkler MD as PCP - General (Internal Medicine)  Tasha Winkler MD as Assigned PCP  Judy Franklin RN as Personal Advocate & Liaison (PAL)  You Miranda MD as Assigned Neuroscience Provider  Davis Salinas PA-C as Assigned Musculoskeletal Provider  Stephenie Barbosa PA-C as Physician Assistant (Dermatology)    The following health maintenance items are reviewed in Epic and correct as of today:  Health Maintenance Due   Topic Date Due     ANNUAL REVIEW OF HM ORDERS  Never done     ZOSTER IMMUNIZATION (1 of 2) Never done     Pneumococcal Vaccine: 65+ Years (2 - PCV) 08/17/2006     DEXA  08/22/2021     TSH W/FREE T4 REFLEX  04/13/2022     COVID-19 Vaccine (4 - Booster for Pfizer series) 04/29/2022     Mammogram Screening - Patient over age 75, has elected to discontinue screenings.  She will alert me if she changes her mind and wants to resume screening  Pertinent mammograms are reviewed under the imaging tab.      4th finger of left hand trigger finger - doesn't get stuck in flexion, but no longer has fluid movement.  Symptoms are getting better.  No pain or swelling.    Constipation - historically regular, but now struggling more with constipation -  stools are soft.  Going on average every third day.  No blood or mucous.  No change in stool caliber.   Hasn't yet tried any medications - wondering what to take.   No changes in diet or water intake    Hx of TIA - on ASA.  No new TIA or stroke like events    Moderate AR - last echo 2021 - denies any new cardiac or respiratory symptoms.  Not interested in additional evaluation at this time.    Lumbar radiculopathy - severely limiting in her activity - uses a rolled walker for longer walks.   Hasn't had any relief from past procedures or physical therapy - doesn't wish to pursue additional work up or treatment at this time apart from maintaining her chiropractic care    Recent carpal tunnel surgery - improved symptoms on the left side, worse on the right side    Chronic insomnia - unchanged over time    Moving to senior residence this week - feeling good about not having all the burdens of home ownership and upkeep, but sad to leave her home and neighborhood    Gets great help from DIL and daughter, son and grandson - family helps her a tremendous amount    Paramedics came to house one night about 6 weeks ago - had strong upper abdominal pain after a stressful conversation - tried antacids - didn't work.  Paramedics did normal EKG and she felt better - no further symptoms since that time.      Has added tumeric and lion's gerry supplements    Completed some COVID vaccines and had COVID infection this summer - recovered completely      Review of Systems   Constitutional: Negative for chills and fever.   HENT: Negative for congestion, ear pain, hearing loss and sore throat.    Eyes: Negative for pain and visual disturbance.   Respiratory: Negative for cough and shortness of breath.    Cardiovascular: Negative for chest pain, palpitations and peripheral edema.   Gastrointestinal: Positive for constipation. Negative for abdominal pain, diarrhea, heartburn, hematochezia and nausea.   Breasts:  Negative for tenderness,  breast mass and discharge.   Genitourinary: Negative for dysuria, frequency, genital sores, hematuria, pelvic pain, urgency, vaginal bleeding and vaginal discharge.   Musculoskeletal: Positive for arthralgias and myalgias. Negative for joint swelling.   Skin: Negative for rash.   Neurological: Positive for weakness. Negative for dizziness, headaches and paresthesias.   Psychiatric/Behavioral: Negative for mood changes. The patient is not nervous/anxious.          OBJECTIVE:   /74 (BP Location: Right arm, Cuff Size: Adult Regular)   Pulse 81   Temp 98.6  F (37  C) (Tympanic)   Resp 16   Ht 1.524 m (5')   Wt 72.1 kg (158 lb 14.4 oz)   LMP  (LMP Unknown)   SpO2 98%   BMI 31.03 kg/m   Estimated body mass index is 31.03 kg/m  as calculated from the following:    Height as of this encounter: 1.524 m (5').    Weight as of this encounter: 72.1 kg (158 lb 14.4 oz).   Wt Readings from Last 4 Encounters:   09/12/22 72.1 kg (158 lb 14.4 oz)   01/20/22 73 kg (161 lb)   04/13/21 71.4 kg (157 lb 8 oz)   01/28/21 70.3 kg (155 lb)       Physical Exam  GENERAL: healthy, alert and no distress  EYES: Eyes grossly normal to inspection, PERRL and conjunctivae and sclerae normal  HENT: ear canals and TM's normal, nose and mouth without ulcers or lesions  NECK: no adenopathy, no asymmetry, masses, or scars and thyroid normal to palpation  RESP: lungs clear to auscultation - no rales, rhonchi or wheezes  CV: regular rates and rhythm, normal S1 S2, no S3 or S4, grade 2/6 diastolic murmur heard best over the lusb, peripheral pulses strong and no peripheral edema  ABDOMEN: soft, nontender, no hepatosplenomegaly, no masses and bowel sounds normal  MS: antalgic gait, walking with aid of rolling walker, left 4th finger with cogwheel movements in flexion and extension  SKIN: no suspicious lesions or rashes  NEURO: Normal strength and tone, mentation intact and speech normal  PSYCH: mentation appears normal, affect  normal/bright    Diagnostic Test Results:  pending    ASSESSMENT / PLAN:       ICD-10-CM    1. Medicare annual wellness visit, subsequent  Z00.00 TSH with free T4 reflex     Comprehensive metabolic panel (BMP + Alb, Alk Phos, ALT, AST, Total. Bili, TP)     CBC with platelets     LDL cholesterol direct   2. Subclinical hypothyroidism  E03.8 TSH with free T4 reflex  Repeat labs today   3. Benign essential hypertension  I10 Comprehensive metabolic panel (BMP + Alb, Alk Phos, ALT, AST, Total. Bili, TP)     LDL cholesterol direct  BP now well controlled and is not on medications - continue to follow     4. History of TIA (transient ischemic attack) and stroke  Z86.73 Comprehensive metabolic panel (BMP + Alb, Alk Phos, ALT, AST, Total. Bili, TP)     LDL cholesterol direct    No evidence of recurrent event - continue ASA     5. Moderate aortic regurgitation -  I35.1 Stable on echo last year.  Patient without additional symptoms and prefers to monitor clinically at this time.   Advised that she can alert me any time with new concerns about heart or breathing and we can pursue additional work up     6. Lumbar radiculopathy  M54.16 Chronic issue - stable symptoms     7. Constipation, unspecified constipation type  K59.00 Reviewed OTC medications that Krystal can start and dietary/water adjustments.   Reviewed symptoms that would be of concern to me and encouraged her to reach out again if not improving     8. Trigger finger, acquired  M65.30 Patient to alert me if worsening for referral       COUNSELING:  Reviewed preventive health counseling, as reflected in patient instructions    Estimated body mass index is 31.03 kg/m  as calculated from the following:    Height as of this encounter: 1.524 m (5').    Weight as of this encounter: 72.1 kg (158 lb 14.4 oz).    Weight management plan: reviewed weight data - encouraged in her plans to increase walking in her new building    She reports that she is a non-smoker but has been  exposed to tobacco smoke. She has never used smokeless tobacco.      Appropriate preventive services were discussed with this patient, including applicable screening as appropriate for cardiovascular disease, diabetes, osteopenia/osteoporosis, and glaucoma.  As appropriate for age/gender, discussed screening for colorectal cancer, prostate cancer, breast cancer, and cervical cancer. Checklist reviewing preventive services available has been given to the patient.    Reviewed patients plan of care and provided an AVS. The Intermediate Care Plan ( asthma action plan, low back pain action plan, and migraine action plan) for Krystal meets the Care Plan requirement. This Care Plan has been established and reviewed with the Patient.    Counseling Resources:  ATP IV Guidelines  Pooled Cohorts Equation Calculator  Breast Cancer Risk Calculator  Breast Cancer: Medication to Reduce Risk  FRAX Risk Assessment  ICSI Preventive Guidelines  Dietary Guidelines for Americans, 2010  USDA's MyPlate  ASA Prophylaxis  Lung CA Screening    Tasha Winkler MD  [facility]    Identified Health Risks:

## 2022-09-12 NOTE — PATIENT INSTRUCTIONS
Ideas for constipation - increase magnesium supplement, ok to try Senna (available OTC) or Bisacodyl (also OTC).  If you like prunes, add them in.  Make sure you are drinking a lot of water    Keep up your baby aspirin    Good luck with your move!    Lab work today    Please alert me if you are interested in doing a mammogram    Keep me posted with any heart or breathing changes

## 2022-09-12 NOTE — LETTER
September 13, 2022      Krystal Parra  [address]  [place] [address]        Dear Ms.Danielalva,    We are writing to inform you of your test results.    It was wonderful to see you in clinic this week.   Thanks again for coming to visit!     Your lab work returned and is enclosed for your review and records.     The results show normal blood counts, electrolytes, kidney function, liver function, thyroid function, and non fasting blood sugar.       Your LDL (bad) cholesterol is just borderline elevated.     Overall, these look beautiful.     Best of luck with your upcoming move!     Warmly,   Tasha Winkler MD       Resulted Orders   TSH with free T4 reflex   Result Value Ref Range    TSH 3.60 0.40 - 4.00 mU/L   Comprehensive metabolic panel (BMP + Alb, Alk Phos, ALT, AST, Total. Bili, TP)   Result Value Ref Range    Sodium 138 133 - 144 mmol/L    Potassium 4.1 3.4 - 5.3 mmol/L    Chloride 106 94 - 109 mmol/L    Carbon Dioxide (CO2) 26 20 - 32 mmol/L    Anion Gap 6 3 - 14 mmol/L    Urea Nitrogen 24 7 - 30 mg/dL    Creatinine 0.66 0.52 - 1.04 mg/dL    Calcium 9.3 8.5 - 10.1 mg/dL    Glucose 101 (H) 70 - 99 mg/dL    Alkaline Phosphatase 58 40 - 150 U/L    AST 17 0 - 45 U/L    ALT 25 0 - 50 U/L    Protein Total 7.1 6.8 - 8.8 g/dL    Albumin 3.4 3.4 - 5.0 g/dL    Bilirubin Total 0.4 0.2 - 1.3 mg/dL    GFR Estimate 86 >60 mL/min/1.73m2      Comment:      Effective December 21, 2021 eGFRcr in adults is calculated using the 2021 CKD-EPI creatinine equation which includes age and gender (Jairo fraire al., NEJM, DOI: 10.1056/OORSdt4663154)   CBC with platelets   Result Value Ref Range    WBC Count 7.5 4.0 - 11.0 10e3/uL    RBC Count 3.92 3.80 - 5.20 10e6/uL    Hemoglobin 12.2 11.7 - 15.7 g/dL    Hematocrit 36.2 35.0 - 47.0 %    MCV 92 78 - 100 fL    MCH 31.1 26.5 - 33.0 pg    MCHC 33.7 31.5 - 36.5 g/dL    RDW 14.1 10.0 - 15.0 %    Platelet Count 252 150 - 450 10e3/uL   LDL cholesterol direct   Result Value Ref Range    LDL  Cholesterol Direct 134 (H) <100 mg/dL      Comment:      Age 0-19 years:  Desirable: 0-110 mg/dL   Borderline high: 110-129 mg/dL   High: >= 130 mg/dL    Age 20 years and older:  Desirable: <100mg/dL  Above desirable: 100-129 mg/dL   Borderline high: 130-159 mg/dL   High: 160-189 mg/dL   Very high: >= 190 mg/dL       If you have any questions or concerns, please call the clinic at the number listed above.

## 2022-09-13 LAB
ALBUMIN SERPL-MCNC: 3.4 G/DL (ref 3.4–5)
ALP SERPL-CCNC: 58 U/L (ref 40–150)
ALT SERPL W P-5'-P-CCNC: 25 U/L (ref 0–50)
ANION GAP SERPL CALCULATED.3IONS-SCNC: 6 MMOL/L (ref 3–14)
AST SERPL W P-5'-P-CCNC: 17 U/L (ref 0–45)
BILIRUB SERPL-MCNC: 0.4 MG/DL (ref 0.2–1.3)
BUN SERPL-MCNC: 24 MG/DL (ref 7–30)
CALCIUM SERPL-MCNC: 9.3 MG/DL (ref 8.5–10.1)
CHLORIDE BLD-SCNC: 106 MMOL/L (ref 94–109)
CO2 SERPL-SCNC: 26 MMOL/L (ref 20–32)
CREAT SERPL-MCNC: 0.66 MG/DL (ref 0.52–1.04)
GFR SERPL CREATININE-BSD FRML MDRD: 86 ML/MIN/1.73M2
GLUCOSE BLD-MCNC: 101 MG/DL (ref 70–99)
LDLC SERPL CALC-MCNC: 134 MG/DL
POTASSIUM BLD-SCNC: 4.1 MMOL/L (ref 3.4–5.3)
PROT SERPL-MCNC: 7.1 G/DL (ref 6.8–8.8)
SODIUM SERPL-SCNC: 138 MMOL/L (ref 133–144)
TSH SERPL DL<=0.005 MIU/L-ACNC: 3.6 MU/L (ref 0.4–4)

## 2023-02-15 ENCOUNTER — TELEPHONE (OUTPATIENT)
Dept: PEDIATRICS | Facility: CLINIC | Age: 83
End: 2023-02-15
Payer: COMMERCIAL

## 2023-02-15 NOTE — TELEPHONE ENCOUNTER
Reason for Call:  Appointment Request    Patient requesting this type of appt:  Episodes of abdominal discomfort    Requested provider: Tasha Winkler    Reason patient unable to be scheduled: Not within requested timeframe    When does patient want to be seen/preferred time: February 27th - March 3rd    Comments: patient has had a couple episodes of abdominal discomfort. One of the times the paramedics came and checked her over and the pain went away. She feels she should see Dr. Winkler and would like to know if she could work her into her schedule the week of February 27th?    Okay to leave a detailed message?: Yes at Home number on file [phone] (home)    Call taken on 2/15/2023 at 1:50 PM by Ynes Steen

## 2023-03-01 ENCOUNTER — OFFICE VISIT (OUTPATIENT)
Dept: PEDIATRICS | Facility: CLINIC | Age: 83
End: 2023-03-01
Payer: COMMERCIAL

## 2023-03-01 VITALS
RESPIRATION RATE: 16 BRPM | DIASTOLIC BLOOD PRESSURE: 60 MMHG | HEART RATE: 90 BPM | SYSTOLIC BLOOD PRESSURE: 134 MMHG | OXYGEN SATURATION: 98 % | WEIGHT: 159.2 LBS | BODY MASS INDEX: 31.09 KG/M2 | TEMPERATURE: 98.9 F

## 2023-03-01 DIAGNOSIS — R94.39 ABNORMAL STRESS TEST: ICD-10-CM

## 2023-03-01 DIAGNOSIS — I25.119 CORONARY ARTERY DISEASE INVOLVING NATIVE CORONARY ARTERY OF NATIVE HEART WITH ANGINA PECTORIS (H): ICD-10-CM

## 2023-03-01 DIAGNOSIS — R07.9 CHEST PAIN, UNSPECIFIED TYPE: Primary | ICD-10-CM

## 2023-03-01 PROCEDURE — 99214 OFFICE O/P EST MOD 30 MIN: CPT | Performed by: PEDIATRICS

## 2023-03-01 PROCEDURE — 93000 ELECTROCARDIOGRAM COMPLETE: CPT | Performed by: PEDIATRICS

## 2023-03-01 RX ORDER — NITROGLYCERIN 0.4 MG/1
0.4 TABLET SUBLINGUAL EVERY 5 MIN PRN
Qty: 20 TABLET | Refills: 1 | Status: SHIPPED | OUTPATIENT
Start: 2023-03-01

## 2023-03-01 ASSESSMENT — PAIN SCALES - GENERAL: PAINLEVEL: NO PAIN (0)

## 2023-03-01 NOTE — PATIENT INSTRUCTIONS
EKG today    Expect a phone call to schedule a stress test at [facility]    Nitroglycerin sent to pharmacy - use if you get chest pressure again and alert us

## 2023-03-01 NOTE — PROGRESS NOTES
Assessment & Plan       ICD-10-CM    1. Chest pain, unspecified type  R07.9 EKG 12-lead complete w/read - Clinics     NM Lexiscan stress test     nitroGLYcerin (NITROSTAT) 0.4 MG sublingual tablet    Remain concerned about an underlying cardiac etiology - plan stress test.  Reviewed reasons to seek emergency medical attention in the future.  Nitroglycerin provided.  Encouraged patient to contact me and her children if having any future concerning symptoms.               Patient Instructions   EKG today    Expect a phone call to schedule a stress test at [facility]    Nitroglycerin sent to pharmacy - use if you get chest pressure again and alert us       Return in about 2 weeks (around 3/15/2023), or if symptoms worsen or fail to improve.    Tasha Winkler MD  [facility] [place] is a 83 year old accompanied by her daughter, presenting for the following health issues:  Abdominal Pain      History of Present Illness       Reason for visit:  Discomfort across chest    She eats 2-3 servings of fruits and vegetables daily.She consumes 0 sweetened beverage(s) daily.She exercises with enough effort to increase her heart rate 9 or less minutes per day.  She exercises with enough effort to increase her heart rate 3 or less days per week.   She is taking medications regularly.     August had first episode of band of pressure across chest.  Called ambulance and had eval at home - didn't want to go to hospital.  3-4 hours.  No medications given - told that her heart looked ok and this was reassuring to her.    Feb 1st - had another episode with a band of discomfort across the chest.  Antacids or water didn't help.  Lasted 2-3 hours.   No associated breathing changes or sweating.   No clear exertional component.    BP at home has been in good range.      Has been followed with echocardiograms historically for moderate AR    Activity is limited due to lumbar back pain/radiculopathy.       Had  also wondered if this could be gallbladder related, though has noted no association with eating        Objective    /60   Pulse 90   Temp 98.9  F (37.2  C) (Tympanic)   Resp 16   Wt 72.2 kg (159 lb 3.2 oz)   LMP  (LMP Unknown)   SpO2 98%   BMI 31.09 kg/m    Body mass index is 31.09 kg/m .   Wt Readings from Last 4 Encounters:   03/01/23 72.2 kg (159 lb 3.2 oz)   09/12/22 72.1 kg (158 lb 14.4 oz)   01/20/22 73 kg (161 lb)   04/13/21 71.4 kg (157 lb 8 oz)       Physical Exam   GENERAL: healthy, alert and no distress  RESP: lungs clear to auscultation - no rales, rhonchi or wheezes  CV: regular rate and rhythm, normal S1 S2, no S3 or S4, no murmur, click or rub, no peripheral edema and peripheral pulses strong  ABDOMEN: soft, nontender, no hepatosplenomegaly, no masses and bowel sounds normal  NEURO: Normal strength and tone, mentation intact and speech normal  PSYCH: mentation appears normal, affect normal/bright    EKG - Reviewed and interpreted by me appears normal, NSR, normal axis, normal intervals, no acute ST/T changes c/w ischemia, no LVH by voltage criteria    Tasha Winkler MD

## 2023-03-13 ENCOUNTER — HOSPITAL ENCOUNTER (OUTPATIENT)
Dept: CARDIOLOGY | Facility: CLINIC | Age: 83
Discharge: HOME | End: 2023-03-13
Attending: PEDIATRICS
Payer: COMMERCIAL

## 2023-03-13 ENCOUNTER — HOSPITAL ENCOUNTER (OUTPATIENT)
Dept: CARDIOLOGY | Facility: CLINIC | Age: 83
Setting detail: NUCLEAR MEDICINE
Discharge: HOME | End: 2023-03-13
Attending: PEDIATRICS
Payer: COMMERCIAL

## 2023-03-13 VITALS — DIASTOLIC BLOOD PRESSURE: 78 MMHG | SYSTOLIC BLOOD PRESSURE: 168 MMHG | HEART RATE: 81 BPM

## 2023-03-13 DIAGNOSIS — M72.0 DUPUYTREN'S CONTRACTURE OF BOTH HANDS: ICD-10-CM

## 2023-03-13 DIAGNOSIS — Z71.89 ADVANCED DIRECTIVES, COUNSELING/DISCUSSION: Chronic | ICD-10-CM

## 2023-03-13 DIAGNOSIS — M51.26 DISPLACEMENT OF LUMBAR INTERVERTEBRAL DISC WITHOUT MYELOPATHY: ICD-10-CM

## 2023-03-13 DIAGNOSIS — M89.9 DISORDER OF BONE AND CARTILAGE: ICD-10-CM

## 2023-03-13 DIAGNOSIS — E04.1 THYROID NODULE: Primary | ICD-10-CM

## 2023-03-13 DIAGNOSIS — M94.9 DISORDER OF BONE AND CARTILAGE: ICD-10-CM

## 2023-03-13 DIAGNOSIS — E03.8 SUBCLINICAL HYPOTHYROIDISM: ICD-10-CM

## 2023-03-13 DIAGNOSIS — G45.9 TIA (TRANSIENT ISCHEMIC ATTACK): ICD-10-CM

## 2023-03-13 DIAGNOSIS — I10 BENIGN ESSENTIAL HYPERTENSION: ICD-10-CM

## 2023-03-13 DIAGNOSIS — R07.9 CHEST PAIN, UNSPECIFIED TYPE: ICD-10-CM

## 2023-03-13 DIAGNOSIS — I35.1 MODERATE AORTIC REGURGITATION: ICD-10-CM

## 2023-03-13 DIAGNOSIS — E78.5 HYPERLIPIDEMIA LDL GOAL <100: ICD-10-CM

## 2023-03-13 LAB
CV BLOOD PRESSURE: 55 MMHG
CV STRESS MAX HR HE: 105
NUC STRESS EJECTION FRACTION: 56 %
RATE PRESSURE PRODUCT: NORMAL
STRESS ECHO BASELINE DIASTOLIC HE: 78
STRESS ECHO BASELINE HR: 86 BPM
STRESS ECHO BASELINE SYSTOLIC BP: 168
STRESS ECHO CALCULATED PERCENT HR: 78 %
STRESS ECHO LAST STRESS DIASTOLIC BP: 78
STRESS ECHO LAST STRESS SYSTOLIC BP: 160
STRESS ECHO TARGET HR: 135

## 2023-03-13 PROCEDURE — 93016 CV STRESS TEST SUPVJ ONLY: CPT | Performed by: INTERNAL MEDICINE

## 2023-03-13 PROCEDURE — 250N000011 HC RX IP 250 OP 636: Performed by: INTERNAL MEDICINE

## 2023-03-13 PROCEDURE — 93018 CV STRESS TEST I&R ONLY: CPT | Performed by: INTERNAL MEDICINE

## 2023-03-13 PROCEDURE — 343N000001 HC RX 343: Performed by: PEDIATRICS

## 2023-03-13 PROCEDURE — 93017 CV STRESS TEST TRACING ONLY: CPT

## 2023-03-13 PROCEDURE — 78452 HT MUSCLE IMAGE SPECT MULT: CPT | Mod: 26 | Performed by: INTERNAL MEDICINE

## 2023-03-13 PROCEDURE — A9502 TC99M TETROFOSMIN: HCPCS | Performed by: PEDIATRICS

## 2023-03-13 RX ORDER — CAFFEINE CITRATE 20 MG/ML
60 SOLUTION INTRAVENOUS
Status: DISCONTINUED | OUTPATIENT
Start: 2023-03-13 | End: 2023-03-14 | Stop reason: HOSPADM

## 2023-03-13 RX ORDER — ACYCLOVIR 200 MG/1
0-1 CAPSULE ORAL
Status: DISCONTINUED | OUTPATIENT
Start: 2023-03-13 | End: 2023-03-14 | Stop reason: HOSPADM

## 2023-03-13 RX ORDER — AMINOPHYLLINE 25 MG/ML
50-100 INJECTION, SOLUTION INTRAVENOUS
Status: DISCONTINUED | OUTPATIENT
Start: 2023-03-13 | End: 2023-03-14 | Stop reason: HOSPADM

## 2023-03-13 RX ORDER — REGADENOSON 0.08 MG/ML
0.4 INJECTION, SOLUTION INTRAVENOUS ONCE
Status: COMPLETED | OUTPATIENT
Start: 2023-03-13 | End: 2023-03-13

## 2023-03-13 RX ORDER — ALBUTEROL SULFATE 90 UG/1
2 AEROSOL, METERED RESPIRATORY (INHALATION) EVERY 5 MIN PRN
Status: DISCONTINUED | OUTPATIENT
Start: 2023-03-13 | End: 2023-03-14 | Stop reason: HOSPADM

## 2023-03-13 RX ADMIN — TETROFOSMIN 3.52 MILLICURIE: 1.38 INJECTION, POWDER, LYOPHILIZED, FOR SOLUTION INTRAVENOUS at 12:40

## 2023-03-13 RX ADMIN — REGADENOSON 0.4 MG: 0.08 INJECTION, SOLUTION INTRAVENOUS at 14:43

## 2023-03-13 RX ADMIN — TETROFOSMIN 8.23 MILLICURIE: 1.38 INJECTION, POWDER, LYOPHILIZED, FOR SOLUTION INTRAVENOUS at 14:45

## 2023-03-20 ENCOUNTER — OFFICE VISIT (OUTPATIENT)
Dept: CARDIOLOGY | Facility: CLINIC | Age: 83
End: 2023-03-20
Payer: COMMERCIAL

## 2023-03-20 VITALS
OXYGEN SATURATION: 98 % | SYSTOLIC BLOOD PRESSURE: 181 MMHG | WEIGHT: 160.4 LBS | BODY MASS INDEX: 30.29 KG/M2 | HEIGHT: 61 IN | DIASTOLIC BLOOD PRESSURE: 77 MMHG | HEART RATE: 93 BPM

## 2023-03-20 DIAGNOSIS — E78.5 HYPERLIPIDEMIA LDL GOAL <100: ICD-10-CM

## 2023-03-20 DIAGNOSIS — I25.119 CORONARY ARTERY DISEASE INVOLVING NATIVE CORONARY ARTERY OF NATIVE HEART WITH ANGINA PECTORIS (H): ICD-10-CM

## 2023-03-20 DIAGNOSIS — I35.1 MODERATE AORTIC REGURGITATION: Primary | ICD-10-CM

## 2023-03-20 DIAGNOSIS — R94.39 ABNORMAL STRESS TEST: ICD-10-CM

## 2023-03-20 DIAGNOSIS — R07.9 CHEST PAIN, UNSPECIFIED TYPE: ICD-10-CM

## 2023-03-20 DIAGNOSIS — R94.39 ABNORMAL CARDIOVASCULAR STRESS TEST: ICD-10-CM

## 2023-03-20 PROCEDURE — 99204 OFFICE O/P NEW MOD 45 MIN: CPT | Performed by: INTERNAL MEDICINE

## 2023-03-20 RX ORDER — METOPROLOL SUCCINATE 25 MG/1
25 TABLET, EXTENDED RELEASE ORAL DAILY
Qty: 30 TABLET | Refills: 11 | Status: SHIPPED | OUTPATIENT
Start: 2023-03-20 | End: 2023-06-21

## 2023-03-20 RX ORDER — ATORVASTATIN CALCIUM 20 MG/1
20 TABLET, FILM COATED ORAL DAILY
Qty: 30 TABLET | Refills: 11 | Status: SHIPPED | OUTPATIENT
Start: 2023-03-20 | End: 2023-06-21

## 2023-03-20 NOTE — LETTER
3/20/2023    Tasha Winkler MD  [address] [place] [address]    RE: Krystal Parra       Dear Colleague,     I had the pleasure of seeing Krystal Parra in the [facility].  HPI and Plan:     Krystal Parra is a pleasant 84 y/o female referred here for abnormal stress test.   She has a history of hyperlipidemia, and moderate aortic insufficiency.     Nuclear stress test reviewed shows small area of mild ischemia in LV apex c/w LAD disease.  LVEF normal.     Episode of chest discomfort last fall and again in February lasting a couple of hours.  Last year called paramedics and they came out and looked at EKG unremarkable.     No associated nausea, vomiting, SOB, or diaphoresis. Did not seek out medical treatment for the second episode in February.  Walks with walker and has not had recent issues.     TC of 227 with .  Family history of mother MI in 80's.  EKG unremarkable.  Has not been taking any medications for cholesterol as she prefers non pharmalogical therapy unless absolutely necessary.      No edema, palpations, syncope or near syncope.     BP at home is normal.  But is high in doctors offices.     Recommended initiation of beta blockers and consideration of coronary angiogram.   Secondary to small apical infarction.    Personally reviewed echocardiogram.  Stable moderate aortic insufficiency.       Krystal is reluctant to start medication.  Discussed with her the side effect of beta blockers which are typically very well tolerated but may cause fatigue.  And recommendation for starting statin with the plan to stabilize coronary plaque and decrease future risk of heart attack.  Told most common side effect was muscle aches in 5-7 %.  Also showed her how to register and sign into [facility].         Today's clinic visit entailed:  Review of the result(s) of each unique test - echocardiogram, EKG, stress test  Ordering of each unique test  Prescription drug  management  40 minutes spent on the date of the encounter doing chart review, history and exam, documentation and further activities per the note  Provider  Link to MDM Help Grid     The level of medical decision making during this visit was of high complexity.      No orders of the defined types were placed in this encounter.    No orders of the defined types were placed in this encounter.    There are no discontinued medications.      Encounter Diagnoses   Name Primary?     Chest pain, unspecified type      Abnormal stress test      Coronary artery disease involving native coronary artery of native heart with angina pectoris (H)      Moderate aortic regurgitation - Yes     Abnormal cardiovascular stress test      Hyperlipidemia LDL goal <100        CURRENT MEDICATIONS:  Current Outpatient Medications   Medication Sig Dispense Refill     aspirin EC 81 MG EC tablet Take 1 tablet (81 mg) by mouth daily       Cholecalciferol (VITAMIN D) 2000 UNIT CAPS Take 1 tablet by mouth daily.       coenzyme Q10 (CO-Q10) 30 MG capsule Take 1 capsule (30 mg) by mouth daily 30 capsule 0     fish oil-omega-3 fatty acids 1000 MG capsule Take 2 g by mouth 2 times daily        Ginkgo Biloba (GINKOBA PO)        nitroGLYcerin (NITROSTAT) 0.4 MG sublingual tablet Place 1 tablet (0.4 mg) under the tongue every 5 minutes as needed for chest pain 20 tablet 1     OVER-THE-COUNTER Take 1 tablet by mouth daily (Melaleuca vitamins)         ALLERGIES     Allergies   Allergen Reactions     Sulfa Drugs        PAST MEDICAL HISTORY:  Past Medical History:   Diagnosis Date     Bone spur 4th toe Right      Lumbar disc herniation      Moderate aortic regurgitation 6/18/2016     Osteopenia      Pathologic fracture of distal radius and ulna     right in 2003, left 1992       PAST SURGICAL HISTORY:  Past Surgical History:   Procedure Laterality Date     CARPAL TUNNEL RELEASE RT/LT Right 10/20/2021    Right carpal tunnel release under local anesthetic,   "Linus Conrad [facility]     CARPAL TUNNEL RELEASE RT/LT Left 03/02/2022    Left carpal tunnel release under local anesthesia, Dr. Linus Conrad [facility].     cataract  01/01/2011    bilateral     HC TOOTH EXTRACTION W/FORCEP       ZZC LIGATE FALLOPIAN TUBE,POSTPARTUM      Tubal Ligation       FAMILY HISTORY:  Family History   Problem Relation Age of Onset     Cardiovascular Mother         Mild MI 80's     Cerebrovascular Disease Father      Diabetes Father      Neurologic Disorder Sister         brain tumor     Lipids Sister      Hypertension Sister      Gastrointestinal Disease Sister         diverticulitis     Cancer Sister         Breast cancer       SOCIAL HISTORY:  Social History     Socioeconomic History     Marital status:      Spouse name: None     Number of children: 4     Years of education: None     Highest education level: None   Occupational History     Occupation: Retired   Tobacco Use     Smoking status: Passive Smoke Exposure - Never Smoker     Smokeless tobacco: Never     Tobacco comments:      smoked in house   Vaping Use     Vaping Use: Never used   Substance and Sexual Activity     Alcohol use: No     Drug use: No     Sexual activity: Not Currently   Other Topics Concern     Parent/sibling w/ CABG, MI or angioplasty before 65F 55M? No       Review of Systems:  Skin:  not assessed     Eyes:  not assessed    ENT:  not assessed    Respiratory:  Negative    Cardiovascular:    chest pain;Positive for  Gastroenterology: not assessed    Genitourinary:  not assessed    Musculoskeletal:  not assessed    Neurologic:  not assessed    Psychiatric:  not assessed    Heme/Lymph/Imm:  not assessed    Endocrine:  Negative      Physical Exam:  Vitals: BP (!) 181/77 (BP Location: Right arm, Patient Position: Sitting)   Pulse 93   Ht 1.549 m (5' 1\")   Wt 72.8 kg (160 lb 6.4 oz)   LMP  (LMP Unknown)   SpO2 98%   BMI 30.31 kg/m      Constitutional:           Skin:             Head:   "         Eyes:           Lymph:      ENT:           Neck:           Respiratory:            Cardiac:                                                           GI:           Extremities and Muscular Skeletal:                 Neurological:           Psych:         Recent Lab Results:  LIPID RESULTS:  Lab Results   Component Value Date    CHOL 227 (H) 07/19/2018    HDL 50 07/19/2018     (H) 09/12/2022     (H) 07/19/2018    TRIG 111 07/19/2018    CHOLHDLRATIO 4.7 04/20/2015       LIVER ENZYME RESULTS:  Lab Results   Component Value Date    AST 17 09/12/2022    AST 16 04/13/2021    ALT 25 09/12/2022    ALT 23 04/13/2021       CBC RESULTS:  Lab Results   Component Value Date    WBC 7.5 09/12/2022    WBC 6.3 04/13/2021    RBC 3.92 09/12/2022    RBC 3.79 (L) 04/13/2021    HGB 12.2 09/12/2022    HGB 11.6 (L) 04/13/2021    HCT 36.2 09/12/2022    HCT 35.7 04/13/2021    MCV 92 09/12/2022    MCV 94 04/13/2021    MCH 31.1 09/12/2022    MCH 30.6 04/13/2021    MCHC 33.7 09/12/2022    MCHC 32.5 04/13/2021    RDW 14.1 09/12/2022    RDW 13.5 04/13/2021     09/12/2022     04/13/2021       BMP RESULTS:  Lab Results   Component Value Date     09/12/2022     04/13/2021    POTASSIUM 4.1 09/12/2022    POTASSIUM 4.3 04/13/2021    CHLORIDE 106 09/12/2022    CHLORIDE 105 04/13/2021    CO2 26 09/12/2022    CO2 28 04/13/2021    ANIONGAP 6 09/12/2022    ANIONGAP 4 04/13/2021     (H) 09/12/2022    GLC 98 04/13/2021    BUN 24 09/12/2022    BUN 23 04/13/2021    CR 0.66 09/12/2022    CR 0.73 04/13/2021    GFRESTIMATED 86 09/12/2022    GFRESTIMATED 76 04/13/2021    GFRESTBLACK 88 04/13/2021    LAMAR 9.3 09/12/2022    LAMAR 9.6 04/13/2021        A1C RESULTS:  Lab Results   Component Value Date    A1C 5.8 06/07/2016       INR RESULTS:  Lab Results   Component Value Date    INR 0.96 06/07/2016         CC  Tasha Winkler MD  [address] [address]    Thank you for allowing me to  participate in the care of your patient.      Sincerely,     CASSANDRA PAN MD     [facility]

## 2023-03-20 NOTE — PROGRESS NOTES
HPI and Plan:     Krystal Parra is a pleasant 84 y/o female referred here for abnormal stress test.   She has a history of hyperlipidemia, and moderate aortic insufficiency.     Nuclear stress test reviewed shows small area of mild ischemia in LV apex c/w LAD disease.  LVEF normal.     Episode of chest discomfort last fall and again in February lasting a couple of hours.  Last year called paramedics and they came out and looked at EKG unremarkable.     No associated nausea, vomiting, SOB, or diaphoresis. Did not seek out medical treatment for the second episode in February.  Walks with walker and has not had recent issues.     TC of 227 with .  Family history of mother MI in 80's.  EKG unremarkable.  Has not been taking any medications for cholesterol as she prefers non pharmalogical therapy unless absolutely necessary.      No edema, palpations, syncope or near syncope.     BP at home is normal.  But is high in doctors offices.     Recommended initiation of beta blockers and consideration of coronary angiogram.   Secondary to small apical infarction.    Personally reviewed echocardiogram.  Stable moderate aortic insufficiency.       Krystal is reluctant to start medication.  Discussed with her the side effect of beta blockers which are typically very well tolerated but may cause fatigue.  And recommendation for starting statin with the plan to stabilize coronary plaque and decrease future risk of heart attack.  Told most common side effect was muscle aches in 5-7 %.  Also showed her how to register and sign into [employer].         Today's clinic visit entailed:  Review of the result(s) of each unique test - echocardiogram, EKG, stress test  Ordering of each unique test  Prescription drug management  40 minutes spent on the date of the encounter doing chart review, history and exam, documentation and further activities per the note  Provider  Link to [facility] Help Grid     The level of medical decision  making during this visit was of high complexity.      No orders of the defined types were placed in this encounter.    No orders of the defined types were placed in this encounter.    There are no discontinued medications.      Encounter Diagnoses   Name Primary?     Chest pain, unspecified type      Abnormal stress test      Coronary artery disease involving native coronary artery of native heart with angina pectoris (H)      Moderate aortic regurgitation - Yes     Abnormal cardiovascular stress test      Hyperlipidemia LDL goal <100        CURRENT MEDICATIONS:  Current Outpatient Medications   Medication Sig Dispense Refill     aspirin EC 81 MG EC tablet Take 1 tablet (81 mg) by mouth daily       Cholecalciferol (VITAMIN D) 2000 UNIT CAPS Take 1 tablet by mouth daily.       coenzyme Q10 (CO-Q10) 30 MG capsule Take 1 capsule (30 mg) by mouth daily 30 capsule 0     fish oil-omega-3 fatty acids 1000 MG capsule Take 2 g by mouth 2 times daily        Ginkgo Biloba (GINKOBA PO)        nitroGLYcerin (NITROSTAT) 0.4 MG sublingual tablet Place 1 tablet (0.4 mg) under the tongue every 5 minutes as needed for chest pain 20 tablet 1     OVER-THE-COUNTER Take 1 tablet by mouth daily (Melaleuca vitamins)         ALLERGIES     Allergies   Allergen Reactions     Sulfa Drugs        PAST MEDICAL HISTORY:  Past Medical History:   Diagnosis Date     Bone spur 4th toe Right      Lumbar disc herniation      Moderate aortic regurgitation 6/18/2016     Osteopenia      Pathologic fracture of distal radius and ulna     right in 2003, left 1992       PAST SURGICAL HISTORY:  Past Surgical History:   Procedure Laterality Date     CARPAL TUNNEL RELEASE RT/LT Right 10/20/2021    Right carpal tunnel release under local anesthetic, Dr. Linus Conrad, [facility]     CARPAL TUNNEL RELEASE RT/LT Left 03/02/2022    Left carpal tunnel release under local anesthesia, Dr. Linus Conrad, [facility].     cataract  01/01/2011    bilateral     HC  "TOOTH EXTRACTION W/FORCEP       ZZC LIGATE FALLOPIAN TUBE,POSTPARTUM      Tubal Ligation       FAMILY HISTORY:  Family History   Problem Relation Age of Onset     Cardiovascular Mother         Mild MI 80's     Cerebrovascular Disease Father      Diabetes Father      Neurologic Disorder Sister         brain tumor     Lipids Sister      Hypertension Sister      Gastrointestinal Disease Sister         diverticulitis     Cancer Sister         Breast cancer       SOCIAL HISTORY:  Social History     Socioeconomic History     Marital status:      Spouse name: None     Number of children: 4     Years of education: None     Highest education level: None   Occupational History     Occupation: Retired   Tobacco Use     Smoking status: Passive Smoke Exposure - Never Smoker     Smokeless tobacco: Never     Tobacco comments:      smoked in house   Vaping Use     Vaping Use: Never used   Substance and Sexual Activity     Alcohol use: No     Drug use: No     Sexual activity: Not Currently   Other Topics Concern     Parent/sibling w/ CABG, MI or angioplasty before 65F 55M? No       Review of Systems:  Skin:  not assessed     Eyes:  not assessed    ENT:  not assessed    Respiratory:  Negative    Cardiovascular:    chest pain;Positive for  Gastroenterology: not assessed    Genitourinary:  not assessed    Musculoskeletal:  not assessed    Neurologic:  not assessed    Psychiatric:  not assessed    Heme/Lymph/Imm:  not assessed    Endocrine:  Negative      Physical Exam:  Vitals: BP (!) 181/77 (BP Location: Right arm, Patient Position: Sitting)   Pulse 93   Ht 1.549 m (5' 1\")   Wt 72.8 kg (160 lb 6.4 oz)   LMP  (LMP Unknown)   SpO2 98%   BMI 30.31 kg/m      Constitutional:           Skin:             Head:           Eyes:           Lymph:      ENT:           Neck:           Respiratory:            Cardiac:                                                           GI:           Extremities and Muscular Skeletal:        "          Neurological:           Psych:         Recent Lab Results:  LIPID RESULTS:  Lab Results   Component Value Date    CHOL 227 (H) 07/19/2018    HDL 50 07/19/2018     (H) 09/12/2022     (H) 07/19/2018    TRIG 111 07/19/2018    CHOLHDLRATIO 4.7 04/20/2015       LIVER ENZYME RESULTS:  Lab Results   Component Value Date    AST 17 09/12/2022    AST 16 04/13/2021    ALT 25 09/12/2022    ALT 23 04/13/2021       CBC RESULTS:  Lab Results   Component Value Date    WBC 7.5 09/12/2022    WBC 6.3 04/13/2021    RBC 3.92 09/12/2022    RBC 3.79 (L) 04/13/2021    HGB 12.2 09/12/2022    HGB 11.6 (L) 04/13/2021    HCT 36.2 09/12/2022    HCT 35.7 04/13/2021    MCV 92 09/12/2022    MCV 94 04/13/2021    MCH 31.1 09/12/2022    MCH 30.6 04/13/2021    MCHC 33.7 09/12/2022    MCHC 32.5 04/13/2021    RDW 14.1 09/12/2022    RDW 13.5 04/13/2021     09/12/2022     04/13/2021       BMP RESULTS:  Lab Results   Component Value Date     09/12/2022     04/13/2021    POTASSIUM 4.1 09/12/2022    POTASSIUM 4.3 04/13/2021    CHLORIDE 106 09/12/2022    CHLORIDE 105 04/13/2021    CO2 26 09/12/2022    CO2 28 04/13/2021    ANIONGAP 6 09/12/2022    ANIONGAP 4 04/13/2021     (H) 09/12/2022    GLC 98 04/13/2021    BUN 24 09/12/2022    BUN 23 04/13/2021    CR 0.66 09/12/2022    CR 0.73 04/13/2021    GFRESTIMATED 86 09/12/2022    GFRESTIMATED 76 04/13/2021    GFRESTBLACK 88 04/13/2021    LAMAR 9.3 09/12/2022    LAMAR 9.6 04/13/2021        A1C RESULTS:  Lab Results   Component Value Date    A1C 5.8 06/07/2016       INR RESULTS:  Lab Results   Component Value Date    INR 0.96 06/07/2016           CC  Tasha Winkler MD  [address] [place] [address]

## 2023-04-04 ENCOUNTER — HOSPITAL ENCOUNTER (EMERGENCY)
Facility: CLINIC | Age: 83
Discharge: HOME | End: 2023-04-04
Attending: EMERGENCY MEDICINE | Admitting: EMERGENCY MEDICINE
Payer: COMMERCIAL

## 2023-04-04 VITALS
TEMPERATURE: 99 F | BODY MASS INDEX: 29.29 KG/M2 | SYSTOLIC BLOOD PRESSURE: 167 MMHG | HEART RATE: 75 BPM | RESPIRATION RATE: 30 BRPM | DIASTOLIC BLOOD PRESSURE: 80 MMHG | OXYGEN SATURATION: 98 % | WEIGHT: 155 LBS

## 2023-04-04 DIAGNOSIS — R07.9 CHEST PAIN, UNSPECIFIED TYPE: ICD-10-CM

## 2023-04-04 LAB
ALBUMIN SERPL BCG-MCNC: 4.3 G/DL (ref 3.5–5.2)
ALP SERPL-CCNC: 66 U/L (ref 35–104)
ALT SERPL W P-5'-P-CCNC: 28 U/L (ref 10–35)
ANION GAP SERPL CALCULATED.3IONS-SCNC: 8 MMOL/L (ref 7–15)
AST SERPL W P-5'-P-CCNC: 24 U/L (ref 10–35)
ATRIAL RATE - MUSE: 83 BPM
BASOPHILS # BLD AUTO: 0 10E3/UL (ref 0–0.2)
BASOPHILS NFR BLD AUTO: 1 %
BILIRUB SERPL-MCNC: 0.4 MG/DL
BUN SERPL-MCNC: 20.6 MG/DL (ref 8–23)
CALCIUM SERPL-MCNC: 10.3 MG/DL (ref 8.8–10.2)
CHLORIDE SERPL-SCNC: 103 MMOL/L (ref 98–107)
CREAT SERPL-MCNC: 0.75 MG/DL (ref 0.51–0.95)
DEPRECATED HCO3 PLAS-SCNC: 30 MMOL/L (ref 22–29)
DIASTOLIC BLOOD PRESSURE - MUSE: NORMAL MMHG
EOSINOPHIL # BLD AUTO: 0.2 10E3/UL (ref 0–0.7)
EOSINOPHIL NFR BLD AUTO: 3 %
ERYTHROCYTE [DISTWIDTH] IN BLOOD BY AUTOMATED COUNT: 13.2 % (ref 10–15)
GFR SERPL CREATININE-BSD FRML MDRD: 78 ML/MIN/1.73M2
GLUCOSE SERPL-MCNC: 134 MG/DL (ref 70–99)
HCT VFR BLD AUTO: 36.7 % (ref 35–47)
HGB BLD-MCNC: 12.3 G/DL (ref 11.7–15.7)
HOLD SPECIMEN: NORMAL
IMM GRANULOCYTES # BLD: 0 10E3/UL
IMM GRANULOCYTES NFR BLD: 0 %
INTERPRETATION ECG - MUSE: NORMAL
LYMPHOCYTES # BLD AUTO: 2.7 10E3/UL (ref 0.8–5.3)
LYMPHOCYTES NFR BLD AUTO: 39 %
MCH RBC QN AUTO: 30.6 PG (ref 26.5–33)
MCHC RBC AUTO-ENTMCNC: 33.5 G/DL (ref 31.5–36.5)
MCV RBC AUTO: 91 FL (ref 78–100)
MONOCYTES # BLD AUTO: 0.5 10E3/UL (ref 0–1.3)
MONOCYTES NFR BLD AUTO: 7 %
NEUTROPHILS # BLD AUTO: 3.6 10E3/UL (ref 1.6–8.3)
NEUTROPHILS NFR BLD AUTO: 50 %
NRBC # BLD AUTO: 0 10E3/UL
NRBC BLD AUTO-RTO: 0 /100
P AXIS - MUSE: 48 DEGREES
PLATELET # BLD AUTO: 211 10E3/UL (ref 150–450)
POTASSIUM SERPL-SCNC: 4.1 MMOL/L (ref 3.4–5.3)
PR INTERVAL - MUSE: 166 MS
PROT SERPL-MCNC: 7.3 G/DL (ref 6.4–8.3)
QRS DURATION - MUSE: 76 MS
QT - MUSE: 360 MS
QTC - MUSE: 423 MS
R AXIS - MUSE: -5 DEGREES
RBC # BLD AUTO: 4.02 10E6/UL (ref 3.8–5.2)
SODIUM SERPL-SCNC: 141 MMOL/L (ref 136–145)
SYSTOLIC BLOOD PRESSURE - MUSE: NORMAL MMHG
T AXIS - MUSE: 60 DEGREES
TROPONIN T SERPL HS-MCNC: 8 NG/L
TROPONIN T SERPL HS-MCNC: 9 NG/L
VENTRICULAR RATE- MUSE: 83 BPM
WBC # BLD AUTO: 7.1 10E3/UL (ref 4–11)

## 2023-04-04 PROCEDURE — 93005 ELECTROCARDIOGRAM TRACING: CPT

## 2023-04-04 PROCEDURE — 85004 AUTOMATED DIFF WBC COUNT: CPT | Performed by: EMERGENCY MEDICINE

## 2023-04-04 PROCEDURE — 36415 COLL VENOUS BLD VENIPUNCTURE: CPT | Performed by: EMERGENCY MEDICINE

## 2023-04-04 PROCEDURE — 80053 COMPREHEN METABOLIC PANEL: CPT | Performed by: EMERGENCY MEDICINE

## 2023-04-04 PROCEDURE — 84484 ASSAY OF TROPONIN QUANT: CPT | Performed by: EMERGENCY MEDICINE

## 2023-04-04 PROCEDURE — 250N000009 HC RX 250: Performed by: EMERGENCY MEDICINE

## 2023-04-04 PROCEDURE — 99284 EMERGENCY DEPT VISIT MOD MDM: CPT

## 2023-04-04 PROCEDURE — 84484 ASSAY OF TROPONIN QUANT: CPT | Mod: 91 | Performed by: EMERGENCY MEDICINE

## 2023-04-04 PROCEDURE — 250N000013 HC RX MED GY IP 250 OP 250 PS 637: Performed by: EMERGENCY MEDICINE

## 2023-04-04 PROCEDURE — 85025 COMPLETE CBC W/AUTO DIFF WBC: CPT | Performed by: EMERGENCY MEDICINE

## 2023-04-04 RX ORDER — MAGNESIUM OXIDE 400 MG/1
800 TABLET ORAL ONCE
Status: DISCONTINUED | OUTPATIENT
Start: 2023-04-04 | End: 2023-04-04

## 2023-04-04 RX ORDER — PANTOPRAZOLE SODIUM 40 MG/1
40 TABLET, DELAYED RELEASE ORAL ONCE
Status: COMPLETED | OUTPATIENT
Start: 2023-04-04 | End: 2023-04-04

## 2023-04-04 RX ORDER — ONDANSETRON 2 MG/ML
4 INJECTION INTRAMUSCULAR; INTRAVENOUS EVERY 30 MIN PRN
Status: DISCONTINUED | OUTPATIENT
Start: 2023-04-04 | End: 2023-04-04

## 2023-04-04 RX ORDER — FOLIC ACID 1 MG/1
1 TABLET ORAL ONCE
Status: DISCONTINUED | OUTPATIENT
Start: 2023-04-04 | End: 2023-04-04

## 2023-04-04 RX ORDER — MULTIVITAMIN,THERAPEUTIC
1 TABLET ORAL ONCE
Status: DISCONTINUED | OUTPATIENT
Start: 2023-04-04 | End: 2023-04-04

## 2023-04-04 RX ORDER — METOPROLOL TARTRATE 25 MG/1
25 TABLET, FILM COATED ORAL ONCE
Status: DISCONTINUED | OUTPATIENT
Start: 2023-04-04 | End: 2023-04-04

## 2023-04-04 RX ADMIN — PANTOPRAZOLE SODIUM 40 MG: 40 TABLET, DELAYED RELEASE ORAL at 19:36

## 2023-04-04 RX ADMIN — LIDOCAINE HYDROCHLORIDE 30 ML: 20 SOLUTION ORAL; TOPICAL at 17:32

## 2023-04-04 ASSESSMENT — ENCOUNTER SYMPTOMS
DIAPHORESIS: 0
NAUSEA: 0
MYALGIAS: 0
NECK PAIN: 0
SHORTNESS OF BREATH: 0

## 2023-04-04 ASSESSMENT — ACTIVITIES OF DAILY LIVING (ADL)
ADLS_ACUITY_SCORE: 33
ADLS_ACUITY_SCORE: 37

## 2023-04-04 NOTE — ED PROVIDER NOTES
History     Chief Complaint:  Chest Pain     The history is provided by the patient.      Krystal Parra is a 83 year old female with a history of moderate aortic regurgitation, hypertension, hyperlipidemia, and TIA who presents with chest pain. Patient states she has had achy lower chest pain since 1130 today while seated in a chair at home. She states the pain caused her extreme discomfort, but pain has improved at the time of this interview. Exertion or laying down did not affect the pain. No shortness of breath, nausea, or diaphoresis at home. No radiation to her neck or arm. Nitroglycerin reportedly did not alleviate pain at home. She is unsure if the GI cocktail given in ED helped alleviate the pain because she feels hungry. She notes she had this chest pain twice before and her last ECHO showed some abnormalities in her left ventricle that could've been caused by a past mild MI. She states her cardiologist told her the next time she has chest pain to come to the ED. Patient states she was recently put on Lipitor and Toprol XL. Patient notes that her mother had a mild MI in her 80s. She reports no history of diabetes, tobacco use, lung disease, recent long travel, or current hormone therapy.     NM Lexiscan stress test 3-13-23       The nuclear stress test is abnormal.     There is a small area of a mild degree of infarction in the apical segment(s) of the left ventricle. This appears to be in the left anterior descending distribution.     Left ventricular function is normal.     The left ventricular ejection fraction at rest is 55%.  The left ventricular ejection fraction at stress is 56%.     There is no prior study for comparison.       Independent Historian:   None - Patient Only    Review of External Notes: I reviewed the progress note from Dr. Reeves from cardiology from 3/20/2023    ROS:  Review of Systems   Constitutional: Negative for diaphoresis.   Respiratory: Negative for shortness of breath.     Cardiovascular: Positive for chest pain (lower).   Gastrointestinal: Negative for nausea.   Musculoskeletal: Negative for myalgias (arm) and neck pain.   All other systems reviewed and are negative.    Allergies:  Sulfa Drugs     Medications:    Aspirin 81 mg  Lipitor   Toprol XL    Past Medical History:     Lumbar disc herniation   Moderate aortic regurgitation  Osteopenia   Subclinical hypothyroidism   TIA  Hyperlipidemia   Hypertension   Thyroid nodule     Past Surgical History:    Bilateral carpal tunnel release   Bilateral IOL  Tooth extraction w/forcep  Tubal ligation     Family History:    Sister: cancer  Mother: cardiovascular disease  Father: cerebrovascular disease, diabetes  Sister: GI disease, hypertension, hyperlipidemia, neurologic disorder     Social History:  Patient presents to the ED via private vehicle with her two sons and daughter   Lives in a senior living facility   PCP: Tasah Winkler     Physical Exam     Patient Vitals for the past 24 hrs:   BP Temp Pulse Resp SpO2 Weight   04/04/23 1800 (!) 167/80 -- 75 30 98 % --   04/04/23 1730 (!) 181/72 -- 84 14 98 % --   04/04/23 1721 (!) 177/65 -- 80 17 98 % --   04/04/23 1720 -- -- 82 20 99 % --   04/04/23 1300 (!) 188/76 99  F (37.2  C) 85 18 98 % 70.3 kg (155 lb)      Physical Exam  Nursing note and vitals reviewed.    Constitutional:  Appears comfortable.    HENT:                Nose normal.  No discharge.      Oral mucosa is moist.  Eyes:    Conjunctivae are normal without injection.  Pupils are equal.  Cardiovascular:  Normal rate, regular rhythm with normal S1 and S2.      Normal heart sounds and peripheral pulses 2+ and equal.       No murmur or michelle.  Pulmonary:  Effort normal and breath sounds clear to auscultation bilaterally     No wheezes. No rales.     GI:    Soft. No distension and no mass. No tenderness.   Musculoskeletal:  Normal range of motion. No extremity deformity.     No edema and no tenderness.    Neurological:    Alert and oriented. No focal weakness.  Skin:    Skin is warm and dry. No rash noted.   Psychiatric:   Behavior is normal. Appropriate mood and affect.     Judgment and thought content normal.     Emergency Department Course   ECG  ECG results from 04/04/23   EKG 12-lead, tracing only     Value    Systolic Blood Pressure     Diastolic Blood Pressure     Ventricular Rate 83    Atrial Rate 83    GA Interval 166    QRS Duration 76        QTc 423    P Axis 48    R AXIS -5    T Axis 60    Interpretation ECG      Sinus rhythm  Cannot rule out Anterior infarct , age undetermined  Abnormal ECG  When compared with ECG of 07-JUN-2016,  No significant change was found     Laboratory:  Labs Ordered and Resulted from Time of ED Arrival to Time of ED Departure   COMPREHENSIVE METABOLIC PANEL - Abnormal       Result Value    Sodium 141      Potassium 4.1      Chloride 103      Carbon Dioxide (CO2) 30 (*)     Anion Gap 8      Urea Nitrogen 20.6      Creatinine 0.75      Calcium 10.3 (*)     Glucose 134 (*)     Alkaline Phosphatase 66      AST 24      ALT 28      Protein Total 7.3      Albumin 4.3      Bilirubin Total 0.4      GFR Estimate 78     TROPONIN T, HIGH SENSITIVITY - Normal    Troponin T, High Sensitivity 9     TROPONIN T, HIGH SENSITIVITY - Normal    Troponin T, High Sensitivity 8     CBC WITH PLATELETS AND DIFFERENTIAL    WBC Count 7.1      RBC Count 4.02      Hemoglobin 12.3      Hematocrit 36.7      MCV 91      MCH 30.6      MCHC 33.5      RDW 13.2      Platelet Count 211      % Neutrophils 50      % Lymphocytes 39      % Monocytes 7      % Eosinophils 3      % Basophils 1      % Immature Granulocytes 0      NRBCs per 100 WBC 0      Absolute Neutrophils 3.6      Absolute Lymphocytes 2.7      Absolute Monocytes 0.5      Absolute Eosinophils 0.2      Absolute Basophils 0.0      Absolute Immature Granulocytes 0.0      Absolute NRBCs 0.0        Emergency Department Course & Assessments:      Interventions:  Medications   lidocaine (viscous) (XYLOCAINE) 2 % 15 mL, alum & mag hydroxide-simethicone (MAALOX) 15 mL GI Cocktail (30 mLs Oral $Given 4/4/23 1732)   pantoprazole (PROTONIX) EC tablet 40 mg (40 mg Oral $Given 4/4/23 1936)      Independent Interpretation (X-rays, CTs, rhythm strip):  None    Assessments/Consultations/Discussion of Management or Tests:   ED Course as of 04/04/23 1937 Tue Apr 04, 2023   1800 I obtained history and examined the patient as noted above.    1850 I rechecked the patient and explained findings.      Social Determinants of Health affecting care:   None    Disposition:  The patient was discharged to home.     Impression & Plan      Medical Decision Making:  Patient comes in with another episode of her lower chest discomfort that she has had 2 other times.  She had a recent abnormal stress test with some mild hypokinesis.  Her troponin is 9, repeat was 8.  EKG is normal.  She did not have much pain by the time she was discharged here.  The only risk factor is hyperlipidemia.  She has a heart score of 2 and with her pain being the same as it was 2 other times and resolving and not responding to nitro with 2 negative troponins, I feel safe discharging her home.  However I do want her to follow-up with her cardiologist.  She knows to return to the ER at any time if her symptoms worsen with shortness of breath sweating and radiating pain.  Because of the possibility this could be GI and not related to the findings on her stress test, I am going to put her on omeprazole she was given her first dose here.  She will contact her cardiologist for a follow-up appoint within the next week.      Take the omeprazole for up to a week or 10 days, liquid antiacid as needed to see if it helps if this recurs.  Keep a journal of your symptoms.  Contact your cardiologist for an appointment.  If you develop chest heaviness with shortness of breath, radiating to your jaw or down your arm and  you are sweating, call 911 and return to the ER.    Diagnosis:    ICD-10-CM    1. Chest pain, unspecified type  R07.9          Discharge Medications:  New Prescriptions    OMEPRAZOLE (PRILOSEC) 20 MG DR CAPSULE    Take 1 capsule (20 mg) by mouth daily      Scribe Disclosure:  I, Skyler Ramosnora, am serving as a scribe at 6:24 PM on 4/4/2023 to document services personally performed by Karen Lemus MD based on my observations and the provider's statements to me.     4/4/2023   Karen Lemus MD Powell, Tracy Alan, MD  04/04/23 1937

## 2023-04-04 NOTE — ED TRIAGE NOTES
CP started 11am; unknown cause; ,tried to eat something cause she thought it was GERD; took a nitroglycerin, didn't change the sx     Triage Assessment       Row Name 04/04/23 1304       Triage Assessment (Adult)    Airway WDL WDL       Respiratory WDL    Respiratory WDL WDL       Skin Circulation/Temperature WDL    Skin Circulation/Temperature WDL WDL       Cardiac WDL    Cardiac WDL X;chest pain       Chest Pain Assessment    Chest Pain Location midsternal    Chest Pain Radiation back       Peripheral/Neurovascular WDL    Peripheral Neurovascular WDL WDL       Cognitive/Neuro/Behavioral WDL    Cognitive/Neuro/Behavioral WDL WDL

## 2023-04-05 ENCOUNTER — TELEPHONE (OUTPATIENT)
Dept: PEDIATRICS | Facility: CLINIC | Age: 83
End: 2023-04-05

## 2023-04-05 ENCOUNTER — TELEPHONE (OUTPATIENT)
Dept: CARDIOLOGY | Facility: CLINIC | Age: 83
End: 2023-04-05
Payer: COMMERCIAL

## 2023-04-05 NOTE — DISCHARGE INSTRUCTIONS
Take the omeprazole for up to a week or 10 days, liquid antiacid as needed to see if it helps if this recurs.  Keep a journal of your symptoms.  Contact your cardiologist for an appointment.  If you develop chest heaviness with shortness of breath, radiating to your jaw or down your arm and you are sweating, call 911 and return to the ER.

## 2023-04-05 NOTE — TELEPHONE ENCOUNTER
[facility] Call Center    Phone Message    May a detailed message be left on voicemail: yes     Reason for Call: Other: Pt would like to discuss recent ER visit, please reach out to [phone] to discuss.     Action Taken: Other: Cardiology    Travel Screening: Not Applicable     Thank you!  [facility]

## 2023-04-05 NOTE — TELEPHONE ENCOUNTER
Patient calling.   Went to ER yesterday for chest discomfort.   Reviewed ER plan with the patient.   Patient inquired if she should follow up with Dr. Winkler.   Advised ok to continue Omeprazole treatment and follow up prn.   Patient understands and agrees with the plan.     Reviewed that patient also spoke with Cardio RN. ER follow up deferred to June.   Patient was to call back if any symptoms returned.

## 2023-04-05 NOTE — TELEPHONE ENCOUNTER
Spoke with patient regarding her recent ED visit. Pt stated she has been feeling well since. She started the Omeprazole and will take it for the 10 days that the ED MD recommended. Pt has a visit with Anamaria CLARKE on 6/21/23. Pt feels ok keeping that as it for now. Reviewed ED recommendation to have liquid antiacid on hand and try taking that if these symptoms reoccur. Pt agreed to that plan. RN recommended if that does not help the chest pain that patient should go to ED/ call cardiology for a sooner visit. Pt agreed to plan.

## 2023-06-21 ENCOUNTER — OFFICE VISIT (OUTPATIENT)
Dept: CARDIOLOGY | Facility: CLINIC | Age: 83
End: 2023-06-21
Attending: INTERNAL MEDICINE
Payer: COMMERCIAL

## 2023-06-21 VITALS
DIASTOLIC BLOOD PRESSURE: 74 MMHG | HEIGHT: 60 IN | BODY MASS INDEX: 31.02 KG/M2 | WEIGHT: 158 LBS | HEART RATE: 89 BPM | SYSTOLIC BLOOD PRESSURE: 157 MMHG

## 2023-06-21 DIAGNOSIS — I35.1 MODERATE AORTIC REGURGITATION: ICD-10-CM

## 2023-06-21 DIAGNOSIS — E78.5 HYPERLIPIDEMIA LDL GOAL <100: ICD-10-CM

## 2023-06-21 DIAGNOSIS — R94.39 ABNORMAL STRESS TEST: Primary | ICD-10-CM

## 2023-06-21 DIAGNOSIS — I10 BENIGN ESSENTIAL HYPERTENSION: ICD-10-CM

## 2023-06-21 PROCEDURE — 99214 OFFICE O/P EST MOD 30 MIN: CPT | Performed by: NURSE PRACTITIONER

## 2023-06-21 RX ORDER — METOPROLOL SUCCINATE 25 MG/1
25 TABLET, EXTENDED RELEASE ORAL DAILY
Qty: 90 TABLET | Refills: 3 | Status: SHIPPED | OUTPATIENT
Start: 2023-06-21 | End: 2023-09-18

## 2023-06-21 RX ORDER — ATORVASTATIN CALCIUM 20 MG/1
20 TABLET, FILM COATED ORAL DAILY
Qty: 90 TABLET | Refills: 3 | Status: SHIPPED | OUTPATIENT
Start: 2023-06-21 | End: 2023-09-18

## 2023-06-21 NOTE — PATIENT INSTRUCTIONS
Thanks for participating in a office visit with the [facility] today.  Doing well on a cardiac standpoint  Reviewed results of abnormal stress test. Patient wishes to hold off on angiogram unless she has recurrent symptoms.   Continue on current medical therapy  Due for annual labs with PCP in September.   Echocardiogram in 1 year for surveillance monitoring of aortic regurgitation.   Encourage exercise and heart healthy diet     Follow up in 1 year with LEYLA     Please call my nurse at  [phone] with any questions or concerns.    Scheduling phone number: [phone]  Reminder: Please bring in all current medications, over the counter supplements and vitamin bottles to your next appointment.

## 2023-06-21 NOTE — LETTER
6/21/2023    Tasha Winkler MD  [address] [place] [address]    RE: Krystal Parra       Dear Colleague,     I had the pleasure of seeing Krystal Parra in the [facility].  Cardiology Clinic Progress Note  Krystal Parra MRN# [id number]   YOB: 1940 Age: 83 year old     Reason For Visit:  3-month follow-up   Primary Cardiologist:   Dr. Reeves          History of Presenting Illness:      Krystal Parra is a pleasant 83 year old patient who carries a past medical history significant for hyperlipidemia, moderate aortic insufficiency, hypertension, and TIA.    She was last seen on 3/20/2023 by Dr. Reeves for evaluation of abnormal stress test ordered by her PCP after patient reported to episodes of chest pain.  Results showed a small area of mild ischemia in the LV apex consistent with LAD disease.  Normal EF.  She was initiated on metoprolol and atorvastatin and offered a coronary angiogram which she declined.     She returns to the office today for a 3-month follow-up.  She does not endorse any chest pain, shortness of breath, palpitations, PND, orthopnea, presyncope, syncope, or lower extremity edema.  Her primary complaint is back pain which causes significant physical limitations.     Last echocardiogram in 2021 showed a normal ejection fraction, grade 1 diastolic dysfunction, normal RV size and function, trace to mild tricuspid regurgitation, and moderate aortic regurgitation (unchanged from previous study in 2017).     Blood pressure is mildly elevated at 157/74.  She brings in a home blood pressure log that shows pressures are well controlled.  Last BMP and CBC were unremarkable  , on low-dose statin  BMI 30.86, 158 pounds    Remains compliant with all medications.                   Assessment and Plan:       1.  Abnormal stress test -results showed a small area of mild ischemia in the LV apex consistent with LAD disease.  She has had no  recurrence of chest pain.  She does not wish to pursue any invasive angiogram at this time in the absence of symptoms.  Continue metoprolol, aspirin, and atorvastatin.     2.  Hypertension -elevated today, well controlled with home monitoring.  3.  Hyperlipidemia -on statin  4.  Aortic regurgitation - Last echocardiogram in 2021 showed a normal ejection fraction, grade 1 diastolic dysfunction, normal RV size and function, trace to mild tricuspid regurgitation, and moderate aortic regurgitation (unchanged from previous study in 2017).  Recommend follow-up echocardiogram for surveillance monitoring.           Thank you for allowing me to participate in this delightful patient's care. I have recommended she follow up in 1 year with LEYLA or sooner if needed.       Anamaria Mondragon, APRN CNP         Review of Systems:     Review of Systems:  Skin:        Eyes:       ENT:       Respiratory:  Negative    Cardiovascular:  Negative    Gastroenterology:      Genitourinary:       Musculoskeletal:       Neurologic:       Psychiatric:       Heme/Lymph/Imm:       Endocrine:                   Physical Exam:     GEN:  In general, this is a overweight elderly female in no acute distress.  HEENT:  Pupils equal, round. Sclerae nonicteric. Clear oropharynx. Mucous membranes moist.  NECK: Supple, no masses appreciated. Trachea midline.  No JVD   C/V:  Regular rate and rhythm, systolic murmur, no rub or gallop. No S3 or RV heave.   RESP: Respirations are unlabored. No use of accessory muscles. Clear to auscultation bilaterally without wheezing, rales, or rhonchi.  GI: Not assessed  EXTREM: No LE edema. No cyanosis or clubbing.  NEURO: Alert and oriented, cooperative. Gait steady with wheeled walker. No obvious focal deficits.   PSYCH: Normal affect.  SKIN: Warm and dry. No rashes or petechiae appreciated.          Past Medical History:     Past Medical History:   Diagnosis Date    Bone spur 4th toe Right     Lumbar disc herniation      Moderate aortic regurgitation 6/18/2016    Osteopenia     Pathologic fracture of distal radius and ulna     right in 2003, left 1992              Past Surgical History:     Past Surgical History:   Procedure Laterality Date    CARPAL TUNNEL RELEASE RT/LT Right 10/20/2021    Right carpal tunnel release under local anesthetic, Dr. Linus Conrad, [facility]    CARPAL TUNNEL RELEASE RT/LT Left 03/02/2022    Left carpal tunnel release under local anesthesia, Dr. Linus Conrad, [facility].    cataract  01/01/2011    bilateral    HC TOOTH EXTRACTION W/FORCEP      ZZC LIGATE FALLOPIAN TUBE,POSTPARTUM      Tubal Ligation              Allergies:   Sulfa antibiotics       Data:   All laboratory data reviewed:    LAST CHOLESTEROL:  Lab Results   Component Value Date    CHOL 227 07/19/2018     Lab Results   Component Value Date    HDL 50 07/19/2018     Lab Results   Component Value Date     09/12/2022     07/19/2018     Lab Results   Component Value Date    TRIG 111 07/19/2018     Lab Results   Component Value Date    CHOLHDLRATIO 4.7 04/20/2015       LAST BMP:  Lab Results   Component Value Date     04/04/2023     04/13/2021      Lab Results   Component Value Date    POTASSIUM 4.1 04/04/2023    POTASSIUM 4.1 09/12/2022    POTASSIUM 4.3 04/13/2021     Lab Results   Component Value Date    CHLORIDE 103 04/04/2023    CHLORIDE 106 09/12/2022    CHLORIDE 105 04/13/2021     Lab Results   Component Value Date    LAMAR 10.3 04/04/2023    LAMAR 9.6 04/13/2021     Lab Results   Component Value Date    CO2 30 04/04/2023    CO2 26 09/12/2022    CO2 28 04/13/2021     Lab Results   Component Value Date    BUN 20.6 04/04/2023    BUN 24 09/12/2022    BUN 23 04/13/2021     Lab Results   Component Value Date    CR 0.75 04/04/2023    CR 0.73 04/13/2021     Lab Results   Component Value Date     04/04/2023     09/12/2022    GLC 98 04/13/2021       LAST CBC:  Lab Results   Component Value Date    WBC  7.1 04/04/2023    WBC 6.3 04/13/2021     Lab Results   Component Value Date    RBC 4.02 04/04/2023    RBC 3.79 04/13/2021     Lab Results   Component Value Date    HGB 12.3 04/04/2023    HGB 11.6 04/13/2021     Lab Results   Component Value Date    HCT 36.7 04/04/2023    HCT 35.7 04/13/2021     Lab Results   Component Value Date    MCV 91 04/04/2023    MCV 94 04/13/2021     Lab Results   Component Value Date    MCH 30.6 04/04/2023    MCH 30.6 04/13/2021     Lab Results   Component Value Date    MCHC 33.5 04/04/2023    MCHC 32.5 04/13/2021     Lab Results   Component Value Date    RDW 13.2 04/04/2023    RDW 13.5 04/13/2021     Lab Results   Component Value Date     04/04/2023     04/13/2021               Thank you for allowing me to participate in the care of your patient.      Sincerely,     OZIEL Poe [facility]  cc:   Jesus Reeves MD  [address]  [place] [address]

## 2023-06-21 NOTE — PROGRESS NOTES
Cardiology Clinic Progress Note  Krystal Parra MRN# [id number]   YOB: 1940 Age: 83 year old     Reason For Visit:  3-month follow-up   Primary Cardiologist:   Dr. Reeves          History of Presenting Illness:      Krystal Parra is a pleasant 83 year old patient who carries a past medical history significant for hyperlipidemia, moderate aortic insufficiency, hypertension, and TIA.    She was last seen on 3/20/2023 by Dr. Reeves for evaluation of abnormal stress test ordered by her PCP after patient reported to episodes of chest pain.  Results showed a small area of mild ischemia in the LV apex consistent with LAD disease.  Normal EF.  She was initiated on metoprolol and atorvastatin and offered a coronary angiogram which she declined.     She returns to the office today for a 3-month follow-up.  She does not endorse any chest pain, shortness of breath, palpitations, PND, orthopnea, presyncope, syncope, or lower extremity edema.  Her primary complaint is back pain which causes significant physical limitations.     Last echocardiogram in 2021 showed a normal ejection fraction, grade 1 diastolic dysfunction, normal RV size and function, trace to mild tricuspid regurgitation, and moderate aortic regurgitation (unchanged from previous study in 2017).     Blood pressure is mildly elevated at 157/74.  She brings in a home blood pressure log that shows pressures are well controlled.  Last BMP and CBC were unremarkable  , on low-dose statin  BMI 30.86, 158 pounds    Remains compliant with all medications.                   Assessment and Plan:       1.  Abnormal stress test -results showed a small area of mild ischemia in the LV apex consistent with LAD disease.  She has had no recurrence of chest pain.  She does not wish to pursue any invasive angiogram at this time in the absence of symptoms.  Continue metoprolol, aspirin, and atorvastatin.     2.  Hypertension -elevated today, well controlled  with home monitoring.  3.  Hyperlipidemia -on statin  4.  Aortic regurgitation - Last echocardiogram in 2021 showed a normal ejection fraction, grade 1 diastolic dysfunction, normal RV size and function, trace to mild tricuspid regurgitation, and moderate aortic regurgitation (unchanged from previous study in 2017).  Recommend follow-up echocardiogram for surveillance monitoring.           Thank you for allowing me to participate in this delightful patient's care. I have recommended she follow up in 1 year with LEYLA or sooner if needed.       Anamaria Mondragon, APRN CNP         Review of Systems:     Review of Systems:  Skin:        Eyes:       ENT:       Respiratory:  Negative    Cardiovascular:  Negative    Gastroenterology:      Genitourinary:       Musculoskeletal:       Neurologic:       Psychiatric:       Heme/Lymph/Imm:       Endocrine:                   Physical Exam:     GEN:  In general, this is a overweight elderly female in no acute distress.  HEENT:  Pupils equal, round. Sclerae nonicteric. Clear oropharynx. Mucous membranes moist.  NECK: Supple, no masses appreciated. Trachea midline.  No JVD   C/V:  Regular rate and rhythm, systolic murmur, no rub or gallop. No S3 or RV heave.   RESP: Respirations are unlabored. No use of accessory muscles. Clear to auscultation bilaterally without wheezing, rales, or rhonchi.  GI: Not assessed  EXTREM: No LE edema. No cyanosis or clubbing.  NEURO: Alert and oriented, cooperative. Gait steady with wheeled walker. No obvious focal deficits.   PSYCH: Normal affect.  SKIN: Warm and dry. No rashes or petechiae appreciated.          Past Medical History:     Past Medical History:   Diagnosis Date     Bone spur 4th toe Right      Lumbar disc herniation      Moderate aortic regurgitation 6/18/2016     Osteopenia      Pathologic fracture of distal radius and ulna     right in 2003, left 1992              Past Surgical History:     Past Surgical History:   Procedure Laterality  Date     CARPAL TUNNEL RELEASE RT/LT Right 10/20/2021    Right carpal tunnel release under local anesthetic, Dr. Linus Conrad, [facility]     CARPAL TUNNEL RELEASE RT/LT Left 03/02/2022    Left carpal tunnel release under local anesthesia, Dr. Linus Conrad, [facility].     cataract  01/01/2011    bilateral     HC TOOTH EXTRACTION W/FORCEP       ZZC LIGATE FALLOPIAN TUBE,POSTPARTUM      Tubal Ligation              Allergies:   Sulfa antibiotics       Data:   All laboratory data reviewed:    LAST CHOLESTEROL:  Lab Results   Component Value Date    CHOL 227 07/19/2018     Lab Results   Component Value Date    HDL 50 07/19/2018     Lab Results   Component Value Date     09/12/2022     07/19/2018     Lab Results   Component Value Date    TRIG 111 07/19/2018     Lab Results   Component Value Date    CHOLHDLRATIO 4.7 04/20/2015       LAST BMP:  Lab Results   Component Value Date     04/04/2023     04/13/2021      Lab Results   Component Value Date    POTASSIUM 4.1 04/04/2023    POTASSIUM 4.1 09/12/2022    POTASSIUM 4.3 04/13/2021     Lab Results   Component Value Date    CHLORIDE 103 04/04/2023    CHLORIDE 106 09/12/2022    CHLORIDE 105 04/13/2021     Lab Results   Component Value Date    LAMAR 10.3 04/04/2023    LAMAR 9.6 04/13/2021     Lab Results   Component Value Date    CO2 30 04/04/2023    CO2 26 09/12/2022    CO2 28 04/13/2021     Lab Results   Component Value Date    BUN 20.6 04/04/2023    BUN 24 09/12/2022    BUN 23 04/13/2021     Lab Results   Component Value Date    CR 0.75 04/04/2023    CR 0.73 04/13/2021     Lab Results   Component Value Date     04/04/2023     09/12/2022    GLC 98 04/13/2021       LAST CBC:  Lab Results   Component Value Date    WBC 7.1 04/04/2023    WBC 6.3 04/13/2021     Lab Results   Component Value Date    RBC 4.02 04/04/2023    RBC 3.79 04/13/2021     Lab Results   Component Value Date    HGB 12.3 04/04/2023    HGB 11.6 04/13/2021     Lab  Results   Component Value Date    HCT 36.7 04/04/2023    HCT 35.7 04/13/2021     Lab Results   Component Value Date    MCV 91 04/04/2023    MCV 94 04/13/2021     Lab Results   Component Value Date    MCH 30.6 04/04/2023    MCH 30.6 04/13/2021     Lab Results   Component Value Date    MCHC 33.5 04/04/2023    MCHC 32.5 04/13/2021     Lab Results   Component Value Date    RDW 13.2 04/04/2023    RDW 13.5 04/13/2021     Lab Results   Component Value Date     04/04/2023     04/13/2021

## 2023-09-12 ENCOUNTER — OFFICE VISIT (OUTPATIENT)
Dept: DERMATOLOGY | Facility: CLINIC | Age: 83
End: 2023-09-12
Payer: COMMERCIAL

## 2023-09-12 DIAGNOSIS — D18.01 ANGIOMA OF SKIN: ICD-10-CM

## 2023-09-12 DIAGNOSIS — Z85.828 HISTORY OF BASAL CELL CARCINOMA (BCC): ICD-10-CM

## 2023-09-12 DIAGNOSIS — L81.4 LENTIGO: ICD-10-CM

## 2023-09-12 DIAGNOSIS — L82.1 SEBORRHEIC KERATOSIS: ICD-10-CM

## 2023-09-12 DIAGNOSIS — D22.9 NEVUS: Primary | ICD-10-CM

## 2023-09-12 PROCEDURE — 99213 OFFICE O/P EST LOW 20 MIN: CPT | Performed by: PHYSICIAN ASSISTANT

## 2023-09-12 NOTE — PROGRESS NOTES
HPI:   Chief complaints: Krystal Parra is a pleasant 83 year old female who presents for Full skin cancer screening to rule out skin cancer   Last Skin Exam: 1 year ago    1st Baseline: no  Personal HX of Skin Cancer: yes BCC about 15 years ago  Personal HX of Malignant Melanoma: no   Family HX of Skin Cancer / Malignant Melanoma: no  Personal HX of Atypical Moles:   no  Risk factors: history of sun exposure and burns  New / Changing lesions:none  Social History: 8 grandchildren; moved to [place] last year - downsized to a senior facility   On review of systems, there are no further skin complaints, patient is feeling otherwise well.   ROS of the following were done and are negative: Constitutional, Eyes, Ears, Nose,   Mouth, Throat, Cardiovascular, Respiratory, GI, Genitourinary, Musculoskeletal,   Psychiatric, Endocrine, Allergic/Immunologic.    PHYSICAL EXAM:   LMP  (LMP Unknown)   Skin exam performed as follows: Type 2 skin. Mood appropriate  Alert and Oriented X 3. Well developed, well nourished in no distress.  General appearance: Normal  Head including face: Normal  Eyes: conjunctiva and lids: Normal  Mouth: Lips, teeth, gums: Normal  Neck: Normal  Chest-breast/axillae: Normal  Back: Normal  Spleen and liver: Normal  Cardiovascular: Exam of peripheral vascular system by observation for swelling, varicosities, edema: Normal  Genitalia: groin, buttocks: Normal  Extremities: digits/nails (clubbing): Normal  Eccrine and Apocrine glands: Normal  Right upper extremity: Normal  Left upper extremity: Normal  Right lower extremity: Normal  Left lower extremity: Normal  Skin: Scalp and body hair: See below    Pt deferred exam of breasts, groin, buttocks: YES    Other physical findings:  1. Multiple pigmented macules on extremities and trunk  2. Multiple pigmented macules on face, trunk and extremities  3. Multiple vascular papules on trunk, arms and legs  4. Multiple scattered keratotic plaques         Except as  noted above, no other signs of skin cancer or melanoma.     ASSESSMENT/PLAN:   Benign Full skin cancer screening today. . Patient with history of BCC   Advised on monthly self exams and 1 year  Patient Education: Appropriate brochures given.    Multiple benign appearing melanocytic nevi on arms, legs and trunk. Discussed ABCDEs of melanoma and sunscreen.   Multiple lentigos on arms, legs and trunk. Advised benign, no treatment needed.  Multiple scattered angiomas. Advised benign, no treatment needed.   Seborrheic keratosis on arms, legs and trunk. Advised benign, no treatment needed.            Follow-up: FSE every 1-2 years/PRN sooner    1.) Patient was asked about new and changing moles. YES  2.) Patient received a complete physical skin examination: YES  3.) Patient was counseled to perform a monthly self skin examination: YES  Scribed By: Stephenie Barbosa MS, PALilaC

## 2023-09-12 NOTE — LETTER
9/12/2023         RE: Krystal Parra  [address]  [address]  [place] [address]        Dear Colleague,    Thank you for referring your patient, Krystal Parra, to the [facility]. Please see a copy of my visit note below.    HPI:   Chief complaints: Krystal Parra is a pleasant 83 year old female who presents for Full skin cancer screening to rule out skin cancer   Last Skin Exam: 1 year ago    1st Baseline: no  Personal HX of Skin Cancer: yes BCC about 15 years ago  Personal HX of Malignant Melanoma: no   Family HX of Skin Cancer / Malignant Melanoma: no  Personal HX of Atypical Moles:   no  Risk factors: history of sun exposure and burns  New / Changing lesions:none  Social History: 8 grandchildren; moved to [place] last year - downsized to a senior facility   On review of systems, there are no further skin complaints, patient is feeling otherwise well.   ROS of the following were done and are negative: Constitutional, Eyes, Ears, Nose,   Mouth, Throat, Cardiovascular, Respiratory, GI, Genitourinary, Musculoskeletal,   Psychiatric, Endocrine, Allergic/Immunologic.    PHYSICAL EXAM:   LMP  (LMP Unknown)   Skin exam performed as follows: Type 2 skin. Mood appropriate  Alert and Oriented X 3. Well developed, well nourished in no distress.  General appearance: Normal  Head including face: Normal  Eyes: conjunctiva and lids: Normal  Mouth: Lips, teeth, gums: Normal  Neck: Normal  Chest-breast/axillae: Normal  Back: Normal  Spleen and liver: Normal  Cardiovascular: Exam of peripheral vascular system by observation for swelling, varicosities, edema: Normal  Genitalia: groin, buttocks: Normal  Extremities: digits/nails (clubbing): Normal  Eccrine and Apocrine glands: Normal  Right upper extremity: Normal  Left upper extremity: Normal  Right lower extremity: Normal  Left lower extremity: Normal  Skin: Scalp and body hair: See below    Pt deferred exam of breasts, groin,  buttocks: YES    Other physical findings:  1. Multiple pigmented macules on extremities and trunk  2. Multiple pigmented macules on face, trunk and extremities  3. Multiple vascular papules on trunk, arms and legs  4. Multiple scattered keratotic plaques         Except as noted above, no other signs of skin cancer or melanoma.     ASSESSMENT/PLAN:   Benign Full skin cancer screening today. . Patient with history of BCC   Advised on monthly self exams and 1 year  Patient Education: Appropriate brochures given.    Multiple benign appearing melanocytic nevi on arms, legs and trunk. Discussed ABCDEs of melanoma and sunscreen.   Multiple lentigos on arms, legs and trunk. Advised benign, no treatment needed.  Multiple scattered angiomas. Advised benign, no treatment needed.   Seborrheic keratosis on arms, legs and trunk. Advised benign, no treatment needed.            Follow-up: FSE every 1-2 years/PRN sooner    1.) Patient was asked about new and changing moles. YES  2.) Patient received a complete physical skin examination: YES  3.) Patient was counseled to perform a monthly self skin examination: YES  Scribed By: Stephenie Barbosa, MS, PALilaC      Again, thank you for allowing me to participate in the care of your patient.        Sincerely,        Stephenie Barbosa PA-C

## 2023-09-18 ENCOUNTER — OFFICE VISIT (OUTPATIENT)
Dept: PEDIATRICS | Facility: CLINIC | Age: 83
End: 2023-09-18
Payer: COMMERCIAL

## 2023-09-18 VITALS
TEMPERATURE: 98.8 F | HEART RATE: 70 BPM | HEIGHT: 60 IN | WEIGHT: 157.6 LBS | BODY MASS INDEX: 30.94 KG/M2 | DIASTOLIC BLOOD PRESSURE: 70 MMHG | RESPIRATION RATE: 20 BRPM | OXYGEN SATURATION: 98 % | SYSTOLIC BLOOD PRESSURE: 138 MMHG

## 2023-09-18 DIAGNOSIS — I10 BENIGN ESSENTIAL HYPERTENSION: ICD-10-CM

## 2023-09-18 DIAGNOSIS — I25.10 ASCVD (ARTERIOSCLEROTIC CARDIOVASCULAR DISEASE): ICD-10-CM

## 2023-09-18 DIAGNOSIS — I25.119 CORONARY ARTERY DISEASE INVOLVING NATIVE CORONARY ARTERY OF NATIVE HEART WITH ANGINA PECTORIS (H): ICD-10-CM

## 2023-09-18 DIAGNOSIS — E03.8 SUBCLINICAL HYPOTHYROIDISM: ICD-10-CM

## 2023-09-18 DIAGNOSIS — M51.26 DISPLACEMENT OF LUMBAR INTERVERTEBRAL DISC WITHOUT MYELOPATHY: ICD-10-CM

## 2023-09-18 DIAGNOSIS — Z86.73 HISTORY OF TIA (TRANSIENT ISCHEMIC ATTACK) AND STROKE: ICD-10-CM

## 2023-09-18 DIAGNOSIS — Z00.00 MEDICARE ANNUAL WELLNESS VISIT, SUBSEQUENT: Primary | ICD-10-CM

## 2023-09-18 DIAGNOSIS — I35.1 MODERATE AORTIC REGURGITATION: ICD-10-CM

## 2023-09-18 DIAGNOSIS — R94.39 ABNORMAL STRESS TEST: ICD-10-CM

## 2023-09-18 DIAGNOSIS — E78.5 HYPERLIPIDEMIA LDL GOAL <100: ICD-10-CM

## 2023-09-18 LAB
ALBUMIN SERPL BCG-MCNC: 4 G/DL (ref 3.5–5.2)
ALP SERPL-CCNC: 56 U/L (ref 35–104)
ALT SERPL W P-5'-P-CCNC: 18 U/L (ref 0–50)
ANION GAP SERPL CALCULATED.3IONS-SCNC: 8 MMOL/L (ref 7–15)
AST SERPL W P-5'-P-CCNC: 21 U/L (ref 0–45)
BILIRUB SERPL-MCNC: 0.3 MG/DL
BUN SERPL-MCNC: 26.4 MG/DL (ref 8–23)
CALCIUM SERPL-MCNC: 9.7 MG/DL (ref 8.8–10.2)
CHLORIDE SERPL-SCNC: 103 MMOL/L (ref 98–107)
CHOLEST SERPL-MCNC: 137 MG/DL
CREAT SERPL-MCNC: 0.69 MG/DL (ref 0.51–0.95)
DEPRECATED HCO3 PLAS-SCNC: 26 MMOL/L (ref 22–29)
EGFRCR SERPLBLD CKD-EPI 2021: 85 ML/MIN/1.73M2
GLUCOSE SERPL-MCNC: 108 MG/DL (ref 70–99)
HDLC SERPL-MCNC: 51 MG/DL
LDLC SERPL CALC-MCNC: 64 MG/DL
NONHDLC SERPL-MCNC: 86 MG/DL
POTASSIUM SERPL-SCNC: 4.6 MMOL/L (ref 3.4–5.3)
PROT SERPL-MCNC: 7 G/DL (ref 6.4–8.3)
SODIUM SERPL-SCNC: 137 MMOL/L (ref 136–145)
TRIGL SERPL-MCNC: 109 MG/DL
TSH SERPL DL<=0.005 MIU/L-ACNC: 3.52 UIU/ML (ref 0.3–4.2)

## 2023-09-18 PROCEDURE — G0439 PPPS, SUBSEQ VISIT: HCPCS | Performed by: PEDIATRICS

## 2023-09-18 PROCEDURE — 80061 LIPID PANEL: CPT | Performed by: PEDIATRICS

## 2023-09-18 PROCEDURE — 80053 COMPREHEN METABOLIC PANEL: CPT | Performed by: PEDIATRICS

## 2023-09-18 PROCEDURE — 36415 COLL VENOUS BLD VENIPUNCTURE: CPT | Performed by: PEDIATRICS

## 2023-09-18 PROCEDURE — 84443 ASSAY THYROID STIM HORMONE: CPT | Performed by: PEDIATRICS

## 2023-09-18 RX ORDER — METOPROLOL SUCCINATE 25 MG/1
25 TABLET, EXTENDED RELEASE ORAL DAILY
Qty: 90 TABLET | Refills: 11 | Status: SHIPPED | OUTPATIENT
Start: 2023-09-18 | End: 2024-09-23

## 2023-09-18 RX ORDER — ATORVASTATIN CALCIUM 20 MG/1
20 TABLET, FILM COATED ORAL DAILY
Qty: 90 TABLET | Refills: 11 | Status: SHIPPED | OUTPATIENT
Start: 2023-09-18 | End: 2024-09-23

## 2023-09-18 ASSESSMENT — ENCOUNTER SYMPTOMS
HEMATURIA: 0
BREAST MASS: 0
NAUSEA: 0
PARESTHESIAS: 0
SHORTNESS OF BREATH: 0
HEADACHES: 0
HEARTBURN: 0
EYE PAIN: 0
SORE THROAT: 0
COUGH: 0
HEMATOCHEZIA: 0
MYALGIAS: 0
DYSURIA: 0
FEVER: 0
WEAKNESS: 0
ABDOMINAL PAIN: 0
ARTHRALGIAS: 1
JOINT SWELLING: 0
PALPITATIONS: 0
DIARRHEA: 0
FREQUENCY: 0
NERVOUS/ANXIOUS: 0
CONSTIPATION: 0
CHILLS: 0
DIZZINESS: 0

## 2023-09-18 ASSESSMENT — ACTIVITIES OF DAILY LIVING (ADL): CURRENT_FUNCTION: NO ASSISTANCE NEEDED

## 2023-09-18 NOTE — PATIENT INSTRUCTIONS
[facility] - ok to try    Walking in the halls    Labs today    Wonderful to see you!  Please alert me if you need anything

## 2023-09-18 NOTE — LETTER
September 19, 2023      Krystal Parra  [address]  [address]  [place] [facility] [place] [address]        Dear Krystal,     It was wonderful to get to catch up with you in clinic this week.   Please find your lab results enclosed for your review and records.     The results show normal electrolytes, kidney function, liver function, thyroid function, and cholesterol.  Your blood sugar is just slightly above the normal range for a fasting result - your continued work with sugar avoidance will help to keep this controlled.     Overall, these look great.     Resulted Orders   Lipid panel reflex to direct LDL Non-fasting   Result Value Ref Range    Cholesterol 137 <200 mg/dL    Triglycerides 109 <150 mg/dL    Direct Measure HDL 51 >=50 mg/dL    LDL Cholesterol Calculated 64 <=100 mg/dL    Non HDL Cholesterol 86 <130 mg/dL    Narrative    Cholesterol  Desirable:  <200 mg/dL    Triglycerides  Normal:  Less than 150 mg/dL  Borderline High:  150-199 mg/dL  High:  200-499 mg/dL  Very High:  Greater than or equal to 500 mg/dL    Direct Measure HDL  Female:  Greater than or equal to 50 mg/dL   Male:  Greater than or equal to 40 mg/dL    LDL Cholesterol  Desirable:  <100mg/dL  Above Desirable:  100-129 mg/dL   Borderline High:  130-159 mg/dL   High:  160-189 mg/dL   Very High:  >= 190 mg/dL    Non HDL Cholesterol  Desirable:  130 mg/dL  Above Desirable:  130-159 mg/dL  Borderline High:  160-189 mg/dL  High:  190-219 mg/dL  Very High:  Greater than or equal to 220 mg/dL   Comprehensive metabolic panel (BMP + Alb, Alk Phos, ALT, AST, Total. Bili, TP)   Result Value Ref Range    Sodium 137 136 - 145 mmol/L    Potassium 4.6 3.4 - 5.3 mmol/L    Chloride 103 98 - 107 mmol/L    Carbon Dioxide (CO2) 26 22 - 29 mmol/L    Anion Gap 8 7 - 15 mmol/L    Urea Nitrogen 26.4 (H) 8.0 - 23.0 mg/dL    Creatinine 0.69 0.51 - 0.95 mg/dL    Calcium 9.7 8.8 - 10.2 mg/dL    Glucose 108 (H) 70 - 99 mg/dL    Alkaline Phosphatase 56 35 - 104 U/L    AST 21 0 - 45  U/L      Comment:      Reference intervals for this test were updated on 6/12/2023 to more accurately reflect our healthy population. There may be differences in the flagging of prior results with similar values performed with this method. Interpretation of those prior results can be made in the context of the updated reference intervals.    ALT 18 0 - 50 U/L      Comment:      Reference intervals for this test were updated on 6/12/2023 to more accurately reflect our healthy population. There may be differences in the flagging of prior results with similar values performed with this method. Interpretation of those prior results can be made in the context of the updated reference intervals.      Protein Total 7.0 6.4 - 8.3 g/dL    Albumin 4.0 3.5 - 5.2 g/dL    Bilirubin Total 0.3 <=1.2 mg/dL    GFR Estimate 85 >60 mL/min/1.73m2   TSH with free T4 reflex   Result Value Ref Range    TSH 3.52 0.30 - 4.20 uIU/mL     Please contact me with any new questions or concerns.     Tasha Stokes MD

## 2023-09-18 NOTE — PROGRESS NOTES
"SUBJECTIVE:   Krystal is a 83 year old who presents for Preventive Visit.      9/18/2023    11:12 AM   Additional Questions   Roomed by [facility]   Accompanied by self         9/18/2023    11:12 AM   Patient Reported Additional Medications   Patient reports taking the following new medications none       Are you in the first 12 months of your Medicare coverage?  No    Healthy Habits:     In general, how would you rate your overall health?  Fair    Frequency of exercise:  None    Do you usually eat at least 4 servings of fruit and vegetables a day, include whole grains    & fiber and avoid regularly eating high fat or \"junk\" foods?  Yes    Taking medications regularly:  Yes    Medication side effects:  None    Ability to successfully perform activities of daily living:  No assistance needed    Home Safety:  No safety concerns identified    Hearing Impairment:  No hearing concerns    In the past 6 months, have you been bothered by leaking of urine? Yes    In general, how would you rate your overall mental or emotional health?  Excellent    Additional concerns today:  Yes      Have you ever done Advance Care Planning? (For example, a Health Directive, POLST, or a discussion with a medical provider or your loved ones about your wishes): Yes, patient states has an Advance Care Planning document and will bring a copy to the clinic.      Fall risk  Fallen 2 or more times in the past year?: No  Any fall with injury in the past year?: No    Cognitive Screening   1) Repeat 3 items (Leader, Season, Table)    2) Clock draw: NORMAL  3) 3 item recall: Recalls 3 objects  Results: 3 items recalled: COGNITIVE IMPAIRMENT LESS LIKELY    Mini-CogTM Copyright JOHNATHAN Pena. Licensed by the author for use in [facility]; reprinted with permission ([email or web address]@.[email or web address]). All rights reserved.      Do you have sleep apnea, excessive snoring or daytime drowsiness? : no    Reviewed and updated as needed this visit by clinical staff   Tobacco  " Allergies  Meds              Reviewed and updated as needed this visit by Provider                 Social History     Tobacco Use     Smoking status: Never     Passive exposure: Yes     Smokeless tobacco: Never     Tobacco comments:      smoked in house   Substance Use Topics     Alcohol use: No           9/18/2023    11:24 AM   Alcohol Use   Prescreen: >3 drinks/day or >7 drinks/week? Not Applicable     Do you have a current opioid prescription? No  Do you use any other controlled substances or medications that are not prescribed by a provider? None      Current providers sharing in care for this patient include:   Patient Care Team:  Tasha Winkler MD as PCP - General (Internal Medicine)  Tasha Winkler MD as Assigned PCP  Judy Franklin, RN as Personal Advocate & Liaison (PAL)  Davis Salinas PA-C as Assigned Musculoskeletal Provider  Stephenie Barbosa PA-C as Physician Assistant (Dermatology)  Stephenie Barbosa PA-C as Assigned Surgical Provider  Jesus Reeves MD as MD (Cardiovascular Disease)  Anamaria Mondragon APRN CNP as Assigned Heart and Vascular Provider    The following health maintenance items are reviewed in Epic and correct as of today:  Health Maintenance   Topic Date Due     ANNUAL REVIEW OF HM ORDERS  Never done     LIPID  07/19/2023     MEDICARE ANNUAL WELLNESS VISIT  09/12/2023     TSH W/FREE T4 REFLEX  09/12/2023     FALL RISK ASSESSMENT  09/18/2024     ADVANCE CARE PLANNING  09/18/2028     PHQ-2 (once per calendar year)  Completed     IPV IMMUNIZATION  Aged Out     HPV IMMUNIZATION  Aged Out     MENINGITIS IMMUNIZATION  Aged Out     DEXA  Discontinued     MAMMO SCREENING  Discontinued     INFLUENZA VACCINE  Discontinued     Pneumococcal Vaccine: 65+ Years  Discontinued     COLORECTAL CANCER SCREENING  Discontinued     ZOSTER IMMUNIZATION  Discontinued     DTAP/TDAP/TD IMMUNIZATION  Discontinued     COVID-19 Vaccine  Discontinued         Mammogram  Screening - Patient over age 75, has elected to discontinue screenings.  Pertinent mammograms are reviewed under the imaging tab.    Moved to [facility] and is liking in.  Misses her house, but likes have her own balcony and attends the bible study group twice monthly.  Makes her own breakfast and lunch and gets her dinner at the building.    Cardiology visits this year - no new chest pain since our visit this spring and ER visit in April.  Hasn't used nitroglycerin.  Stable on current medications.  Not interested in additional work up at this time.    Mod AR - had echocardiogram ordered this year - due for recheck.  No new symptoms.      HTN - at home 117-125/70's, always a little bit higher in clinic    Lumbar radiculopathy - back pain - uses roller walker with seat for long walks.   No new associated symptoms.      Joint pain - cutting down on sugar and high inflammatory foods - tylenol at night helps a bit and also using advil when needed in the middle of the night    Insomnia - chronic - taking melatoin, benadryl - no improvement.  Up hours each night, but not new for her    Itchy skin - antihistamines help - worse at night.          Review of Systems   Constitutional:  Negative for chills and fever.   HENT:  Positive for hearing loss. Negative for congestion, ear pain and sore throat.    Eyes:  Negative for pain and visual disturbance.   Respiratory:  Negative for cough and shortness of breath.    Cardiovascular:  Negative for chest pain, palpitations and peripheral edema.   Gastrointestinal:  Negative for abdominal pain, constipation, diarrhea, heartburn, hematochezia and nausea.   Breasts:  Negative for tenderness, breast mass and discharge.   Genitourinary:  Negative for dysuria, frequency, genital sores, hematuria, pelvic pain, urgency, vaginal bleeding and vaginal discharge.   Musculoskeletal:  Positive for arthralgias. Negative for joint swelling and myalgias.   Skin:  Negative for rash.  "  Neurological:  Negative for dizziness, weakness, headaches and paresthesias.   Psychiatric/Behavioral:  Negative for mood changes. The patient is not nervous/anxious.          OBJECTIVE:   /70   Pulse 70   Temp 98.8  F (37.1  C) (Tympanic)   Resp 20   Ht 1.52 m (4' 11.84\")   Wt 71.5 kg (157 lb 9.6 oz)   LMP  (LMP Unknown)   SpO2 98%   BMI 30.94 kg/m   Estimated body mass index is 30.94 kg/m  as calculated from the following:    Height as of this encounter: 1.52 m (4' 11.84\").    Weight as of this encounter: 71.5 kg (157 lb 9.6 oz).  Wt Readings from Last 4 Encounters:   09/18/23 71.5 kg (157 lb 9.6 oz)   06/21/23 71.7 kg (158 lb)   04/04/23 70.3 kg (155 lb)   03/20/23 72.8 kg (160 lb 6.4 oz)       Physical Exam  GENERAL: healthy, alert and no distress  EYES: Eyes grossly normal to inspection, PERRL and conjunctivae and sclerae normal  HENT: ear canals and TM's normal, nose and mouth without ulcers or lesions  NECK: no adenopathy, no asymmetry, masses, or scars and thyroid normal to palpation  RESP: lungs clear to auscultation - no rales, rhonchi or wheezes  CV: regular rate and rhythm, normal S1 S2, no S3 or S4, no murmur, click or rub, no peripheral edema and peripheral pulses strong  ABDOMEN: soft, nontender, no hepatosplenomegaly, no masses and bowel sounds normal  MS: antalgic gait, climbs onto exam table with help of one, OA related changes to hands  SKIN: no suspicious lesions or rashes  NEURO: Normal strength and tone, mentation intact and speech normal  PSYCH: mentation appears normal, affect normal/bright    Diagnostic Test Results:  Labs pending    ASSESSMENT / PLAN:       ICD-10-CM    1. Medicare annual wellness visit, subsequent  Z00.00 Lipid panel reflex to direct LDL Non-fasting     Comprehensive metabolic panel (BMP + Alb, Alk Phos, ALT, AST, Total. Bili, TP)     TSH with free T4 reflex      2. ASCVD (arteriosclerotic cardiovascular disease)  I25.10 Continue statin, beta blocker, labs " "today      3. Benign essential hypertension  I10 Well controlled, continue current medications        4. Lumbar disc herniation  M51.26 Stable symptoms, follow, encouraged ambulation in her building as much as possible      5. History of TIA (transient ischemic attack) and stroke  Z86.73 No evidence of recurrence, continue statin and ASA      6. Moderate aortic regurgitation -  I35.1 atorvastatin (LIPITOR) 20 MG tablet     metoprolol succinate ER (TOPROL XL) 25 MG 24 hr tablet      7. Subclinical hypothyroidism  E03.8 TSH with free T4 reflex  Recheck today      8. Abnormal stress test  R94.39 atorvastatin (LIPITOR) 20 MG tablet     metoprolol succinate ER (TOPROL XL) 25 MG 24 hr tablet  Reviewed cardiology visits, continue current medications      9. Hyperlipidemia LDL goal <100  E78.5 atorvastatin (LIPITOR) 20 MG tablet     metoprolol succinate ER (TOPROL XL) 25 MG 24 hr tablet      10. Coronary artery disease involving native coronary artery of native heart with angina pectoris (H)  I25.119 See above            COUNSELING:  Reviewed preventive health counseling, as reflected in patient instructions      BMI:   Estimated body mass index is 30.94 kg/m  as calculated from the following:    Height as of this encounter: 1.52 m (4' 11.84\").    Weight as of this encounter: 71.5 kg (157 lb 9.6 oz).   Weight management plan: Discussed healthy diet and exercise guidelines      She reports that she has never smoked. She has been exposed to tobacco smoke. She has never used smokeless tobacco.      Appropriate preventive services were discussed with this patient, including applicable screening as appropriate for cardiovascular disease, diabetes, osteopenia/osteoporosis, and glaucoma.  As appropriate for age/gender, discussed screening for colorectal cancer, prostate cancer, breast cancer, and cervical cancer. Checklist reviewing preventive services available has been given to the patient.    Reviewed patients plan of care and " provided an AVS. The Basic Care Plan (routine screening as documented in Health Maintenance) for Krystal meets the Care Plan requirement. This Care Plan has been established and reviewed with the Patient.          Tasha Winkler MD  [facility]    Identified Health Risks:  I have reviewed Opioid Use Disorder and Substance Use Disorder risk factors and made any needed referrals.

## 2024-07-17 ENCOUNTER — VIRTUAL VISIT (OUTPATIENT)
Dept: PEDIATRICS | Facility: CLINIC | Age: 84
End: 2024-07-17
Payer: COMMERCIAL

## 2024-07-17 DIAGNOSIS — M25.561 PAIN IN BOTH KNEES, UNSPECIFIED CHRONICITY: ICD-10-CM

## 2024-07-17 DIAGNOSIS — M25.562 PAIN IN BOTH KNEES, UNSPECIFIED CHRONICITY: ICD-10-CM

## 2024-07-17 DIAGNOSIS — M79.641 BILATERAL HAND PAIN: ICD-10-CM

## 2024-07-17 DIAGNOSIS — M79.642 BILATERAL HAND PAIN: ICD-10-CM

## 2024-07-17 DIAGNOSIS — G47.00 PERSISTENT INSOMNIA: Primary | ICD-10-CM

## 2024-07-17 PROCEDURE — 99441 PR PHYSICIAN TELEPHONE EVALUATION 5-10 MIN: CPT | Mod: 93 | Performed by: PEDIATRICS

## 2024-07-17 RX ORDER — TRAZODONE HYDROCHLORIDE 50 MG/1
50 TABLET, FILM COATED ORAL AT BEDTIME
Qty: 30 TABLET | Refills: 5 | Status: SHIPPED | OUTPATIENT
Start: 2024-07-17 | End: 2024-09-23

## 2024-07-17 NOTE — PROGRESS NOTES
"Krystal is a 84 year old who is being evaluated via a billable telephone visit.    What phone number would you like to be contacted at? Cell #  How would you like to obtain your AVS? Mail a copy  Originating Location (pt. Location): Home    Distant Location (provider location):  On-site    Assessment & Plan       ICD-10-CM    1. Persistent insomnia  G47.00 traZODone (DESYREL) 50 MG tablet    Repeat trial of low dose trazodone.   Med reviewed.    Will follow up at [facility] in 2 months      2. Pain in both knees, unspecified chronicity  M25.561 Orthopedic  Referral    Recommended sports med eval - suspect OA that would benefit from steroid injection    M25.562       3. Bilateral hand pain  M79.641 Orthopedic  Referral    Again, suspect OA - discussed dosing of OTC med medications - patient can liberalize use and will start doing so with tylenol    M79.642                   BMI  Estimated body mass index is 30.94 kg/m  as calculated from the following:    Height as of 9/18/23: 1.52 m (4' 11.84\").    Weight as of 9/18/23: 71.5 kg (157 lb 9.6 oz).   Weight management plan: will review at next office visit        Subjective   Krystal is a 84 year old, presenting for the following health issues:      HPI       Joint pain - both knees - most bothersome.  Fingers, wrists, ankles also painful.  Chronic issue, slowly worsening over time.    No joint swelling or redness or warmth.      For knee pain - hurts more if walking more.  Using a rolling walker.      Taking 1 tylenol at night, occasionally 1 advil.  These meds help at night - she hasn't been taking them during the day.      Also with chronic insomnia issues that have been a bit worse recently.   Was on trazodone some years ago and would like to try to use again intermittently at low dose          Objective           Vitals:  No vitals were obtained today due to virtual visit.    Physical Exam   General: Alert and no distress //Respiratory: No audible wheeze, " cough, or shortness of breath // Psychiatric:  Appropriate affect, tone, and pace of words            Phone call duration: 10 minutes  Signed Electronically by: Tasha Winkler MD

## 2024-07-29 ENCOUNTER — OFFICE VISIT (OUTPATIENT)
Dept: ORTHOPEDICS | Facility: CLINIC | Age: 84
End: 2024-07-29
Attending: PEDIATRICS
Payer: COMMERCIAL

## 2024-07-29 ENCOUNTER — ANCILLARY PROCEDURE (OUTPATIENT)
Dept: GENERAL RADIOLOGY | Facility: CLINIC | Age: 84
End: 2024-07-29
Attending: FAMILY MEDICINE
Payer: COMMERCIAL

## 2024-07-29 VITALS
HEIGHT: 60 IN | DIASTOLIC BLOOD PRESSURE: 70 MMHG | SYSTOLIC BLOOD PRESSURE: 155 MMHG | BODY MASS INDEX: 30.82 KG/M2 | WEIGHT: 157 LBS

## 2024-07-29 DIAGNOSIS — M25.562 CHRONIC PAIN OF BOTH KNEES: Primary | ICD-10-CM

## 2024-07-29 DIAGNOSIS — M25.562 CHRONIC PAIN OF BOTH KNEES: ICD-10-CM

## 2024-07-29 DIAGNOSIS — M17.0 PRIMARY OSTEOARTHRITIS OF BOTH KNEES: ICD-10-CM

## 2024-07-29 DIAGNOSIS — G89.29 CHRONIC PAIN OF BOTH KNEES: ICD-10-CM

## 2024-07-29 DIAGNOSIS — M25.561 CHRONIC PAIN OF BOTH KNEES: Primary | ICD-10-CM

## 2024-07-29 DIAGNOSIS — M25.561 CHRONIC PAIN OF BOTH KNEES: ICD-10-CM

## 2024-07-29 DIAGNOSIS — G89.29 CHRONIC PAIN OF BOTH KNEES: Primary | ICD-10-CM

## 2024-07-29 PROCEDURE — 20611 DRAIN/INJ JOINT/BURSA W/US: CPT | Mod: 50 | Performed by: FAMILY MEDICINE

## 2024-07-29 PROCEDURE — 99204 OFFICE O/P NEW MOD 45 MIN: CPT | Mod: 25 | Performed by: FAMILY MEDICINE

## 2024-07-29 PROCEDURE — 73562 X-RAY EXAM OF KNEE 3: CPT | Mod: TC | Performed by: FAMILY MEDICINE

## 2024-07-29 RX ORDER — ROPIVACAINE HYDROCHLORIDE 5 MG/ML
4 INJECTION, SOLUTION EPIDURAL; INFILTRATION; PERINEURAL
Status: SHIPPED | OUTPATIENT
Start: 2024-07-29

## 2024-07-29 RX ORDER — METHYLPREDNISOLONE ACETATE 40 MG/ML
40 INJECTION, SUSPENSION INTRA-ARTICULAR; INTRALESIONAL; INTRAMUSCULAR; SOFT TISSUE
Status: SHIPPED | OUTPATIENT
Start: 2024-07-29

## 2024-07-29 RX ADMIN — ROPIVACAINE HYDROCHLORIDE 4 ML: 5 INJECTION, SOLUTION EPIDURAL; INFILTRATION; PERINEURAL at 14:27

## 2024-07-29 RX ADMIN — METHYLPREDNISOLONE ACETATE 40 MG: 40 INJECTION, SUSPENSION INTRA-ARTICULAR; INTRALESIONAL; INTRAMUSCULAR; SOFT TISSUE at 14:27

## 2024-07-29 NOTE — PROGRESS NOTES
ASSESSMENT & PLAN  Patient Instructions     1. Chronic pain of both knees    2. Primary osteoarthritis of both knees      -Patient has chronic bilateral knee pain due to severe bone-on-bone arthritis  -X-rays taken in office today of bilateral knees shows bone-on-bone medial and lateral compartment joint space narrowing with large osteophytes  -Patient tolerated bilateral knee intra-articular cortisone injection today without complications.  Patient was given postprocedure instructions  -Patient will follow-up when pain returns.  Patient is open to knee replacement surgery if she does not get much relief from the cortisone injection  -Call direct clinic number [[phone]] at any time with questions or concerns.    Albert Yeo MD [facility]  [facility]        -----    SUBJECTIVE  Krystal Parra is a/an 84 year old female who is seen in consultation at the request of  Tasha Winkler M.D. for evaluation of bilateral knee pain (L>R). The patient is seen by themselves.    Onset: 2-3 years(s) ago. Reports insidious onset without acute precipitating event.  Location of Pain: bilateral anterior knee   Rating of Pain at worst: 7/10  Rating of Pain Currently: 0/10  Worsened by: Weight bearing and End of the Day  Better with: Rest/Activity Avoidance  Treatments tried: rest/activity avoidance, walker for ambulation, and Tylenol  Associated symptoms: no distal numbness or tingling; denies swelling or warmth  Orthopedic history: NO  Relevant surgical history: NO  Social history: social history: retired    Past Medical History:   Diagnosis Date    Basal cell carcinoma     Bone spur 4th toe Right     Lumbar disc herniation     Moderate aortic regurgitation 06/18/2016    Osteopenia     Pathologic fracture of distal radius and ulna     right in 2003, left 1992     Social History     Socioeconomic History    Marital status:     Number of children: 4   Occupational  "History    Occupation: Retired   Tobacco Use    Smoking status: Never     Passive exposure: Yes    Smokeless tobacco: Never    Tobacco comments:      smoked in house   Vaping Use    Vaping status: Never Used   Substance and Sexual Activity    Alcohol use: No    Drug use: No    Sexual activity: Not Currently   Other Topics Concern    Parent/sibling w/ CABG, MI or angioplasty before 65F 55M? No         Patient's past medical, surgical, social, and family histories were reviewed today and no changes are noted.    REVIEW OF SYSTEMS:  10 point ROS is negative other than symptoms noted above in HPI, Past Medical History or as stated below  Constitutional: NEGATIVE for fever, chills, change in weight  Skin: NEGATIVE for worrisome rashes, moles or lesions  GI/: NEGATIVE for bowel or bladder changes  Neuro: NEGATIVE for weakness, dizziness or paresthesias    OBJECTIVE:  BP (!) 155/70   Ht 1.52 m (4' 11.84\")   Wt 71.2 kg (157 lb)   LMP  (LMP Unknown)   BMI 30.83 kg/m     General: healthy, alert and in no distress  HEENT: no scleral icterus or conjunctival erythema  Skin: no suspicious lesions or rash. No jaundice.  CV: no pedal edema  Resp: normal respiratory effort without conversational dyspnea   Psych: normal mood and affect  Gait: normal steady gait with appropriate coordination and balance  Neuro: Normal light sensory exam of lower extremity  MSK:  BILATERAL KNEE  Inspection:    normal alignment  Palpation:    Tender about the lateral joint line and medial joint line. Remainder of bony and ligamentous landmarks are nontender.    Mild effusion is present in the left    Patellofemoral crepitus is Present  Range of Motion:     00 extension to 900 flexion  Strength:    Quadriceps grossly intact    Extensor mechanism intact  Special Tests:    Positive: Patellar grind    Negative: MCL/valgus stress (0 & 30 deg), LCL/varus stress (0 & 30 deg), Lachman's, anterior drawer, posterior drawer, Kimberly's    Independent " visualization of the below image:  Recent Results (from the past 24 hour(s))   XR Knee Bilateral 3 vw    Narrative    Severe bone-on-bone medial and lateral compartment joint space narrowing,   moderate patellofemoral joint space narrowing with tricompartment   osteophytosis.  Chondrocalcinosis in all compartments.  No acute fracture   or dislocation.       Large Joint Injection/Arthocentesis: bilateral knee    Date/Time: 7/29/2024 2:27 PM    Performed by: Yeo, Albert, MD  Authorized by: Yeo, Albert, MD    Indications:  Pain and osteoarthritis  Needle Size:  22 G  Guidance: ultrasound    Approach:  Anterolateral  Location:  Knee  Laterality:  Bilateral      Medications (Right):  40 mg methylPREDNISolone 40 MG/ML; 4 mL ROPivacaine 5 MG/ML  Medications (Left):  40 mg methylPREDNISolone 40 MG/ML; 4 mL ROPivacaine 5 MG/ML  Aspirate amount (mL):  10  Aspirate:  Serous and yellow  Outcome:  Tolerated well, no immediate complications  Procedure discussed: discussed risks, benefits, and alternatives    Consent Given by:  Patient  Timeout: timeout called immediately prior to procedure    Prep: patient was prepped and draped in usual sterile fashion     Ultrasound was used to ensure safe and accurate needle placement and injection. Ultrasound images of the procedure were permanently stored.            Albert Yeo MD [facility]

## 2024-07-29 NOTE — PATIENT INSTRUCTIONS
1. Chronic pain of both knees    2. Primary osteoarthritis of both knees      -Patient has chronic bilateral knee pain due to severe bone-on-bone arthritis  -X-rays taken in office today of bilateral knees shows bone-on-bone medial and lateral compartment joint space narrowing with large osteophytes  -Patient tolerated bilateral knee intra-articular cortisone injection today without complications.  Patient was given postprocedure instructions  -Patient will follow-up when pain returns.  Patient is open to knee replacement surgery if she does not get much relief from the cortisone injection  -Call direct clinic number [[phone]] at any time with questions or concerns.    Albert Yeo MD [facility]  [facility] [place]

## 2024-07-29 NOTE — LETTER
7/29/2024      Krystal Parra  [address]  [address]  [place] [address]      Dear Colleague,    Thank you for referring your patient, Krystal Parra, to the [facility] [place]. Please see a copy of my visit note below.    ASSESSMENT & PLAN  Patient Instructions     1. Chronic pain of both knees    2. Primary osteoarthritis of both knees      -Patient has chronic bilateral knee pain due to severe bone-on-bone arthritis  -X-rays taken in office today of bilateral knees shows bone-on-bone medial and lateral compartment joint space narrowing with large osteophytes  -Patient tolerated bilateral knee intra-articular cortisone injection today without complications.  Patient was given postprocedure instructions  -Patient will follow-up when pain returns.  Patient is open to knee replacement surgery if she does not get much relief from the cortisone injection  -Call direct clinic number [[phone]] at any time with questions or concerns.    Albert Yeo MD [facility]  [facility]        -----    SUBJECTIVE  Krystal Parra is a/an 84 year old female who is seen in consultation at the request of  Tasha Winkler M.D. for evaluation of bilateral knee pain (L>R). The patient is seen by themselves.    Onset: 2-3 years(s) ago. Reports insidious onset without acute precipitating event.  Location of Pain: bilateral anterior knee   Rating of Pain at worst: 7/10  Rating of Pain Currently: 0/10  Worsened by: Weight bearing and End of the Day  Better with: Rest/Activity Avoidance  Treatments tried: rest/activity avoidance, walker for ambulation, and Tylenol  Associated symptoms: no distal numbness or tingling; denies swelling or warmth  Orthopedic history: NO  Relevant surgical history: NO  Social history: social history: retired    Past Medical History:   Diagnosis Date     Basal cell carcinoma      Bone spur 4th toe Right       "Lumbar disc herniation      Moderate aortic regurgitation 06/18/2016     Osteopenia      Pathologic fracture of distal radius and ulna     right in 2003, left 1992     Social History     Socioeconomic History     Marital status:      Number of children: 4   Occupational History     Occupation: Retired   Tobacco Use     Smoking status: Never     Passive exposure: Yes     Smokeless tobacco: Never     Tobacco comments:      smoked in house   Vaping Use     Vaping status: Never Used   Substance and Sexual Activity     Alcohol use: No     Drug use: No     Sexual activity: Not Currently   Other Topics Concern     Parent/sibling w/ CABG, MI or angioplasty before 65F 55M? No         Patient's past medical, surgical, social, and family histories were reviewed today and no changes are noted.    REVIEW OF SYSTEMS:  10 point ROS is negative other than symptoms noted above in HPI, Past Medical History or as stated below  Constitutional: NEGATIVE for fever, chills, change in weight  Skin: NEGATIVE for worrisome rashes, moles or lesions  GI/: NEGATIVE for bowel or bladder changes  Neuro: NEGATIVE for weakness, dizziness or paresthesias    OBJECTIVE:  BP (!) 155/70   Ht 1.52 m (4' 11.84\")   Wt 71.2 kg (157 lb)   LMP  (LMP Unknown)   BMI 30.83 kg/m     General: healthy, alert and in no distress  HEENT: no scleral icterus or conjunctival erythema  Skin: no suspicious lesions or rash. No jaundice.  CV: no pedal edema  Resp: normal respiratory effort without conversational dyspnea   Psych: normal mood and affect  Gait: normal steady gait with appropriate coordination and balance  Neuro: Normal light sensory exam of lower extremity  MSK:  BILATERAL KNEE  Inspection:    normal alignment  Palpation:    Tender about the lateral joint line and medial joint line. Remainder of bony and ligamentous landmarks are nontender.    Mild effusion is present in the left    Patellofemoral crepitus is Present  Range of Motion:     00 " extension to 900 flexion  Strength:    Quadriceps grossly intact    Extensor mechanism intact  Special Tests:    Positive: Patellar grind    Negative: MCL/valgus stress (0 & 30 deg), LCL/varus stress (0 & 30 deg), Lachman's, anterior drawer, posterior drawer, Kimberly's    Independent visualization of the below image:  Recent Results (from the past 24 hour(s))   XR Knee Bilateral 3 vw    Narrative    Severe bone-on-bone medial and lateral compartment joint space narrowing,   moderate patellofemoral joint space narrowing with tricompartment   osteophytosis.  Chondrocalcinosis in all compartments.  No acute fracture   or dislocation.       Large Joint Injection/Arthocentesis: bilateral knee    Date/Time: 7/29/2024 2:27 PM    Performed by: Yeo, Albert, MD  Authorized by: Yeo, Albert, MD    Indications:  Pain and osteoarthritis  Needle Size:  22 G  Guidance: ultrasound    Approach:  Anterolateral  Location:  Knee  Laterality:  Bilateral      Medications (Right):  40 mg methylPREDNISolone 40 MG/ML; 4 mL ROPivacaine 5 MG/ML  Medications (Left):  40 mg methylPREDNISolone 40 MG/ML; 4 mL ROPivacaine 5 MG/ML  Aspirate amount (mL):  10  Aspirate:  Serous and yellow  Outcome:  Tolerated well, no immediate complications  Procedure discussed: discussed risks, benefits, and alternatives    Consent Given by:  Patient  Timeout: timeout called immediately prior to procedure    Prep: patient was prepped and draped in usual sterile fashion     Ultrasound was used to ensure safe and accurate needle placement and injection. Ultrasound images of the procedure were permanently stored.            Albert Yeo MD [facility]      Again, thank you for allowing me to participate in the care of your patient.        Sincerely,        Albert Yeo, MD

## 2024-08-12 ENCOUNTER — OFFICE VISIT (OUTPATIENT)
Dept: DERMATOLOGY | Facility: CLINIC | Age: 84
End: 2024-08-12
Payer: COMMERCIAL

## 2024-08-12 DIAGNOSIS — L57.8 ACTINIC SKIN DAMAGE: ICD-10-CM

## 2024-08-12 DIAGNOSIS — K13.0 MUCOCELE OF LOWER LIP: ICD-10-CM

## 2024-08-12 DIAGNOSIS — L81.4 LENTIGINES: Primary | ICD-10-CM

## 2024-08-12 DIAGNOSIS — L82.0 INFLAMED SEBORRHEIC KERATOSIS: ICD-10-CM

## 2024-08-12 PROCEDURE — 17110 DESTRUCTION B9 LES UP TO 14: CPT | Performed by: STUDENT IN AN ORGANIZED HEALTH CARE EDUCATION/TRAINING PROGRAM

## 2024-08-12 PROCEDURE — 99213 OFFICE O/P EST LOW 20 MIN: CPT | Mod: 25 | Performed by: STUDENT IN AN ORGANIZED HEALTH CARE EDUCATION/TRAINING PROGRAM

## 2024-08-12 NOTE — PROGRESS NOTES
[facility] Dermatology Note    Encounter Date: Aug 12, 2024    Dermatology Problem List:    ______________________________________    Impression/Plan:  Krystal was seen today for derm problem.    Diagnoses and all orders for this visit:    Lentigines  Actinic skin damage  - Reviewed the compounding benefits of incremental changes to sun protective clothing behaviors including increased frequency of sunscreen and sun protective clothing like broad brimmed hats and longsleeved UPF containing clothing    Mucocele of lower lip  - benign    ISK  - x2 L cheek and chest     Cryotherapy procedure note: After verbal consent and discussion of risks and benefits including but no limited to dyspigmentation/scar, blister, and pain, 2 ISKs on L cheek and chest was(were) treated with 1-2mm freeze border for 2 cycles with liquid nitrogen. Post cryotherapy instructions were provided.       Follow-up in 1 year  .       Staff Involved:  Staff Only    Nam Stone MD   of Dermatology  Department of Dermatology  [facility]      CC:   Chief Complaint   Patient presents with    Derm Problem     Skin tags and spot on lip       History of Present Illness:  Ms. Krystal Parra is a 84 year old female who presents as a new patient.    Pt presents today for concerns about spots on trunk and extremities       Labs:      Physical exam:  Vitals: LMP  (LMP Unknown)   GEN: well developed, well-nourished, in no acute distress, in a pleasant mood.     SKIN: Thomas phototype 1  - Sun-exposed skin, which includes the head/face, neck, both arms, digits, and/or nails was examined.   - Flat brown macules and patches in a sun exposed areas on face and extremities  - Stuck on brown papules on trunk and extremities   - clear papule lower lip  - No other lesions of concern on areas examined.     Past Medical History:   Past Medical History:   Diagnosis Date    Basal cell carcinoma      Bone spur 4th toe Right     Lumbar disc herniation     Moderate aortic regurgitation 06/18/2016    Osteopenia     Pathologic fracture of distal radius and ulna     right in 2003, left 1992     Past Surgical History:   Procedure Laterality Date    CARPAL TUNNEL RELEASE RT/LT Right 10/20/2021    Right carpal tunnel release under local anesthetic, Dr. Linus Conrad, [facility]    CARPAL TUNNEL RELEASE RT/LT Left 03/02/2022    Left carpal tunnel release under local anesthesia, Dr. Linus Conrad, [facility].    cataract  01/01/2011    bilateral    HC TOOTH EXTRACTION W/FORCEP      ZZC LIGATE FALLOPIAN TUBE,POSTPARTUM      Tubal Ligation       Social History:   reports that she has never smoked. She has been exposed to tobacco smoke. She has never used smokeless tobacco. She reports that she does not drink alcohol and does not use drugs.    Family History:  Family History   Problem Relation Age of Onset    Cardiovascular Mother         Mild MI 80's    Cerebrovascular Disease Father     Diabetes Father     Neurologic Disorder Sister         brain tumor    Lipids Sister     Hypertension Sister     Gastrointestinal Disease Sister         diverticulitis    Cancer Sister         Breast cancer       Medications:  Current Outpatient Medications   Medication Sig Dispense Refill    aspirin EC 81 MG EC tablet Take 1 tablet (81 mg) by mouth daily      atorvastatin (LIPITOR) 20 MG tablet Take 1 tablet (20 mg) by mouth daily 90 tablet 11    Cholecalciferol (VITAMIN D) 2000 UNIT CAPS Take 1 tablet by mouth daily.      coenzyme Q10 (CO-Q10) 30 MG capsule Take 1 capsule (30 mg) by mouth daily 30 capsule 0    fish oil-omega-3 fatty acids 1000 MG capsule Take 2 g by mouth 2 times daily       Ginkgo Biloba (GINKOBA PO)       metoprolol succinate ER (TOPROL XL) 25 MG 24 hr tablet Take 1 tablet (25 mg) by mouth daily 90 tablet 11    nitroGLYcerin (NITROSTAT) 0.4 MG sublingual tablet Place 1 tablet (0.4 mg) under the tongue every 5  minutes as needed for chest pain 20 tablet 1    OVER-THE-COUNTER Take 1 tablet by mouth daily (Melaleuca vitamins)      traZODone (DESYREL) 50 MG tablet Take 1 tablet (50 mg) by mouth at bedtime 30 tablet 5     Allergies   Allergen Reactions    Sulfa Antibiotics

## 2024-08-12 NOTE — LETTER
8/12/2024      Krystal Parra  [address]  [address]  [place] [address]      Dear Colleague,    Thank you for referring your patient, Krystal Parra, to the [facility]. Please see a copy of my visit note below.    [facility] Dermatology Note    Encounter Date: Aug 12, 2024    Dermatology Problem List:    ______________________________________    Impression/Plan:  Krystal was seen today for derm problem.    Diagnoses and all orders for this visit:    Lentigines  Actinic skin damage  - Reviewed the compounding benefits of incremental changes to sun protective clothing behaviors including increased frequency of sunscreen and sun protective clothing like broad brimmed hats and longsleeved UPF containing clothing    Mucocele of lower lip  - benign    ISK  - x2 L cheek and chest     Cryotherapy procedure note: After verbal consent and discussion of risks and benefits including but no limited to dyspigmentation/scar, blister, and pain, 2 ISKs on L cheek and chest was(were) treated with 1-2mm freeze border for 2 cycles with liquid nitrogen. Post cryotherapy instructions were provided.       Follow-up in 1 year  .       Staff Involved:  Staff Only    Nam Stone MD   of Dermatology  Department of Dermatology  [facility]      CC:   Chief Complaint   Patient presents with     Derm Problem     Skin tags and spot on lip       History of Present Illness:  Ms. Krystal Parra is a 84 year old female who presents as a new patient.    Pt presents today for concerns about spots on trunk and extremities       Labs:      Physical exam:  Vitals: LMP  (LMP Unknown)   GEN: well developed, well-nourished, in no acute distress, in a pleasant mood.     SKIN: Thomas phototype 1  - Sun-exposed skin, which includes the head/face, neck, both arms, digits, and/or nails was examined.   - Flat brown macules and patches in a sun  exposed areas on face and extremities  - Stuck on brown papules on trunk and extremities   - clear papule lower lip  - No other lesions of concern on areas examined.     Past Medical History:   Past Medical History:   Diagnosis Date     Basal cell carcinoma      Bone spur 4th toe Right      Lumbar disc herniation      Moderate aortic regurgitation 06/18/2016     Osteopenia      Pathologic fracture of distal radius and ulna     right in 2003, left 1992     Past Surgical History:   Procedure Laterality Date     CARPAL TUNNEL RELEASE RT/LT Right 10/20/2021    Right carpal tunnel release under local anesthetic, Dr. Linus Conrad, [facility]     CARPAL TUNNEL RELEASE RT/LT Left 03/02/2022    Left carpal tunnel release under local anesthesia, Dr. Linus Conrad, [facility].     cataract  01/01/2011    bilateral     HC TOOTH EXTRACTION W/FORCEP       ZZC LIGATE FALLOPIAN TUBE,POSTPARTUM      Tubal Ligation       Social History:   reports that she has never smoked. She has been exposed to tobacco smoke. She has never used smokeless tobacco. She reports that she does not drink alcohol and does not use drugs.    Family History:  Family History   Problem Relation Age of Onset     Cardiovascular Mother         Mild MI 80's     Cerebrovascular Disease Father      Diabetes Father      Neurologic Disorder Sister         brain tumor     Lipids Sister      Hypertension Sister      Gastrointestinal Disease Sister         diverticulitis     Cancer Sister         Breast cancer       Medications:  Current Outpatient Medications   Medication Sig Dispense Refill     aspirin EC 81 MG EC tablet Take 1 tablet (81 mg) by mouth daily       atorvastatin (LIPITOR) 20 MG tablet Take 1 tablet (20 mg) by mouth daily 90 tablet 11     Cholecalciferol (VITAMIN D) 2000 UNIT CAPS Take 1 tablet by mouth daily.       coenzyme Q10 (CO-Q10) 30 MG capsule Take 1 capsule (30 mg) by mouth daily 30 capsule 0     fish oil-omega-3 fatty acids 1000 MG  capsule Take 2 g by mouth 2 times daily        Ginkgo Biloba (GINKOBA PO)        metoprolol succinate ER (TOPROL XL) 25 MG 24 hr tablet Take 1 tablet (25 mg) by mouth daily 90 tablet 11     nitroGLYcerin (NITROSTAT) 0.4 MG sublingual tablet Place 1 tablet (0.4 mg) under the tongue every 5 minutes as needed for chest pain 20 tablet 1     OVER-THE-COUNTER Take 1 tablet by mouth daily (Melaleuca vitamins)       traZODone (DESYREL) 50 MG tablet Take 1 tablet (50 mg) by mouth at bedtime 30 tablet 5     Allergies   Allergen Reactions     Sulfa Antibiotics                Again, thank you for allowing me to participate in the care of your patient.        Sincerely,        Nam Stone MD

## 2024-08-23 ENCOUNTER — OFFICE VISIT (OUTPATIENT)
Dept: ORTHOPEDICS | Facility: CLINIC | Age: 84
End: 2024-08-23
Payer: COMMERCIAL

## 2024-08-23 ENCOUNTER — ANCILLARY PROCEDURE (OUTPATIENT)
Dept: GENERAL RADIOLOGY | Facility: CLINIC | Age: 84
End: 2024-08-23
Attending: FAMILY MEDICINE
Payer: COMMERCIAL

## 2024-08-23 VITALS
HEIGHT: 60 IN | DIASTOLIC BLOOD PRESSURE: 68 MMHG | WEIGHT: 157 LBS | SYSTOLIC BLOOD PRESSURE: 136 MMHG | BODY MASS INDEX: 30.82 KG/M2

## 2024-08-23 DIAGNOSIS — M79.642 BILATERAL HAND PAIN: Primary | ICD-10-CM

## 2024-08-23 DIAGNOSIS — M18.0 OSTEOARTHRITIS OF CARPOMETACARPAL (CMC) JOINT OF BOTH THUMBS: ICD-10-CM

## 2024-08-23 DIAGNOSIS — M79.642 BILATERAL HAND PAIN: ICD-10-CM

## 2024-08-23 DIAGNOSIS — M17.0 PRIMARY OSTEOARTHRITIS OF BOTH KNEES: ICD-10-CM

## 2024-08-23 DIAGNOSIS — M79.641 BILATERAL HAND PAIN: Primary | ICD-10-CM

## 2024-08-23 DIAGNOSIS — R20.2 NUMBNESS AND TINGLING IN BOTH HANDS: ICD-10-CM

## 2024-08-23 DIAGNOSIS — M79.641 BILATERAL HAND PAIN: ICD-10-CM

## 2024-08-23 DIAGNOSIS — R20.0 NUMBNESS AND TINGLING IN BOTH HANDS: ICD-10-CM

## 2024-08-23 PROCEDURE — 99214 OFFICE O/P EST MOD 30 MIN: CPT | Performed by: FAMILY MEDICINE

## 2024-08-23 PROCEDURE — 73130 X-RAY EXAM OF HAND: CPT | Mod: TC | Performed by: FAMILY MEDICINE

## 2024-08-23 NOTE — LETTER
8/23/2024      Krystal Parra  [address]  [address]  [place] [address]      Dear Colleague,    Thank you for referring your patient, Krystal Parra, to the [facility] [place]. Please see a copy of my visit note below.    ASSESSMENT & PLAN  Patient Instructions     1. Bilateral hand pain    2. Numbness and tingling in both hands    3. Osteoarthritis of carpometacarpal (CMC) joint of both thumbs    4. Primary osteoarthritis of both knees      -Patient has bilateral hand pain with numbness and tingling either due to carpal tunnel syndrome versus cervical radiculopathy.  Patient is also reporting ongoing bilateral knee pain due to severe bone-on-bone arthritis  -Patient had bilateral carpal tunnel release surgery approximately 3 years ago without any improvement in her symptoms.  -Patient will get an EMG to assess the source of her nerve pain and severity to guide further workup and treatment.  -Patient will also be referred to orthopedic surgery to discuss potential knee replacement surgery for either knee.  -Authorization for Durolane was ordered today.  -Patient will follow-up in 2 weeks to administer the Durolane medication  -Call direct clinic number [[phone]] at any time with questions or concerns.    Albert Yeo MD [facility]  [facility] [place]        -----    SUBJECTIVE  Krystal Parra is a/an 84 year old Right handed female who is seen in consultation at the request of  Tasha Winkler M.D. for evaluation of bilateral hand pain. The patient is seen by themselves.  Pain is most noted at night, and can keep her up at night    Onset: 1 years(s) ago. Reports insidious onset without acute precipitating event.  Location of Pain: bilateral hands, most notably the first 4 fingers of both hands  Rating of Pain at worst: 4/10  Rating of Pain Currently: 3/10  Worsened by: use  Better with: heat  Treatments tried: heat  "and massage  Associated symptoms: numbness, tingling, and stiffness  Orthopedic history: YES - Date: 2022  Relevant surgical history: YES - Date: carpal tunnel realease, bilateral  Social history: social history: retired    Past Medical History:   Diagnosis Date     Basal cell carcinoma      Bone spur 4th toe Right      Lumbar disc herniation      Moderate aortic regurgitation 06/18/2016     Osteopenia      Pathologic fracture of distal radius and ulna     right in 2003, left 1992     Social History     Socioeconomic History     Marital status:      Number of children: 4   Occupational History     Occupation: Retired   Tobacco Use     Smoking status: Never     Passive exposure: Yes     Smokeless tobacco: Never     Tobacco comments:      smoked in house   Vaping Use     Vaping status: Never Used   Substance and Sexual Activity     Alcohol use: No     Drug use: No     Sexual activity: Not Currently   Other Topics Concern     Parent/sibling w/ CABG, MI or angioplasty before 65F 55M? No         Patient's past medical, surgical, social, and family histories were reviewed today and no changes are noted.    REVIEW OF SYSTEMS:  10 point ROS is negative other than symptoms noted above in HPI, Past Medical History or as stated below  Constitutional: NEGATIVE for fever, chills, change in weight  Skin: NEGATIVE for worrisome rashes, moles or lesions  GI/: NEGATIVE for bowel or bladder changes  Neuro: NEGATIVE for weakness, dizziness or paresthesias    OBJECTIVE:  /68   Ht 1.52 m (4' 11.84\")   Wt 71.2 kg (157 lb)   LMP  (LMP Unknown)   BMI 30.82 kg/m     General: healthy, alert and in no distress  HEENT: no scleral icterus or conjunctival erythema  Skin: no suspicious lesions or rash. No jaundice.  CV: regular rhythm by palpation  Resp: normal respiratory effort without conversational dyspnea   Psych: normal mood and affect  Gait: normal steady gait with appropriate coordination and balance  Neuro: " normal light touch sensory exam of the bilateral hands.    MSK:  BILATERAL HAND  Inspection:    No swelling or obvious deformity or asymmetry  Palpation:   Carpals: normal   Metacarpals: normal   Thumb: CMC   Fingers: MCP joint  Range of Motion:    Full active flexion and extension at MCP, PIP, and DIP joints; normal finger cascade without malrotation.  Wrist pronation, supination, and ulnar/radial deviation normal.  Strength:  Grossly intact  Special Tests:    Positive: CMC grind, Tinel's, Phalen's      Independent visualization of the below image:  Recent Results (from the past 24 hour(s))   XR Hand Bilateral G/E 3 Views    Narrative    Degenerative changes of the right first CMC, scaphoid trapezium joint,   first MCP and IP joint, second and third DIP joints.    Left wrist joint space narrowing, chondrocalcinosis and osteophytes.  STT   degenerative changes.  First MCP and second DIP joint space narrowing and   osteophytes.  No acute fracture or dislocation.         Patient's     Albert Yeo MD [facility]       Again, thank you for allowing me to participate in the care of your patient.        Sincerely,        Albert Yeo, MD

## 2024-08-23 NOTE — PROGRESS NOTES
ASSESSMENT & PLAN  Patient Instructions     1. Bilateral hand pain    2. Numbness and tingling in both hands    3. Osteoarthritis of carpometacarpal (CMC) joint of both thumbs    4. Primary osteoarthritis of both knees      -Patient has bilateral hand pain with numbness and tingling either due to carpal tunnel syndrome versus cervical radiculopathy.  Patient is also reporting ongoing bilateral knee pain due to severe bone-on-bone arthritis  -Patient had bilateral carpal tunnel release surgery approximately 3 years ago without any improvement in her symptoms.  -Patient will get an EMG to assess the source of her nerve pain and severity to guide further workup and treatment.  -Patient will also be referred to orthopedic surgery to discuss potential knee replacement surgery for either knee.  -Authorization for Durolane was ordered today.  -Patient will follow-up in 2 weeks to administer the Durolane medication  -Call direct clinic number [[phone]] at any time with questions or concerns.    Albert Yeo MD [facility]  [facility]        -----    SUBJECTIVE  Krystal Parra is a/an 84 year old Right handed female who is seen in consultation at the request of  Tasha Winkler M.D. for evaluation of bilateral hand pain. The patient is seen by themselves.  Pain is most noted at night, and can keep her up at night    Onset: 1 years(s) ago. Reports insidious onset without acute precipitating event.  Location of Pain: bilateral hands, most notably the first 4 fingers of both hands  Rating of Pain at worst: 4/10  Rating of Pain Currently: 3/10  Worsened by: use  Better with: heat  Treatments tried: heat and massage  Associated symptoms: numbness, tingling, and stiffness  Orthopedic history: YES - Date: 2022  Relevant surgical history: YES - Date: carpal tunnel realease, bilateral  Social history: social history: retired    Past Medical History:   Diagnosis Date     "Basal cell carcinoma     Bone spur 4th toe Right     Lumbar disc herniation     Moderate aortic regurgitation 06/18/2016    Osteopenia     Pathologic fracture of distal radius and ulna     right in 2003, left 1992     Social History     Socioeconomic History    Marital status:     Number of children: 4   Occupational History    Occupation: Retired   Tobacco Use    Smoking status: Never     Passive exposure: Yes    Smokeless tobacco: Never    Tobacco comments:      smoked in house   Vaping Use    Vaping status: Never Used   Substance and Sexual Activity    Alcohol use: No    Drug use: No    Sexual activity: Not Currently   Other Topics Concern    Parent/sibling w/ CABG, MI or angioplasty before 65F 55M? No         Patient's past medical, surgical, social, and family histories were reviewed today and no changes are noted.    REVIEW OF SYSTEMS:  10 point ROS is negative other than symptoms noted above in HPI, Past Medical History or as stated below  Constitutional: NEGATIVE for fever, chills, change in weight  Skin: NEGATIVE for worrisome rashes, moles or lesions  GI/: NEGATIVE for bowel or bladder changes  Neuro: NEGATIVE for weakness, dizziness or paresthesias    OBJECTIVE:  /68   Ht 1.52 m (4' 11.84\")   Wt 71.2 kg (157 lb)   LMP  (LMP Unknown)   BMI 30.82 kg/m     General: healthy, alert and in no distress  HEENT: no scleral icterus or conjunctival erythema  Skin: no suspicious lesions or rash. No jaundice.  CV: regular rhythm by palpation  Resp: normal respiratory effort without conversational dyspnea   Psych: normal mood and affect  Gait: normal steady gait with appropriate coordination and balance  Neuro: normal light touch sensory exam of the bilateral hands.    MSK:  BILATERAL HAND  Inspection:    No swelling or obvious deformity or asymmetry  Palpation:   Carpals: normal   Metacarpals: normal   Thumb: CMC   Fingers: MCP joint  Range of Motion:    Full active flexion and extension at " MCP, PIP, and DIP joints; normal finger cascade without malrotation.  Wrist pronation, supination, and ulnar/radial deviation normal.  Strength:  Grossly intact  Special Tests:    Positive: CMC grind, Tinel's, Phalen's      Independent visualization of the below image:  Recent Results (from the past 24 hour(s))   XR Hand Bilateral G/E 3 Views    Narrative    Degenerative changes of the right first CMC, scaphoid trapezium joint,   first MCP and IP joint, second and third DIP joints.    Left wrist joint space narrowing, chondrocalcinosis and osteophytes.  STT   degenerative changes.  First MCP and second DIP joint space narrowing and   osteophytes.  No acute fracture or dislocation.         Patient's     Albert Yeo MD [facility]

## 2024-08-23 NOTE — PATIENT INSTRUCTIONS
1. Bilateral hand pain    2. Numbness and tingling in both hands    3. Osteoarthritis of carpometacarpal (CMC) joint of both thumbs    4. Primary osteoarthritis of both knees      -Patient has bilateral hand pain with numbness and tingling either due to carpal tunnel syndrome versus cervical radiculopathy.  Patient is also reporting ongoing bilateral knee pain due to severe bone-on-bone arthritis  -Patient had bilateral carpal tunnel release surgery approximately 3 years ago without any improvement in her symptoms.  -Patient will get an EMG to assess the source of her nerve pain and severity to guide further workup and treatment.  -Patient will also be referred to orthopedic surgery to discuss potential knee replacement surgery for either knee.  -Authorization for Durolane was ordered today.  -Patient will follow-up in 2 weeks to administer the Durolane medication  -Call direct clinic number [[phone]] at any time with questions or concerns.    Albert Yeo MD [facility]  [facility]

## 2024-08-28 ENCOUNTER — TELEPHONE (OUTPATIENT)
Dept: ORTHOPEDICS | Facility: CLINIC | Age: 84
End: 2024-08-28
Payer: COMMERCIAL

## 2024-08-28 NOTE — TELEPHONE ENCOUNTER
Pt of Dr. Yeo is receiving calls to schedule and it seems there are 2 neurology appts being requested.  She called from Dr. Yeos clinic and the  scheduled her for [place] on 9/25.  Pt is not aware what this is for or who it is with.  I do not see this.  Its called [facility].  There is an ortho surgical order and an EMG order for [place].  Pt is mixed up on who to schedule with all of these calls.        Could we send this information to you [email or web address] or would you prefer to receive a phone call?:   Patient would prefer a phone call   Okay to leave a detailed message?: Yes at Cell number on file:    Telephone Information:   Mobile [phone]

## 2024-09-12 NOTE — PROGRESS NOTES
SUBJECTIVE:   Krystal Parra is a 79 year old female who presents to clinic today for the following   health issues:      Back Pain       Duration: Several years         Specific cause: none    Description:   Location of pain: low back bilateral  Character of pain: pt states its an uncomfortable pain, not sharp, dull, cramping   Pain radiation:radiates into the left leg  New numbness or weakness in legs, not attributed to pain:  YES both     Intensity: Currently 2-3/10, moderate    History:   Pain interferes with job: Not applicable, retired   History of back problems: no prior back problems  Any previous MRI or X-rays: Yes- at [place].  Date unknown  Sees a specialist for back pain: none  Therapies tried without relief: chiropractor and massage    Alleviating factors:   Improved by: chiropractor      Precipitating factors:  Worsened by: Standing and Sitting      First back issues started during pregnancy at age 23.  Was walking down burgos at work and had back pain.  Over the years, have had symptoms - pain, numbness.    Has tried physical therapy - this made things worse.      Had cortisone shots - 1 didn't help, the 2nd helped with the pain, but made her start limping - she continues to limp to this day.  She estimates that this was 10 years ago.    More recently, has been having pain across her lower back with radiation down the left leg.  This has been persistent for the past year.  Had stem cell therapy in the SI joints and didn't get any benefit.    Has been going to the chiropractor twice per week.  Has been getting regular adjustments and has had a leg length discrepancy.    No new bowel/bladder dysfunction.    No trauma or MVAs.    Tylenol helps a little when she takes it - prefers not to use regularly.    Feeling more weakness in her back and hips.  Feeling numbness in her lower left leg.    Symptoms have changed since last MRI in 2016 - more SI joint pain, more pain radiating to leg and  Tazorac Pregnancy And Lactation Text: This medication is not safe during pregnancy. It is unknown if this medication is excreted in breast milk. "discomfort.    Hasn't been [facility] at the [facility] since January because seemed to make her back worse.      Wears a back brace when walking longer distances and out in public/          Accompanying Signs & Symptoms:  Risk of Fracture:  None  Risk of Cauda Equina:  None  Risk of Infection:  None  Risk of Cancer:  None  Risk of Ankylosing Spondylitis:  Onset at age <35, male, AND morning back stiffness. no         Patient also has concerns about her skin - scaling patch on her left upper cheek that is not healing. Multiple moles.    Additional history: as documented    Reviewed  and updated as needed this visit by clinical staff  Tobacco  Allergies  Meds  Med Hx  Surg Hx  Fam Hx  Soc Hx        Reviewed and updated as needed this visit by Provider             ROS:  Constitutional, derm, msk, neuro systems are negative, except as otherwise noted.    OBJECTIVE:     /62 (BP Location: Right arm, Patient Position: Sitting, Cuff Size: Adult Regular)   Pulse 88   Temp 98  F (36.7  C) (Oral)   Ht 1.549 m (5' 1\")   Wt 74.8 kg (165 lb)   SpO2 98%   BMI 31.18 kg/m    Body mass index is 31.18 kg/m .  GENERAL: healthy, alert and no distress  RESP: lungs clear to auscultation - no rales, rhonchi or wheezes  CV: regular rate and rhythm, normal S1 S2, no S3 or S4, no murmur, click or rub, no peripheral edema and peripheral pulses strong  MS: no spinous process tenderness, negative straight leg raise in clinic today, limited back ROM due to stiffness.  No muscle wasting.  SKIN: erythematous scaling dime sized patch left upper cheek, scattered pigmented nevi  NEURO:unable to walk on toe on left side, able to walk on heels, decreased sensation to light touch on left shin and foot, fluent speech, CN intact and symmetric  PSYCH: mentation appears normal, affect normal/bright    Diagnostic Test Results:  Reviewed 6/2016 lumbar MRI abnormalities with patient in clinic today    ASSESSMENT/PLAN:         ICD-10-CM    1. Lumbar " Sarecycline Pregnancy And Lactation Text: This medication is Pregnancy Category D and not consider safe during pregnancy. It is also excreted in breast milk. Azelaic Acid Counseling: Patient counseled that medicine may cause skin irritation and to avoid applying near the eyes.  In the event of skin irritation, the patient was advised to reduce the amount of the drug applied or use it less frequently.   The patient verbalized understanding of the proper use and possible adverse effects of azelaic acid.  All of the patient's questions and concerns were addressed. disc herniation M51.26 MR Lumbar Spine w/o Contrast     ORTHO  REFERRAL    Plan repeat MRI and spine specialist consultation due to worsening symptoms over time.  Patient is not interested in adding any additional medications today, but would be open to possible procedures in the future.  Physical therapy has not been helpful for her in the past and with her last experience checks had worsening of her symptoms.   2. Lumbar radiculopathy M54.16 MR Lumbar Spine w/o Contrast     ORTHO  REFERRAL   3. Spondylolisthesis of lumbar region M43.16 ORTHO  REFERRAL   4. Multiple pigmented nevi D22.9 DERMATOLOGY REFERRAL  Plan dermatology referral for full skin exam.       See Patient Instructions    Tasha Winkler MD  [facility]       Isotretinoin Counseling: Patient should get monthly blood tests, not donate blood, not drive at night if vision affected, not share medication, and not undergo elective surgery for 6 months after tx completed. Side effects reviewed, pt to contact office should one occur. Bactrim Pregnancy And Lactation Text: This medication is Pregnancy Category D and is known to cause fetal risk.  It is also excreted in breast milk. Erythromycin Counseling:  I discussed with the patient the risks of erythromycin including but not limited to GI upset, allergic reaction, drug rash, diarrhea, increase in liver enzymes, and yeast infections. Aklief counseling:  Patient advised to apply a pea-sized amount only at bedtime and wait 30 minutes after washing their face before applying.  If too drying, patient may add a non-comedogenic moisturizer.  The most commonly reported side effects including irritation, redness, scaling, dryness, stinging, burning, itching, and increased risk of sunburn.  The patient verbalized understanding of the proper use and possible adverse effects of retinoids.  All of the patient's questions and concerns were addressed. Winlevi Counseling:  I discussed with the patient the risks of topical clascoterone including but not limited to erythema, scaling, itching, and stinging. Patient voiced their understanding. Topical Retinoid Pregnancy And Lactation Text: This medication is Pregnancy Category C. It is unknown if this medication is excreted in breast milk. Include Pregnancy/Lactation Warning?: No Topical Sulfur Applications Counseling: Topical Sulfur Counseling: Patient counseled that this medication may cause skin irritation or allergic reactions.  In the event of skin irritation, the patient was advised to reduce the amount of the drug applied or use it less frequently.   The patient verbalized understanding of the proper use and possible adverse effects of topical sulfur application.  All of the patient's questions and concerns were addressed. Benzoyl Peroxide Pregnancy And Lactation Text: This medication is Pregnancy Category C. It is unknown if benzoyl peroxide is excreted in breast milk. Detail Level: Detailed High Dose Vitamin A Pregnancy And Lactation Text: High dose vitamin A therapy is contraindicated during pregnancy and breast feeding. Doxycycline Counseling:  Patient counseled regarding possible photosensitivity and increased risk for sunburn.  Patient instructed to avoid sunlight, if possible.  When exposed to sunlight, patients should wear protective clothing, sunglasses, and sunscreen.  The patient was instructed to call the office immediately if the following severe adverse effects occur:  hearing changes, easy bruising/bleeding, severe headache, or vision changes.  The patient verbalized understanding of the proper use and possible adverse effects of doxycycline.  All of the patient's questions and concerns were addressed. Tetracycline Counseling: Patient counseled regarding possible photosensitivity and increased risk for sunburn.  Patient instructed to avoid sunlight, if possible.  When exposed to sunlight, patients should wear protective clothing, sunglasses, and sunscreen.  The patient was instructed to call the office immediately if the following severe adverse effects occur:  hearing changes, easy bruising/bleeding, severe headache, or vision changes.  The patient verbalized understanding of the proper use and possible adverse effects of tetracycline.  All of the patient's questions and concerns were addressed. Patient understands to avoid pregnancy while on therapy due to potential birth defects. Azithromycin Pregnancy And Lactation Text: This medication is considered safe during pregnancy and is also secreted in breast milk. Dapsone Counseling: I discussed with the patient the risks of dapsone including but not limited to hemolytic anemia, agranulocytosis, rashes, methemoglobinemia, kidney failure, peripheral neuropathy, headaches, GI upset, and liver toxicity.  Patients who start dapsone require monitoring including baseline LFTs and weekly CBCs for the first month, then every month thereafter.  The patient verbalized understanding of the proper use and possible adverse effects of dapsone.  All of the patient's questions and concerns were addressed. Isotretinoin Pregnancy And Lactation Text: This medication is Pregnancy Category X and is considered extremely dangerous during pregnancy. It is unknown if it is excreted in breast milk. Spironolactone Counseling: Patient advised regarding risks of diarrhea, abdominal pain, hyperkalemia, birth defects (for female patients), liver toxicity and renal toxicity. The patient may need blood work to monitor liver and kidney function and potassium levels while on therapy. The patient verbalized understanding of the proper use and possible adverse effects of spironolactone.  All of the patient's questions and concerns were addressed. Azelaic Acid Pregnancy And Lactation Text: This medication is considered safe during pregnancy and breast feeding. Topical Clindamycin Counseling: Patient counseled that this medication may cause skin irritation or allergic reactions.  In the event of skin irritation, the patient was advised to reduce the amount of the drug applied or use it less frequently.   The patient verbalized understanding of the proper use and possible adverse effects of clindamycin.  All of the patient's questions and concerns were addressed. Sarecycline Counseling: Patient advised regarding possible photosensitivity and discoloration of the teeth, skin, lips, tongue and gums.  Patient instructed to avoid sunlight, if possible.  When exposed to sunlight, patients should wear protective clothing, sunglasses, and sunscreen.  The patient was instructed to call the office immediately if the following severe adverse effects occur:  hearing changes, easy bruising/bleeding, severe headache, or vision changes.  The patient verbalized understanding of the proper use and possible adverse effects of sarecycline.  All of the patient's questions and concerns were addressed. Birth Control Pills Counseling: Birth Control Pill Counseling: I discussed with the patient the potential side effects of OCPs including but not limited to increased risk of stroke, heart attack, thrombophlebitis, deep venous thrombosis, hepatic adenomas, breast changes, GI upset, headaches, and depression.  The patient verbalized understanding of the proper use and possible adverse effects of OCPs. All of the patient's questions and concerns were addressed. Erythromycin Pregnancy And Lactation Text: This medication is Pregnancy Category B and is considered safe during pregnancy. It is also excreted in breast milk. Aklief Pregnancy And Lactation Text: It is unknown if this medication is safe to use during pregnancy.  It is unknown if this medication is excreted in breast milk.  Breastfeeding women should use the topical cream on the smallest area of the skin for the shortest time needed while breastfeeding.  Do not apply to nipple and areola. Winlevi Pregnancy And Lactation Text: This medication is considered safe during pregnancy and breastfeeding. Tazorac Counseling:  Patient advised that medication is irritating and drying.  Patient may need to apply sparingly and wash off after an hour before eventually leaving it on overnight.  The patient verbalized understanding of the proper use and possible adverse effects of tazorac.  All of the patient's questions and concerns were addressed. Minocycline Counseling: Patient advised regarding possible photosensitivity and discoloration of the teeth, skin, lips, tongue and gums.  Patient instructed to avoid sunlight, if possible.  When exposed to sunlight, patients should wear protective clothing, sunglasses, and sunscreen.  The patient was instructed to call the office immediately if the following severe adverse effects occur:  hearing changes, easy bruising/bleeding, severe headache, or vision changes.  The patient verbalized understanding of the proper use and possible adverse effects of minocycline.  All of the patient's questions and concerns were addressed. Topical Retinoid counseling:  Patient advised to apply a pea-sized amount only at bedtime and wait 30 minutes after washing their face before applying.  If too drying, patient may add a non-comedogenic moisturizer. The patient verbalized understanding of the proper use and possible adverse effects of retinoids.  All of the patient's questions and concerns were addressed. Bactrim Counseling:  I discussed with the patient the risks of sulfa antibiotics including but not limited to GI upset, allergic reaction, drug rash, diarrhea, dizziness, photosensitivity, and yeast infections.  Rarely, more serious reactions can occur including but not limited to aplastic anemia, agranulocytosis, methemoglobinemia, blood dyscrasias, liver or kidney failure, lung infiltrates or desquamative/blistering drug rashes. Doxycycline Pregnancy And Lactation Text: This medication is Pregnancy Category D and not consider safe during pregnancy. It is also excreted in breast milk but is considered safe for shorter treatment courses. Topical Sulfur Applications Pregnancy And Lactation Text: This medication is Pregnancy Category C and has an unknown safety profile during pregnancy. It is unknown if this topical medication is excreted in breast milk. Azithromycin Counseling:  I discussed with the patient the risks of azithromycin including but not limited to GI upset, allergic reaction, drug rash, diarrhea, and yeast infections. Dapsone Pregnancy And Lactation Text: This medication is Pregnancy Category C and is not considered safe during pregnancy or breast feeding. High Dose Vitamin A Counseling: Side effects reviewed, pt to contact office should one occur. Spironolactone Pregnancy And Lactation Text: This medication can cause feminization of the male fetus and should be avoided during pregnancy. The active metabolite is also found in breast milk. Benzoyl Peroxide Counseling: Patient counseled that medicine may cause skin irritation and bleach clothing.  In the event of skin irritation, the patient was advised to reduce the amount of the drug applied or use it less frequently.   The patient verbalized understanding of the proper use and possible adverse effects of benzoyl peroxide.  All of the patient's questions and concerns were addressed. Topical Clindamycin Pregnancy And Lactation Text: This medication is Pregnancy Category B and is considered safe during pregnancy. It is unknown if it is excreted in breast milk. Birth Control Pills Pregnancy And Lactation Text: This medication should be avoided if pregnant and for the first 30 days post-partum.

## 2024-09-13 ENCOUNTER — OFFICE VISIT (OUTPATIENT)
Dept: ORTHOPEDICS | Facility: CLINIC | Age: 84
End: 2024-09-13
Payer: COMMERCIAL

## 2024-09-13 DIAGNOSIS — M47.816 LUMBAR SPONDYLOSIS: ICD-10-CM

## 2024-09-13 DIAGNOSIS — M17.0 PRIMARY OSTEOARTHRITIS OF BOTH KNEES: Primary | ICD-10-CM

## 2024-09-13 DIAGNOSIS — M51.26 DISPLACEMENT OF LUMBAR INTERVERTEBRAL DISC WITHOUT MYELOPATHY: ICD-10-CM

## 2024-09-13 DIAGNOSIS — M48.062 SPINAL STENOSIS OF LUMBAR REGION WITH NEUROGENIC CLAUDICATION: ICD-10-CM

## 2024-09-13 PROCEDURE — 20611 DRAIN/INJ JOINT/BURSA W/US: CPT | Mod: 50 | Performed by: FAMILY MEDICINE

## 2024-09-13 NOTE — LETTER
9/13/2024      Krystal Parra  [address]  [address]  [place] [address]      Dear Colleague,    Thank you for referring your patient, Krystal Parra, to the [facility] [place]. Please see a copy of my visit note below.    ASSESSMENT & PLAN  Patient Instructions     1. Primary osteoarthritis of both knees    2. Lumbar disc herniation    3. Lumbar spondylosis    4. Spinal stenosis of lumbar region with neurogenic claudication      -Patient is following up for chronic bilateral knee pain due to arthritis.    -Patient is also reporting chronic low back pain due to arthritis and degenerative disc disease  -Patient referred to pain management for further evaluation and treatment.  -May continue with chiropractic treatments as needed  -Call direct clinic number [[phone]] at any time with questions or concerns.    Albert Yeo MD [facility]  [facility] [place]        -----    SUBJECTIVE:  Krystal Parra is a 84 year old female who is seen for bilateral HA injections  at the request of Dr. Yeo .             Albert Yeo MD, [facility]  Large Joint Injection/Arthocentesis: bilateral knee    Date/Time: 9/13/2024 1:28 PM    Performed by: Yeo, Albert, MD  Authorized by: Yeo, Albert, MD    Indications:  Pain  Needle Size:  22 G  Guidance: ultrasound    Approach:  Anterolateral  Location:  Knee  Laterality:  Bilateral      Medications (Right):  60 mg sodium hyaluronate 60 MG/3ML  Medications (Left):  60 mg sodium hyaluronate 60 MG/3ML  Outcome:  Tolerated well, no immediate complications  Procedure discussed: discussed risks, benefits, and alternatives    Consent Given by:  Patient  Timeout: timeout called immediately prior to procedure    Prep: patient was prepped and draped in usual sterile fashion          Again, thank you for allowing me to participate in the care of your patient.         Sincerely,        Albert Yeo, MD

## 2024-09-13 NOTE — PROGRESS NOTES
ASSESSMENT & PLAN  Patient Instructions     1. Primary osteoarthritis of both knees    2. Lumbar disc herniation    3. Lumbar spondylosis    4. Spinal stenosis of lumbar region with neurogenic claudication      -Patient is following up for chronic bilateral knee pain due to arthritis.    -Patient is also reporting chronic low back pain due to arthritis and degenerative disc disease  -Patient referred to pain management for further evaluation and treatment.  -May continue with chiropractic treatments as needed  -Call direct clinic number [[phone]] at any time with questions or concerns.    Albert Yeo MD [facility]  [facility]        -----    SUBJECTIVE:  Krystal Parra is a 84 year old female who is seen for bilateral HA injections  at the request of Dr. Yeo .             Albert Yeo MD, [facility]  Large Joint Injection/Arthocentesis: bilateral knee    Date/Time: 9/13/2024 1:28 PM    Performed by: Yeo, Albert, MD  Authorized by: Yeo, Albert, MD    Indications:  Pain  Needle Size:  22 G  Guidance: ultrasound    Approach:  Anterolateral  Location:  Knee  Laterality:  Bilateral      Medications (Right):  60 mg sodium hyaluronate 60 MG/3ML  Medications (Left):  60 mg sodium hyaluronate 60 MG/3ML  Outcome:  Tolerated well, no immediate complications  Procedure discussed: discussed risks, benefits, and alternatives    Consent Given by:  Patient  Timeout: timeout called immediately prior to procedure    Prep: patient was prepped and draped in usual sterile fashion

## 2024-09-13 NOTE — PATIENT INSTRUCTIONS
1. Primary osteoarthritis of both knees    2. Lumbar disc herniation    3. Lumbar spondylosis    4. Spinal stenosis of lumbar region with neurogenic claudication      -Patient is following up for chronic bilateral knee pain due to arthritis.    -Patient is also reporting chronic low back pain due to arthritis and degenerative disc disease  -Patient referred to pain management for further evaluation and treatment.  -May continue with chiropractic treatments as needed  -Call direct clinic number [[phone]] at any time with questions or concerns.    Albert Yeo MD CAM  [facility]  [facility] [place]

## 2024-09-23 ENCOUNTER — OFFICE VISIT (OUTPATIENT)
Dept: PEDIATRICS | Facility: CLINIC | Age: 84
End: 2024-09-23
Payer: COMMERCIAL

## 2024-09-23 VITALS
HEIGHT: 60 IN | RESPIRATION RATE: 21 BRPM | SYSTOLIC BLOOD PRESSURE: 126 MMHG | WEIGHT: 159 LBS | DIASTOLIC BLOOD PRESSURE: 52 MMHG | OXYGEN SATURATION: 98 % | HEART RATE: 89 BPM | BODY MASS INDEX: 31.22 KG/M2 | TEMPERATURE: 98.1 F

## 2024-09-23 DIAGNOSIS — Z00.00 ENCOUNTER FOR MEDICARE ANNUAL WELLNESS EXAM: Primary | ICD-10-CM

## 2024-09-23 DIAGNOSIS — G47.00 PERSISTENT INSOMNIA: ICD-10-CM

## 2024-09-23 DIAGNOSIS — I35.1 MODERATE AORTIC REGURGITATION: ICD-10-CM

## 2024-09-23 DIAGNOSIS — H61.23 IMPACTED CERUMEN, BILATERAL: ICD-10-CM

## 2024-09-23 DIAGNOSIS — R94.39 ABNORMAL STRESS TEST: ICD-10-CM

## 2024-09-23 DIAGNOSIS — I25.119 CORONARY ARTERY DISEASE INVOLVING NATIVE CORONARY ARTERY OF NATIVE HEART WITH ANGINA PECTORIS (H): ICD-10-CM

## 2024-09-23 DIAGNOSIS — M51.26 DISPLACEMENT OF LUMBAR INTERVERTEBRAL DISC WITHOUT MYELOPATHY: ICD-10-CM

## 2024-09-23 DIAGNOSIS — H61.23 IMPACTED CERUMEN OF BOTH EARS: ICD-10-CM

## 2024-09-23 DIAGNOSIS — I10 BENIGN ESSENTIAL HYPERTENSION: ICD-10-CM

## 2024-09-23 DIAGNOSIS — E78.5 HYPERLIPIDEMIA LDL GOAL <100: ICD-10-CM

## 2024-09-23 DIAGNOSIS — I25.10 ASCVD (ARTERIOSCLEROTIC CARDIOVASCULAR DISEASE): ICD-10-CM

## 2024-09-23 LAB
ALBUMIN SERPL BCG-MCNC: 4.2 G/DL (ref 3.5–5.2)
ALP SERPL-CCNC: 65 U/L (ref 40–150)
ALT SERPL W P-5'-P-CCNC: 21 U/L (ref 0–50)
ANION GAP SERPL CALCULATED.3IONS-SCNC: 11 MMOL/L (ref 7–15)
AST SERPL W P-5'-P-CCNC: 22 U/L (ref 0–45)
BILIRUB SERPL-MCNC: 0.3 MG/DL
BUN SERPL-MCNC: 25.1 MG/DL (ref 8–23)
CALCIUM SERPL-MCNC: 9.7 MG/DL (ref 8.8–10.4)
CHLORIDE SERPL-SCNC: 104 MMOL/L (ref 98–107)
CHOLEST SERPL-MCNC: 144 MG/DL
CREAT SERPL-MCNC: 0.73 MG/DL (ref 0.51–0.95)
EGFRCR SERPLBLD CKD-EPI 2021: 80 ML/MIN/1.73M2
FASTING STATUS PATIENT QL REPORTED: NO
FASTING STATUS PATIENT QL REPORTED: NO
GLUCOSE SERPL-MCNC: 99 MG/DL (ref 70–99)
HCO3 SERPL-SCNC: 26 MMOL/L (ref 22–29)
HDLC SERPL-MCNC: 49 MG/DL
LDLC SERPL CALC-MCNC: 63 MG/DL
NONHDLC SERPL-MCNC: 95 MG/DL
POTASSIUM SERPL-SCNC: 5 MMOL/L (ref 3.4–5.3)
PROT SERPL-MCNC: 7.1 G/DL (ref 6.4–8.3)
SODIUM SERPL-SCNC: 141 MMOL/L (ref 135–145)
TRIGL SERPL-MCNC: 160 MG/DL
TSH SERPL DL<=0.005 MIU/L-ACNC: 3.84 UIU/ML (ref 0.3–4.2)

## 2024-09-23 PROCEDURE — 99214 OFFICE O/P EST MOD 30 MIN: CPT | Mod: 25 | Performed by: PEDIATRICS

## 2024-09-23 PROCEDURE — 36415 COLL VENOUS BLD VENIPUNCTURE: CPT | Performed by: PEDIATRICS

## 2024-09-23 PROCEDURE — G0439 PPPS, SUBSEQ VISIT: HCPCS | Performed by: PEDIATRICS

## 2024-09-23 PROCEDURE — 69209 REMOVE IMPACTED EAR WAX UNI: CPT | Mod: 50 | Performed by: PEDIATRICS

## 2024-09-23 PROCEDURE — 80061 LIPID PANEL: CPT | Performed by: PEDIATRICS

## 2024-09-23 PROCEDURE — 80053 COMPREHEN METABOLIC PANEL: CPT | Performed by: PEDIATRICS

## 2024-09-23 PROCEDURE — 84443 ASSAY THYROID STIM HORMONE: CPT | Performed by: PEDIATRICS

## 2024-09-23 RX ORDER — METOPROLOL SUCCINATE 25 MG/1
25 TABLET, EXTENDED RELEASE ORAL DAILY
Qty: 90 TABLET | Refills: 11 | Status: SHIPPED | OUTPATIENT
Start: 2024-09-23

## 2024-09-23 RX ORDER — TRAZODONE HYDROCHLORIDE 50 MG/1
50-100 TABLET, FILM COATED ORAL AT BEDTIME
Qty: 180 TABLET | Refills: 3 | Status: SHIPPED | OUTPATIENT
Start: 2024-09-23

## 2024-09-23 RX ORDER — ATORVASTATIN CALCIUM 20 MG/1
20 TABLET, FILM COATED ORAL DAILY
Qty: 90 TABLET | Refills: 11 | Status: SHIPPED | OUTPATIENT
Start: 2024-09-23

## 2024-09-23 ASSESSMENT — PAIN SCALES - GENERAL: PAINLEVEL: NO PAIN (0)

## 2024-09-23 NOTE — PATIENT INSTRUCTIONS
Patient Education   Preventive Care Advice   This is general advice given by our system to help you stay healthy. However, your care team may have specific advice just for you. Please talk to your care team about your preventive care needs.  Nutrition  Eat 5 or more servings of fruits and vegetables each day.  Try wheat bread, brown rice and whole grain pasta (instead of white bread, rice, and pasta).  Get enough calcium and vitamin D. Check the label on foods and aim for 100% of the RDA (recommended daily allowance).  Lifestyle  Exercise at least 150 minutes each week  (30 minutes a day, 5 days a week).  Do muscle strengthening activities 2 days a week. These help control your weight and prevent disease.  No smoking.  Wear sunscreen to prevent skin cancer.  Have a dental exam and cleaning every 6 months.  Yearly exams  See your health care team every year to talk about:  Any changes in your health.  Any medicines your care team has prescribed.  Preventive care, family planning, and ways to prevent chronic diseases.  Shots (vaccines)   HPV shots (up to age 26), if you've never had them before.  Hepatitis B shots (up to age 59), if you've never had them before.  COVID-19 shot: Get this shot when it's due.  Flu shot: Get a flu shot every year.  Tetanus shot: Get a tetanus shot every 10 years.  Pneumococcal, hepatitis A, and RSV shots: Ask your care team if you need these based on your risk.  Shingles shot (for age 50 and up)  General health tests  Diabetes screening:  Starting at age 35, Get screened for diabetes at least every 3 years.  If you are younger than age 35, ask your care team if you should be screened for diabetes.  Cholesterol test: At age 39, start having a cholesterol test every 5 years, or more often if advised.  Bone density scan (DEXA): At age 50, ask your care team if you should have this scan for osteoporosis (brittle bones).  Hepatitis C: Get tested at least once in your life.  STIs (sexually  transmitted infections)  Before age 24: Ask your care team if you should be screened for STIs.  After age 24: Get screened for STIs if you're at risk. You are at risk for STIs (including HIV) if:  You are sexually active with more than one person.  You don't use condoms every time.  You or a partner was diagnosed with a sexually transmitted infection.  If you are at risk for HIV, ask about PrEP medicine to prevent HIV.  Get tested for HIV at least once in your life, whether you are at risk for HIV or not.  Cancer screening tests  Cervical cancer screening: If you have a cervix, begin getting regular cervical cancer screening tests starting at age 21.  Breast cancer scan (mammogram): If you've ever had breasts, begin having regular mammograms starting at age 40. This is a scan to check for breast cancer.  Colon cancer screening: It is important to start screening for colon cancer at age 45.  Have a colonoscopy test every 10 years (or more often if you're at risk) Or, ask your provider about stool tests like a FIT test every year or Cologuard test every 3 years.  To learn more about your testing options, visit:   .  For help making a decision, visit:   https://bit.ly/cw40939.  Prostate cancer screening test: If you have a prostate, ask your care team if a prostate cancer screening test (PSA) at age 55 is right for you.  Lung cancer screening: If you are a current or former smoker ages 50 to 80, ask your care team if ongoing lung cancer screenings are right for you.  For informational purposes only. Not to replace the advice of your health care provider. Copyright   2023 [employer]. All rights reserved. Clinically reviewed by the [facility] Transitions Program. [employer] [id number] - REV 01/24.

## 2024-09-23 NOTE — PROGRESS NOTES
Preventive Care Visit  [facility], Internal Medicine - [facility]  Sep 23, 2024      Assessment & Plan       ICD-10-CM    1. Encounter for Medicare annual wellness exam  Z00.00 PRIMARY CARE FOLLOW-UP SCHEDULING     Lipid panel reflex to direct LDL Non-fasting     Comprehensive metabolic panel (BMP + Alb, Alk Phos, ALT, AST, Total. Bili, TP)     TSH with free T4 reflex    Patient politely declines additional vaccines or cancer screenings      2. Moderate aortic regurgitation -  I35.1 metoprolol succinate ER (TOPROL XL) 25 MG 24 hr tablet     atorvastatin (LIPITOR) 20 MG tablet    Patient politely declines repeat echocardiogram - will alert me if having any new cardiac symptoms      3. Coronary artery disease involving native coronary artery of native heart with angina pectoris (H24)  I25.119 Lipid panel reflex to direct LDL Non-fasting     Comprehensive metabolic panel (BMP + Alb, Alk Phos, ALT, AST, Total. Bili, TP)    Doing well, continue current medication      4. Benign essential hypertension  I10 Lipid panel reflex to direct LDL Non-fasting     Comprehensive metabolic panel (BMP + Alb, Alk Phos, ALT, AST, Total. Bili, TP)    Well controlled, continue current medications        5. Lumbar disc herniation  M51.26 Working with orthopedics and her chiropractor      6. ASCVD (arteriosclerotic cardiovascular disease)  I25.10       7. Persistent insomnia  G47.00 traZODone (DESYREL) 50 MG tablet  Well controlled, continue current medications        8. Abnormal stress test  R94.39 metoprolol succinate ER (TOPROL XL) 25 MG 24 hr tablet     atorvastatin (LIPITOR) 20 MG tablet      9. Hyperlipidemia LDL goal <100  E78.5 metoprolol succinate ER (TOPROL XL) 25 MG 24 hr tablet     atorvastatin (LIPITOR) 20 MG tablet    Continue statin, follow labs      10. Impacted cerumen, bilateral  H61.23 NM REMOVAL IMPACTED CERUMEN IRRIGATION/LVG UNILAT (RN/MA)    Ear wash today      11. Impacted  cerumen of both ears  H61.23 OR REMOVAL IMPACTED CERUMEN IRRIGATION/LVG UNILAT (RN/MA)                Counseling  Appropriate preventive services were addressed with this patient     Subjective   Krystal is a 84 year old, presenting for the following:  Medicare Visit        9/23/2024     1:49 PM   Additional Questions   Roomed by Olivia RODRIGUEZ       Health Care Directive  Patient does not have a Health Care Directive or Living Will: Discussed advance care planning with patient; however, patient declined at this time.    Would like ear wash in L ear.    No additional concerns.       HPI        9/23/2024   General Health   How would you rate your overall physical health? Good   Feel stress (tense, anxious, or unable to sleep) Not at all            9/23/2024   Nutrition   Diet: Regular (no restrictions)            9/23/2024   Exercise   Days per week of moderate/strenous exercise 0 days      (!) EXERCISE CONCERN      9/23/2024   Social Factors   Frequency of gathering with friends or relatives Three times a week   Worry food won't last until get money to buy more No   Food not last or not have enough money for food? No   Do you have housing? (Housing is defined as stable permanent housing and does not include staying ouside in a car, in a tent, in an abandoned building, in an overnight shelter, or couch-surfing.) Yes   Are you worried about losing your housing? No   Lack of transportation? No   Unable to get utilities (heat,electricity)? No            9/23/2024   Fall Risk   Fallen 2 or more times in the past year? No    No   Trouble with walking or balance? Yes    Yes   Gait Speed Test (Document in seconds) 12   Gait Speed Test Interpretation Greater than 5.01 seconds - ABNORMAL       Multiple values from one day are sorted in reverse-chronological order          9/23/2024   Activities of Daily Living- Home Safety   Needs help with the following daily activites Transportation    Housework   Safety concerns in the home  None of the above       Multiple values from one day are sorted in reverse-chronological order         9/23/2024   Dental   Dentist two times every year? Yes            9/23/2024   Hearing Screening   Hearing concerns? (!) IT'S HARD TO FOLLOW A CONVERSATION IN A NOISY RESTAURANT OR CROWDED ROOM.            9/23/2024   Driving Risk Screening   Patient/family members have concerns about driving No            9/23/2024   General Alertness/Fatigue Screening   Have you been more tired than usual lately? No            9/23/2024   Urinary Incontinence Screening   Bothered by leaking urine in past 6 months No            9/23/2024   TB Screening   Were you born outside of the [place]? No            Today's PHQ-2 Score:       9/23/2024     1:52 PM   PHQ-2 ( 1999 [employer])   Q1: Little interest or pleasure in doing things 0   Q2: Feeling down, depressed or hopeless 0   PHQ-2 Score 0   Q1: Little interest or pleasure in doing things Not at all   Q2: Feeling down, depressed or hopeless Not at all   PHQ-2 Score 0           9/23/2024   Substance Use   Alcohol more than 3/day or more than 7/wk No   Do you have a current opioid prescription? No   How severe/bad is pain from 1 to 10? 7/10   Do you use any other substances recreationally? No        Social History     Tobacco Use    Smoking status: Never     Passive exposure: Yes    Smokeless tobacco: Never    Tobacco comments:      smoked in house   Vaping Use    Vaping status: Never Used   Substance Use Topics    Alcohol use: No    Drug use: No        Mammogram Screening - After age 74- determine frequency with patient based on health status, life expectancy and patient goals      Reviewed and updated as needed this visit by Provider                      Current providers sharing in care for this patient include:  Patient Care Team:  Tasha Winkler MD as PCP - General (Internal Medicine)  Tasha Winkler MD as Assigned PCP  Stephenie Barbosa PA-C as Physician Assistant  (Dermatology)  Jesus Reeves MD as MD (Cardiovascular Disease)  Anamaria Mondragon APRN CNP as Assigned Heart and Vascular Provider  Nam Stone MD as MD (Dermatology)  Yeo, Albert, MD as Assigned Musculoskeletal Provider  Nam Stone MD as Assigned Surgical Provider    The following health maintenance items are reviewed in Epic and correct as of today:  Health Maintenance   Topic Date Due    ANNUAL REVIEW OF HM ORDERS  Never done    RSV VACCINE (1 - 1-dose 75+ series) Never done    BMP  09/18/2024    TSH W/FREE T4 REFLEX  09/18/2024    MEDICARE ANNUAL WELLNESS VISIT  09/23/2025    FALL RISK ASSESSMENT  09/23/2025    LIPID  09/18/2028    ADVANCE CARE PLANNING  09/23/2029    PHQ-2 (once per calendar year)  Completed    HPV IMMUNIZATION  Aged Out    MENINGITIS IMMUNIZATION  Aged Out    RSV MONOCLONAL ANTIBODY  Aged Out    DEXA  Discontinued    MAMMO SCREENING  Discontinued    INFLUENZA VACCINE  Discontinued    Pneumococcal Vaccine: 65+ Years  Discontinued    COLORECTAL CANCER SCREENING  Discontinued    ZOSTER IMMUNIZATION  Discontinued    DTAP/TDAP/TD IMMUNIZATION  Discontinued    COVID-19 Vaccine  Discontinued     HTN - 120'2/60's on average at home and doing well on current meds    OA of knees - getting injections - not yet seeing improvement - following with orthopedics    Hand pain - right side, has EMG later this week    New chiropractor and is hopeful that she'll see some results for her chronic low back pain    Insomnia - on trazodone since our last visit and has found it helpful    Ears full of wash    Hx of TIA - no evidence of any further issues    ASCVD - no chest pain    AR - no new cardiac symptoms - doing well on current medication         ROS: 10 point ROS neg other than the symptoms noted above in the HPI.       Objective    Exam  /52 (BP Location: Right arm, Patient Position: Sitting, Cuff Size: Adult Large)   Pulse 89   Temp 98.1  F (36.7  C) (Oral)   Resp 21   Ht 1.518 m (4'  "11.75\")   Wt 72.1 kg (159 lb)   LMP  (LMP Unknown)   SpO2 98%   BMI 31.31 kg/m     Estimated body mass index is 31.31 kg/m  as calculated from the following:    Height as of this encounter: 1.518 m (4' 11.75\").    Weight as of this encounter: 72.1 kg (159 lb).    Physical Exam  GENERAL: alert and no distress  EYES: Eyes grossly normal to inspection, PERRL and conjunctivae and sclerae normal  HENT: TMs obscured by wax, nose and mouth without ulcers or lesions  NECK: no adenopathy, no asymmetry, masses, or scars  RESP: lungs clear to auscultation - no rales, rhonchi or wheezes  CV: regular rate and rhythm, normal S1 S2, no S3 or S4, no murmur, click or rub, no peripheral edema  ABDOMEN: soft, nontender, no hepatosplenomegaly, no masses and bowel sounds normal  MS: antalgic gait, limited knee ROM, requires assist of 1 to descend from exam table  SKIN: no suspicious lesions or rashes  NEURO: Normal strength and tone, mentation intact and speech normal  PSYCH: mentation appears normal, affect normal/bright         9/23/2024   Mini Cog   Clock Draw Score 2 Normal   3 Item Recall 3 objects recalled   Mini Cog Total Score 5               Signed Electronically by: Tasha Winkler MD    "

## 2024-09-23 NOTE — LETTER
September 24, 2024      Krystal Parra  [address]  [address]  [place] [address]        Dear ,    We are writing to inform you of your test results.    It was wonderful to see you this week in clinic!     Please find your lab results enclosed for your review and records.     The results show normal electrolytes, kidney function, liver function, thyroid function, and blood sugar.     Your cholesterol panel is in a good place on your current medication regimen.     Please contact me with any new questions or concerns.       Warmly,     Tasha Winkler MD     Resulted Orders   Lipid panel reflex to direct LDL Non-fasting   Result Value Ref Range    Cholesterol 144 <200 mg/dL    Triglycerides 160 (H) <150 mg/dL    Direct Measure HDL 49 (L) >=50 mg/dL    LDL Cholesterol Calculated 63 <100 mg/dL    Non HDL Cholesterol 95 <130 mg/dL    Patient Fasting > 8hrs? No     Narrative    Cholesterol  Desirable: < 200 mg/dL  Borderline High: 200 - 239 mg/dL  High: >= 240 mg/dL    Triglycerides  Normal: < 150 mg/dL  Borderline High: 150 - 199 mg/dL  High: 200-499 mg/dL  Very High: >= 500 mg/dL    Direct Measure HDL  Female: >= 50 mg/dL   Male: >= 40 mg/dL    LDL Cholesterol  Desirable: < 100 mg/dL  Above Desirable: 100 - 129 mg/dL   Borderline High: 130 - 159 mg/dL   High:  160 - 189 mg/dL   Very High: >= 190 mg/dL    Non HDL Cholesterol  Desirable: < 130 mg/dL  Above Desirable: 130 - 159 mg/dL  Borderline High: 160 - 189 mg/dL  High: 190 - 219 mg/dL  Very High: >= 220 mg/dL   Comprehensive metabolic panel (BMP + Alb, Alk Phos, ALT, AST, Total. Bili, TP)   Result Value Ref Range    Sodium 141 135 - 145 mmol/L    Potassium 5.0 3.4 - 5.3 mmol/L    Carbon Dioxide (CO2) 26 22 - 29 mmol/L    Anion Gap 11 7 - 15 mmol/L    Urea Nitrogen 25.1 (H) 8.0 - 23.0 mg/dL    Creatinine 0.73 0.51 - 0.95 mg/dL    GFR Estimate 80 >60 mL/min/1.73m2      Comment:      eGFR calculated using 2021 CKD-EPI equation.    Calcium 9.7  8.8 - 10.4 mg/dL      Comment:      Reference intervals for this test were updated on 7/16/2024 to reflect our healthy population more accurately. There may be differences in the flagging of prior results with similar values performed with this method. Those prior results can be interpreted in the context of the updated reference intervals.    Chloride 104 98 - 107 mmol/L    Glucose 99 70 - 99 mg/dL    Alkaline Phosphatase 65 40 - 150 U/L    AST 22 0 - 45 U/L    ALT 21 0 - 50 U/L    Protein Total 7.1 6.4 - 8.3 g/dL    Albumin 4.2 3.5 - 5.2 g/dL    Bilirubin Total 0.3 <=1.2 mg/dL    Patient Fasting > 8hrs? No    TSH with free T4 reflex   Result Value Ref Range    TSH 3.84 0.30 - 4.20 uIU/mL

## 2024-09-25 ENCOUNTER — TRANSFERRED RECORDS (OUTPATIENT)
Dept: HEALTH INFORMATION MANAGEMENT | Facility: CLINIC | Age: 84
End: 2024-09-25
Payer: COMMERCIAL

## 2024-10-16 ENCOUNTER — ANCILLARY PROCEDURE (OUTPATIENT)
Dept: GENERAL RADIOLOGY | Facility: CLINIC | Age: 84
End: 2024-10-16
Attending: STUDENT IN AN ORGANIZED HEALTH CARE EDUCATION/TRAINING PROGRAM
Payer: COMMERCIAL

## 2024-10-16 ENCOUNTER — OFFICE VISIT (OUTPATIENT)
Dept: ORTHOPEDICS | Facility: CLINIC | Age: 84
End: 2024-10-16
Attending: FAMILY MEDICINE
Payer: COMMERCIAL

## 2024-10-16 VITALS — DIASTOLIC BLOOD PRESSURE: 63 MMHG | SYSTOLIC BLOOD PRESSURE: 178 MMHG

## 2024-10-16 DIAGNOSIS — G89.29 CHRONIC LOW BACK PAIN, UNSPECIFIED BACK PAIN LATERALITY, UNSPECIFIED WHETHER SCIATICA PRESENT: Primary | ICD-10-CM

## 2024-10-16 DIAGNOSIS — M54.50 CHRONIC LOW BACK PAIN, UNSPECIFIED BACK PAIN LATERALITY, UNSPECIFIED WHETHER SCIATICA PRESENT: Primary | ICD-10-CM

## 2024-10-16 DIAGNOSIS — M17.0 PRIMARY OSTEOARTHRITIS OF BOTH KNEES: ICD-10-CM

## 2024-10-16 DIAGNOSIS — M17.12 OSTEOARTHROSIS, LOCALIZED, PRIMARY, KNEE, LEFT: ICD-10-CM

## 2024-10-16 PROCEDURE — 77073 BONE LENGTH STUDIES: CPT | Mod: TC | Performed by: RADIOLOGY

## 2024-10-16 PROCEDURE — 99214 OFFICE O/P EST MOD 30 MIN: CPT | Performed by: STUDENT IN AN ORGANIZED HEALTH CARE EDUCATION/TRAINING PROGRAM

## 2024-10-16 RX ORDER — TRANEXAMIC ACID 650 MG/1
1950 TABLET ORAL ONCE
OUTPATIENT
Start: 2024-10-16 | End: 2024-10-16

## 2024-10-16 ASSESSMENT — KOOS JR
KOOS JR SCORING: 57.14
BENDING TO THE FLOOR TO PICK UP OBJECT: MODERATE
STRAIGHTENING KNEE FULLY: MILD
HOW SEVERE IS YOUR KNEE STIFFNESS AFTER FIRST WAKING IN MORNING: MILD
STANDING UPRIGHT: MILD
RISING FROM SITTING: MODERATE
TWISING OR PIVOTING ON KNEE: SEVERE
GOING UP OR DOWN STAIRS: MODERATE

## 2024-10-16 NOTE — PROGRESS NOTES
[facility]  Orthopaedic Surgery Consultation by Jeremiah Perez M.D.    Krystal Parra MRN# [id number]   Age: 84 year old YOB: 1940     Requesting physician: Albert Yeo Dunn, Christina Tove     Background history:  DX:  Coronary artery disease on aspirin 81 mg  Hyperlipidemia  Hypertension  TIA  Moderate aortic regurgitation    TREATMENTS:  None           History of Present Illness:   84 year old female who presents to our clinic for the evaluation of chronic bilateral knee pain left> right.  No clear antecedent trauma.  Patient has had pain for multiple years which has been progressively worse over time.  The pain is felt throughout the entire knee but mostly on the anterior and medial side of the knee.  Patient does not report significant effusions, crepitus or mechanical symptoms.  She does report some occasional giving way.  Patient reports the presence of night pain.  She has occasional initiation stiffness or soreness.  To mitigate her pain patient has started ambulating with a walker, modified her activities, tried over-the-counter analgesics.  She has not yet tried focused physical therapy or bracing.  She has trialed injections which do not provide significant or long-term relief.  Patient does not report significant hip or groin pain.  She does endorse the presence of low back pain which she states is her major pain contributor.  She has been seen by a lumbar spine specialist in the past who said she would not be a surgical candidate given her age.  Patient states that she would be open to obtain a second opinion.    Social:   Occupation: retired   Living situation: alone in senior living apartment  Hobbies / Sports: none    Smoking: No  Alcohol: No  Illicit drug use: No         Physical Exam:     EXAMINATION pertinent findings:   PSYCH: Pleasant, healthy-appearing, alert, oriented x3, cooperative. Normal mood and affect.  VITAL SIGNS: There were no vitals taken for this visit.   Reviewed nursing intake notes.   There is no height or weight on file to calculate BMI.  RESP: non labored breathing   ABD: benign, soft, non-tender, no acute peritoneal findings  SKIN: grossly normal   LYMPHATIC: grossly normal, no adenopathy, no extremity edema  NEURO: grossly normal , no motor deficits  VASCULAR: satisfactory perfusion of all extremities   MUSCULOSKELETAL:   Alignment: Neutral  Gait: Careful gait.  Bilateral hip exhibits a full range of motion.  No pain upon rotations.  Lasegue's test is negative.  No tenderness to palpation over greater trochanteric region.    L knee: -5-0 . Straight leg raise +. No redness, warmth or skin changes present. Effusion No. Ligamentously stable in both ML and AP direction. Normal PF tracking with crepitus.  Rabot's recognizably painful.  Apprehension -. Meniscal provocation tests are negative.  There is tenderness to palpation over both the medial and lateral joint line.     R knee: -5-0 . Straight leg raise +. No redness, warmth or skin changes present. Effusion No. Ligamentously stable in both ML and AP direction. Normal PF tracking with crepitus.  Rabot's recognizably painful.  Apprehension -. Meniscal provocation tests are negative.  There is tenderness to palpation over both the medial and lateral joint line.     Bilateral LE:   Thigh and leg compartments soft and compressible   +Quad/TA/GSC/FHL/EHL   SILT DP/SP/Guzman/Saph/Tib nerve distributions   Palpable dorsalis pedis pulse          Data:   All laboratory data reviewed  All imaging studies reviewed by me personally.    XR alignment films 10/16/2024:  My interpretation: Slight valgus alignment of bilateral lower extremities.    XR bilateral knees 7/29/2024:  My interpretation: End-stage osteoarthritic changes of bilateral knees with complete or near complete obliteration of joint space, presence of marginal osteophytes, sclerosis and subchondral cysts of tibiofemoral joint.  Moderate  osteoarthritic change of patellofemoral joints.  Osteopenic appearing bone.            Assessment and Plan:   Assessment:  84-year-old female presenting with bilateral chronic knee pain left> right due to end-stage osteoarthritic changes in tibiofemoral joint.  Insufficiently responding to nonsurgical treatment initiated so far.     Plan:  I had a long discussion with the patient and her son regarding etiology and ongoing management options.  Reviewed surgical and nonsurgical treatments.  The non-surgical options include activity modification, pain medication, PT, bracing and injection therapy.  Given the severe osteoarthritic changes I do not anticipate that a cycle of formal physical therapy will provide significant or long-term relief.  As for surgery we discussed the option of total knee replacement surgery. We reviewed total knee replacement in detail including the procedure, the implants, the recovery process, and long-term outcomes.  We reviewed that the risks of the surgery include but are not limited to infection, wound problems, stiffness, persistent pain, swelling, clicking, loosening, revision surgery.  We also reviewed less common risks such as neurovascular injury fracture, and other implant-related issues.  We reviewed other medical complications such as a blood clot.  We discussed that the vast majority of cases have a highly successful outcome.  However there is a small subset of patients that do experience complications or problems following the knee replacement and these problems can be very debilitating and painful and sometimes do not improve.   Based on a discussion of the risks and benefits, we believe that the benefits far outweigh the risks at this point and the patient would like to proceed getting the process started to schedule left knee replacement surgery.  Before such procedure she will schedule an appointment with a lumbar spine specialist and will discuss with her family on how to  best proceed.  If patient would like to proceed with surgical intervention we will work on scheduling surgery at a time that works well for patient taking into account at least 3 months after the latest injection.  Patient will contact us if there are any questions or concerns leading up to surgery. Before surgery the patient will attend a joint replacement class and will be seen by the PCP/anesthesiologist and dentist.    A referral to spine specialist was provided.  Our team helped patient set up MyChart.    For additional information and frequently asked questions regarding joint replacements, scan the QR code image below on your phone camera or go to: [email or web address]           Thank you for your referral.    Jeremiah Perez MD, PhD     Adult Reconstruction  [facility] Department of Orthopaedic Surgery    This note was created using dictation software and may contain errors.  Please contact the creator for any clarifications that are needed.    DATA for DOCUMENTATION:         Past Medical History:     Patient Active Problem List   Diagnosis    Lumbar disc herniation    Disorder of bone and cartilage    Subclinical hypothyroidism    TIA (transient ischemic attack)    Hyperlipidemia LDL goal <100    Benign essential hypertension    Moderate aortic regurgitation -    Dupuytren's contracture of both hands    Thyroid nodule -    Coronary artery disease involving native coronary artery of native heart with angina pectoris (H)     Past Medical History:   Diagnosis Date    Basal cell carcinoma     Bone spur 4th toe Right     Lumbar disc herniation     Moderate aortic regurgitation 06/18/2016    Osteopenia     Pathologic fracture of distal radius and ulna     right in 2003, left 1992       Also see scanned health assessment forms.       Past Surgical History:     Past Surgical History:   Procedure Laterality Date    CARPAL TUNNEL RELEASE  RT/LT Right 10/20/2021    Right carpal tunnel release under local anesthetic, Dr. Linus Conrad, [facility]    CARPAL TUNNEL RELEASE RT/LT Left 03/02/2022    Left carpal tunnel release under local anesthesia, Dr. Linus Conrad, [facility].    cataract  01/01/2011    bilateral    HC TOOTH EXTRACTION W/FORCEP      ZZC LIGATE FALLOPIAN TUBE,POSTPARTUM      Tubal Ligation            Social History:     Social History     Socioeconomic History    Marital status:      Spouse name: Not on file    Number of children: 4    Years of education: Not on file    Highest education level: Not on file   Occupational History    Occupation: Retired   Tobacco Use    Smoking status: Never     Passive exposure: Yes    Smokeless tobacco: Never    Tobacco comments:      smoked in house   Vaping Use    Vaping status: Never Used   Substance and Sexual Activity    Alcohol use: No    Drug use: No    Sexual activity: Not Currently   Other Topics Concern    Parent/sibling w/ CABG, MI or angioplasty before 65F 55M? No   Social History Narrative    Not on file     Social Determinants of Health     Financial Resource Strain: Low Risk  (9/23/2024)    Financial Resource Strain     Within the past 12 months, have you or your family members you live with been unable to get utilities (heat, electricity) when it was really needed?: No   Food Insecurity: Low Risk  (9/23/2024)    Food Insecurity     Within the past 12 months, did you worry that your food would run out before you got money to buy more?: No     Within the past 12 months, did the food you bought just not last and you didn t have money to get more?: No   Transportation Needs: Low Risk  (9/23/2024)    Transportation Needs     Within the past 12 months, has lack of transportation kept you from medical appointments, getting your medicines, non-medical meetings or appointments, work, or from getting things that you need?: No   Physical Activity: Unknown (9/23/2024)     Exercise Vital Sign     Days of Exercise per Week: 0 days     Minutes of Exercise per Session: Not on file   Stress: No Stress Concern Present (9/23/2024)    [employer] - Occupational Stress Questionnaire     Feeling of Stress : Not at all   Social Connections: Unknown (9/23/2024)    Social Connection and Isolation Panel [NHANES]     Frequency of Communication with Friends and Family: Not on file     Frequency of Social Gatherings with Friends and Family: Three times a week     Attends [employer] Services: Not on file     Active Member of Clubs or Organizations: Not on file     Attends Club or Organization Meetings: Not on file     Marital Status: Not on file   Interpersonal Safety: Low Risk  (9/23/2024)    Interpersonal Safety     Do you feel physically and emotionally safe where you currently live?: Yes     Within the past 12 months, have you been hit, slapped, kicked or otherwise physically hurt by someone?: No     Within the past 12 months, have you been humiliated or emotionally abused in other ways by your partner or ex-partner?: No   Housing Stability: Low Risk  (9/23/2024)    Housing Stability     Do you have housing? : Yes     Are you worried about losing your housing?: No            Family History:       Family History   Problem Relation Age of Onset    Cardiovascular Mother         Mild MI 80's    Cerebrovascular Disease Father     Diabetes Father     Neurologic Disorder Sister         brain tumor    Lipids Sister     Hypertension Sister     Gastrointestinal Disease Sister         diverticulitis    Cancer Sister         Breast cancer            Medications:     Current Outpatient Medications   Medication Sig Dispense Refill    aspirin EC 81 MG EC tablet Take 1 tablet (81 mg) by mouth daily      atorvastatin (LIPITOR) 20 MG tablet Take 1 tablet (20 mg) by mouth daily. 90 tablet 11    Cholecalciferol (VITAMIN D) 2000 UNIT CAPS Take 1 tablet by mouth daily.      coenzyme Q10  (CO-Q10) 30 MG capsule Take 1 capsule (30 mg) by mouth daily 30 capsule 0    fish oil-omega-3 fatty acids 1000 MG capsule Take 2 g by mouth 2 times daily       Ginkgo Biloba (GINKOBA PO)       metoprolol succinate ER (TOPROL XL) 25 MG 24 hr tablet Take 1 tablet (25 mg) by mouth daily. 90 tablet 11    nitroGLYcerin (NITROSTAT) 0.4 MG sublingual tablet Place 1 tablet (0.4 mg) under the tongue every 5 minutes as needed for chest pain (Patient not taking: Reported on 9/23/2024) 20 tablet 1    OVER-THE-COUNTER Take 1 tablet by mouth daily (Melaleuca vitamins)      traZODone (DESYREL) 50 MG tablet Take 1-2 tablets ( mg) by mouth at bedtime. 180 tablet 3     Current Facility-Administered Medications   Medication Dose Route Frequency Provider Last Rate Last Admin    4 mL ropivacaine (NAROPIN) injection 5 mg/mL  4 mL      4 mL at 07/29/24 1427    4 mL ropivacaine (NAROPIN) injection 5 mg/mL  4 mL      4 mL at 07/29/24 1427    methylPREDNISolone (DEPO-Medrol) injection 40 mg  40 mg      40 mg at 07/29/24 1427    methylPREDNISolone (DEPO-Medrol) injection 40 mg  40 mg      40 mg at 07/29/24 1427    sodium hyaluronate (DUROLANE) injection 60 mg  60 mg      60 mg at 09/13/24 1328    sodium hyaluronate (DUROLANE) injection 60 mg  60 mg      60 mg at 09/13/24 1328              Review of Systems:   A comprehensive 10 point review of systems (constitutional, ENT, cardiac, peripheral vascular, lymphatic, respiratory, GI, , Musculoskeletal, skin, Neurological) was performed and found to be negative except as described in this note.     See intake form completed by patient

## 2024-10-16 NOTE — PATIENT INSTRUCTIONS
1. Primary osteoarthritis of both knees      A referral to a spine specialist has been placed. They will contact you to schedule.     Schedule surgery.   Surgery Scheduler will contact you to assist with scheduling surgery.   You can contact her directly at [phone].   Prior to your scheduled surgery we advise scheduling with your dentist to obtain clearance for surgery, and to complete any recommended dental work prior.     Pre-operative Physical needed within 30 days of scheduled proceedure  Physical Therapy will be scheduled to start post op day 3-5.    For mor information regarding total joint surgery please visit our website:   [email or web address]    FORMS:   If you are needing any forms completed relating to your upcoming procedure, please send them to our office with a completed Release of Information.   Forms will be completed AFTER your procedure. A letter can be sent to your employer prior to surgery to inform them of your anticipated time off.    Please notify our staff if you would like a letter to do so.   Forms can be faxed directly to our clinic at [phone].     DO NOT BRING FORMS ON THE DATE OF SURGERY.     MEDICATION REFILL:   Please allow 3 business days for completion of medication refills for any surgery related prescription.   Medication refills submitted on Friday, may not be addressed until the following Monday.  You may request a refill via [employer], or by calling our Nurse Triage at [phone].      Call my office with any questions or concerns, [phone].

## 2024-10-16 NOTE — LETTER
10/16/2024      Krystal Parra  [address]  [address]  [place] [address]      Dear Colleague,    Thank you for referring your patient, Krystal Parra, to the [facility] [place]. Please see a copy of my visit note below.        [facility]  Orthopaedic Surgery Consultation by Jeremiah Perez M.D.    Krystal Parra MRN# [id number]   Age: 84 year old YOB: 1940     Requesting physician: Albert Yeo Dunn, Christina Tove     Background history:  DX:  Coronary artery disease on aspirin 81 mg  Hyperlipidemia  Hypertension  TIA  Moderate aortic regurgitation    TREATMENTS:  None           History of Present Illness:   84 year old female who presents to our clinic for the evaluation of chronic bilateral knee pain left> right.  No clear antecedent trauma.  Patient has had pain for multiple years which has been progressively worse over time.  The pain is felt throughout the entire knee but mostly on the anterior and medial side of the knee.  Patient does not report significant effusions, crepitus or mechanical symptoms.  She does report some occasional giving way.  Patient reports the presence of night pain.  She has occasional initiation stiffness or soreness.  To mitigate her pain patient has started ambulating with a walker, modified her activities, tried over-the-counter analgesics.  She has not yet tried focused physical therapy or bracing.  She has trialed injections which do not provide significant or long-term relief.  Patient does not report significant hip or groin pain.  She does endorse the presence of low back pain which she states is her major pain contributor.  She has been seen by a lumbar spine specialist in the past who said she would not be a surgical candidate given her age.  Patient states that she would be open to obtain a second opinion.    Social:   Occupation: retired   Living situation: alone in senior living apartment  Hobbies / Sports:  none    Smoking: No  Alcohol: No  Illicit drug use: No         Physical Exam:     EXAMINATION pertinent findings:   PSYCH: Pleasant, healthy-appearing, alert, oriented x3, cooperative. Normal mood and affect.  VITAL SIGNS: There were no vitals taken for this visit.  Reviewed nursing intake notes.   There is no height or weight on file to calculate BMI.  RESP: non labored breathing   ABD: benign, soft, non-tender, no acute peritoneal findings  SKIN: grossly normal   LYMPHATIC: grossly normal, no adenopathy, no extremity edema  NEURO: grossly normal , no motor deficits  VASCULAR: satisfactory perfusion of all extremities   MUSCULOSKELETAL:   Alignment: Neutral  Gait: Careful gait.  Bilateral hip exhibits a full range of motion.  No pain upon rotations.  Lasegue's test is negative.  No tenderness to palpation over greater trochanteric region.    L knee: -5-0 . Straight leg raise +. No redness, warmth or skin changes present. Effusion No. Ligamentously stable in both ML and AP direction. Normal PF tracking with crepitus.  Rabot's recognizably painful.  Apprehension -. Meniscal provocation tests are negative.  There is tenderness to palpation over both the medial and lateral joint line.     R knee: -5-0 . Straight leg raise +. No redness, warmth or skin changes present. Effusion No. Ligamentously stable in both ML and AP direction. Normal PF tracking with crepitus.  Rabot's recognizably painful.  Apprehension -. Meniscal provocation tests are negative.  There is tenderness to palpation over both the medial and lateral joint line.     Bilateral LE:   Thigh and leg compartments soft and compressible   +Quad/TA/GSC/FHL/EHL   SILT DP/SP/Guzman/Saph/Tib nerve distributions   Palpable dorsalis pedis pulse          Data:   All laboratory data reviewed  All imaging studies reviewed by me personally.    XR alignment films 10/16/2024:  My interpretation: Slight valgus alignment of bilateral lower extremities.    XR  bilateral knees 7/29/2024:  My interpretation: End-stage osteoarthritic changes of bilateral knees with complete or near complete obliteration of joint space, presence of marginal osteophytes, sclerosis and subchondral cysts of tibiofemoral joint.  Moderate osteoarthritic change of patellofemoral joints.  Osteopenic appearing bone.            Assessment and Plan:   Assessment:  84-year-old female presenting with bilateral chronic knee pain left> right due to end-stage osteoarthritic changes in tibiofemoral joint.  Insufficiently responding to nonsurgical treatment initiated so far.     Plan:  I had a long discussion with the patient and her son regarding etiology and ongoing management options.  Reviewed surgical and nonsurgical treatments.  The non-surgical options include activity modification, pain medication, PT, bracing and injection therapy.  Given the severe osteoarthritic changes I do not anticipate that a cycle of formal physical therapy will provide significant or long-term relief.  As for surgery we discussed the option of total knee replacement surgery. We reviewed total knee replacement in detail including the procedure, the implants, the recovery process, and long-term outcomes.  We reviewed that the risks of the surgery include but are not limited to infection, wound problems, stiffness, persistent pain, swelling, clicking, loosening, revision surgery.  We also reviewed less common risks such as neurovascular injury fracture, and other implant-related issues.  We reviewed other medical complications such as a blood clot.  We discussed that the vast majority of cases have a highly successful outcome.  However there is a small subset of patients that do experience complications or problems following the knee replacement and these problems can be very debilitating and painful and sometimes do not improve.   Based on a discussion of the risks and benefits, we believe that the benefits far outweigh the  risks at this point and the patient would like to proceed getting the process started to schedule left knee replacement surgery.  Before such procedure she will schedule an appointment with a lumbar spine specialist and will discuss with her family on how to best proceed.  If patient would like to proceed with surgical intervention we will work on scheduling surgery at a time that works well for patient taking into account at least 3 months after the latest injection.  Patient will contact us if there are any questions or concerns leading up to surgery. Before surgery the patient will attend a joint replacement class and will be seen by the PCP/anesthesiologist and dentist.    A referral to spine specialist was provided.  Our team helped patient set up MyChart.    For additional information and frequently asked questions regarding joint replacements, scan the QR code image below on your phone camera or go to: [email or web address]           Thank you for your referral.    Jeremiah Perez MD, PhD     Adult Reconstruction  [facility] Department of Orthopaedic Surgery    This note was created using dictation software and may contain errors.  Please contact the creator for any clarifications that are needed.    DATA for DOCUMENTATION:         Past Medical History:     Patient Active Problem List   Diagnosis     Lumbar disc herniation     Disorder of bone and cartilage     Subclinical hypothyroidism     TIA (transient ischemic attack)     Hyperlipidemia LDL goal <100     Benign essential hypertension     Moderate aortic regurgitation -     Dupuytren's contracture of both hands     Thyroid nodule -     Coronary artery disease involving native coronary artery of native heart with angina pectoris (H)     Past Medical History:   Diagnosis Date     Basal cell carcinoma      Bone spur 4th toe Right      Lumbar disc herniation      Moderate aortic  regurgitation 06/18/2016     Osteopenia      Pathologic fracture of distal radius and ulna     right in 2003, left 1992       Also see scanned health assessment forms.       Past Surgical History:     Past Surgical History:   Procedure Laterality Date     CARPAL TUNNEL RELEASE RT/LT Right 10/20/2021    Right carpal tunnel release under local anesthetic, Dr. Linus Conrad, [facility]     CARPAL TUNNEL RELEASE RT/LT Left 03/02/2022    Left carpal tunnel release under local anesthesia, Dr. Linus Conrad, [facility].     cataract  01/01/2011    bilateral     HC TOOTH EXTRACTION W/FORCEP       ZZC LIGATE FALLOPIAN TUBE,POSTPARTUM      Tubal Ligation            Social History:     Social History     Socioeconomic History     Marital status:      Spouse name: Not on file     Number of children: 4     Years of education: Not on file     Highest education level: Not on file   Occupational History     Occupation: Retired   Tobacco Use     Smoking status: Never     Passive exposure: Yes     Smokeless tobacco: Never     Tobacco comments:      smoked in house   Vaping Use     Vaping status: Never Used   Substance and Sexual Activity     Alcohol use: No     Drug use: No     Sexual activity: Not Currently   Other Topics Concern     Parent/sibling w/ CABG, MI or angioplasty before 65F 55M? No   Social History Narrative     Not on file     Social Determinants of Health     Financial Resource Strain: Low Risk  (9/23/2024)    Financial Resource Strain      Within the past 12 months, have you or your family members you live with been unable to get utilities (heat, electricity) when it was really needed?: No   Food Insecurity: Low Risk  (9/23/2024)    Food Insecurity      Within the past 12 months, did you worry that your food would run out before you got money to buy more?: No      Within the past 12 months, did the food you bought just not last and you didn t have money to get more?: No   Transportation Needs:  Low Risk  (9/23/2024)    Transportation Needs      Within the past 12 months, has lack of transportation kept you from medical appointments, getting your medicines, non-medical meetings or appointments, work, or from getting things that you need?: No   Physical Activity: Unknown (9/23/2024)    Exercise Vital Sign      Days of Exercise per Week: 0 days      Minutes of Exercise per Session: Not on file   Stress: No Stress Concern Present (9/23/2024)    [employer] - Occupational Stress Questionnaire      Feeling of Stress : Not at all   Social Connections: Unknown (9/23/2024)    Social Connection and Isolation Panel [NHANES]      Frequency of Communication with Friends and Family: Not on file      Frequency of Social Gatherings with Friends and Family: Three times a week      Attends Latter-day Services: Not on file      Active Member of Clubs or Organizations: Not on file      Attends Club or Organization Meetings: Not on file      Marital Status: Not on file   Interpersonal Safety: Low Risk  (9/23/2024)    Interpersonal Safety      Do you feel physically and emotionally safe where you currently live?: Yes      Within the past 12 months, have you been hit, slapped, kicked or otherwise physically hurt by someone?: No      Within the past 12 months, have you been humiliated or emotionally abused in other ways by your partner or ex-partner?: No   Housing Stability: Low Risk  (9/23/2024)    Housing Stability      Do you have housing? : Yes      Are you worried about losing your housing?: No            Family History:       Family History   Problem Relation Age of Onset     Cardiovascular Mother         Mild MI 80's     Cerebrovascular Disease Father      Diabetes Father      Neurologic Disorder Sister         brain tumor     Lipids Sister      Hypertension Sister      Gastrointestinal Disease Sister         diverticulitis     Cancer Sister         Breast cancer            Medications:      Current Outpatient Medications   Medication Sig Dispense Refill     aspirin EC 81 MG EC tablet Take 1 tablet (81 mg) by mouth daily       atorvastatin (LIPITOR) 20 MG tablet Take 1 tablet (20 mg) by mouth daily. 90 tablet 11     Cholecalciferol (VITAMIN D) 2000 UNIT CAPS Take 1 tablet by mouth daily.       coenzyme Q10 (CO-Q10) 30 MG capsule Take 1 capsule (30 mg) by mouth daily 30 capsule 0     fish oil-omega-3 fatty acids 1000 MG capsule Take 2 g by mouth 2 times daily        Ginkgo Biloba (GINKOBA PO)        metoprolol succinate ER (TOPROL XL) 25 MG 24 hr tablet Take 1 tablet (25 mg) by mouth daily. 90 tablet 11     nitroGLYcerin (NITROSTAT) 0.4 MG sublingual tablet Place 1 tablet (0.4 mg) under the tongue every 5 minutes as needed for chest pain (Patient not taking: Reported on 9/23/2024) 20 tablet 1     OVER-THE-COUNTER Take 1 tablet by mouth daily (Melaleuca vitamins)       traZODone (DESYREL) 50 MG tablet Take 1-2 tablets ( mg) by mouth at bedtime. 180 tablet 3     Current Facility-Administered Medications   Medication Dose Route Frequency Provider Last Rate Last Admin     4 mL ropivacaine (NAROPIN) injection 5 mg/mL  4 mL      4 mL at 07/29/24 1427     4 mL ropivacaine (NAROPIN) injection 5 mg/mL  4 mL      4 mL at 07/29/24 1427     methylPREDNISolone (DEPO-Medrol) injection 40 mg  40 mg      40 mg at 07/29/24 1427     methylPREDNISolone (DEPO-Medrol) injection 40 mg  40 mg      40 mg at 07/29/24 1427     sodium hyaluronate (DUROLANE) injection 60 mg  60 mg      60 mg at 09/13/24 1328     sodium hyaluronate (DUROLANE) injection 60 mg  60 mg      60 mg at 09/13/24 1328              Review of Systems:   A comprehensive 10 point review of systems (constitutional, ENT, cardiac, peripheral vascular, lymphatic, respiratory, GI, , Musculoskeletal, skin, Neurological) was performed and found to be negative except as described in this note.     See intake form completed by patient         Again, thank you  for allowing me to participate in the care of your patient.        Sincerely,        Jeremiah Perez MD

## 2024-10-18 ENCOUNTER — OFFICE VISIT (OUTPATIENT)
Dept: ORTHOPEDICS | Facility: CLINIC | Age: 84
End: 2024-10-18
Payer: COMMERCIAL

## 2024-10-18 VITALS
SYSTOLIC BLOOD PRESSURE: 122 MMHG | DIASTOLIC BLOOD PRESSURE: 60 MMHG | HEIGHT: 60 IN | BODY MASS INDEX: 31.22 KG/M2 | WEIGHT: 159 LBS

## 2024-10-18 DIAGNOSIS — G56.01 CARPAL TUNNEL SYNDROME OF RIGHT WRIST: Primary | ICD-10-CM

## 2024-10-18 DIAGNOSIS — G56.02 CARPAL TUNNEL SYNDROME OF LEFT WRIST: ICD-10-CM

## 2024-10-18 PROCEDURE — 99213 OFFICE O/P EST LOW 20 MIN: CPT | Performed by: FAMILY MEDICINE

## 2024-10-18 NOTE — PROGRESS NOTES
ASSESSMENT & PLAN  Patient Instructions     1. Carpal tunnel syndrome of right wrist    2. Carpal tunnel syndrome of left wrist      -Patient is following up for chronic bilateral wrist pain due to carpal tunnel syndrome  -EMG performed on 9/25/2024 shows bilateral carpal tunnel syndrome  -Patient has had previous carpal tunnel surgery but continues to be symptomatic  -Patient reports pain often at nighttime.  Patient has a wrist brace for her left wrist but none for the right.  Patient was given a cock up wrist brace to wear at nighttime.  -We discussed treatment options including a cortisone injection bilaterally or referral to orthopedic hand surgery to discuss potential further interventions.  -Patient will be referred to orthopedic hand surgery to discuss any potential repeat interventions to provide long-term relief.  If patient is not a good surgical candidate, she may return for bilateral carpal tunnel cortisone injection  -Call direct clinic number [[phone]] at any time with questions or concerns.    Albert Yeo MD [facility] Orthopedics and Sports Medicine  [facility]          -----    SUBJECTIVE:  Krystal Parra is a 84 year old female who is seen in follow-up for bilateral hand pain. They were last seen for bilateral hand pain on 8/23/24.     Since their last visit reports 0% - (About the same as last time). They indicate that their current pain level is 5/10 and at worst is 8/10 (especially in right index finger). She notes pain is worse at night. They have tried rest/activity avoidance, Tylenol, and previous imaging (Other EMG 9/25/24 at [facility]).      The patient is seen with their son.    Patient's past medical, surgical, social, and family histories were reviewed today and no changes are noted.    REVIEW OF SYSTEMS:  Constitutional: NEGATIVE for fever, chills, change in weight  Skin: NEGATIVE for worrisome rashes, moles or lesions  GI/: NEGATIVE  "for bowel or bladder changes  Neuro: NEGATIVE for weakness, dizziness or paresthesias    OBJECTIVE:  /60   Ht 1.518 m (4' 11.75\")   Wt 72.1 kg (159 lb)   LMP  (LMP Unknown)   BMI 31.31 kg/m     General: healthy, alert and in no distress  HEENT: no scleral icterus or conjunctival erythema  Skin: no suspicious lesions or rash. No jaundice.  CV: regular rhythm by palpation, no pedal edema  Resp: normal respiratory effort without conversational dyspnea   Psych: normal mood and affect  Gait: normal steady gait with appropriate coordination and balance  Neuro: normal light touch sensory exam of the extremities.    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  Mild left ulnar sensory neuropathy at the wrist.  Bilateral radial sensory axonal neuropathy.  Needle examination findings just chronic inactive bilateral C8-T1 radiculopathy.    Patient's conditions were thoroughly discussed during today's visit with total time spent face-to-face with the patient and documentation being 20 minutes.    Albert Yeo MD, [facility] Sports and Orthopedic Care      "

## 2024-10-18 NOTE — LETTER
10/18/2024      Krystal Parra  [address]  [address]  [place] [address]      Dear Colleague,    Thank you for referring your patient, Krystal Parra, to the [facility] [place]. Please see a copy of my visit note below.    ASSESSMENT & PLAN  Patient Instructions     1. Carpal tunnel syndrome of right wrist    2. Carpal tunnel syndrome of left wrist      -Patient is following up for chronic bilateral wrist pain due to carpal tunnel syndrome  -EMG performed on 9/25/2024 shows bilateral carpal tunnel syndrome  -Patient has had previous carpal tunnel surgery but continues to be symptomatic  -Patient reports pain often at nighttime.  Patient has a wrist brace for her left wrist but none for the right.  Patient was given a cock up wrist brace to wear at nighttime.  -We discussed treatment options including a cortisone injection bilaterally or referral to orthopedic hand surgery to discuss potential further interventions.  -Patient will be referred to orthopedic hand surgery to discuss any potential repeat interventions to provide long-term relief.  If patient is not a good surgical candidate, she may return for bilateral carpal tunnel cortisone injection  -Call direct clinic number [[phone]] at any time with questions or concerns.    Albert Yeo MD [facility] Orthopedics and Sports Medicine  [facility] [place]          -----    SUBJECTIVE:  Krystal Parra is a 84 year old female who is seen in follow-up for bilateral hand pain. They were last seen for bilateral hand pain on 8/23/24.     Since their last visit reports 0% - (About the same as last time). They indicate that their current pain level is 5/10 and at worst is 8/10 (especially in right index finger). She notes pain is worse at night. They have tried rest/activity avoidance, Tylenol, and previous imaging (Other EMG 9/25/24 at [facility]).      The patient is seen with their  "son.    Patient's past medical, surgical, social, and family histories were reviewed today and no changes are noted.    REVIEW OF SYSTEMS:  Constitutional: NEGATIVE for fever, chills, change in weight  Skin: NEGATIVE for worrisome rashes, moles or lesions  GI/: NEGATIVE for bowel or bladder changes  Neuro: NEGATIVE for weakness, dizziness or paresthesias    OBJECTIVE:  /60   Ht 1.518 m (4' 11.75\")   Wt 72.1 kg (159 lb)   LMP  (LMP Unknown)   BMI 31.31 kg/m     General: healthy, alert and in no distress  HEENT: no scleral icterus or conjunctival erythema  Skin: no suspicious lesions or rash. No jaundice.  CV: regular rhythm by palpation, no pedal edema  Resp: normal respiratory effort without conversational dyspnea   Psych: normal mood and affect  Gait: normal steady gait with appropriate coordination and balance  Neuro: normal light touch sensory exam of the extremities.    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  Mild left ulnar sensory neuropathy at the wrist.  Bilateral radial sensory axonal neuropathy.  Needle examination findings just chronic inactive bilateral C8-T1 radiculopathy.    Patient's conditions were thoroughly discussed during today's visit with total time spent face-to-face with the patient and documentation being 20 minutes.    Albert Yeo MD, [facility] Orthopedic Care        Again, thank you for allowing me to participate in the care of your patient.        Sincerely,        Albert Yeo, MD  "

## 2024-10-18 NOTE — PATIENT INSTRUCTIONS
1. Carpal tunnel syndrome of right wrist    2. Carpal tunnel syndrome of left wrist      -Patient is following up for chronic bilateral wrist pain due to carpal tunnel syndrome  -EMG performed on 9/25/2024 shows bilateral carpal tunnel syndrome  -Patient has had previous carpal tunnel surgery but continues to be symptomatic  -Patient reports pain often at nighttime.  Patient has a wrist brace for her left wrist but none for the right.  Patient was given a cock up wrist brace to wear at nighttime.  -We discussed treatment options including a cortisone injection bilaterally or referral to orthopedic hand surgery to discuss potential further interventions.  -Patient will be referred to orthopedic hand surgery to discuss any potential repeat interventions to provide long-term relief.  If patient is not a good surgical candidate, she may return for bilateral carpal tunnel cortisone injection  -Call direct clinic number [[phone]] at any time with questions or concerns.    Albert Yeo MD [facility]  [facility]

## 2024-10-23 NOTE — TELEPHONE ENCOUNTER
"Action    Action Taken Request sent to Chiro center in [place] for chiro notes and xr imaging.    Fax: [phone]  Ph: [phone]    SPINE PATIENTS - NEW PROTOCOL PREVISIT    RECORDS RECEIVED FROM: Referred by Jeremiah Perez MD   REASON FOR VISIT: Chronic low back pain, unspecified back pain laterality   PROVIDER: [facility]   DATE OF APPT: 11/27/2024   NOTES (FOR ALL VISITS) STATUS DETAILS   OFFICE NOTE from referring provider Internal Referral and notes in chart    Chiro notes scanned into chart   NO RECENT WORK-UP  No imaging/no prev work-up internal MRI lumbar in 2019  Chiro did xrays Ziyad Jarvis, in [place]   PT last completed in 2019  Prev XR lumbar in 2020  Prev MBB/RFA\"s       "

## 2024-10-24 ENCOUNTER — TRANSFERRED RECORDS (OUTPATIENT)
Dept: HEALTH INFORMATION MANAGEMENT | Facility: CLINIC | Age: 84
End: 2024-10-24
Payer: COMMERCIAL

## 2024-10-26 ASSESSMENT — ANXIETY QUESTIONNAIRES
GAD7 TOTAL SCORE: 0
2. NOT BEING ABLE TO STOP OR CONTROL WORRYING: NOT AT ALL
1. FEELING NERVOUS, ANXIOUS, OR ON EDGE: NOT AT ALL
7. FEELING AFRAID AS IF SOMETHING AWFUL MIGHT HAPPEN: NOT AT ALL
3. WORRYING TOO MUCH ABOUT DIFFERENT THINGS: NOT AT ALL
4. TROUBLE RELAXING: NOT AT ALL
GAD7 TOTAL SCORE: 0
GAD7 TOTAL SCORE: 0
5. BEING SO RESTLESS THAT IT IS HARD TO SIT STILL: NOT AT ALL
7. FEELING AFRAID AS IF SOMETHING AWFUL MIGHT HAPPEN: NOT AT ALL
6. BECOMING EASILY ANNOYED OR IRRITABLE: NOT AT ALL

## 2024-10-26 ASSESSMENT — PAIN SCALES - PAIN ENJOYMENT GENERAL ACTIVITY SCALE (PEG)
PEG_TOTALSCORE: 3.67
INTERFERED_GENERAL_ACTIVITY: 5
INTERFERED_GENERAL_ACTIVITY: 5
INTERFERED_ENJOYMENT_LIFE: 1
PEG_TOTALSCORE: 3.67
AVG_PAIN_PASTWEEK: 5
INTERFERED_ENJOYMENT_LIFE: 1
AVG_PAIN_PASTWEEK: 5

## 2024-10-30 NOTE — PROGRESS NOTES
Date:10/31/2024      COMPREHENSIVE PAIN CLINIC INITIAL EVALUATION    I had the pleasure of meeting Ms. Krystal Parra on 10/31/2024 in the [facility] in consult for Dr. Albert Yeo, Sports Medicine with regards to her pain.  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      History of of chronic pain on initial exam 10/31/2024                               Subjective:  She presents with her daughter and using a rolling walker.     She reports multi site pain including:  low back, hands and knees.  She has not had any surgeries in her spine.  She had b/l carpal tunnel surgery 4 years ago which failed.   She has not had any surgeries on her knees.  Dr. Perez is recommending a left knee TKA but it has not been scheduled.      Patient endorses chronic pain that varies in location and intensity throughout the day depending on activity.  Patient has intermittent numbness and tingling in b/l hands. She has cock up splints at home.  She has had injection in b/l knees. She had injections in her lumbar spine.    Progress Notes Reviewed:  10/18/2024 Dr. Yeo, Sports Medicine - carpal tunnel b/l wrists  10/16/2024 Dr. Perez, Orthopaedic Surgeon - L) TKA  For additional information and frequently asked questions regarding joint replacements, scan the QR code image below on your phone camera or go to: [email or web address]         09/23/2024 Dr. Tasha Winkler, PCP    She denies any new problems with falls or balance, any new numbness or weakness of the arms or legs, any new bowel or bladder incontinence, any night sweats or unexplained fevers, or any sudden or unexpected weight loss.  He denies saddle  anesthesia. He denies changes in gait, instability, or falling episodes.     Krystal Parra has been seen at a [facility] clinic in the past with Dr. Kilgore.        Current Treatments:  Trazodone 50mg  Acetaminophen ER 650mg 2 tabs once  [id number] 2 tabs once daily      Anticoagulation:  none      Previous Medication Treatments Included:  Anti-convulsants:   Muscle relaxors:   Anti-depressants:   Benzodiazapine's:   Acetaminophen/NSAIDs:   Topicals: lidocaine patches  Headache abortives:   Headache prophylactics:   Opioids:       Other Treatments Have Included:  Physical therapy: made pain worse. [facility]  Pain Psychology: no  Chiropractic: yes off and on entire life - lately every week  Acupuncture: no  TENs Unit: yes  25 years ago  Injections: in knees and lumbar spine at [facility].  Surgeries: no  Dry Needling: no  Massage:no    Implantable devices:  none      Past Medical History:  Medical history reviewed.  Past Medical History:   Diagnosis Date    Basal cell carcinoma     Bone spur 4th toe Right     Lumbar disc herniation     Moderate aortic regurgitation 06/18/2016    Osteopenia     Pathologic fracture of distal radius and ulna     right in 2003, left 1992      Patient Active Problem List   Diagnosis    Lumbar disc herniation    Disorder of bone and cartilage    Subclinical hypothyroidism    TIA (transient ischemic attack)    Hyperlipidemia LDL goal <100    Benign essential hypertension    Moderate aortic regurgitation -    Dupuytren's contracture of both hands    Thyroid nodule -    Coronary artery disease involving native coronary artery of native heart with angina pectoris (H)         Past Surgical History:  Pertinent surgical history reviewed.  Past Surgical History:   Procedure Laterality Date    CARPAL TUNNEL RELEASE RT/LT Right 10/20/2021    Right carpal tunnel release under local anesthetic, Dr. Linus Conrad, [facility]    CARPAL TUNNEL RELEASE RT/LT Left  03/02/2022    Left carpal tunnel release under local anesthesia, Dr. Linus Conrad, [facility].    cataract  01/01/2011    bilateral    HC TOOTH EXTRACTION W/FORCEP      ZZC LIGATE FALLOPIAN TUBE,POSTPARTUM      Tubal Ligation          Medications: Pertinent medications reviewed.  Current Outpatient Medications   Medication Sig Dispense Refill    aspirin EC 81 MG EC tablet Take 1 tablet (81 mg) by mouth daily      atorvastatin (LIPITOR) 20 MG tablet Take 1 tablet (20 mg) by mouth daily. 90 tablet 11    Cholecalciferol (VITAMIN D) 2000 UNIT CAPS Take 1 tablet by mouth daily.      coenzyme Q10 (CO-Q10) 30 MG capsule Take 1 capsule (30 mg) by mouth daily 30 capsule 0    fish oil-omega-3 fatty acids 1000 MG capsule Take 2 g by mouth 2 times daily       Ginkgo Biloba (GINKOBA PO)       metoprolol succinate ER (TOPROL XL) 25 MG 24 hr tablet Take 1 tablet (25 mg) by mouth daily. 90 tablet 11    nitroGLYcerin (NITROSTAT) 0.4 MG sublingual tablet Place 1 tablet (0.4 mg) under the tongue every 5 minutes as needed for chest pain (Patient not taking: Reported on 9/23/2024) 20 tablet 1    OVER-THE-COUNTER Take 1 tablet by mouth daily (Melaleuca vitamins)      traZODone (DESYREL) 50 MG tablet Take 1-2 tablets ( mg) by mouth at bedtime. 180 tablet 3       [place] Prescription Monitoring Program reviewed 10/30/2024.  No concern for abuse or misuse of controlled medications based on this report.  No medications listed on .      Allergies: Pertinent allergies reviewed.     Allergies   Allergen Reactions    Sulfa Antibiotics        Family History:   family history includes Cancer in her sister; Cardiovascular in her mother; Cerebrovascular Disease in her father; Diabetes in her father; Gastrointestinal Disease in her sister; Hypertension in her sister; Lipids in her sister; Neurologic Disorder in her sister.    Social History:   She is retired and lives in senior housing. She has 4 children.  She has 12 grand children.   Patient is independent in ADL's.  She  reports that she has never smoked. She has been exposed to tobacco smoke. She has never used smokeless tobacco. She reports that she does not drink alcohol and does not use drugs.  Social History     Social History Narrative    Not on file         Review of Systems:      (Positive responses bolded)  GENERAL: fever/chills, fatigue, general unwell feeling, weight gain/loss  HEAD/EYES:  headache, dizziness, or vision changes  EARS/NOSE/THROAT: nosebleeds, hearing loss, sinus infection, earache, tinnitus  IMMUNE:  allergies, cancer, immune deficiency, or infections  SKIN:  itching, rash, hives  HEME/Lymphatic: anemia, easy bruising, easy bleeding  RESPIRATORY: cough, wheezing, or shortness of breath  CARDIOVASCULAR/Circulation: extremity edema, syncope, hypertension, tachycardia, or angina  GASTROINTESTINAL: abdominal pain, nausea/emesis, diarrhea, constipation, hematochezia, or melena  ENDOCRINE:  diabetes, steroid use, thyroid disease or osteoporosis  MUSCULOSKELETAL: myalgias, joint pain, stiffness, neck pain, back pain, arthritis, or gout  GENITOURINARY: frequency, urgency, dysuria, difficulty voiding, hematuria or incontinence  NEUROLOGIC: weakness, numbness, paresthesias, seizure, tremor, stroke or memory loss  PSYCHIATRIC: depression, anxiety, stress, suicidal thoughts/attempts or mood swings      Physical Exam:  BP (!) 194/67   Pulse 81   LMP  (LMP Unknown)   SpO2 97%       Constitutional: She is oriented to person, place, and time.  She appears well-developed and well-nourished. She is not in acute distress.   HENT:     Head: Normocephalic and atraumatic.     Eyes: Pupils are equal, round, and reactive to light. EOM are normal. No scleral icterus.   Pulmonary/Chest:  NWOB. No respiratory distress.   Neurological: She is alert and oriented to person, place, and time. Coordination grossly normal. Skin: Skin is warm and dry. She is not diaphoretic.   Psychiatric: She has a  normal mood and affect. Her behavior is normal. Judgment and thought content normal.  Patient answers questions appropriately.  MSK: She ambulates with a rolling walker.      Imaging:  XR alignment films 10/16/2024:  My interpretation: Slight valgus alignment of bilateral lower extremities.     XR bilateral knees 7/29/2024:  My interpretation: End-stage osteoarthritic changes of bilateral knees with complete or near complete obliteration of joint space, presence of marginal osteophytes, sclerosis and subchondral cysts of tibiofemoral joint.  Moderate osteoarthritic change of patellofemoral joints.  Osteopenic appearing bone.         EMG:                          Diagnosis:  (M51.26) Lumbar disc herniation  (primary encounter diagnosis)  Comment:   Plan:     (M47.816) Lumbar spondylosis  Comment:   Plan:     (M48.062) Spinal stenosis of lumbar region with neurogenic claudication  Comment:   Plan:     (G56.03) Bilateral carpal tunnel syndrome  Comment:   Plan: Orthopedic  Referral, ketamine HCl         POWD            (M25.561,  G89.29) Chronic pain of right knee  Comment:   Plan:     (M25.562,  G89.29) Chronic pain of left knee  Comment:   Plan:         Plan on initial consult on 10/30/2024:  A multimodal plan was developed today to treat your pain.  Multimodal analgesia is a strategy that reduces reliance on opioids through the use of non-opioid analgesics and therapies that have different mechanisms of action.    Referral to a Orthopedics hand specialist.  Apply heat prn.    Continue to follow up with Dr. Perez for knee pain.    Continue to see chiropractor.        Diagnostics:   Consider new lumbar MRI since last MRI is from 2016.        Medications:  Discussed compounding cream Lidocaine 5% and Ketamine 5% four times daily.  She would like to try this.    The following OTC pain medications may be helpful, use as directed: Voltaren Gel 1%, CBD products, Arnica products, Capsaicin products,  Australian Dream Cream, Epson It, Lidocaine Patch, Solanpas, Biofreeze, Aspercream, Tiger Balm and Jorge Emu cream.  Apply heat or cold PRN.      Therapies:  Discussed Pain Psychology - consider in the future  Psychological treatments are also important part of pain management.  Understanding and managing the thoughts, emotions and behaviors that accompany the discomfort can help you cope more effectively with your pain and can actually reduce the intensity of your pain.      PHYSICAL THERAPY   Discussed the importance of core strengthening, ROM, stretching exercises with the patient and how each of these entities is important in decreasing pain.  Explained to the patient that the purpose of physical therapy is to teach the patient a home exercise program.  These exercises need to be performed every day in order to decrease pain and prevent future occurrences of pain.        Discussed Grounding Mat - handout provided  https://www.NurseGrid.com/watch?v=[email or web address]    Discussed Frequency Specific Microcurrent - handout provided  Treatment for Neuropathic Pain.   [facility] [phone]  [facility] [phone]  [facility] [phone]  [facility] [phone]  May be able to be billed as a chiropractic service depending on your insurance coverage.     Discussed Acupuncture.    Discussed TENs unit.      Interventions:  none      Follow up:     Return to clinic as needed.        OZIEL Pa, RN, CNP, FNP  [facility]        BILLING TIME DOCUMENTATION:   The total TIME spent on this patient on the date of the encounter/appointment was 96 minutes.              Answers submitted by the patient for this visit:  Patient Health Questionnaire (G7) (Submitted on 10/26/2024)  BENI 7 TOTAL SCORE: 0

## 2024-10-31 ENCOUNTER — OFFICE VISIT (OUTPATIENT)
Dept: PALLIATIVE MEDICINE | Facility: CLINIC | Age: 84
End: 2024-10-31
Attending: FAMILY MEDICINE
Payer: COMMERCIAL

## 2024-10-31 VITALS — DIASTOLIC BLOOD PRESSURE: 67 MMHG | SYSTOLIC BLOOD PRESSURE: 194 MMHG | OXYGEN SATURATION: 97 % | HEART RATE: 81 BPM

## 2024-10-31 DIAGNOSIS — M47.816 LUMBAR SPONDYLOSIS: ICD-10-CM

## 2024-10-31 DIAGNOSIS — M25.561 CHRONIC PAIN OF RIGHT KNEE: ICD-10-CM

## 2024-10-31 DIAGNOSIS — G89.29 CHRONIC PAIN OF RIGHT KNEE: ICD-10-CM

## 2024-10-31 DIAGNOSIS — M51.26 DISPLACEMENT OF LUMBAR INTERVERTEBRAL DISC WITHOUT MYELOPATHY: Primary | ICD-10-CM

## 2024-10-31 DIAGNOSIS — G89.29 CHRONIC PAIN OF LEFT KNEE: ICD-10-CM

## 2024-10-31 DIAGNOSIS — M25.562 CHRONIC PAIN OF LEFT KNEE: ICD-10-CM

## 2024-10-31 DIAGNOSIS — G56.03 BILATERAL CARPAL TUNNEL SYNDROME: ICD-10-CM

## 2024-10-31 DIAGNOSIS — M48.062 SPINAL STENOSIS OF LUMBAR REGION WITH NEUROGENIC CLAUDICATION: ICD-10-CM

## 2024-10-31 PROCEDURE — 99205 OFFICE O/P NEW HI 60 MIN: CPT | Performed by: NURSE PRACTITIONER

## 2024-10-31 PROCEDURE — 99417 PROLNG OP E/M EACH 15 MIN: CPT | Performed by: NURSE PRACTITIONER

## 2024-10-31 ASSESSMENT — PAIN SCALES - GENERAL: PAINLEVEL_OUTOF10: MILD PAIN (2)

## 2024-10-31 NOTE — PROGRESS NOTES
Plan on initial consult on 10/30/2024:  A multimodal plan was developed today to treat your pain.  Multimodal analgesia is a strategy that reduces reliance on opioids through the use of non-opioid analgesics and therapies that have different mechanisms of action.    Referral to a Orthopedics hand specialist.  Apply heat prn.    Continue to follow up with Dr. Perez for knee pain.    Continue to see chiropractor.        Diagnostics:   Consider new lumbar MRI since last MRI is from 2016.        Medications:  Discussed compounding cream Lidocaine 5% and Ketamine 5% four times daily.  She would like to try this.    The following OTC pain medications may be helpful, use as directed: Voltaren Gel 1%, CBD products, Arnica products, Capsaicin products, Australian Dream Cream, Epson It, Lidocaine Patch, Solanpas, Biofreeze, Aspercream, Tiger Balm and Jorge Emu cream.  Apply heat or cold PRN.      Therapies:  Discussed Pain Psychology - consider in the future  Psychological treatments are also important part of pain management.  Understanding and managing the thoughts, emotions and behaviors that accompany the discomfort can help you cope more effectively with your pain and can actually reduce the intensity of your pain.      PHYSICAL THERAPY   Discussed the importance of core strengthening, ROM, stretching exercises with the patient and how each of these entities is important in decreasing pain.  Explained to the patient that the purpose of physical therapy is to teach the patient a home exercise program.  These exercises need to be performed every day in order to decrease pain and prevent future occurrences of pain.        Discussed Grounding Mat - handout provided  https://www.youtube.com/watch?v=KmBBXQ1Fw3Q    Discussed Frequency Specific Microcurrent - handout provided  Treatment for Neuropathic Pain.   [facility] [phone]  [facility] [phone]  [facility] [phone]  [facility] [phone]  May be able to be billed as a chiropractic service depending on your insurance coverage.     Discussed Acupuncture.    Discussed TENs unit.      Interventions:  none      Follow up:     Return to clinic as needed.      OZIEL Pa, RN, CNP, FNP  [facility]              BILLING TIME DOCUMENTATION:   The total TIME spent on this patient on the date of the encounter/appointment was 96  minutes.

## 2024-11-01 RX ORDER — KETAMINE HCL 100 %
2-3 POWDER (GRAM) MISCELLANEOUS 4 TIMES DAILY
Qty: 120 G | Refills: 3 | Status: SHIPPED | OUTPATIENT
Start: 2024-11-01

## 2024-11-05 ENCOUNTER — TELEPHONE (OUTPATIENT)
Dept: ORTHOPEDICS | Facility: CLINIC | Age: 84
End: 2024-11-05
Payer: COMMERCIAL

## 2024-11-05 NOTE — TELEPHONE ENCOUNTER
This is for Dr. Jeremiah Perez    11/04 lvm  10/29 [facility]   10/23 lvm, kathryn    Have not heard back from patient.

## 2024-11-15 ENCOUNTER — TELEPHONE (OUTPATIENT)
Dept: ORTHOPEDICS | Facility: CLINIC | Age: 84
End: 2024-11-15
Payer: COMMERCIAL

## 2024-11-15 NOTE — TELEPHONE ENCOUNTER
Krystal called, she states she can not lay on her back for surgery, due to her throat closing and her back hurting.    How would the surgery be done.    Please call to advise.     Thank you.

## 2024-11-18 NOTE — TELEPHONE ENCOUNTER
Phoned patient back regarding surgery questions about positioning.    Patient reports spine concerns, has a hx of throat closing completely when lying flat.  She is concerned about positioning during surgery.    Patient reports she currently sleeps on her side.  She has no concerns with sitting in a recliner.  Writer advised during her recovery she could opt to sleep in a recliner or sleep on her non-operative side with a pillow in between her legs. She is concerned with positioning during anesthesia.  Writer advised obtaining her pre-op exam with our PAC clinic to determine safe anesthesia plan.  Patient in agreeable to this.    Routed to surgery scheduler to assist with scheduling.    Jasmin Burnett RN on 11/18/2024 at 10:20 AM

## 2024-11-26 ENCOUNTER — NURSE TRIAGE (OUTPATIENT)
Dept: PEDIATRICS | Facility: CLINIC | Age: 84
End: 2024-11-26
Payer: COMMERCIAL

## 2024-11-26 DIAGNOSIS — E78.5 HYPERLIPIDEMIA LDL GOAL <100: ICD-10-CM

## 2024-11-26 DIAGNOSIS — I35.1 MODERATE AORTIC REGURGITATION: ICD-10-CM

## 2024-11-26 DIAGNOSIS — R94.39 ABNORMAL STRESS TEST: ICD-10-CM

## 2024-11-26 RX ORDER — METOPROLOL SUCCINATE 50 MG/1
50 TABLET, EXTENDED RELEASE ORAL DAILY
Qty: 90 TABLET | Refills: 3 | Status: SHIPPED | OUTPATIENT
Start: 2024-11-26

## 2024-11-26 NOTE — TELEPHONE ENCOUNTER
I recommend increasing her Metoprolol succinate ER to 50mg daily.  I sent a new prescription to the pharmacy.    She can call us with updated blood pressure AND pulse readings 2-3 weeks after changing the dose.    Tasha Winkler MD

## 2024-11-26 NOTE — TELEPHONE ENCOUNTER
RN called [phone] and spoke with patient. Relayed provider message below. Patient will keep log of BP and HR and will call back in 2-3 weeks. Instructed patient to call back sooner with any symptoms or side effects. Patient verbalized understanding and agreement in plan.   Svetlana Vanegas RN

## 2024-11-26 NOTE — TELEPHONE ENCOUNTER
Nurse Triage SBAR    Is this a 2nd Level Triage? YES, LICENSED PRACTITIONER REVIEW IS REQUIRED    Situation: Patient voicing concerns over her elevated blood pressure. Has been running higher than usual for about the past month. 158/76 at about 10:00 AM today. Asymptomatic. Wonders if her Metoprolol needs to be adjusted.    Background: Patient taking Metoprolol 25 mg daily. Hx of HTN. Has been pretty well managed but has been noticing that her BP has been higher over the last 3-4 weeks. Admits to having more stress in her life within this same time frame. Was using her own wrist cuff but switched to using a friend's arm cuff now. Reading about 10:00 AM today was 158/76. HR has been normal, no concerns. BP has been ranging in the 150's-170's systolic. This is making patient nervous. Has not missed any doses of Metoprolol. No other changes. Currently asymptomatic but knowing that her blood pressure is running higher than usual is making her more nervous and anxious.    Assessment: High blood pressure, may need medication adjustment.    Protocol Recommended Disposition:   See Within 3 Days In Office     Recommendation: No openings with PCP remaining this week. Patient asking if she needs to be seen in person for this? Has trouble with transportation. Uses a walker and has to arrange a ride. Or can complete a virtual visit? Patient wondering if she just needs an increase in her Metoprolol?  Please advise on plan or give recommendation.    Routed to provider        Arleth Gamboa RN  Clinical Triage/Primary Care  [facility]      Reason for Disposition    Systolic BP >= 160 OR Diastolic >= 100    Additional Information    Negative: Pregnant > 20 weeks or postpartum (< 6 weeks after delivery) and new hand or face swelling    Negative: Pregnant > 20 weeks and BP > 140/90    Negative: Sounds like a life-threatening emergency to the triager    Negative: Systolic BP >= 160 OR Diastolic >= 100, and  "any cardiac or neurologic symptoms (e.g., chest pain, difficulty breathing, unsteady gait, blurred vision)    Negative: Patient sounds very sick or weak to the triager    Negative: BP Systolic BP >= 140 OR Diastolic >= 90 and postpartum (from 0 to 6 weeks after delivery)    Negative: Systolic BP >= 180 OR Diastolic >= 110, and missed most recent dose of blood pressure medication    Negative: Systolic BP >= 180 OR Diastolic >= 110    Negative: Patient wants to be seen    Negative: Ran out of BP medications    Negative: Taking BP medications and feels is having side effects (e.g., impotence, cough, dizziness)    Answer Assessment - Initial Assessment Questions  1. BLOOD PRESSURE: \"What is the blood pressure?\" \"Did you take at least two measurements 5 minutes apart?\"      158/76, heart rate has been normal. Ranging from 150's-170/70's.   2. ONSET: \"When did you take your blood pressure?\"      About 90 minutes ago  3. HOW: \"How did you obtain the blood pressure?\" (e.g., visiting nurse, automatic home BP monitor)      Automated cuff on the arm  4. HISTORY: \"Do you have a history of high blood pressure?\"      Yes, is taking medication  5. MEDICATIONS: \"Are you taking any medications for blood pressure?\" \"Have you missed any doses recently?\"      No, has not missed any doses of medication. Takes Metoprolol daily.   6. OTHER SYMPTOMS: \"Do you have any symptoms?\" (e.g., headache, chest pain, blurred vision, difficulty breathing, weakness)      No symptoms. Denies HA, chest pain or pressure, trouble breathing, blurred vision, weakness, lightheadedness. No palpitations.  7. PREGNANCY: \"Is there any chance you are pregnant?\" \"When was your last menstrual period?\"      N/A    Protocols used: High Blood Pressure-A-[place]    "

## 2024-11-27 ENCOUNTER — PRE VISIT (OUTPATIENT)
Dept: NEUROSURGERY | Facility: CLINIC | Age: 84
End: 2024-11-27
Payer: COMMERCIAL

## 2024-12-10 ENCOUNTER — NURSE TRIAGE (OUTPATIENT)
Dept: PEDIATRICS | Facility: CLINIC | Age: 84
End: 2024-12-10
Payer: COMMERCIAL

## 2024-12-10 NOTE — TELEPHONE ENCOUNTER
"Nurse Triage SBAR    Is this a 2nd Level Triage? YES, LICENSED PRACTITIONER REVIEW IS REQUIRED    Situation: Pt calls to report high blood pressure.     Background: Pt reports she increased her metoprolol ER from 25 mg to 50 mg a few weeks ago. Per chart review 11/26 telephone encounter:  \"Tasha Winkler MD 11/26/24 12:39 PM Note  I recommend increasing her Metoprolol succinate ER to 50mg daily.  I sent a new prescription to the pharmacy.     She can call us with updated blood pressure AND pulse readings 2-3 weeks after changing the dose.     Tasha Winkler MD      \"    Pt denies missing any recent doses of her BP med. Pt states she usually takes her metoprolol ER in the evenings. Pt reports she has not yet taken her BP medication today. Pt preferred [facility] [place].    Assessment: Pt reports her typical BP over the past few weeks is 160's/60's, and HR in the 60-70's. Pt reports her BP today was 185/70 HR  77 bpm, upon retake 180/78 HR 77 bpm. Pt denies any symptoms including: headache, chest pain, SOB, vision changes, dizziness.     Protocol Recommended Disposition:   See in Office Today    Recommendation: 2nd level triage with PCP. Advised pt to callback or seek care for any new or worsening symptoms. Pt verbalized understanding and agrees with plan.     Huddled with PCP Dr. Winkler in person    Dr. Winkler recommended an appointment with week with extending provider. Called patient and relayed provider recommendations. Advised calling back with new or worsening symptoms or seeking immediate care if developing symptoms. Discussed red flag symptoms and reasons for presenting to ED. Pt verbalized understanding and agrees with plan. Scheduled pt to be seen per request tomorrow.     Reason for Disposition   Systolic BP >= 180 OR Diastolic >= 110    Additional Information   Negative: Sounds like a life-threatening emergency to the triager   Negative: Symptom is main concern (e.g., " headache, chest pain)   Negative: Low blood pressure is main concern   Negative: Systolic BP >= 160 OR Diastolic >= 100, and any cardiac (e.g., breathing difficulty, chest pain) or neurologic symptoms (e.g., new-onset blurred or double vision)   Negative: Pregnant 20 or more weeks (or postpartum < 6 weeks) with new hand or face swelling   Negative: Pregnant 20 or more weeks (or postpartum < 6 weeks) and Systolic BP >= 160 OR Diastolic >= 110   Negative: Patient sounds very sick or weak to the triager   Negative: Systolic BP >= 200 OR Diastolic >= 120 and having NO cardiac or neurologic symptoms   Negative: Pregnant 20 or more weeks (or postpartum < 6 weeks) with Systolic BP >= 140 OR Diastolic >= 90   Negative: Systolic BP >= 180 OR Diastolic >= 110, and missed most recent dose of blood pressure medication    Protocols used: Blood Pressure - High-A-[place]    Anuel Francisco RN on 12/10/2024 at 10:27 AM

## 2024-12-11 ENCOUNTER — OFFICE VISIT (OUTPATIENT)
Dept: PEDIATRICS | Facility: CLINIC | Age: 84
End: 2024-12-11
Payer: COMMERCIAL

## 2024-12-11 VITALS
HEART RATE: 83 BPM | TEMPERATURE: 97.9 F | BODY MASS INDEX: 30.93 KG/M2 | WEIGHT: 163.8 LBS | OXYGEN SATURATION: 97 % | SYSTOLIC BLOOD PRESSURE: 157 MMHG | DIASTOLIC BLOOD PRESSURE: 76 MMHG | HEIGHT: 61 IN | RESPIRATION RATE: 17 BRPM

## 2024-12-11 DIAGNOSIS — I10 BENIGN ESSENTIAL HYPERTENSION: Primary | ICD-10-CM

## 2024-12-11 RX ORDER — LOSARTAN POTASSIUM 25 MG/1
25 TABLET ORAL DAILY
Qty: 30 TABLET | Refills: 1 | Status: SHIPPED | OUTPATIENT
Start: 2024-12-11

## 2024-12-11 NOTE — PROGRESS NOTES
Assessment & Plan     Benign essential hypertension    - losartan (COZAAR) 25 MG tablet; Take 1 tablet (25 mg) by mouth daily.      Patient is here for elevated BP despite talking Metoprolol.  We will have her add on Losartan for additional treatment and maintain close followup.  Patient understands and agrees with the plan today.     Patient Plan:  Please start taking the losartan medication to help treat the elevated BP.  This can be taken once a day, maybe in the morning would be best.      You can continue the metoprolol.      Please followup in about 2 weeks with Dr. Winkler.  This appointment has been scheduled.  Please record your Blood pressures from home in the meantime.      Please seek more immediate care if your symptoms change or worsen in any way.          Addy Rice is a 84 year old, presenting for the following health issues:  RECHECK (HIGH BP/ FOLLOW UP TRIAGED BEST TIME PER PT. )    History of Present Illness       Reason for visit:  High blood pressure  Symptom onset:  More than a month   She is taking medications regularly.     Patient feels like she is having a hard time controlling her blood pressure.  She states that she had her metoprolol increased and that has brought the pressures down a bit, but they are still elevated into the 150's and 160's.  She is not having any symptoms, but she does feel nervous about the high blood pressure readings.  She has never been on any other medications for her BP.      She denies any chest pain, shortness of breath, headaches or visual changes.        Review of Systems  Constitutional, HEENT, cardiovascular, pulmonary, gi and gu systems are negative, except as otherwise noted.      Objective    LMP  (LMP Unknown)   There is no height or weight on file to calculate BMI.  Physical Exam   GENERAL: alert and no distress  EYES: Eyes grossly normal to inspection, PERRL and conjunctivae and sclerae normal  NECK: no adenopathy, no asymmetry, masses, or  scars  RESP: lungs clear to auscultation - no rales, rhonchi or wheezes  CV: regular rate and rhythm, normal S1 S2, no S3 or S4, no murmur, click or rub, no peripheral edema  MS: no gross musculoskeletal defects noted, no edema          Signed Electronically by: Shanika Astorga PA-C

## 2024-12-11 NOTE — PATIENT INSTRUCTIONS
Please start taking the losartan medication to help treat the elevated BP.  This can be taken once a day, maybe in the morning would be best.      You can continue the metoprolol.      Please followup in about 2 weeks with Dr. Winkler.  This appointment has been scheduled.  Please record your Blood pressures from home in the meantime.      Please seek more immediate care if your symptoms change or worsen in any way.

## 2024-12-31 ENCOUNTER — OFFICE VISIT (OUTPATIENT)
Dept: PEDIATRICS | Facility: CLINIC | Age: 84
End: 2024-12-31
Payer: COMMERCIAL

## 2024-12-31 VITALS
RESPIRATION RATE: 16 BRPM | HEIGHT: 61 IN | HEART RATE: 74 BPM | SYSTOLIC BLOOD PRESSURE: 158 MMHG | OXYGEN SATURATION: 98 % | DIASTOLIC BLOOD PRESSURE: 52 MMHG | WEIGHT: 163 LBS | TEMPERATURE: 99.3 F | BODY MASS INDEX: 30.78 KG/M2

## 2024-12-31 DIAGNOSIS — I10 BENIGN ESSENTIAL HYPERTENSION: Primary | ICD-10-CM

## 2024-12-31 LAB
ANION GAP SERPL CALCULATED.3IONS-SCNC: 10 MMOL/L (ref 7–15)
BUN SERPL-MCNC: 25.7 MG/DL (ref 8–23)
CALCIUM SERPL-MCNC: 10.4 MG/DL (ref 8.8–10.4)
CHLORIDE SERPL-SCNC: 103 MMOL/L (ref 98–107)
CREAT SERPL-MCNC: 0.72 MG/DL (ref 0.51–0.95)
EGFRCR SERPLBLD CKD-EPI 2021: 81 ML/MIN/1.73M2
GLUCOSE SERPL-MCNC: 103 MG/DL (ref 70–99)
HCO3 SERPL-SCNC: 26 MMOL/L (ref 22–29)
POTASSIUM SERPL-SCNC: 4.6 MMOL/L (ref 3.4–5.3)
SODIUM SERPL-SCNC: 139 MMOL/L (ref 135–145)

## 2024-12-31 PROCEDURE — 80048 BASIC METABOLIC PNL TOTAL CA: CPT | Performed by: PEDIATRICS

## 2024-12-31 PROCEDURE — 99214 OFFICE O/P EST MOD 30 MIN: CPT | Mod: GC | Performed by: PEDIATRICS

## 2024-12-31 PROCEDURE — 36415 COLL VENOUS BLD VENIPUNCTURE: CPT | Performed by: PEDIATRICS

## 2024-12-31 RX ORDER — LOSARTAN POTASSIUM 50 MG/1
50 TABLET ORAL DAILY
Qty: 30 TABLET | Refills: 1 | Status: SHIPPED | OUTPATIENT
Start: 2024-12-31

## 2024-12-31 ASSESSMENT — PAIN SCALES - GENERAL: PAINLEVEL_OUTOF10: NO PAIN (0)

## 2024-12-31 NOTE — PATIENT INSTRUCTIONS
Keep metoprolol dose the same    Increase your losartan to 50mg - I will send new pill to pharmacy, but ok to take 2 of the ones you have until you are out!    Labs today    We'll help schedule our next visit :)

## 2024-12-31 NOTE — PROGRESS NOTES
"  Assessment & Plan       ICD-10-CM    1. Benign essential hypertension  I10 Basic metabolic panel  (Ca, Cl, CO2, Creat, Gluc, K, Na, BUN)     Albumin Random Urine Quantitative with Creat Ratio     losartan (COZAAR) 50 MG tablet     Basic metabolic panel  (Ca, Cl, CO2, Creat, Gluc, K, Na, BUN)     Albumin Random Urine Quantitative with Creat Ratio      Tolerating the addition of losartan well, but BP not yet at goal.  Plan to move losartan dosing to 50mg daily and meet again in 4 weeks to review.  Keep Toprol XL dosing the same.    Labs today after start of losartan.        The longitudinal plan of care for the diagnosis(es)/condition(s) as documented were addressed during this visit. Due to the added complexity in care, I will continue to support Krystal in the subsequent management and with ongoing continuity of care.                 Subjective   Krystal is a 84 year old, presenting for the following health issues:  Follow Up and Hypertension        12/31/2024     1:11 PM   Additional Questions   Roomed by Twyla ELLIS CMA   Accompanied by Self         12/31/2024     1:11 PM   Patient Reported Additional Medications   Patient reports taking the following new medications n/a     History of Present Illness       Reason for visit:  Blood pressure   She is taking medications regularly.     Was seen on 12/11/24 and Losartan 25mg was added. Pt states her home cuff readings are not coming down. Top number is in the 160s       At last visit, continued metoprolol and added losartan.   Has tolerated medication well, but hasn't had good control just yet.   Following BPs at home.    Systolics have been in the 150-160's and diastolic remains in the 60-s usually.    Typically not symptomatic with higher BPs, but sometimes feels a bit off.  No chest pain or dizziness.             Objective    BP (!) 158/52   Pulse 74   Temp 99.3  F (37.4  C) (Temporal)   Resp 16   Ht 1.537 m (5' 0.5\")   Wt 73.9 kg (163 lb)   LMP  (LMP Unknown)  "  SpO2 98%   BMI 31.31 kg/m    Body mass index is 31.31 kg/m .  Wt Readings from Last 4 Encounters:   12/31/24 73.9 kg (163 lb)   12/11/24 74.3 kg (163 lb 12.8 oz)   10/18/24 72.1 kg (159 lb)   09/23/24 72.1 kg (159 lb)     BP rechecked during visit - readings were similar to original check in both arms.      Physical Exam   GENERAL: alert and no distress  RESP: lungs clear to auscultation - no rales, rhonchi or wheezes  CV: regular rate and rhythm, normal S1 S2, no S3 or S4, no murmur, click or rub, no peripheral edema   PSYCH: mentation appears normal, affect normal/bright    Labs pending        Signed Electronically by: Tasha Winkler MD

## 2025-01-06 NOTE — PROGRESS NOTES
ASSESSMENT & PLAN  Patient Instructions     1. Carpal tunnel syndrome of right wrist    2. Carpal tunnel syndrome of left wrist    3. Localized primary osteoarthritis of carpometacarpal (CMC) joint of right wrist      -Patient is following up for chronic bilateral wrist pain and due to carpal tunnel syndrome and CMC arthritis  -Patient had previous carpal tunnel release surgery approximately 4 years ago with worsening pain in the right hand and slight improvement of pain and symptoms in the left  -Patient also has significant tenderness palpation of the CMC joint on exam today  -Patient tolerated right CMC intra-articular cortisone injection today without complications.  Patient was given postprocedure instructions  -Patient will be referred to orthopedic hand surgery for further treatment recommendations for her chronic carpal tunnel syndrome symptoms s/p surgical release  -Call direct clinic number [[phone]] at any time with questions or concerns.    Albert Yeo MD [facility] Orthopedics and Sports Medicine  [facility]        -----    SUBJECTIVE:  Krystal Parra is a 84 year old female who is seen in follow-up for bilateral hand and thumb 2,3,4 fingers on both hands.They were last seen 10/18/2024.     Since their last visit reports worsening pain. They indicate that their current pain level is 8/10. They have tried casting/splinting/bracing and pain medication is taken for other reasons and heated gloves.      The patient is seen with their son.    Patient's past medical, surgical, social, and family histories were reviewed today and no changes are noted.    REVIEW OF SYSTEMS:  Constitutional: NEGATIVE for fever, chills, change in weight  Skin: NEGATIVE for worrisome rashes, moles or lesions  GI/: NEGATIVE for bowel or bladder changes  Neuro: NEGATIVE for weakness, dizziness or paresthesias    OBJECTIVE:  Ht 1.524 m (5')   Wt 73.7 kg (162 lb 8 oz)   LMP  (LMP Unknown)   BMI 31.74  kg/m     General: healthy, alert and in no distress  HEENT: no scleral icterus or conjunctival erythema  Skin: no suspicious lesions or rash. No jaundice.  CV: regular rhythm by palpation, no pedal edema  Resp: normal respiratory effort without conversational dyspnea   Psych: normal mood and affect  Gait: normal steady gait with appropriate coordination and balance  Neuro: normal light touch sensory exam of the extremities.    MSK:  BILATERAL HAND  Inspection:    No swelling or obvious deformity or asymmetry, surgical scars proximal palm  Palpation:   Carpals: normal   Metacarpals: normal   Thumb: CMC (right greater than left)   Fingers: MCP joint  Range of Motion:    Full active flexion and extension at MCP, PIP, and DIP joints; normal finger cascade without malrotation.  Wrist pronation, supination, and ulnar/radial deviation normal.  Strength:  Grossly intact  Special Tests:    Positive: CMC grind, Tinel's, Phalen's (right greater than left)    Independent visualization of the below image:  Result Narrative - xray bilateral hand 8/23/24    Degenerative changes of the right first CMC, scaphoid trapezium joint,  first MCP and IP joint, second and third DIP joints.    Left wrist joint space narrowing, chondrocalcinosis and osteophytes.  STT  degenerative changes.  First MCP and second DIP joint space narrowing and  osteophytes.  No acute fracture or dislocation.    Hand / Upper Extremity Injection/Arthrocentesis: R thumb CMC    Date/Time: 1/8/2025 2:07 PM    Performed by: Yeo, Albert, MD  Authorized by: Yeo, Albert, MD    Indications:  Pain  Needle Size:  25 G  Guidance: ultrasound    Approach:  Volar  Condition: osteoarthritis    Location:  Thumb  Site:  R thumb CMC    Medications:  40 mg methylPREDNISolone 40 MG/ML; 2 mL lidocaine 1 %; 1 mL ROPivacaine 5 MG/ML  Outcome:  Tolerated well, no immediate complications  Procedure discussed: discussed risks, benefits, and alternatives    Consent Given by:   Patient  Timeout: timeout called immediately prior to procedure    Prep: patient was prepped and draped in usual sterile fashion     Ultrasound was used to ensure safe and accurate needle placement and injection. Ultrasound images of the procedure were permanently stored.            Albert Yeo MD, [facility] [place]

## 2025-01-08 ENCOUNTER — OFFICE VISIT (OUTPATIENT)
Dept: ORTHOPEDICS | Facility: CLINIC | Age: 85
End: 2025-01-08
Payer: COMMERCIAL

## 2025-01-08 VITALS — WEIGHT: 162.5 LBS | BODY MASS INDEX: 31.9 KG/M2 | HEIGHT: 60 IN

## 2025-01-08 DIAGNOSIS — G56.02 CARPAL TUNNEL SYNDROME OF LEFT WRIST: ICD-10-CM

## 2025-01-08 DIAGNOSIS — G56.01 CARPAL TUNNEL SYNDROME OF RIGHT WRIST: Primary | ICD-10-CM

## 2025-01-08 DIAGNOSIS — M19.031 LOCALIZED PRIMARY OSTEOARTHRITIS OF CARPOMETACARPAL (CMC) JOINT OF RIGHT WRIST: ICD-10-CM

## 2025-01-08 PROCEDURE — 99214 OFFICE O/P EST MOD 30 MIN: CPT | Mod: 25 | Performed by: FAMILY MEDICINE

## 2025-01-08 PROCEDURE — 20604 DRAIN/INJ JOINT/BURSA W/US: CPT | Mod: F5 | Performed by: FAMILY MEDICINE

## 2025-01-08 RX ORDER — METHYLPREDNISOLONE ACETATE 40 MG/ML
40 INJECTION, SUSPENSION INTRA-ARTICULAR; INTRALESIONAL; INTRAMUSCULAR; SOFT TISSUE
Status: COMPLETED | OUTPATIENT
Start: 2025-01-08 | End: 2025-01-08

## 2025-01-08 RX ORDER — ROPIVACAINE HYDROCHLORIDE 5 MG/ML
1 INJECTION, SOLUTION EPIDURAL; INFILTRATION; PERINEURAL
Status: COMPLETED | OUTPATIENT
Start: 2025-01-08 | End: 2025-01-08

## 2025-01-08 RX ORDER — LIDOCAINE HYDROCHLORIDE 10 MG/ML
2 INJECTION, SOLUTION INFILTRATION; PERINEURAL
Status: COMPLETED | OUTPATIENT
Start: 2025-01-08 | End: 2025-01-08

## 2025-01-08 RX ADMIN — METHYLPREDNISOLONE ACETATE 40 MG: 40 INJECTION, SUSPENSION INTRA-ARTICULAR; INTRALESIONAL; INTRAMUSCULAR; SOFT TISSUE at 14:07

## 2025-01-08 RX ADMIN — LIDOCAINE HYDROCHLORIDE 2 ML: 10 INJECTION, SOLUTION INFILTRATION; PERINEURAL at 14:07

## 2025-01-08 RX ADMIN — ROPIVACAINE HYDROCHLORIDE 1 ML: 5 INJECTION, SOLUTION EPIDURAL; INFILTRATION; PERINEURAL at 14:07

## 2025-01-08 NOTE — PATIENT INSTRUCTIONS
1. Carpal tunnel syndrome of right wrist    2. Carpal tunnel syndrome of left wrist    3. Localized primary osteoarthritis of carpometacarpal (CMC) joint of right wrist      -Patient is following up for chronic bilateral wrist pain and due to carpal tunnel syndrome and CMC arthritis  -Patient had previous carpal tunnel release surgery approximately 4 years ago with worsening pain in the right hand and slight improvement of pain and symptoms in the left  -Patient also has significant tenderness palpation of the CMC joint on exam today  -Patient tolerated right CMC intra-articular cortisone injection today without complications.  Patient was given postprocedure instructions  -Patient will be referred to orthopedic hand surgery for further treatment recommendations for her chronic carpal tunnel syndrome symptoms s/p surgical release  -Call direct clinic number [[phone]] at any time with questions or concerns.    Albert Yeo MD [facility] Orthopedics and Sports Medicine  [facility] [place]

## 2025-01-08 NOTE — LETTER
1/8/2025      Krystal Parra  [address]  [address]  [place] [address]      Dear Colleague,    Thank you for referring your patient, Krystal Parra, to the [facility] [place]. Please see a copy of my visit note below.    ASSESSMENT & PLAN  Patient Instructions     1. Carpal tunnel syndrome of right wrist    2. Carpal tunnel syndrome of left wrist    3. Localized primary osteoarthritis of carpometacarpal (CMC) joint of right wrist      -Patient is following up for chronic bilateral wrist pain and due to carpal tunnel syndrome and CMC arthritis  -Patient had previous carpal tunnel release surgery approximately 4 years ago with worsening pain in the right hand and slight improvement of pain and symptoms in the left  -Patient also has significant tenderness palpation of the CMC joint on exam today  -Patient tolerated right CMC intra-articular cortisone injection today without complications.  Patient was given postprocedure instructions  -Patient will be referred to orthopedic hand surgery for further treatment recommendations for her chronic carpal tunnel syndrome symptoms s/p surgical release  -Call direct clinic number [[phone]] at any time with questions or concerns.    Albert Yeo MD [facility] Orthopedics and Sports Medicine  [facility]        -----    SUBJECTIVE:  Krystal Parra is a 84 year old female who is seen in follow-up for bilateral hand and thumb 2,3,4 fingers on both hands.They were last seen 10/18/2024.     Since their last visit reports worsening pain. They indicate that their current pain level is 8/10. They have tried casting/splinting/bracing and pain medication is taken for other reasons and heated gloves.      The patient is seen with their son.    Patient's past medical, surgical, social, and family histories were reviewed today and no changes are noted.    REVIEW OF SYSTEMS:  Constitutional: NEGATIVE for fever, chills,  change in weight  Skin: NEGATIVE for worrisome rashes, moles or lesions  GI/: NEGATIVE for bowel or bladder changes  Neuro: NEGATIVE for weakness, dizziness or paresthesias    OBJECTIVE:  Ht 1.524 m (5')   Wt 73.7 kg (162 lb 8 oz)   LMP  (LMP Unknown)   BMI 31.74 kg/m     General: healthy, alert and in no distress  HEENT: no scleral icterus or conjunctival erythema  Skin: no suspicious lesions or rash. No jaundice.  CV: regular rhythm by palpation, no pedal edema  Resp: normal respiratory effort without conversational dyspnea   Psych: normal mood and affect  Gait: normal steady gait with appropriate coordination and balance  Neuro: normal light touch sensory exam of the extremities.    MSK:  BILATERAL HAND  Inspection:    No swelling or obvious deformity or asymmetry, surgical scars proximal palm  Palpation:   Carpals: normal   Metacarpals: normal   Thumb: CMC (right greater than left)   Fingers: MCP joint  Range of Motion:    Full active flexion and extension at MCP, PIP, and DIP joints; normal finger cascade without malrotation.  Wrist pronation, supination, and ulnar/radial deviation normal.  Strength:  Grossly intact  Special Tests:    Positive: CMC grind, Tinel's, Phalen's (right greater than left)    Independent visualization of the below image:  Result Narrative - xray bilateral hand 8/23/24    Degenerative changes of the right first CMC, scaphoid trapezium joint,  first MCP and IP joint, second and third DIP joints.    Left wrist joint space narrowing, chondrocalcinosis and osteophytes.  STT  degenerative changes.  First MCP and second DIP joint space narrowing and  osteophytes.  No acute fracture or dislocation.    Hand / Upper Extremity Injection/Arthrocentesis: R thumb CMC    Date/Time: 1/8/2025 2:07 PM    Performed by: Yeo, Albert, MD  Authorized by: Yeo, Albert, MD    Indications:  Pain  Needle Size:  25 G  Guidance: ultrasound    Approach:  Volar  Condition: osteoarthritis    Location:   Thumb  Site:  R thumb CMC    Medications:  40 mg methylPREDNISolone 40 MG/ML; 2 mL lidocaine 1 %; 1 mL ROPivacaine 5 MG/ML  Outcome:  Tolerated well, no immediate complications  Procedure discussed: discussed risks, benefits, and alternatives    Consent Given by:  Patient  Timeout: timeout called immediately prior to procedure    Prep: patient was prepped and draped in usual sterile fashion     Ultrasound was used to ensure safe and accurate needle placement and injection. Ultrasound images of the procedure were permanently stored.            Albert Yeo MD, [facility]      Again, thank you for allowing me to participate in the care of your patient.        Sincerely,        Albert Yeo, MD    Electronically signed

## 2025-01-09 ENCOUNTER — PATIENT OUTREACH (OUTPATIENT)
Dept: CARE COORDINATION | Facility: CLINIC | Age: 85
End: 2025-01-09
Payer: COMMERCIAL

## 2025-01-13 ENCOUNTER — PATIENT OUTREACH (OUTPATIENT)
Dept: CARE COORDINATION | Facility: CLINIC | Age: 85
End: 2025-01-13
Payer: COMMERCIAL

## 2025-01-29 ENCOUNTER — VIRTUAL VISIT (OUTPATIENT)
Dept: ORTHOPEDICS | Facility: CLINIC | Age: 85
End: 2025-01-29
Payer: COMMERCIAL

## 2025-01-29 DIAGNOSIS — M51.26 DISPLACEMENT OF LUMBAR INTERVERTEBRAL DISC WITHOUT MYELOPATHY: Primary | ICD-10-CM

## 2025-01-29 NOTE — PROGRESS NOTES
Teaching Flowsheet     Visit Type: Telephone    Time Start: 10:15am  Time End: 11am  Total Time Spent: 45 mins    Surgeon: Dr. Perez  Location of Surgery (known or anticipated): [facility]  Type of Anesthesia: Choice    Per Dr. Perez recommendation, she should see a spine specialist before upcoming surgery for lumbar concerns.  Spine referral placed today and recommended patient schedule visit.  Patient inquired about home care physical therapy services.  Writer explained that our clinic is happy to put in this order.  Explained we can not guarantee home care capacity to accept her as a patient nor insurance coverage.  Patient will call her insurance company to inquire if home care is covered.  If home care is not a possibility we will plan on outpatient physical therapy with medical transportation if she can not arrange rides with family.  Discussed total joint online class.  Patient will call if they require help for signing up for the total joint zoom class. Patient understands they will need a preop exam within 30 days of date of surgery. Patient understands the expected length of stay and the expectation of outpatient physical therapy. Patient understands the need for an at home  after surgery and need for transportation home.  Discussed dental/non-sterile procedure prophylaxis. Discussed the [facility] Companion service.     Pertinent Medical History: PAST MEDICAL HISTORY:   Past Medical History:   Diagnosis Date    Basal cell carcinoma     Bone spur 4th toe Right     Lumbar disc herniation     Moderate aortic regurgitation 06/18/2016    Osteopenia     Pathologic fracture of distal radius and ulna     right in 2003, left 1992       PAST SURGICAL HISTORY:   Past Surgical History:   Procedure Laterality Date    CARPAL TUNNEL RELEASE RT/LT Right 10/20/2021    Right carpal tunnel release under local anesthetic, Dr. Linus Conrad, [facility]    CARPAL TUNNEL RELEASE RT/LT Left 03/02/2022     Left carpal tunnel release under local anesthesia, Dr. Linus Conrad, [facility].    cataract  01/01/2011    bilateral    HC TOOTH EXTRACTION W/FORCEP      ZZC LIGATE FALLOPIAN TUBE,POSTPARTUM      Tubal Ligation       FAMILY HISTORY:   Family History   Problem Relation Age of Onset    Cardiovascular Mother         Mild MI 80's    Cerebrovascular Disease Father     Diabetes Father     Neurologic Disorder Sister         brain tumor    Lipids Sister     Hypertension Sister     Gastrointestinal Disease Sister         diverticulitis    Cancer Sister         Breast cancer       SOCIAL HISTORY:   Social History     Tobacco Use    Smoking status: Never     Passive exposure: Yes    Smokeless tobacco: Never    Tobacco comments:      smoked in house   Substance Use Topics    Alcohol use: No       Were medical conditions reviewed and appropriate for location? Yes  BMI: Normal BMI    Relevant Diagnosis: left knee osteoarthritis  Teaching Topic: left total knee arthroplasty    Person(s) involved in teaching:   Patient  : No.   Verified Patient's Phone Number: YES: [phone]    Caregiver//  Name: Ngoc   Phone Number: [phone]  Relationship: Daughter in-law  Consent to Communicate on file: No     Motivation Level:  Asks Questions: Yes  Eager to Learn: Yes  Cooperative: Yes  Receptive (willing/able to accept information): Yes  Any cultural factors/Hinduism beliefs that may influence understanding or compliance? No     Patient demonstrates understanding of the following:  Reason for the appointment, diagnosis and treatment plan: Yes  Knowledge of proper use of medications and conditions for which they are ordered (with special attention to potential side effects or drug interactions): Yes  Which situations necessitate calling provider and whom to contact: Yes     Teaching Concerns Addressed:   - Important Contact Info/Phone Numbers: [facility] clinic number [phone] and after hours  number [phone]  - Map/location of surgery and follow-up appointments  - Showering instructions  - [employer]  - Does patient have difficulty getting a ride to appointments (post-ops, PT/OT): No  - Patient's plan after discharge:  Independent living at senior living facility     Proper use and care of medical equip, care aids, etc.: Yes  Nutritional needs and diet plan: Yes  Pain management techniques: Yes  Wound Care: Yes  How and/when to access community resources: Yes  Need for pre-op with in 30 days: YES, will be done with PCP. I asked them to ensure they go over their daily medications during this visit and discuss what medications need to be stopped before surgery and when. If you are doing a pre-op with your PCP and they are not within the [facility], I ask them to fax it to our pre-op office. Patient verbalized understanding.        Instructional Materials Used/Given:  two bottles of chlorhexidine soap, a surgery packet, and a joint booklet given to patient in clinic.        -Next step: schedule a pre-op with PCP and PAC scheduled to evaluate anesthesia risk associated with positioning and breathing concerns.    Jasmin Burnett RN

## 2025-01-30 ENCOUNTER — PATIENT OUTREACH (OUTPATIENT)
Dept: CARE COORDINATION | Facility: CLINIC | Age: 85
End: 2025-01-30
Payer: COMMERCIAL

## 2025-01-30 NOTE — TELEPHONE ENCOUNTER
FUTURE VISIT INFORMATION      SURGERY INFORMATION:  Date: 3/18/25  Location:  OR  Surgeon:  Jeremiah Perez MD   Anesthesia Type:  choice  Procedure: ARTHROPLASTY, KNEE, TOTAL     RECORDS REQUESTED FROM:       Primary Care Provider: Tasha Winkler MD - [facility]    Most recent EKG+ Tracin23    Most recent ECHO: 23    Most recent Cardiac Stress Test: 3/13/23

## 2025-02-04 ENCOUNTER — OFFICE VISIT (OUTPATIENT)
Dept: ORTHOPEDICS | Facility: CLINIC | Age: 85
End: 2025-02-04
Payer: COMMERCIAL

## 2025-02-04 VITALS — SYSTOLIC BLOOD PRESSURE: 166 MMHG | DIASTOLIC BLOOD PRESSURE: 62 MMHG

## 2025-02-04 DIAGNOSIS — M18.0 OSTEOARTHRITIS OF CARPOMETACARPAL (CMC) JOINT OF BOTH THUMBS: Primary | ICD-10-CM

## 2025-02-04 DIAGNOSIS — M19.031 LOCALIZED PRIMARY OSTEOARTHRITIS OF CARPOMETACARPAL (CMC) JOINT OF RIGHT WRIST: ICD-10-CM

## 2025-02-04 DIAGNOSIS — G56.01 CARPAL TUNNEL SYNDROME OF RIGHT WRIST: ICD-10-CM

## 2025-02-04 DIAGNOSIS — M25.531 WRIST PAIN, RIGHT: ICD-10-CM

## 2025-02-04 DIAGNOSIS — G56.03 BILATERAL CARPAL TUNNEL SYNDROME: ICD-10-CM

## 2025-02-04 PROCEDURE — 99203 OFFICE O/P NEW LOW 30 MIN: CPT | Performed by: STUDENT IN AN ORGANIZED HEALTH CARE EDUCATION/TRAINING PROGRAM

## 2025-02-04 NOTE — PROGRESS NOTES
Orthopaedic Surgery Hand and Upper Extremity Clinic H&P Note:  Date: Feb 4, 2025     Patient Name: Krystal Parra  MRN: [id number]    Consult requested by: Albert Yeo    CHIEF COMPLAINT: bilateral carpal tunnel syndrome, right thumb CMC OA    Dominant Hand: right  Occupation: retired, worked as a  at [employer]      HPI:  Ms. Krystal Parra is a 84 year old  female right hand dominant who presents with bilateral wrist pain (R>L). Patient has a history of bilateral carpal tunnel surgery. Right end of 2021, Left early 2022 by Dr. Conrad. She notes that her right hand did not improve much at all after the surgery. She notes numbness, pain, tingling predominantly in the index finger but also in the middle finger.  Small finger is not affected.  She has difficulty falling asleep, as the pain and discomfort is worse before bedtime. She reports that warm water provides significant relief from the pain (washing hands under warm water).  She denies weakness in the hand.    Patient received right thumb CMC joint corticosteroid injection on 1/8/25 that has not provided relief.     Patient had EMG completed at the [facility] on 9/25/24.     Patient is seen today with her daughter-in-law, Ngoc RODRIGUEZ  Diabetes: no  Thyroid Problems: no  Smoking Y/N; No      PAST MEDICAL HISTORY:  Past Medical History:   Diagnosis Date    Basal cell carcinoma     Bone spur 4th toe Right     Lumbar disc herniation     Moderate aortic regurgitation 06/18/2016    Osteopenia     Pathologic fracture of distal radius and ulna     right in 2003, left 1992       PAST SURGICAL HISTORY:  Past Surgical History:   Procedure Laterality Date    CARPAL TUNNEL RELEASE RT/LT Right 10/20/2021    Right carpal tunnel release under local anesthetic, Dr. Linus Conrad, [facility]    CARPAL TUNNEL RELEASE RT/LT Left 03/02/2022    Left carpal tunnel release under local anesthesia, Dr. Linus Conrad, [facility].    cataract   01/01/2011    bilateral    HC TOOTH EXTRACTION W/FORCEP      ZZC LIGATE FALLOPIAN TUBE,POSTPARTUM      Tubal Ligation       MEDICATIONS:  Current Outpatient Medications   Medication Sig Dispense Refill    aspirin EC 81 MG EC tablet Take 1 tablet (81 mg) by mouth daily      atorvastatin (LIPITOR) 20 MG tablet Take 1 tablet (20 mg) by mouth daily. 90 tablet 11    Cholecalciferol (VITAMIN D) 2000 UNIT CAPS Take 1 tablet by mouth daily.      coenzyme Q10 (CO-Q10) 30 MG capsule Take 1 capsule (30 mg) by mouth daily 30 capsule 0    fish oil-omega-3 fatty acids 1000 MG capsule Take 2 g by mouth 2 times daily       Ginkgo Biloba (GINKOBA PO)       ketamine HCl POWD 2-3 Pump 4 times daily. Ketamine 10% Lidocaine 5% apply 2-3 pumpts (1.1.5gms) to affected area up to 4 times daily as needed. 120 g 3    losartan (COZAAR) 50 MG tablet Take 1 tablet (50 mg) by mouth daily. 30 tablet 1    metoprolol succinate ER (TOPROL XL) 50 MG 24 hr tablet Take 1 tablet (50 mg) by mouth daily. 90 tablet 3    nitroGLYcerin (NITROSTAT) 0.4 MG sublingual tablet Place 1 tablet (0.4 mg) under the tongue every 5 minutes as needed for chest pain 20 tablet 1    OVER-THE-COUNTER Take 1 tablet by mouth daily (Melaleuca vitamins)      traZODone (DESYREL) 50 MG tablet Take 1-2 tablets ( mg) by mouth at bedtime. 180 tablet 3     No current facility-administered medications for this visit.       ALLERGIES:     Allergies   Allergen Reactions    Sulfa Antibiotics        FAMILY HISTORY:  No pertinent family history    SOCIAL HISTORY:  Social History     Tobacco Use    Smoking status: Never     Passive exposure: Yes    Smokeless tobacco: Never    Tobacco comments:      smoked in house   Vaping Use    Vaping status: Never Used   Substance Use Topics    Alcohol use: No    Drug use: No       The patient's past medical, family, and social history was reviewed and confirmed.    REVIEW OF SYMPTOMS:      General: Negative   Eyes: Negative   Ear, Nose and  Throat: Negative   Respiratory: Negative   Cardiovascular: Negative   Gastrointestinal: Negative   Genito-urinary: Negative   Musculoskeletal: see HPI  Neurological: Negative   Psychological: Negative  HEME: Negative   ENDO: Negative   SKIN: Negative    VITALS:  There were no vitals filed for this visit.    EXAM:  General: NAD, A&Ox3  HEENT: NC/AT  CV: RRR by peripheral pulse  Pulmonary: Non-labored breathing on RA   2 pt 7mm median and ulnar right, 5mm median and ulnar left  +tinel's on right, mild on left  Negative durkan's compression test bilaterally  Tingling right median nerve to light touch, intact ulnar; intact med/ulnar left  BUE:  Skin intact, advanced thenar atrophy on the right, mild on the left  Diminished sensation to light touch in the right index finger, intact ulnar and radial  Sensation intact to light touch median, radial, ulnar nerve issues on the left  2 point discrimination 7 mm median and ulnar on the right, 5 mm median and ulnar on the left  Positive Tinel's and Durkan's compression on the right, mildly positive on the left  Tenderness palpation at the thumb CMC joint on the right  Intact EPL, FPL, hand intrinsics  Warm well-perfused, capillary refill less than 2 seconds       IMAGING:    X-rays of bilateral hands demonstrates advanced thumb CMC OA    EMG/NCS 9/25/2024  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  Nerve conduction study reveals only sensory nerve involvement with more significant findings on the right side, mild left ulnar sensory neuropathy at the wrist  Bilateral radial sensory axonal neuropathy  Needle examination suggests chronic inactive bilateral C8-T1 radiculopathy    I have personally reviewed the above images and labs.         IMPRESSION AND RECOMMENDATIONS:  Ms. Krystal Parra is a 84 year old female right hand dominant with persistent bilateral carpal tunnel syndrome status post bilateral carpal tunnel releases and right thumb CMC OA    I have  recommended an ultrasound of bilateral wrists to evaluate the caliber of the median nerve to determine if there is any residual compression.    We discussed the diagnosis, prognosis, and treatment options of right thumb CMC OA.  Patient just had steroid injection 1/8/2025 and is not due for another one yet.  I have recommended hand therapy for strengthening exercises as well as fabrication of a custom thermoplastic splint.    The patient was measured for bilateral comfort cool splints as well.    All questions answered.  The patient and her daughter-in-law voiced understanding and agreement.  Follow-up with me after ultrasound to discuss results and further treatment.  Can consider injections versus revision surgery.    Alex Gaspar MD    Hand, Upper Extremity & Microvascular Surgery  Department of Orthopaedic Surgery  [facility]

## 2025-02-04 NOTE — PATIENT INSTRUCTIONS
Thank you for choosing [facility]    Dr. Gaspar Locations:    [facility] - [address], [address]  [address] [address]   Appointments: [phone] Appointments: [phone]   Fax: [phone] Fax: [phone]   Comfort Cool size Medium +      For scheduling in the [facility] ([facility]) call [phone]  or   [phone]          Hand Therapy orders have been placed with [facility] (formally [facility])  You can call [phone] to schedule at your convenience.     Please call [phone] to schedule your follow up appointment.

## 2025-02-04 NOTE — LETTER
2/4/2025      Krystal Parra  [address]  [address]  [place] [address]      Dear Colleague,    Thank you for referring your patient, Krystal Parra, to the [facility] [place]. Please see a copy of my visit note below.    Orthopaedic Surgery Hand and Upper Extremity Clinic H&P Note:  Date: Feb 4, 2025     Patient Name: Krystal Parra  MRN: [id number]    Consult requested by: Albert Yeo    CHIEF COMPLAINT: bilateral carpal tunnel syndrome, right thumb CMC OA    Dominant Hand: right  Occupation: retired, worked as a  at [employer]      HPI:  Ms. Krystal Parra is a 84 year old  female right hand dominant who presents with bilateral wrist pain (R>L). Patient has a history of bilateral carpal tunnel surgery. Right end of 2021, Left early 2022 by Dr. Cornad. She notes that her right hand did not improve much at all after the surgery. She notes numbness, pain, tingling predominantly in the index finger but also in the middle finger.  Small finger is not affected.  She has difficulty falling asleep, as the pain and discomfort is worse before bedtime. She reports that warm water provides significant relief from the pain (washing hands under warm water).  She denies weakness in the hand.    Patient received right thumb CMC joint corticosteroid injection on 1/8/25 that has not provided relief.     Patient had EMG completed at the [facility] on 9/25/24.     Patient is seen today with her daughter-in-law, Ngoc RODRIGUEZ  Diabetes: no  Thyroid Problems: no  Smoking Y/N; No      PAST MEDICAL HISTORY:  Past Medical History:   Diagnosis Date     Basal cell carcinoma      Bone spur 4th toe Right      Lumbar disc herniation      Moderate aortic regurgitation 06/18/2016     Osteopenia      Pathologic fracture of distal radius and ulna     right in 2003, left 1992       PAST SURGICAL HISTORY:  Past Surgical History:   Procedure Laterality Date     CARPAL TUNNEL RELEASE  RT/LT Right 10/20/2021    Right carpal tunnel release under local anesthetic, Dr. Linus Conrad, [facility]     CARPAL TUNNEL RELEASE RT/LT Left 03/02/2022    Left carpal tunnel release under local anesthesia, Dr. Linus Conrad, [facility].     cataract  01/01/2011    bilateral     HC TOOTH EXTRACTION W/FORCEP       ZZC LIGATE FALLOPIAN TUBE,POSTPARTUM      Tubal Ligation       MEDICATIONS:  Current Outpatient Medications   Medication Sig Dispense Refill     aspirin EC 81 MG EC tablet Take 1 tablet (81 mg) by mouth daily       atorvastatin (LIPITOR) 20 MG tablet Take 1 tablet (20 mg) by mouth daily. 90 tablet 11     Cholecalciferol (VITAMIN D) 2000 UNIT CAPS Take 1 tablet by mouth daily.       coenzyme Q10 (CO-Q10) 30 MG capsule Take 1 capsule (30 mg) by mouth daily 30 capsule 0     fish oil-omega-3 fatty acids 1000 MG capsule Take 2 g by mouth 2 times daily        Ginkgo Biloba (GINKOBA PO)        ketamine HCl POWD 2-3 Pump 4 times daily. Ketamine 10% Lidocaine 5% apply 2-3 pumpts (1.1.5gms) to affected area up to 4 times daily as needed. 120 g 3     losartan (COZAAR) 50 MG tablet Take 1 tablet (50 mg) by mouth daily. 30 tablet 1     metoprolol succinate ER (TOPROL XL) 50 MG 24 hr tablet Take 1 tablet (50 mg) by mouth daily. 90 tablet 3     nitroGLYcerin (NITROSTAT) 0.4 MG sublingual tablet Place 1 tablet (0.4 mg) under the tongue every 5 minutes as needed for chest pain 20 tablet 1     OVER-THE-COUNTER Take 1 tablet by mouth daily (Melaleuca vitamins)       traZODone (DESYREL) 50 MG tablet Take 1-2 tablets ( mg) by mouth at bedtime. 180 tablet 3     No current facility-administered medications for this visit.       ALLERGIES:     Allergies   Allergen Reactions     Sulfa Antibiotics        FAMILY HISTORY:  No pertinent family history    SOCIAL HISTORY:  Social History     Tobacco Use     Smoking status: Never     Passive exposure: Yes     Smokeless tobacco: Never     Tobacco comments:       smoked in house   Vaping Use     Vaping status: Never Used   Substance Use Topics     Alcohol use: No     Drug use: No       The patient's past medical, family, and social history was reviewed and confirmed.    REVIEW OF SYMPTOMS:      General: Negative   Eyes: Negative   Ear, Nose and Throat: Negative   Respiratory: Negative   Cardiovascular: Negative   Gastrointestinal: Negative   Genito-urinary: Negative   Musculoskeletal: see HPI  Neurological: Negative   Psychological: Negative  HEME: Negative   ENDO: Negative   SKIN: Negative    VITALS:  There were no vitals filed for this visit.    EXAM:  General: NAD, A&Ox3  HEENT: NC/AT  CV: RRR by peripheral pulse  Pulmonary: Non-labored breathing on RA   2 pt 7mm median and ulnar right, 5mm median and ulnar left  +tinel's on right, mild on left  Negative durkan's compression test bilaterally  Tingling right median nerve to light touch, intact ulnar; intact med/ulnar left  BUE:  Skin intact, advanced thenar atrophy on the right, mild on the left  Diminished sensation to light touch in the right index finger, intact ulnar and radial  Sensation intact to light touch median, radial, ulnar nerve issues on the left  2 point discrimination 7 mm median and ulnar on the right, 5 mm median and ulnar on the left  Positive Tinel's and Durkan's compression on the right, mildly positive on the left  Tenderness palpation at the thumb CMC joint on the right  Intact EPL, FPL, hand intrinsics  Warm well-perfused, capillary refill less than 2 seconds       IMAGING:    X-rays of bilateral hands demonstrates advanced thumb CMC OA    EMG/NCS 9/25/2024  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  Nerve conduction study reveals only sensory nerve involvement with more significant findings on the right side, mild left ulnar sensory neuropathy at the wrist  Bilateral radial sensory axonal neuropathy  Needle examination suggests chronic inactive bilateral C8-T1 radiculopathy    I  have personally reviewed the above images and labs.         IMPRESSION AND RECOMMENDATIONS:  Ms. Krystal Parra is a 84 year old female right hand dominant with persistent bilateral carpal tunnel syndrome status post bilateral carpal tunnel releases and right thumb CMC OA    I have recommended an ultrasound of bilateral wrists to evaluate the caliber of the median nerve to determine if there is any residual compression.    We discussed the diagnosis, prognosis, and treatment options of right thumb CMC OA.  Patient just had steroid injection 1/8/2025 and is not due for another one yet.  I have recommended hand therapy for strengthening exercises as well as fabrication of a custom thermoplastic splint.    The patient was measured for bilateral comfort cool splints as well.    All questions answered.  The patient and her daughter-in-law voiced understanding and agreement.  Follow-up with me after ultrasound to discuss results and further treatment.  Can consider injections versus revision surgery.    Alex Gaspar MD    Hand, Upper Extremity & Microvascular Surgery  Department of Orthopaedic Surgery  [facility]          Again, thank you for allowing me to participate in the care of your patient.        Sincerely,        Alex Gaspar MD    Electronically signed

## 2025-02-06 ENCOUNTER — OFFICE VISIT (OUTPATIENT)
Dept: PEDIATRICS | Facility: CLINIC | Age: 85
End: 2025-02-06
Payer: COMMERCIAL

## 2025-02-06 VITALS
SYSTOLIC BLOOD PRESSURE: 142 MMHG | TEMPERATURE: 97 F | BODY MASS INDEX: 31.77 KG/M2 | OXYGEN SATURATION: 97 % | HEIGHT: 60 IN | WEIGHT: 161.8 LBS | DIASTOLIC BLOOD PRESSURE: 60 MMHG | HEART RATE: 72 BPM | RESPIRATION RATE: 16 BRPM

## 2025-02-06 DIAGNOSIS — I10 BENIGN ESSENTIAL HYPERTENSION: ICD-10-CM

## 2025-02-06 RX ORDER — LOSARTAN POTASSIUM 50 MG/1
75 TABLET ORAL DAILY
Qty: 45 TABLET | Refills: 5 | Status: SHIPPED | OUTPATIENT
Start: 2025-02-06

## 2025-02-06 ASSESSMENT — PAIN SCALES - GENERAL: PAINLEVEL_OUTOF10: NO PAIN (0)

## 2025-02-06 NOTE — PATIENT INSTRUCTIONS
Increase losartan dosing to 75 mg (1.5 tablets) daily.   Keep the Toprol XL dosing the same.    Plan labs at next visit

## 2025-02-06 NOTE — PROGRESS NOTES
Assessment & Plan       ICD-10-CM    1. Benign essential hypertension  I10 losartan (COZAAR) 50 MG tablet    Still not optimally controlled - plan to increase dosing to 75mg daily and will recheck at preop in 3 weeks          The longitudinal plan of care for the diagnosis(es)/condition(s) as documented were addressed during this visit. Due to the added complexity in care, I will continue to support Krystal in the subsequent management and with ongoing continuity of care.         BMI  Estimated body mass index is 31.6 kg/m  as calculated from the following:    Height as of this encounter: 1.524 m (5').    Weight as of this encounter: 73.4 kg (161 lb 12.8 oz).   Weight management plan: Discussed healthy diet and exercise guidelines          Subjective   Krystal is a 84 year old, presenting for the following health issues:  Follow Up        2/6/2025     3:47 PM   Additional Questions   Roomed by Sheri Elias   Accompanied by Ngoc - daughter in law     History of Present Illness       Hypertension: She presents for follow up of hypertension.  She does check blood pressure  regularly outside of the clinic. Outside blood pressures have been over 140/90. She follows a low salt diet.     She eats 2-3 servings of fruits and vegetables daily.She consumes 0 sweetened beverage(s) daily.She exercises with enough effort to increase her heart rate 9 or less minutes per day.  She exercises with enough effort to increase her heart rate 3 or less days per week.   She is taking medications regularly.       Tolerating BP med changes well.  Has seen BPs at home drop - now ranging from 130-150's/60-70's at home.  No side effects from medications.  No new cardiac symptoms.    Planning knee replacement surgery this spring and has preop next month    Increased pain in back and knee          Objective    BP (!) 142/60 (BP Location: Right arm, Patient Position: Sitting, Cuff Size: Adult Regular)   Pulse 72   Temp 97  F (36.1  C) (Temporal)    Resp 16   Ht 1.524 m (5')   Wt 73.4 kg (161 lb 12.8 oz)   LMP  (LMP Unknown)   SpO2 97%   BMI 31.60 kg/m    Body mass index is 31.6 kg/m .  Physical Exam   GENERAL: alert and no distress  CV: regular rate and rhythm, normal S1 S2, no S3 or S4, no murmur, click or rub, no peripheral edema   PSYCH: mentation appears normal, affect normal/bright    Labs reviewed at last visit after starting losartan        Signed Electronically by: Tasha Winkler MD

## 2025-02-20 NOTE — PROGRESS NOTES
OCCUPATIONAL THERAPY EVALUATION  Type of Visit: Evaluation     Fall Risk Screen:  Fall screen completed by: OT  Have you fallen 2 or more times in the past year?: No  Have you fallen and had an injury in the past year?: No  Is patient a fall risk?: No    Subjective      Condition type:  Chronic (continuous duration <3 months)  Cause of current episode:  Repetitive     Nature of treatment:  Rehabilitative  Functional ability:  Moderate functional limitations  Documented POC (choose all that apply):  Measurable short and long term/discharge treatment goals related to physical and functional deficits.;Frequency of treatment visits and treatment activities to address deficit areas.;Patient agrees to program participation including home program  Briefly describe symptoms:  Pain, stiffness  How did the symptoms start:  Overuse  Average pain/intensity last 24 hours:  5/10  Average pain/intensity past week:  5/10  Frequency of Symptoms:  Frequently (51-75% of the time)  Symptom impact on ADLs:  Moderately  Condition change since eval:  N/A (first visit)  General health reported by patient:  Very good     Presenting condition or subjective complaint: hand pain  Date of onset: 02/04/25 (MD order)    Relevant medical history: Arthritis   Dates & types of surgery:      Prior diagnostic imaging/testing results: EMG     Prior therapy history for the same diagnosis, illness or injury: No      Living Environment  Social support: Alone   Type of home: Apartment/condo   Stairs to enter the home: No       Ramp: No   Stairs inside the home: No       Help at home: None  Equipment owned:       Employment: No    Hobbies/Interests: reading  Epic! studies    Patient goals for therapy: less pain    Pain assessment: See objective evaluation for additional pain details     Objective   Right hand dominant  Patient reports symptoms of pain, stiffness/loss of motion, weakness/loss of strength, edema, numbness, and tingling     Pain Level (Scale  0-10)   2/27/2025   At Rest R: 2-5/10  L: 2-4/10   With Use R: 6/10  L: 3-5/10     Pain Description  Date 2/27/2025   Location B CMC, volar wrist   Pain Quality Just pain   Frequency intermittent, constant, or daily     Pain is worst  daytime with use or nighttime   Exacerbated by  Sleeping position, lifting, pinching   Relieved by Rest, splints   Progression unchanged     ROM  Thumb 2/27/2025 2/27/2025   AROM  (PROM) Left Right   MP +/45 0/50   IP 0/65 0/50   RABD 40 50   PABD 45 47     Thumb Observation/Appearance   - none  + mild    ++ moderate    +++ severe     2/27/2025   Shoulder deformity present over CMC R: -  L: -   Edema over the CMC joint R: -  L: -   Noted collapse of MP into hyperextension during pinch R: +  L: +      Provocative Tests  Pain Report:  - none    + mild    ++ moderate    +++ severe     2/27/2025   AP Joint Laxity of Trapezium R: -  L: -   Crepitus present R: -  L: -       Strength   (Measured in pounds)  Pain Report: - none  + mild    ++ moderate    +++ severe    2/27/2025 2/27/2025   Trials Left Right   1  2  3 44 36   Average 44 36     Lat Pinch 2/27/2025 2/27/2025   Trials Left Right   1  2  3 12- 13-   Average 12 13     3 Pt Pinch 2/27/2025 2/27/2025   Trials Left Right   1  2  3 15+ 10+   Average 15 10     Palpation   Pain Report:  - none    + mild    ++ moderate    +++ severe    2/27/2025   CMC Joint Line R: -  L: +   Thenar Eminence R: +  L: -   Web Space R: ++  L: + and tight   1st DC R: -  L: -   Radial Styloid R: -  L: -   FCR R: -  L: -     Assessment & Plan   CLINICAL IMPRESSIONS  Medical Diagnosis: R CMC OA    Treatment Diagnosis: R CMC OA    Impression/Assessment: Pt is a 85 year old female presenting to Occupational Therapy due to Bilateral thumb pain.  The following significant findings have been identified: Impaired ROM, Impaired strength, Pain, and instability .  These identified deficits interfere with their ability to perform self care tasks, recreational  activities, household chores, and meal planning and preparation as compared to previous level of function.     Clinical Decision Making (Complexity):  Assessment of Occupational Performance: 5 or more Performance Deficits  Occupational Performance Limitations: dressing, hygiene and grooming, home establishment and management, meal preparation and cleanup, sleep, and leisure activities  Clinical Decision Making (Complexity): Low complexity    PLAN OF CARE  Treatment Interventions:  Modalities:  Paraffin  Therapeutic Exercise:  AROM, Isometrics, and Stabilization  Neuromuscular re-education:  Kinesiotaping, Isometrics, and Stabilization  Manual Techniques:  Joint mobilization and Myofascial release  Orthotic Fabrication:  Hand based  Self Care:  Self Care Tasks    Long Term Goals   OT Goal 1  Goal Identifier: Household Chores  Goal Description: Patient will be able to open a tight or new jar without difficulty, using AE as needed  Rationale: In order to maximize safety and independence with ADL/IADLs  Target Date: 04/27/25      Frequency of Treatment: 2 x Month  Duration of Treatment: 2 Months     Education Assessment: Learner/Method: Family;Patient  Education Comments: Daughter in Law Ngoc Present for Evaluation     Risks and benefits of evaluation/treatment have been explained.   Patient/Family/caregiver agrees with Plan of Care.     Evaluation Time:    OT Eval, Low Complexity Minutes (95491): 25  Signing Clinician: Mariana Mathew OT        [facility]                                                                                   OUTPATIENT OCCUPATIONAL THERAPY      PLAN OF TREATMENT FOR OUTPATIENT REHABILITATION   Patient's Last Name, First Name, Krystal Yoon YOB: 1940   Provider's Name   [facility]   Medical Record No.  [id number]     Onset Date: 02/04/25 (MD order) Start of Care Date: 02/27/25     Medical Diagnosis:  R  CMC OA      OT Treatment Diagnosis:  R CMC OA Plan of Treatment  Frequency/Duration:2 x Month/2 Months    Certification date from 02/27/25   To 04/27/25        See note for plan of treatment details and functional goals     Mariana Mathew, OT                         I CERTIFY THE NEED FOR THESE SERVICES FURNISHED UNDER        THIS PLAN OF TREATMENT AND WHILE UNDER MY CARE     (Physician attestation of this document indicates review and certification of the therapy plan).              Referring Provider:  Alex Gaspar    Initial Assessment  See Epic Evaluation- 02/27/25

## 2025-02-21 ENCOUNTER — ANESTHESIA EVENT (OUTPATIENT)
Dept: SURGERY | Facility: CLINIC | Age: 85
End: 2025-02-21
Payer: COMMERCIAL

## 2025-02-21 ENCOUNTER — PRE VISIT (OUTPATIENT)
Dept: SURGERY | Facility: CLINIC | Age: 85
End: 2025-02-21

## 2025-02-24 ENCOUNTER — OFFICE VISIT (OUTPATIENT)
Dept: PEDIATRICS | Facility: CLINIC | Age: 85
End: 2025-02-24
Payer: COMMERCIAL

## 2025-02-24 VITALS
TEMPERATURE: 98.7 F | RESPIRATION RATE: 18 BRPM | SYSTOLIC BLOOD PRESSURE: 138 MMHG | OXYGEN SATURATION: 97 % | BODY MASS INDEX: 31.79 KG/M2 | HEIGHT: 60 IN | DIASTOLIC BLOOD PRESSURE: 62 MMHG | WEIGHT: 161.9 LBS

## 2025-02-24 DIAGNOSIS — M51.26 DISPLACEMENT OF LUMBAR INTERVERTEBRAL DISC WITHOUT MYELOPATHY: ICD-10-CM

## 2025-02-24 DIAGNOSIS — I35.8 AORTIC VALVE SCLEROSIS: ICD-10-CM

## 2025-02-24 DIAGNOSIS — I35.1 MODERATE AORTIC REGURGITATION: ICD-10-CM

## 2025-02-24 DIAGNOSIS — M17.12 PRIMARY OSTEOARTHRITIS OF LEFT KNEE: ICD-10-CM

## 2025-02-24 DIAGNOSIS — Z86.73 HISTORY OF TIA (TRANSIENT ISCHEMIC ATTACK) AND STROKE: ICD-10-CM

## 2025-02-24 DIAGNOSIS — I10 BENIGN ESSENTIAL HYPERTENSION: ICD-10-CM

## 2025-02-24 DIAGNOSIS — I25.10 CORONARY ARTERY DISEASE INVOLVING NATIVE CORONARY ARTERY OF NATIVE HEART WITHOUT ANGINA PECTORIS: ICD-10-CM

## 2025-02-24 DIAGNOSIS — Z01.818 PREOP GENERAL PHYSICAL EXAM: Primary | ICD-10-CM

## 2025-02-24 LAB
ERYTHROCYTE [DISTWIDTH] IN BLOOD BY AUTOMATED COUNT: 13.5 % (ref 10–15)
HCT VFR BLD AUTO: 34 % (ref 35–47)
HGB BLD-MCNC: 11.5 G/DL (ref 11.7–15.7)
MCH RBC QN AUTO: 30.4 PG (ref 26.5–33)
MCHC RBC AUTO-ENTMCNC: 33.8 G/DL (ref 31.5–36.5)
MCV RBC AUTO: 90 FL (ref 78–100)
PLATELET # BLD AUTO: 214 10E3/UL (ref 150–450)
RBC # BLD AUTO: 3.78 10E6/UL (ref 3.8–5.2)
WBC # BLD AUTO: 7 10E3/UL (ref 4–11)

## 2025-02-24 PROCEDURE — 36415 COLL VENOUS BLD VENIPUNCTURE: CPT | Performed by: PEDIATRICS

## 2025-02-24 PROCEDURE — 93000 ELECTROCARDIOGRAM COMPLETE: CPT | Performed by: PEDIATRICS

## 2025-02-24 PROCEDURE — 80048 BASIC METABOLIC PNL TOTAL CA: CPT | Performed by: PEDIATRICS

## 2025-02-24 PROCEDURE — 99214 OFFICE O/P EST MOD 30 MIN: CPT | Performed by: PEDIATRICS

## 2025-02-24 PROCEDURE — 85027 COMPLETE CBC AUTOMATED: CPT | Performed by: PEDIATRICS

## 2025-02-24 RX ORDER — LOSARTAN POTASSIUM 100 MG/1
100 TABLET ORAL DAILY
Qty: 90 TABLET | Refills: 3 | Status: SHIPPED | OUTPATIENT
Start: 2025-02-24

## 2025-02-24 NOTE — PROGRESS NOTES
Preoperative Evaluation  [facility] [place]  [address]  [address]  [place] [address]  Phone: [phone]  Fax: [phone]  Primary Provider: Tasha Winkler MD  Pre-op Performing Provider: Tasha Winkler MD  Feb 24, 2025 2/24/2025   Surgical Information   What procedure is being done? left knee   Facility or Hospital where procedure/surgery will be performed: [facility]   Who is doing the procedure / surgery? dr head   Date of surgery / procedure: 3 18 25   Time of surgery / procedure: 9am   Where do you plan to recover after surgery? at home with family     Fax number for surgical facility: Note does not need to be faxed, will be available electronically in Epic.    Assessment & Plan     The proposed surgical procedure is considered INTERMEDIATE risk.      ICD-10-CM    1. Preop general physical exam  Z01.818 Basic metabolic panel  (Ca, Cl, CO2, Creat, Gluc, K, Na, BUN)     CBC with platelets     Basic metabolic panel  (Ca, Cl, CO2, Creat, Gluc, K, Na, BUN)     CBC with platelets     EKG 12-lead complete w/read - Clinics      2. Primary osteoarthritis of left knee  M17.12       3. Benign essential hypertension  I10 Basic metabolic panel  (Ca, Cl, CO2, Creat, Gluc, K, Na, BUN)     CBC with platelets     losartan (COZAAR) 100 MG tablet     Basic metabolic panel  (Ca, Cl, CO2, Creat, Gluc, K, Na, BUN)     CBC with platelets    BP control has been improving and is adequate for surgery.   Increased losartan dose slightly today.        4. Moderate aortic regurgitation  I35.1 asymptomatic      5. Aortic valve sclerosis  I35.8       6. Lumbar disc herniation  M51.26 Sensitive to positioning, will need special care taken to her position in surgery and periop      7. History of TIA (transient ischemic attack) and stroke  Z86.73 On stable medication regimen           8. Coronary artery disease involving native coronary artery of native heart without  angina pectoris  I25.10 No anginal symptoms, stable on current medications                 Risks and Recommendations  The patient has the following additional risks and recommendations for perioperative complications:   - No identified additional risk factors other than previously addressed    Antiplatelet or Anticoagulation Medication Instructions   - aspirin: Discontinue aspirin 7 days prior to procedure to reduce bleeding risk. It should be resumed postoperatively.     Additional Medication Instructions   - ACE/ARB/ARNI (lisinopril, enalapril, losartan, valsartan, olmesartan, sacubritril/valsartan) : DO NOT TAKE on day of surgery (minimum 11 hours for general anesthesia).   - Beta Blockers (atenolol, metoprolol, propranolol) : Continue taking on the day of surgery.   - Statins (atorvastatin, simvastatin, pravastatin) : Continue taking on the day of surgery.     Recommendation  Approval given to proceed with proposed procedure, without further diagnostic evaluation.    Addy Rice is a 85 year old, presenting for the following:  Pre-Op Exam          2/24/2025     4:08 PM   Additional Questions   Roomed by miss   Accompanied by self         2/24/2025     4:08 PM   Patient Reported Additional Medications   Patient reports taking the following new medications no     HPI related to upcoming procedure: severe knee osteoarthritis failed conservative measures requiring total knee replacement.        2/24/2025   Pre-Op Questionnaire   Have you ever had a heart attack or stroke? No   Have you ever had surgery on your heart or blood vessels, such as a stent placement, a coronary artery bypass, or surgery on an artery in your head, neck, heart, or legs? No   Do you have chest pain with activity? No   Do you have a history of heart failure? No   Do you currently have a cold, bronchitis or symptoms of other infection? No   Do you have a cough, shortness of breath, or wheezing? No   Do you or anyone in your family have  previous history of blood clots? No   Do you or does anyone in your family have a serious bleeding problem such as prolonged bleeding following surgeries or cuts? No   Have you ever had problems with anemia or been told to take iron pills? No   Have you had any abnormal blood loss such as black, tarry or bloody stools, or abnormal vaginal bleeding? No   Have you ever had a blood transfusion? No   Are you willing to have a blood transfusion if it is medically needed before, during, or after your surgery? Yes   Have you or any of your relatives ever had problems with anesthesia? No   Do you have sleep apnea, excessive snoring or daytime drowsiness? (!) YES - has some daytime drowsiness - likes to nap.  No concern for JARON   Do you have a CPAP machine? (!) NO    Do you have any artifical heart valves or other implanted medical devices like a pacemaker, defibrillator, or continuous glucose monitor? No   Do you have artificial joints? No   Are you allergic to latex? No   \    Preoperative Review of    reviewed - no record of controlled substances prescribed.      Status of Chronic Conditions:  See problem list for active medical problems.  Problems all longstanding and stable, except as noted/documented.  See ROS for pertinent symptoms related to these conditions.    Patient Active Problem List    Diagnosis Date Noted    Benign essential hypertension 06/18/2016     Priority: High    Moderate aortic regurgitation - 06/18/2016     Priority: High    TIA (transient ischemic attack) 06/07/2016     Priority: High    Coronary artery disease involving native coronary artery of native heart with angina pectoris 09/18/2023     Priority: Medium    Dupuytren's contracture of both hands 01/04/2017     Priority: Medium    Hyperlipidemia LDL goal <100 06/18/2016     Priority: Medium    Subclinical hypothyroidism 11/07/2012     Priority: Medium    Lumbar disc herniation 07/09/2003     Priority: Medium    Disorder of bone and  cartilage 07/09/2003     Priority: Medium     Physical therapy   Epidural steroid injection  Problem list name updated by automated process. Provider to review      Thyroid nodule - 09/05/2017     Priority: Low      Past Medical History:   Diagnosis Date    Basal cell carcinoma     Bone spur 4th toe Right     Hypertension     Lumbar disc herniation     Moderate aortic regurgitation 06/18/2016    Osteopenia     Pathologic fracture of distal radius and ulna     right in 2003, left 1992     Past Surgical History:   Procedure Laterality Date    CARPAL TUNNEL RELEASE RT/LT Right 10/20/2021    Right carpal tunnel release under local anesthetic, Dr. Linus Conrad, [facility]    CARPAL TUNNEL RELEASE RT/LT Left 03/02/2022    Left carpal tunnel release under local anesthesia, Dr. Linus Conrad, [facility].    cataract  01/01/2011    bilateral    HC TOOTH EXTRACTION W/FORCEP      ZZC LIGATE FALLOPIAN TUBE,POSTPARTUM      Tubal Ligation     Current Outpatient Medications   Medication Sig Dispense Refill    aspirin EC 81 MG EC tablet Take 1 tablet (81 mg) by mouth daily      atorvastatin (LIPITOR) 20 MG tablet Take 1 tablet (20 mg) by mouth daily. 90 tablet 11    Cholecalciferol (VITAMIN D) 2000 UNIT CAPS Take 1 tablet by mouth daily.      coenzyme Q10 (CO-Q10) 30 MG capsule Take 1 capsule (30 mg) by mouth daily 30 capsule 0    fish oil-omega-3 fatty acids 1000 MG capsule Take 2 g by mouth 2 times daily       Ginkgo Biloba (GINKOBA PO)       ketamine HCl POWD 2-3 Pump 4 times daily. Ketamine 10% Lidocaine 5% apply 2-3 pumpts (1.1.5gms) to affected area up to 4 times daily as needed. 120 g 3    losartan (COZAAR) 100 MG tablet Take 1 tablet (100 mg) by mouth daily. 90 tablet 3    metoprolol succinate ER (TOPROL XL) 50 MG 24 hr tablet Take 1 tablet (50 mg) by mouth daily. 90 tablet 3    OVER-THE-COUNTER Take 1 tablet by mouth daily (Melaleuca vitamins)      traZODone (DESYREL) 50 MG tablet Take 1-2 tablets ( mg) by  mouth at bedtime. 180 tablet 3       Allergies   Allergen Reactions    Sulfa Antibiotics         Social History     Tobacco Use    Smoking status: Never     Passive exposure: Yes    Smokeless tobacco: Never    Tobacco comments:      smoked in house   Substance Use Topics    Alcohol use: No       History   Drug Use No         \           ROS: 10 point ROS neg other than the symptoms noted above in the HPI.      Objective    /62   Temp 98.7  F (37.1  C)   Resp 18   Ht 1.524 m (5')   Wt 73.4 kg (161 lb 14.4 oz)   LMP  (LMP Unknown)   SpO2 97%   BMI 31.62 kg/m     Estimated body mass index is 31.62 kg/m  as calculated from the following:    Height as of this encounter: 1.524 m (5').    Weight as of this encounter: 73.4 kg (161 lb 14.4 oz).  Physical Exam  GENERAL: alert and no distress  EYES: Eyes grossly normal to inspection, PERRL and conjunctivae and sclerae normal  HENT: ear canals and TM's normal, nose and mouth without ulcers or lesions  NECK: no adenopathy, no asymmetry, masses, or scars  RESP: lungs clear to auscultation - no rales, rhonchi or wheezes  CV: regular rates and rhythm, normal S1 S2, no S3 or S4, grade 2/6 pruvi murmur heard best over the lusb, peripheral pulses strong, and no peripheral edema  ABDOMEN: soft, nontender, no hepatosplenomegaly, no masses and bowel sounds normal  MS: no gross musculoskeletal defects noted, no edema  SKIN: no suspicious lesions or rashes  NEURO: sensory exam grossly normal, mentation intact, antalgic gait, walks with aid of boothe  PSYCH: mentation appears normal, affect normal/bright    Recent Labs   Lab Test 12/31/24  1346 09/23/24  1438    141   POTASSIUM 4.6 5.0   CR 0.72 0.73        Diagnostics  Labs pending at this time.  Results will be reviewed when available.   EKG: appears normal, NSR, normal axis, normal intervals, no acute ST/T changes c/w ischemia, no LVH by voltage criteria    Revised Cardiac Risk Index (RCRI)  The patient has the  following serious cardiovascular risks for perioperative complications:   - Cerebrovascular Disease (TIA or CVA) = 1 point     RCRI Interpretation: 1 point: Class II (low risk - 0.9% complication rate)         Signed Electronically by: Tasha Winkler MD  A copy of this evaluation report is provided to the requesting physician.

## 2025-02-24 NOTE — PROVIDER NOTIFICATION
02/24/25 1202   Discharge Planning   Patient/Family Anticipates Transition to other (comment)  (Because of patients back problems she [facility]ates [facility]. If she discharges home vs. TCU she does have her daughter in [place] available to stay with her 2+ nights)   Comment, Barriers to Discharge patient states she has severe back pain. She is worried she may need to discharge to TCU vs. back to her independent living senior complex   Concerns to be Addressed denies needs/concerns at this time   Living Arrangements   People in Home alone   Type of Residence Independent Senior Housing   Is your private residence a single family home or apartment? Apartment  (Independent Senior Living)   Number of Stairs, Within Home, Primary other (see comments)  (elevator)   Once home, are you able to live on one level? Yes   Which level?   (n/a apartment)   Bathroom Shower/Tub Tub/Shower unit   Equipment Currently Used at Home walker, rolling   Support System   Support Systems Children   Do you have someone available to stay with you one or two nights once you are home? Yes  (Daughter in [place])   Medical Clearance   Date of Physical 02/24/25   [facility]   It is recommended that you call and check with any specialty providers before surgery to see if you need surgical clearance.  Do you see any specialty providers outside of your primary care provider? No   Blood   Known Bleeding Disorder or Coagulopathy No   Does the patient have any Yazidi/cultural preferences related to blood products? No   What are your blood product preferences? patient accepts blood in an emergency

## 2025-02-24 NOTE — PATIENT INSTRUCTIONS
Increase your losartan to 100mg - new prescription sent    We usually have people stop their aspirin for 7 days prior to surgery, but if your surgeon says it is ok, please continue it.    Hold your losartan the morning of surgery.    OK to take your atorvastatin the night before surgery.    Please take your metoprolol the way that you typically take it - ok for morning of surgery    EKG and labs today    Let me know when you are home from surgery/TCU!

## 2025-02-25 LAB
ANION GAP SERPL CALCULATED.3IONS-SCNC: 11 MMOL/L (ref 7–15)
BUN SERPL-MCNC: 28.3 MG/DL (ref 8–23)
CALCIUM SERPL-MCNC: 9.6 MG/DL (ref 8.8–10.4)
CHLORIDE SERPL-SCNC: 103 MMOL/L (ref 98–107)
CREAT SERPL-MCNC: 0.72 MG/DL (ref 0.51–0.95)
EGFRCR SERPLBLD CKD-EPI 2021: 80 ML/MIN/1.73M2
GLUCOSE SERPL-MCNC: 100 MG/DL (ref 70–99)
HCO3 SERPL-SCNC: 25 MMOL/L (ref 22–29)
POTASSIUM SERPL-SCNC: 4.3 MMOL/L (ref 3.4–5.3)
SODIUM SERPL-SCNC: 139 MMOL/L (ref 135–145)

## 2025-02-27 ENCOUNTER — TELEPHONE (OUTPATIENT)
Dept: NEUROSURGERY | Facility: CLINIC | Age: 85
End: 2025-02-27

## 2025-02-27 ENCOUNTER — THERAPY VISIT (OUTPATIENT)
Dept: OCCUPATIONAL THERAPY | Facility: CLINIC | Age: 85
End: 2025-02-27
Attending: STUDENT IN AN ORGANIZED HEALTH CARE EDUCATION/TRAINING PROGRAM
Payer: COMMERCIAL

## 2025-02-27 DIAGNOSIS — M79.645 BILATERAL THUMB PAIN: Primary | ICD-10-CM

## 2025-02-27 DIAGNOSIS — M79.644 BILATERAL THUMB PAIN: Primary | ICD-10-CM

## 2025-02-27 DIAGNOSIS — M18.0 OSTEOARTHRITIS OF CARPOMETACARPAL (CMC) JOINT OF BOTH THUMBS: ICD-10-CM

## 2025-02-27 NOTE — TELEPHONE ENCOUNTER
Attempted to reach patient to remind them about appointment scheduled with OZIEL Chambers CNP on 2/28/25 in our [place] clinic.    A voicemail was left with a call back number if the patient has questions or would like to reschedule.

## 2025-03-03 ENCOUNTER — TELEPHONE (OUTPATIENT)
Dept: ORTHOPEDICS | Facility: CLINIC | Age: 85
End: 2025-03-03
Payer: COMMERCIAL

## 2025-03-03 DIAGNOSIS — Z96.652 S/P TOTAL KNEE ARTHROPLASTY, LEFT: Primary | ICD-10-CM

## 2025-03-03 DIAGNOSIS — M17.12 OSTEOARTHRITIS OF LEFT KNEE: Primary | ICD-10-CM

## 2025-03-03 DIAGNOSIS — Z96.652 S/P TOTAL KNEE ARTHROPLASTY, LEFT: ICD-10-CM

## 2025-03-03 DIAGNOSIS — M17.12 OSTEOARTHRITIS OF LEFT KNEE: ICD-10-CM

## 2025-03-03 NOTE — TELEPHONE ENCOUNTER
Placed outpatient physical therapy referral since home care referral was declined.  This was previously discussed with patient.    Attempted to call patient 2x (tried both house and mobile), no answer during call.  VM left with callback instructions.    Jasmin Burnett RN on 3/3/2025 at 1:00 PM

## 2025-03-03 NOTE — PROGRESS NOTES
"Ortho Navigator Note      Pre-op Date 2/24/25     Medical Clearance  Cleared     Labs Stable      COVID Test Date No longer indicated      Skin  Intact      Activity: Ambulates independently with rolling walker. Lives in an independent senior apartment      Equipment Need Patient will likely need a walker for discharge. Defer to PT/OT for recs.       Meds to Hold Held all supplements 14 days prior to surgery  ASA-Hold for 7 days prior to surgery per PCP  Losartan-Hold am of surgery   * Medication recommendations are not intended to be exhaustive; they are limited to common medications that are potentially dangerous if incorrectly managed   NPO Instructions  Instructed to stop solids 8 hr prior to arrival and clears 2 hr prior to arrival.       Pre-op Joint Education Complete? Competed by phone due to technology challenges.    Discharge Plan Patient has plan to discharge home vs TCU when ,emdically stable.    Krystal has requested to go to a TCU following her TKA surgery on 3/18/25. She's 86y/o,  has chronic back pain and lives alone at [facility]. Unfortunately, they do not have a TCU at her facility. She understands the process and qualifications necessary to be accepted into a TCU. She has requested that [place] be on the list of possible locations in the event that she qualifies. He daughter in law is available to help her at her apartment if necessary although she is concerned about physically caring for her.   /Transportation [place] will be  and transportation.  is physically able to care for patient.      Barriers to Discharge No barriers to discharge.      Additional Info/   Special Needs : Patient stated that when she sleeps on her back her \"throat closes\". She is concerned about anesthesia. Note placed in anesthesia special needs box.           02/24/25 1202   Discharge Planning   Patient/Family Anticipates Transition to other (comment)  (Because " of patients back problems she anticipates discharging [facility]. If she discharges home vs. TCU she does have her daughter in [place] available to stay with her 2+ nights)   Comment, Barriers to Discharge patient states she has severe back pain. She is worried she may need to discharge to TCU vs. back to her independent living senior complex   Concerns to be Addressed denies needs/concerns at this time   Living Arrangements   People in Home alone   Type of Residence Independent Senior Housing   Is your private residence a single family home or apartment? Apartment  (Independent Senior Living)   Number of Stairs, Within Home, Primary other (see comments)  (elevator)   Once home, are you able to live on one level? Yes   Which level?   (n/a apartment)   Bathroom Shower/Tub Tub/Shower unit   Equipment Currently Used at Home walker, rolling   Support System   Support Systems Children   Do you have someone available to stay with you one or two nights once you are home? Yes  (Daughter in [place])   Medical Clearance   Date of Physical 02/24/25   [facility]   It is recommended that you call and check with any specialty providers before surgery to see if you need surgical clearance.  Do you see any specialty providers outside of your primary care provider? No   Blood   Known Bleeding Disorder or Coagulopathy No   Does the patient have any Buddhism/cultural preferences related to blood products? No   What are your blood product preferences? patient accepts blood in an emergency   Education   Has the patient scheduled or completed pre-op total joint education, either in class or online, in the last 12 months? Yes   Patient attended total joint pre-op class/received pre-op teaching  email/phone call   Falls Risk   Has the patient been identified as a high falls risk before surgery? (fallen in the last 6 months, history of falls, unsteady gait, or balance issues) No   Did an order get placed to attend  outpatient pre-surgery balance evaluation? No

## 2025-03-03 NOTE — TELEPHONE ENCOUNTER
Regina with [facility] can't accept referral for patient, any questions call Regina [phone]    Could we send this information to you in [place] or would you prefer to receive a phone call?:   Patient would prefer a phone call   Okay to leave a detailed message?: Yes at Other phone number:  [phone]

## 2025-03-04 ENCOUNTER — THERAPY VISIT (OUTPATIENT)
Dept: OCCUPATIONAL THERAPY | Facility: CLINIC | Age: 85
End: 2025-03-04
Payer: COMMERCIAL

## 2025-03-04 DIAGNOSIS — M18.0 OSTEOARTHRITIS OF CARPOMETACARPAL (CMC) JOINT OF BOTH THUMBS: Primary | ICD-10-CM

## 2025-03-04 DIAGNOSIS — M79.645 BILATERAL THUMB PAIN: ICD-10-CM

## 2025-03-04 DIAGNOSIS — M79.644 BILATERAL THUMB PAIN: ICD-10-CM

## 2025-03-04 PROCEDURE — 97763 ORTHC/PROSTC MGMT SBSQ ENC: CPT | Mod: GO

## 2025-03-17 NOTE — PHARMACY-ADMISSION MEDICATION HISTORY
Medication history and patient interview completed by pre-admitting nurse (Betzy Porras, RN). Reviewed by pharmacist. No further clarifications needed.    [facility]  ------------------------------------------------------    Prior to Admission medications    Medication Sig Last Dose Taking? Auth Provider Long Term End Date   aspirin EC 81 MG EC tablet Take 1 tablet (81 mg) by mouth daily  Yes Donny Monk MD     atorvastatin (LIPITOR) 20 MG tablet Take 1 tablet (20 mg) by mouth daily.  Yes Tasha Winkler MD Yes    Cholecalciferol (VITAMIN D) 2000 UNIT CAPS Take 1 tablet by mouth daily.  Yes Reported, Patient     coenzyme Q10 (CO-Q10) 30 MG capsule Take 1 capsule (30 mg) by mouth daily  Yes Tasha Winkler MD     fish oil-omega-3 fatty acids 1000 MG capsule Take 2 g by mouth 2 times daily   Yes Reported, Patient     Ginkgo Biloba (GINKOBA PO)   Yes Reported, Patient     ketamine HCl POWD 2-3 Pump 4 times daily. Ketamine 10% Lidocaine 5% apply 2-3 pumpts (1.1.5gms) to affected area up to 4 times daily as needed.  Yes Tatianna Nelson APRN CNP     metoprolol succinate ER (TOPROL XL) 50 MG 24 hr tablet Take 1 tablet (50 mg) by mouth daily.  Yes Tasha Winkler MD Yes    OVER-THE-COUNTER Take 1 tablet by mouth daily (Melaleuca vitamins)  Yes Unknown, Entered By History     traZODone (DESYREL) 50 MG tablet Take 1-2 tablets ( mg) by mouth at bedtime.  Yes Tasha Winkler MD Yes    acetaminophen (TYLENOL) 325 MG tablet Take 325-650 mg by mouth every 6 hours as needed for mild pain.   Reported, Patient     losartan (COZAAR) 100 MG tablet Take 1 tablet (100 mg) by mouth daily.   Tasha Winkler MD Yes

## 2025-03-18 ENCOUNTER — APPOINTMENT (OUTPATIENT)
Dept: GENERAL RADIOLOGY | Facility: CLINIC | Age: 85
End: 2025-03-18
Attending: STUDENT IN AN ORGANIZED HEALTH CARE EDUCATION/TRAINING PROGRAM
Payer: COMMERCIAL

## 2025-03-18 ENCOUNTER — HOSPITAL ENCOUNTER (OUTPATIENT)
Facility: CLINIC | Age: 85
Discharge: NURSING, REHAB OR LONG-TERM CARE | End: 2025-03-20
Attending: STUDENT IN AN ORGANIZED HEALTH CARE EDUCATION/TRAINING PROGRAM | Admitting: STUDENT IN AN ORGANIZED HEALTH CARE EDUCATION/TRAINING PROGRAM
Payer: COMMERCIAL

## 2025-03-18 ENCOUNTER — ANESTHESIA (OUTPATIENT)
Dept: SURGERY | Facility: CLINIC | Age: 85
End: 2025-03-18
Payer: COMMERCIAL

## 2025-03-18 ENCOUNTER — APPOINTMENT (OUTPATIENT)
Dept: PHYSICAL THERAPY | Facility: CLINIC | Age: 85
End: 2025-03-18
Attending: STUDENT IN AN ORGANIZED HEALTH CARE EDUCATION/TRAINING PROGRAM
Payer: COMMERCIAL

## 2025-03-18 DIAGNOSIS — Z96.652 S/P TOTAL KNEE ARTHROPLASTY, LEFT: ICD-10-CM

## 2025-03-18 DIAGNOSIS — M17.12 OSTEOARTHROSIS, LOCALIZED, PRIMARY, KNEE, LEFT: Primary | ICD-10-CM

## 2025-03-18 PROCEDURE — 97530 THERAPEUTIC ACTIVITIES: CPT | Mod: GP

## 2025-03-18 PROCEDURE — 258N000003 HC RX IP 258 OP 636: Performed by: STUDENT IN AN ORGANIZED HEALTH CARE EDUCATION/TRAINING PROGRAM

## 2025-03-18 PROCEDURE — C1713 ANCHOR/SCREW BN/BN,TIS/BN: HCPCS | Performed by: STUDENT IN AN ORGANIZED HEALTH CARE EDUCATION/TRAINING PROGRAM

## 2025-03-18 PROCEDURE — 360N000077 HC SURGERY LEVEL 4, PER MIN: Performed by: STUDENT IN AN ORGANIZED HEALTH CARE EDUCATION/TRAINING PROGRAM

## 2025-03-18 PROCEDURE — 999N000065 XR KNEE PORT LEFT 1/2 VIEWS: Mod: LT

## 2025-03-18 PROCEDURE — 258N000003 HC RX IP 258 OP 636: Performed by: NURSE ANESTHETIST, CERTIFIED REGISTERED

## 2025-03-18 PROCEDURE — 250N000009 HC RX 250: Performed by: STUDENT IN AN ORGANIZED HEALTH CARE EDUCATION/TRAINING PROGRAM

## 2025-03-18 PROCEDURE — 250N000013 HC RX MED GY IP 250 OP 250 PS 637: Performed by: STUDENT IN AN ORGANIZED HEALTH CARE EDUCATION/TRAINING PROGRAM

## 2025-03-18 PROCEDURE — 250N000011 HC RX IP 250 OP 636: Performed by: ANESTHESIOLOGY

## 2025-03-18 PROCEDURE — 97161 PT EVAL LOW COMPLEX 20 MIN: CPT | Mod: GP

## 2025-03-18 PROCEDURE — 250N000009 HC RX 250: Performed by: NURSE ANESTHETIST, CERTIFIED REGISTERED

## 2025-03-18 PROCEDURE — 97110 THERAPEUTIC EXERCISES: CPT | Mod: GP

## 2025-03-18 PROCEDURE — 250N000011 HC RX IP 250 OP 636: Performed by: NURSE ANESTHETIST, CERTIFIED REGISTERED

## 2025-03-18 PROCEDURE — 272N000001 HC OR GENERAL SUPPLY STERILE: Performed by: STUDENT IN AN ORGANIZED HEALTH CARE EDUCATION/TRAINING PROGRAM

## 2025-03-18 PROCEDURE — 27447 TOTAL KNEE ARTHROPLASTY: CPT | Mod: LT | Performed by: STUDENT IN AN ORGANIZED HEALTH CARE EDUCATION/TRAINING PROGRAM

## 2025-03-18 PROCEDURE — 250N000011 HC RX IP 250 OP 636: Performed by: STUDENT IN AN ORGANIZED HEALTH CARE EDUCATION/TRAINING PROGRAM

## 2025-03-18 PROCEDURE — 710N000009 HC RECOVERY PHASE 1, LEVEL 1, PER MIN: Performed by: STUDENT IN AN ORGANIZED HEALTH CARE EDUCATION/TRAINING PROGRAM

## 2025-03-18 PROCEDURE — 250N000025 HC SEVOFLURANE, PER MIN: Performed by: STUDENT IN AN ORGANIZED HEALTH CARE EDUCATION/TRAINING PROGRAM

## 2025-03-18 PROCEDURE — C1776 JOINT DEVICE (IMPLANTABLE): HCPCS | Performed by: STUDENT IN AN ORGANIZED HEALTH CARE EDUCATION/TRAINING PROGRAM

## 2025-03-18 PROCEDURE — 999N000141 HC STATISTIC PRE-PROCEDURE NURSING ASSESSMENT: Performed by: STUDENT IN AN ORGANIZED HEALTH CARE EDUCATION/TRAINING PROGRAM

## 2025-03-18 PROCEDURE — 258N000001 HC RX 258: Performed by: STUDENT IN AN ORGANIZED HEALTH CARE EDUCATION/TRAINING PROGRAM

## 2025-03-18 PROCEDURE — 258N000003 HC RX IP 258 OP 636: Performed by: ANESTHESIOLOGY

## 2025-03-18 PROCEDURE — 370N000017 HC ANESTHESIA TECHNICAL FEE, PER MIN: Performed by: STUDENT IN AN ORGANIZED HEALTH CARE EDUCATION/TRAINING PROGRAM

## 2025-03-18 DEVICE — BONE CEMENT MIXING SYSTEM W/FEM PRESSURIZER 06065730000: Type: IMPLANTABLE DEVICE | Site: KNEE | Status: FUNCTIONAL

## 2025-03-18 DEVICE — IMPLANTABLE DEVICE
Type: IMPLANTABLE DEVICE | Site: KNEE | Status: FUNCTIONAL
Brand: PERSONA®

## 2025-03-18 DEVICE — IMPLANTABLE DEVICE
Type: IMPLANTABLE DEVICE | Site: KNEE | Status: FUNCTIONAL
Brand: PERSONA® NATURAL TIBIA®

## 2025-03-18 DEVICE — IMPLANTABLE DEVICE
Type: IMPLANTABLE DEVICE | Site: KNEE | Status: FUNCTIONAL
Brand: PERSONA® VIVACIT-E®

## 2025-03-18 DEVICE — FULL DOSE BONE CEMENT, 10 PACK CATALOG NUMBER IS 6191-1-010
Type: IMPLANTABLE DEVICE | Site: KNEE | Status: FUNCTIONAL
Brand: SIMPLEX

## 2025-03-18 RX ORDER — FENTANYL CITRATE 50 UG/ML
50 INJECTION, SOLUTION INTRAMUSCULAR; INTRAVENOUS EVERY 5 MIN PRN
Status: DISCONTINUED | OUTPATIENT
Start: 2025-03-18 | End: 2025-03-18 | Stop reason: HOSPADM

## 2025-03-18 RX ORDER — OXYCODONE HYDROCHLORIDE 5 MG/1
5 TABLET ORAL EVERY 4 HOURS PRN
Status: DISCONTINUED | OUTPATIENT
Start: 2025-03-18 | End: 2025-03-20 | Stop reason: HOSPADM

## 2025-03-18 RX ORDER — NALOXONE HYDROCHLORIDE 0.4 MG/ML
0.4 INJECTION, SOLUTION INTRAMUSCULAR; INTRAVENOUS; SUBCUTANEOUS
Status: DISCONTINUED | OUTPATIENT
Start: 2025-03-18 | End: 2025-03-20 | Stop reason: HOSPADM

## 2025-03-18 RX ORDER — PROPOFOL 10 MG/ML
INJECTION, EMULSION INTRAVENOUS PRN
Status: DISCONTINUED | OUTPATIENT
Start: 2025-03-18 | End: 2025-03-18

## 2025-03-18 RX ORDER — DEXAMETHASONE SODIUM PHOSPHATE 4 MG/ML
4 INJECTION, SOLUTION INTRA-ARTICULAR; INTRALESIONAL; INTRAMUSCULAR; INTRAVENOUS; SOFT TISSUE
Status: DISCONTINUED | OUTPATIENT
Start: 2025-03-18 | End: 2025-03-18 | Stop reason: HOSPADM

## 2025-03-18 RX ORDER — LOSARTAN POTASSIUM 100 MG/1
100 TABLET ORAL DAILY
Status: DISCONTINUED | OUTPATIENT
Start: 2025-03-19 | End: 2025-03-20 | Stop reason: HOSPADM

## 2025-03-18 RX ORDER — SODIUM CHLORIDE, SODIUM LACTATE, POTASSIUM CHLORIDE, CALCIUM CHLORIDE 600; 310; 30; 20 MG/100ML; MG/100ML; MG/100ML; MG/100ML
INJECTION, SOLUTION INTRAVENOUS CONTINUOUS PRN
Status: DISCONTINUED | OUTPATIENT
Start: 2025-03-18 | End: 2025-03-18

## 2025-03-18 RX ORDER — ACETAMINOPHEN 325 MG/1
650 TABLET ORAL EVERY 4 HOURS PRN
Qty: 100 TABLET | Refills: 0 | Status: SHIPPED | OUTPATIENT
Start: 2025-03-18 | End: 2025-03-19

## 2025-03-18 RX ORDER — OXYCODONE HYDROCHLORIDE 5 MG/1
5-10 TABLET ORAL EVERY 4 HOURS PRN
Qty: 26 TABLET | Refills: 0 | Status: SHIPPED | OUTPATIENT
Start: 2025-03-18 | End: 2025-03-19

## 2025-03-18 RX ORDER — LABETALOL 20 MG/4 ML (5 MG/ML) INTRAVENOUS SYRINGE
PRN
Status: DISCONTINUED | OUTPATIENT
Start: 2025-03-18 | End: 2025-03-18

## 2025-03-18 RX ORDER — ASPIRIN 81 MG/1
81 TABLET ORAL 2 TIMES DAILY
Qty: 60 TABLET | Refills: 0 | Status: SHIPPED | OUTPATIENT
Start: 2025-03-18 | End: 2025-03-19

## 2025-03-18 RX ORDER — ONDANSETRON 2 MG/ML
INJECTION INTRAMUSCULAR; INTRAVENOUS PRN
Status: DISCONTINUED | OUTPATIENT
Start: 2025-03-18 | End: 2025-03-18

## 2025-03-18 RX ORDER — CEFAZOLIN SODIUM/WATER 2 G/20 ML
2 SYRINGE (ML) INTRAVENOUS SEE ADMIN INSTRUCTIONS
Status: DISCONTINUED | OUTPATIENT
Start: 2025-03-18 | End: 2025-03-18 | Stop reason: HOSPADM

## 2025-03-18 RX ORDER — ONDANSETRON 2 MG/ML
4 INJECTION INTRAMUSCULAR; INTRAVENOUS EVERY 30 MIN PRN
Status: DISCONTINUED | OUTPATIENT
Start: 2025-03-18 | End: 2025-03-18 | Stop reason: HOSPADM

## 2025-03-18 RX ORDER — CEFAZOLIN SODIUM 1 G/3ML
1 INJECTION, POWDER, FOR SOLUTION INTRAMUSCULAR; INTRAVENOUS EVERY 8 HOURS
Status: COMPLETED | OUTPATIENT
Start: 2025-03-18 | End: 2025-03-19

## 2025-03-18 RX ORDER — ASPIRIN 81 MG/1
81 TABLET ORAL 2 TIMES DAILY
Status: DISCONTINUED | OUTPATIENT
Start: 2025-03-18 | End: 2025-03-20 | Stop reason: HOSPADM

## 2025-03-18 RX ORDER — GLYCOPYRROLATE 0.2 MG/ML
INJECTION, SOLUTION INTRAMUSCULAR; INTRAVENOUS PRN
Status: DISCONTINUED | OUTPATIENT
Start: 2025-03-18 | End: 2025-03-18

## 2025-03-18 RX ORDER — SODIUM CHLORIDE, SODIUM LACTATE, POTASSIUM CHLORIDE, CALCIUM CHLORIDE 600; 310; 30; 20 MG/100ML; MG/100ML; MG/100ML; MG/100ML
INJECTION, SOLUTION INTRAVENOUS CONTINUOUS
Status: DISCONTINUED | OUTPATIENT
Start: 2025-03-18 | End: 2025-03-20 | Stop reason: HOSPADM

## 2025-03-18 RX ORDER — SODIUM CHLORIDE, SODIUM LACTATE, POTASSIUM CHLORIDE, CALCIUM CHLORIDE 600; 310; 30; 20 MG/100ML; MG/100ML; MG/100ML; MG/100ML
INJECTION, SOLUTION INTRAVENOUS CONTINUOUS
Status: DISCONTINUED | OUTPATIENT
Start: 2025-03-18 | End: 2025-03-18 | Stop reason: HOSPADM

## 2025-03-18 RX ORDER — ONDANSETRON 4 MG/1
4 TABLET, ORALLY DISINTEGRATING ORAL EVERY 30 MIN PRN
Status: DISCONTINUED | OUTPATIENT
Start: 2025-03-18 | End: 2025-03-18 | Stop reason: HOSPADM

## 2025-03-18 RX ORDER — LIDOCAINE HYDROCHLORIDE 20 MG/ML
INJECTION, SOLUTION INFILTRATION; PERINEURAL PRN
Status: DISCONTINUED | OUTPATIENT
Start: 2025-03-18 | End: 2025-03-18

## 2025-03-18 RX ORDER — AMOXICILLIN 250 MG
1-2 CAPSULE ORAL 2 TIMES DAILY
Qty: 30 TABLET | Refills: 0 | Status: SHIPPED | OUTPATIENT
Start: 2025-03-18 | End: 2025-03-19

## 2025-03-18 RX ORDER — POLYETHYLENE GLYCOL 3350 17 G/17G
1 POWDER, FOR SOLUTION ORAL DAILY
Qty: 7 PACKET | Refills: 0 | Status: SHIPPED | OUTPATIENT
Start: 2025-03-18 | End: 2025-03-19

## 2025-03-18 RX ORDER — HYDROMORPHONE HCL IN WATER/PF 6 MG/30 ML
0.2 PATIENT CONTROLLED ANALGESIA SYRINGE INTRAVENOUS EVERY 4 HOURS PRN
Status: DISCONTINUED | OUTPATIENT
Start: 2025-03-18 | End: 2025-03-20 | Stop reason: HOSPADM

## 2025-03-18 RX ORDER — NALOXONE HYDROCHLORIDE 0.4 MG/ML
0.2 INJECTION, SOLUTION INTRAMUSCULAR; INTRAVENOUS; SUBCUTANEOUS
Status: DISCONTINUED | OUTPATIENT
Start: 2025-03-18 | End: 2025-03-20 | Stop reason: HOSPADM

## 2025-03-18 RX ORDER — ONDANSETRON 2 MG/ML
4 INJECTION INTRAMUSCULAR; INTRAVENOUS EVERY 6 HOURS PRN
Status: DISCONTINUED | OUTPATIENT
Start: 2025-03-18 | End: 2025-03-20 | Stop reason: HOSPADM

## 2025-03-18 RX ORDER — AMOXICILLIN 250 MG
1 CAPSULE ORAL 2 TIMES DAILY
Status: DISCONTINUED | OUTPATIENT
Start: 2025-03-18 | End: 2025-03-20 | Stop reason: HOSPADM

## 2025-03-18 RX ORDER — ATORVASTATIN CALCIUM 20 MG/1
20 TABLET, FILM COATED ORAL DAILY
Status: DISCONTINUED | OUTPATIENT
Start: 2025-03-19 | End: 2025-03-20 | Stop reason: HOSPADM

## 2025-03-18 RX ORDER — LIDOCAINE 40 MG/G
CREAM TOPICAL
Status: DISCONTINUED | OUTPATIENT
Start: 2025-03-18 | End: 2025-03-18 | Stop reason: HOSPADM

## 2025-03-18 RX ORDER — POLYETHYLENE GLYCOL 3350 17 G/17G
17 POWDER, FOR SOLUTION ORAL DAILY
Status: DISCONTINUED | OUTPATIENT
Start: 2025-03-19 | End: 2025-03-20 | Stop reason: HOSPADM

## 2025-03-18 RX ORDER — ONDANSETRON 4 MG/1
4 TABLET, ORALLY DISINTEGRATING ORAL EVERY 6 HOURS PRN
Status: DISCONTINUED | OUTPATIENT
Start: 2025-03-18 | End: 2025-03-20 | Stop reason: HOSPADM

## 2025-03-18 RX ORDER — TRAZODONE HYDROCHLORIDE 50 MG/1
50-100 TABLET ORAL AT BEDTIME
Status: DISCONTINUED | OUTPATIENT
Start: 2025-03-18 | End: 2025-03-20 | Stop reason: HOSPADM

## 2025-03-18 RX ORDER — HYDROMORPHONE HCL IN WATER/PF 6 MG/30 ML
0.2 PATIENT CONTROLLED ANALGESIA SYRINGE INTRAVENOUS EVERY 5 MIN PRN
Status: DISCONTINUED | OUTPATIENT
Start: 2025-03-18 | End: 2025-03-18 | Stop reason: HOSPADM

## 2025-03-18 RX ORDER — IBUPROFEN 600 MG/1
600 TABLET, FILM COATED ORAL EVERY 6 HOURS PRN
Qty: 30 TABLET | Refills: 0 | Status: SHIPPED | OUTPATIENT
Start: 2025-03-18 | End: 2025-03-19

## 2025-03-18 RX ORDER — DEXAMETHASONE SODIUM PHOSPHATE 4 MG/ML
INJECTION, SOLUTION INTRA-ARTICULAR; INTRALESIONAL; INTRAMUSCULAR; INTRAVENOUS; SOFT TISSUE PRN
Status: DISCONTINUED | OUTPATIENT
Start: 2025-03-18 | End: 2025-03-18

## 2025-03-18 RX ORDER — LIDOCAINE 40 MG/G
CREAM TOPICAL
Status: DISCONTINUED | OUTPATIENT
Start: 2025-03-18 | End: 2025-03-20 | Stop reason: HOSPADM

## 2025-03-18 RX ORDER — PROCHLORPERAZINE MALEATE 5 MG/1
5 TABLET ORAL EVERY 6 HOURS PRN
Status: DISCONTINUED | OUTPATIENT
Start: 2025-03-18 | End: 2025-03-20 | Stop reason: HOSPADM

## 2025-03-18 RX ORDER — TRANEXAMIC ACID 650 MG/1
1950 TABLET ORAL ONCE
Status: COMPLETED | OUTPATIENT
Start: 2025-03-18 | End: 2025-03-18

## 2025-03-18 RX ORDER — BISACODYL 10 MG
10 SUPPOSITORY, RECTAL RECTAL DAILY PRN
Status: DISCONTINUED | OUTPATIENT
Start: 2025-03-18 | End: 2025-03-20 | Stop reason: HOSPADM

## 2025-03-18 RX ORDER — ACETAMINOPHEN 325 MG/1
975 TABLET ORAL EVERY 8 HOURS
Status: DISCONTINUED | OUTPATIENT
Start: 2025-03-18 | End: 2025-03-20 | Stop reason: HOSPADM

## 2025-03-18 RX ORDER — NALOXONE HYDROCHLORIDE 0.4 MG/ML
0.1 INJECTION, SOLUTION INTRAMUSCULAR; INTRAVENOUS; SUBCUTANEOUS
Status: DISCONTINUED | OUTPATIENT
Start: 2025-03-18 | End: 2025-03-18 | Stop reason: HOSPADM

## 2025-03-18 RX ORDER — CEFAZOLIN SODIUM/WATER 2 G/20 ML
2 SYRINGE (ML) INTRAVENOUS
Status: COMPLETED | OUTPATIENT
Start: 2025-03-18 | End: 2025-03-18

## 2025-03-18 RX ORDER — PROPOFOL 10 MG/ML
INJECTION, EMULSION INTRAVENOUS CONTINUOUS PRN
Status: DISCONTINUED | OUTPATIENT
Start: 2025-03-18 | End: 2025-03-18

## 2025-03-18 RX ORDER — HYDROMORPHONE HCL IN WATER/PF 6 MG/30 ML
0.1 PATIENT CONTROLLED ANALGESIA SYRINGE INTRAVENOUS EVERY 4 HOURS PRN
Status: DISCONTINUED | OUTPATIENT
Start: 2025-03-18 | End: 2025-03-20 | Stop reason: HOSPADM

## 2025-03-18 RX ORDER — FENTANYL CITRATE 50 UG/ML
25 INJECTION, SOLUTION INTRAMUSCULAR; INTRAVENOUS EVERY 5 MIN PRN
Status: DISCONTINUED | OUTPATIENT
Start: 2025-03-18 | End: 2025-03-18 | Stop reason: HOSPADM

## 2025-03-18 RX ORDER — FENTANYL CITRATE 50 UG/ML
INJECTION, SOLUTION INTRAMUSCULAR; INTRAVENOUS PRN
Status: DISCONTINUED | OUTPATIENT
Start: 2025-03-18 | End: 2025-03-18

## 2025-03-18 RX ORDER — HYDROMORPHONE HCL IN WATER/PF 6 MG/30 ML
0.4 PATIENT CONTROLLED ANALGESIA SYRINGE INTRAVENOUS EVERY 5 MIN PRN
Status: DISCONTINUED | OUTPATIENT
Start: 2025-03-18 | End: 2025-03-18 | Stop reason: HOSPADM

## 2025-03-18 RX ORDER — METOPROLOL SUCCINATE 50 MG/1
50 TABLET, EXTENDED RELEASE ORAL DAILY
Status: DISCONTINUED | OUTPATIENT
Start: 2025-03-18 | End: 2025-03-20 | Stop reason: HOSPADM

## 2025-03-18 RX ADMIN — SODIUM CHLORIDE, SODIUM LACTATE, POTASSIUM CHLORIDE, AND CALCIUM CHLORIDE: .6; .31; .03; .02 INJECTION, SOLUTION INTRAVENOUS at 10:35

## 2025-03-18 RX ADMIN — CEFAZOLIN 1 G: 1 INJECTION, POWDER, FOR SOLUTION INTRAMUSCULAR; INTRAVENOUS at 17:58

## 2025-03-18 RX ADMIN — LABETALOL 20 MG/4 ML (5 MG/ML) INTRAVENOUS SYRINGE 2.5 MG: at 11:19

## 2025-03-18 RX ADMIN — FENTANYL CITRATE 50 MCG: 50 INJECTION INTRAMUSCULAR; INTRAVENOUS at 10:44

## 2025-03-18 RX ADMIN — ACETAMINOPHEN 975 MG: 325 TABLET, FILM COATED ORAL at 16:39

## 2025-03-18 RX ADMIN — PROPOFOL 50 MCG/KG/MIN: 10 INJECTION, EMULSION INTRAVENOUS at 10:59

## 2025-03-18 RX ADMIN — FENTANYL CITRATE 25 MCG: 50 INJECTION, SOLUTION INTRAMUSCULAR; INTRAVENOUS at 14:27

## 2025-03-18 RX ADMIN — GLYCOPYRROLATE 0.1 MG: 0.2 INJECTION, SOLUTION INTRAMUSCULAR; INTRAVENOUS at 10:43

## 2025-03-18 RX ADMIN — ONDANSETRON 4 MG: 2 INJECTION INTRAMUSCULAR; INTRAVENOUS at 12:20

## 2025-03-18 RX ADMIN — PHENYLEPHRINE HYDROCHLORIDE 50 MCG: 10 INJECTION INTRAVENOUS at 12:17

## 2025-03-18 RX ADMIN — SODIUM CHLORIDE, SODIUM LACTATE, POTASSIUM CHLORIDE, AND CALCIUM CHLORIDE: .6; .31; .03; .02 INJECTION, SOLUTION INTRAVENOUS at 12:17

## 2025-03-18 RX ADMIN — ASPIRIN 81 MG: 81 TABLET, COATED ORAL at 20:35

## 2025-03-18 RX ADMIN — PROPOFOL 30 MG: 10 INJECTION, EMULSION INTRAVENOUS at 11:21

## 2025-03-18 RX ADMIN — OXYCODONE HYDROCHLORIDE 2.5 MG: 5 TABLET ORAL at 22:58

## 2025-03-18 RX ADMIN — TRANEXAMIC ACID 1950 MG: 650 TABLET ORAL at 09:15

## 2025-03-18 RX ADMIN — OXYCODONE HYDROCHLORIDE 2.5 MG: 5 TABLET ORAL at 16:38

## 2025-03-18 RX ADMIN — LIDOCAINE HYDROCHLORIDE 30 MG: 20 INJECTION, SOLUTION INFILTRATION; PERINEURAL at 10:43

## 2025-03-18 RX ADMIN — SENNOSIDES AND DOCUSATE SODIUM 1 TABLET: 50; 8.6 TABLET ORAL at 20:35

## 2025-03-18 RX ADMIN — GLYCOPYRROLATE 0.1 MG: 0.2 INJECTION, SOLUTION INTRAMUSCULAR; INTRAVENOUS at 10:59

## 2025-03-18 RX ADMIN — Medication 2 G: at 10:35

## 2025-03-18 RX ADMIN — HYDROMORPHONE HYDROCHLORIDE 0.5 MG: 1 INJECTION, SOLUTION INTRAMUSCULAR; INTRAVENOUS; SUBCUTANEOUS at 11:05

## 2025-03-18 RX ADMIN — TRAZODONE HYDROCHLORIDE 50 MG: 50 TABLET ORAL at 21:58

## 2025-03-18 RX ADMIN — DEXAMETHASONE SODIUM PHOSPHATE 4 MG: 4 INJECTION, SOLUTION INTRA-ARTICULAR; INTRALESIONAL; INTRAMUSCULAR; INTRAVENOUS; SOFT TISSUE at 10:43

## 2025-03-18 RX ADMIN — SODIUM CHLORIDE, POTASSIUM CHLORIDE, SODIUM LACTATE AND CALCIUM CHLORIDE: 600; 310; 30; 20 INJECTION, SOLUTION INTRAVENOUS at 09:25

## 2025-03-18 RX ADMIN — LABETALOL 20 MG/4 ML (5 MG/ML) INTRAVENOUS SYRINGE 2.5 MG: at 11:29

## 2025-03-18 RX ADMIN — FENTANYL CITRATE 50 MCG: 50 INJECTION INTRAMUSCULAR; INTRAVENOUS at 10:52

## 2025-03-18 RX ADMIN — TRAZODONE HYDROCHLORIDE 50 MG: 50 TABLET ORAL at 21:55

## 2025-03-18 RX ADMIN — HYDROMORPHONE HYDROCHLORIDE 0.5 MG: 1 INJECTION, SOLUTION INTRAMUSCULAR; INTRAVENOUS; SUBCUTANEOUS at 11:09

## 2025-03-18 RX ADMIN — FENTANYL CITRATE 25 MCG: 50 INJECTION, SOLUTION INTRAMUSCULAR; INTRAVENOUS at 13:46

## 2025-03-18 RX ADMIN — FENTANYL CITRATE 25 MCG: 50 INJECTION, SOLUTION INTRAMUSCULAR; INTRAVENOUS at 15:05

## 2025-03-18 RX ADMIN — PROPOFOL 130 MG: 10 INJECTION, EMULSION INTRAVENOUS at 10:43

## 2025-03-18 RX ADMIN — METOPROLOL SUCCINATE 50 MG: 50 TABLET, EXTENDED RELEASE ORAL at 16:39

## 2025-03-18 ASSESSMENT — ACTIVITIES OF DAILY LIVING (ADL)
ADLS_ACUITY_SCORE: 28
ADLS_ACUITY_SCORE: 30
ADLS_ACUITY_SCORE: 28
ADLS_ACUITY_SCORE: 34
ADLS_ACUITY_SCORE: 28
ADLS_ACUITY_SCORE: 30
ADLS_ACUITY_SCORE: 30
ADLS_ACUITY_SCORE: 21
ADLS_ACUITY_SCORE: 28
ADLS_ACUITY_SCORE: 38
ADLS_ACUITY_SCORE: 34

## 2025-03-18 NOTE — ANESTHESIA CARE TRANSFER NOTE
Patient: [place] Parra    Procedure: Procedure(s):  Left total knee arthroplasty       Diagnosis: Osteoarthrosis, localized, primary, knee, left [M17.12]  Diagnosis Additional Information: No value filed.    Anesthesia Type:   General     Note:    Oropharynx: oropharynx clear of all foreign objects and spontaneously breathing  Level of Consciousness: awake  Oxygen Supplementation: face mask    Independent Airway: airway patency satisfactory and stable  Dentition: dentition unchanged  Vital Signs Stable: post-procedure vital signs reviewed and stable  Report to RN Given: handoff report given  Patient transferred to: PACU    Handoff Report: Identifed the Patient, Identified the Reponsible Provider, Reviewed the pertinent medical history, Discussed the surgical course, Reviewed Intra-OP anesthesia mangement and issues during anesthesia, Set expectations for post-procedure period and Allowed opportunity for questions and acknowledgement of understanding      Vitals:  Vitals Value Taken Time   BP     Temp 98.06  F (36.7  C) 03/18/25 1254   Pulse 89 03/18/25 1254   Resp 12 03/18/25 1254   SpO2 98 % 03/18/25 1254   Vitals shown include unfiled device data.    Electronically Signed By: OZIEL Menendez CRNA  March 18, 2025  12:56 PM

## 2025-03-18 NOTE — ANESTHESIA POSTPROCEDURE EVALUATION
Patient: Krystal Parra    Procedure: Procedure(s):  Left total knee arthroplasty       Anesthesia Type:  General    Note:  Disposition: Admission   Postop Pain Control: Uneventful            Sign Out: Well controlled pain   PONV: No   Neuro/Psych: Uneventful            Sign Out: Acceptable/Baseline neuro status   Airway/Respiratory: Uneventful            Sign Out: Acceptable/Baseline resp. status   CV/Hemodynamics: Uneventful            Sign Out: Acceptable CV status; No obvious hypovolemia; No obvious fluid overload   Other NRE: NONE   DID A NON-ROUTINE EVENT OCCUR? No           Last vitals:  Vitals Value Taken Time   /67 03/18/25 1430   Temp 97.88  F (36.6  C) 03/18/25 1437   Pulse 79 03/18/25 1437   Resp 16 03/18/25 1437   SpO2 97 % 03/18/25 1437   Vitals shown include unfiled device data.    Electronically Signed By: Dilip Love MD  March 18, 2025  2:38 PM

## 2025-03-18 NOTE — PROGRESS NOTES
03/18/25 1702   Appointment Info   Signing Clinician's Name / Credentials (PT) Meaghan Mullen DPT   Rehab Comments (PT) LLE WBAT   Quick Adds   Quick Adds [facility] Auth & Certification   Living Environment   People in Home alone   Current Living Arrangements independent living facility   Home Accessibility no concerns   Number of Stairs, Within Home, Primary none   Transportation Anticipated family or friend will provide   Living Environment Comments Lives alone in ILF. Reports no family can stay with or assist, pt planning for TCU.   Self-Care   Usual Activity Tolerance good   Current Activity Tolerance moderate   Regular Exercise No   Equipment Currently Used at Home none   Fall history within last six months no   Activity/Exercise/Self-Care Comment IND at baseline, uses 4WW outside of apt, sometimes no AD in apt.   General Information   Onset of Illness/Injury or Date of Surgery 03/18/25   Referring Physician Jeremiah Perez MD   Patient/Family Therapy Goals Statement (PT) return home   Pertinent History of Current Problem (include personal factors and/or comorbidities that impact the POC) Pt is 84 yo female who underwent L TKA on 3/18/2025.   Existing Precautions/Restrictions fall;weight bearing   Weight-Bearing Status - LLE weight-bearing as tolerated   Cognition   Affect/Mental Status (Cognition) WFL   Follows Commands (Cognition) WFL   Pain Assessment   Patient Currently in Pain Yes, see Vital Sign flowsheet   Integumentary/Edema   Integumentary/Edema Comments LLE ACE bandage intact   Posture    Posture Forward head position;Protracted shoulders   Range of Motion (ROM)   Range of Motion ROM deficits secondary to surgical procedure   ROM Comment L knee 0-60 degrees   Strength (Manual Muscle Testing)   Strength (Manual Muscle Testing) Deficits observed during functional mobility;Able to perform R SLR;Able to perform L SLR   Bed Mobility   Comment, (Bed Mobility) supine<>sit with CGA   Transfers   Comment,  (Transfers) sit<>stand wtih FWW and Estelita   Gait/Stairs (Locomotion)   Comment, (Gait/Stairs) amb with FWW and CGA, step-to   Balance   Balance Comments impaired; needing FWW and CGA   Sensory Examination   Sensory Perception patient reports no sensory changes   Clinical Impression   Criteria for Skilled Therapeutic Intervention Yes, treatment indicated   PT Diagnosis (PT) impaired functional mobility   Influenced by the following impairments weakness, pain, post op status, impaired balance   Functional limitations due to impairments difficulty with bed mobility, transfers, ambulation, stairs   Clinical Presentation (PT Evaluation Complexity) stable   Clinical Presentation Rationale clinical judgement   Clinical Decision Making (Complexity) low complexity   Planned Therapy Interventions (PT) balance training;bed mobility training;gait training;home exercise program;manual therapy techniques;neuromuscular re-education;patient/family education;ROM (range of motion);stair training;strengthening;transfer training;stretching;progressive activity/exercise;risk factor education;home program guidelines   Risk & Benefits of therapy have been explained care plan/treatment goals reviewed;evaluation/treatment results reviewed;risks/benefits reviewed;current/potential barriers reviewed;participants voiced agreement with care plan;participants included;patient   PT Total Evaluation Time   PT Eval, Low Complexity Minutes (41748) 10   Physical Therapy Goals   PT Frequency Daily   PT Predicted Duration/Target Date for Goal Attainment 03/22/25   PT Goals Bed Mobility;Transfers;Gait   PT: Bed Mobility Independent;Supine to/from sit   PT: Transfers Modified independent;Sit to/from stand;Assistive device   PT: Gait Modified independent;Assistive device;Within precautions;Greater than 200 feet   Interventions   Interventions Quick Adds Therapeutic Activity;Therapeutic Procedure;Gait Training   PT Discharge Planning   PT Plan PT: review  HEP, IND bed mob, repeated STS, progress gait distance   PT Rationale for DC Rec Pt lives alone in ILF, reports no family can stay or assist. Pt planning for TCU at discharge.   PT Brief overview of current status A x 1 FWW   Physical Therapy Time and Intention   Total Session Time (sum of timed and untimed services) 10     [facility]                                                                                   OUTPATIENT PHYSICAL THERAPY    PLAN OF TREATMENT FOR OUTPATIENT REHABILITATION   Patient's Last Name, First Name, Krystla Yoon YOB: 1940   Provider's Name   [facility]   Medical Record No.  [id number]     Onset Date: 03/18/25 Start of Care Date:  3/18/2025     Medical Diagnosis:   s/p L TKA               PT Diagnosis:  impaired functional mobility Certification Dates:  From:  3/18/2025  To:  3/22/2025       See note for plan of treatment, functional goals, and certification details.    I CERTIFY THE NEED FOR THESE SERVICES FURNISHED UNDER        THIS PLAN OF TREATMENT AND WHILE UNDER MY CARE (Physician co-signature of this document indicates review and certification of the therapy plan).

## 2025-03-18 NOTE — PROGRESS NOTES
03/18/25 1702   Appointment Info   Signing Clinician's Name / Credentials (PT) Meaghan Mullen DPT   Rehab Comments (PT) LLE WBAT   Quick Adds   Quick Adds [facility] Auth & Certification   Living Environment   People in Home alone   Current Living Arrangements independent living facility   Home Accessibility no concerns   Number of Stairs, Within Home, Primary none   Transportation Anticipated family or friend will provide   Living Environment Comments Lives alone in ILF. Reports no family can stay with or assist, pt planning for TCU.   Self-Care   Usual Activity Tolerance good   Current Activity Tolerance moderate   Regular Exercise No   Equipment Currently Used at Home none   Fall history within last six months no   Activity/Exercise/Self-Care Comment IND at baseline, uses 4WW outside of apt, sometimes no AD in apt.   General Information   Onset of Illness/Injury or Date of Surgery 03/18/25   Referring Physician Jeremiah Perez MD   Patient/Family Therapy Goals Statement (PT) return home   Pertinent History of Current Problem (include personal factors and/or comorbidities that impact the POC) Pt is 84 yo female who underwent L TKA on 3/18/2025.   Existing Precautions/Restrictions fall;weight bearing   Weight-Bearing Status - LLE weight-bearing as tolerated   Cognition   Affect/Mental Status (Cognition) WFL   Follows Commands (Cognition) WFL   Pain Assessment   Patient Currently in Pain Yes, see Vital Sign flowsheet   Integumentary/Edema   Integumentary/Edema Comments LLE ACE bandage intact   Posture    Posture Forward head position;Protracted shoulders   Range of Motion (ROM)   Range of Motion ROM deficits secondary to surgical procedure   ROM Comment L knee 0-60 degrees   Strength (Manual Muscle Testing)   Strength (Manual Muscle Testing) Deficits observed during functional mobility;Able to perform R SLR;Able to perform L SLR   Bed Mobility   Comment, (Bed Mobility) supine<>sit with CGA   Transfers   Comment,  (Transfers) sit<>stand wtih FWW and Estelita   Gait/Stairs (Locomotion)   Comment, (Gait/Stairs) amb with FWW and CGA, step-to   Balance   Balance Comments impaired; needing FWW and CGA   Sensory Examination   Sensory Perception patient reports no sensory changes   Clinical Impression   Criteria for Skilled Therapeutic Intervention Yes, treatment indicated   PT Diagnosis (PT) impaired functional mobility   Influenced by the following impairments weakness, pain, post op status, impaired balance   Functional limitations due to impairments difficulty with bed mobility, transfers, ambulation, stairs   Clinical Presentation (PT Evaluation Complexity) stable   Clinical Presentation Rationale clinical judgement   Clinical Decision Making (Complexity) low complexity   Planned Therapy Interventions (PT) balance training;bed mobility training;gait training;home exercise program;manual therapy techniques;neuromuscular re-education;patient/family education;ROM (range of motion);stair training;strengthening;transfer training;stretching;progressive activity/exercise;risk factor education;home program guidelines   Risk & Benefits of therapy have been explained care plan/treatment goals reviewed;evaluation/treatment results reviewed;risks/benefits reviewed;current/potential barriers reviewed;participants voiced agreement with care plan;participants included;patient   PT Total Evaluation Time   PT Eval, Low Complexity Minutes (97446) 10   Therapy Certification   Start of care date 03/18/25   Certification date from 03/18/25   Certification date to 03/22/25   Medical Diagnosis s/p L TKA   [facility] Authorization Information   Condition type Initial onset (within last 3 months)   Cause of current episode Post Surgical   Nature of treatment Rehabilitative   Functional ability Moderate functional limitations   Documented POC (choose all that apply) Measurable short and long term/discharge treatment goals related to physical and functional  deficits.;Frequency of treatment visits and treatment activities to address deficit areas.;Patient agrees to program participation including home program   Briefly describe symptoms pain and weakness post op   How did the symptoms start post op   Last 24H: avg pain/symptom intensity  4/10   Past wk: avg pain/symptom intensity 6/10   Frequency of Symptoms Frequently (51-75% of the time)   Symptom impact on ADLs Moderately   Condition change since eval N/A (first visit)   General health reported by patient Good   Type of surgery 5-Joint Replacement   Physical Therapy Goals   PT Frequency Daily   PT Predicted Duration/Target Date for Goal Attainment 03/22/25   PT Goals Bed Mobility;Transfers;Gait   PT: Bed Mobility Independent;Supine to/from sit   PT: Transfers Modified independent;Sit to/from stand;Assistive device   PT: Gait Modified independent;Assistive device;Within precautions;Greater than 200 feet   Interventions   Interventions Quick Adds Therapeutic Activity;Therapeutic Procedure;Gait Training   PT Discharge Planning   PT Plan PT: review HEP, IND bed mob, repeated STS, progress gait distance   PT Rationale for DC Rec Pt lives alone in ILF, reports no family can stay or assist. Pt planning for TCU at discharge.   PT Brief overview of current status A x 1 FWW   Physical Therapy Time and Intention   Total Session Time (sum of timed and untimed services) 10   [facility]                                                                                   OUTPATIENT PHYSICAL THERAPY    PLAN OF TREATMENT FOR OUTPATIENT REHABILITATION   Patient's Last Name, First Name, Krystal Yoon YOB: 1940   Provider's Name   [facility]   Medical Record No.  [id number]     Onset Date: 03/18/25 Start of Care Date: 03/18/25     Medical Diagnosis:  s/p L TKA               PT Diagnosis:  impaired functional mobility Certification Dates:  From:  03/18/25  To: 03/22/25       See note for plan of treatment, functional goals, and certification details.    I CERTIFY THE NEED FOR THESE SERVICES FURNISHED UNDER        THIS PLAN OF TREATMENT AND WHILE UNDER MY CARE (Physician co-signature of this document indicates review and certification of the therapy plan).

## 2025-03-18 NOTE — OP NOTE
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  Insufficiently responding to nonsurgical treatment initiated so far.   Post-operative diagnosis Same as pre-operative diagnosis    Procedure: Left total knee arthroplasty, Left - Knee    Surgeon: Surgeons and Role:     * Jeremiah Perez MD - Primary     * Linden Barrera PA-C - Assisting     * Syed Trotter - Resident    Anesthesia: Choice   Estimated Blood Loss: Less than 100 ml    Drains: None  Specimens: * No specimens in log *  Findings:   None.  Complications: None.  Implants:   Implant Name Type Inv. Item Serial No.  Lot No. LRB No. Used Action   BONE CEMENT MIXING SYSTEM W/FEM PRESSURIZER [id number] - [id number] Cement, Bone BONE CEMENT MIXING SYSTEM W/FEM PRESSURIZER [id number]  [employer]  Left 1 Used as a Supply   BONE CEMENT SIMPLEX FULL DOSE 6191-1-001 - [id number] Cement, Bone BONE CEMENT SIMPLEX FULL DOSE 6191-1-001  [employer] [id number] Left 1 Implanted   BONE CEMENT SIMPLEX FULL DOSE 6191-1-001 - [id number] Cement, Bone BONE CEMENT SIMPLEX FULL DOSE 6191-1-001  [employer] [id number] Left 1 Implanted   IMP TIBIAL ZIM PSN NP STM 5DEG SZ DL -772-01 - [id number] Total Joint Component/Insert IMP TIBIAL ZIM PSN NP STM 5DEG SZ DL -556-01  [employer] [id number] Left 1 Implanted   IMP STEM EXT ZIM PERSONA KNEE TPR 14MM + 30MM -948-14 - [id number] Total Joint Component/Insert IMP STEM EXT ZIM PERSONA KNEE TPR 14MM + 30MM -053-14  [employer] [id number] Left 1 Implanted   KNEE FEMUR CR CEMENT CCR STD SZ 8 L - [id number] Total Joint Component/Insert KNEE FEMUR CR CEMENT CCR STD SZ 8 L  [employer] [id number] Left 1 Implanted   IMP PATELLA ZIM KNEE ALL TAMIA 32MM -120-32 - [id number] Total Joint Component/Insert IMP PATELLA ZIM  KNEE ALL TAMIA 32MM -471-32  [employer] [id number] Left 1 Implanted   PERSONA THE PERSONALIZED KNEE SYSTEM VIVACIT-E HIGHLY CROSSLINKED POLYETHYLENE LEFT 14MM HEIGHT (MC) Metallic Hardware/[place]   [employer] [id number] Left 1 Implanted         The presence of GIDEON Jerez was essential throughout this case for assistance with patient transfer to and from the operative bed, draping, irrigation, cautery usage, retraction, implant placement, cement removal, arthrotomy closure,  incisional closure, dressing placement.    Components used:  Cheryl Persona Size 8 Femoral Component  Cheryl Persona Size D Tibial Component  Cheryl Size 32 Patellar Button    Cheryl Persona MC Polyethylene Liner, Size 14     Description of procedure:      Patient was seen in the preoperative area where procedure, risks and complications, expectations and rehabilitation were discussed once more.  All questions were answered.  Patient understands and agrees to the treatment plan as set forth.  Informed consent was obtained and the left lower extremity was marked.  Patient was then taken to the operating room where general anesthesia was induced.     Patient was positioned supine with a bump under the ipsilateral buttock. Bony prominences were well padded and the patient was secured to the table in the standard fashion.  An SCD was placed on the nonoperative leg.  Preoperative range of motion showed a flexion/extension of 115-10-0 .      A tourniquet was placed on the operative thigh and the patient was prepped and draped in the normal sterile fashion.        After the completion of a timeout procedure a standard midline incision was made. Dissection was carried down to the knee retinaculum and the quadriceps tendon. A standard medial parapatellar arthrotomy was performed. Subperiosteal dissection was carried out along the medial proximal tibia. The fat pad was removed.  Care was taken to protect the patellar tendon and extensor  mechanism during fat pad removal. The tissue along the anterior aspect of the distal femur was also removed.  The patella was subluxed and the knee was flexed.       The ACL was released.  A drill was advanced down the femoral canal in a retrograde direction. The sword was set at 6 degrees of valgus in accordance with the preoperative plan and was advanced into the canal.  The distal femoral cutting block was then placed at +1 mm resection and pinned into place. The joellen was removed and the distal femur cuts were made medially and laterally.  The cutting block was removed and the joellen with the alignment piece was inserted into the canal to ensure proper angle of the cut and a flat cut.       Attention was then directed towards the proximal tibia.  Part of the medial and lateral meniscus and soft tissue was removed to expose the medial and lateral tibial plateau. Retractors were placed around the knee to obtain good visualization, then the extramedullary tibial alignment guide was placed in the appropriate position. The 2-10 guide was used to select the resection level of the tibia and the cutting guide was pinned into position.  Great care was taken to place the proximal cutting guide in the appropriate varus/valgus and posterior slope orientation.  With care directed towards preserving the posterior nerves and blood vessels and the medial collateral ligament, the saw was used to cut the proximal tibia.  Next the 10 mm sizing block was put in place to ensure proper alignment.  It showed adequate alignment using the drop joellen as well as good balancing of soft tissues.     We turned our attention to the back to the distal femur.  The posterior referencing femoral sizing block was used and the femur was measured to be a size 8.  Two holes were drilled to obtain 3 degrees of external rotation with respect to the posterior condylar axis of the femur.  The femoral cutting block was then slid over the pins and fixed onto the  bone.  The anterior cut, posterior cut, anterior and posterior chamfer cuts were made.  The block was then removed and excess bone fragments were removed.   A lamina  was used to properly visualize the posterior knee and meniscal remnants.  Using electrocautery the residual medial and lateral menisci were removed.  Care was taken to coagulate the lateral genicular vessels.  A curved osteotome was used to remove posterior osteophytes.  Great care was taken to protect the popliteus tendon laterally.  Our anesthetic injection was then introduced through the posterior capsule with extreme care directed towards not injuring the posterior neurovascular structures. The trial components (femur size 8) were placed and confirmed appropriate sizing of the flexion and extension gaps.  The drop joellen was used to confirm proper alignment of the cut.  A range of motion of 0 to 130 degrees with a well balanced knee in both flexion and extension was obtained.  Appropriate external rotation of the tibial trial was marked. The excess bone was removed.       The patella was then exposed.  Denervation and soft tissue release around the patella for visualization utilizing electrocautery. The patella thickness was measured with a caliper to be 25, and a measured resection of 8.5 mm was made.  A caliper was then used to measure the residual thickness of the patella to be 16.5.  The patella was measured with the lollipops and found to be a size 32. The drill guide was placed and the three lug holes were drilled. The patella trial was then placed and the knee was ranged. The patella was found to track well.  Now the tourniquet was inflated to 250 mmHg.    Femoral lug holes were drilled.  All trial components were removed and the proximal tibia was again exposed and sized. The tibia was measured to be a size D.   The tibial trial was placed into proper rotation and pinned into place.  The stove pipe was used to guide the drill, and then  the wing punch was used. The tibial trial was then removed.      Pulsitile lavage was used to clean the bone in preparation for cementing. The bone was dried. Cement was mixed, and then all the components were cemented into place. While the cement was hardening, the remainder of the anesthetic injection was spread around the deep retinacular layer and the subcutaneous layer with a spinal needle.  The knee was irrigated with betadine lavage for approximately 3 minutes.  Once the cement had hardened, the excess cement was removed.    The tourniquet was released.  Hemostasis was obtained.       The MC 14 mm liner fit best and the knee had full extension to 130 degrees and was stable to anterior and posterior stress in flexion and extension as well as varus/valgus stress in 0, 30, and 90 degrees of flexion.  The trial liner was removed and the real liner was placed.       The knee was again copiously irrigated.   Closure was performed with a #1  Vicryl interrupted sutures in the retinacular layer, 0 Vicryl and 2-0 Vicryl interrupted suture in the  subcutaneous tissues, and 3-0 monocryl in the skin.  A sterile dressing was applied.  The patient was then awakened, transferred to the [facility], and brought to the recovery room in stable condition. There were no complications.     POSTOPERATIVE PLAN:  Weight bearing: Weightbearing as tolerated  Anticoagulation: Aspirin 162 mg daily for 4 weeks  Precautions: No precautions  Pain control and monitoring  PT/OT  Antibiotics per protocol  X-ray in PACU  Discharge today or tomorrow when pain well controlled and ambulating safely.        Jeremiah Perez MD      Adult Reconstruction  [facility] Department of Orthopaedic Surgery  Pager [phone]

## 2025-03-18 NOTE — ANESTHESIA PROCEDURE NOTES
Airway       Patient location during procedure: OR       Procedure Start/Stop Times: 3/18/2025 10:44 AM  Staff -        CRNA: Sherie Hagan APRN CRNA       Performed By: CRNA  Consent for Airway        Urgency: elective  Indications and Patient Condition       Indications for airway management: matias-procedural       Induction type:intravenous       Mask difficulty assessment: 1 - vent by mask    Final Airway Details       Final airway type: supraglottic airway    Supraglottic Airway Details        Type: LMA       Brand: I-Gel       LMA size: 4    Post intubation assessment        Placement verified by: capnometry        Number of attempts at approach: 1       Secured with: commercial tube rueda       Ease of procedure: easy       Dentition: Intact and Unchanged    Medication(s) Administered   Medication Administration Time: 3/18/2025 10:44 AM

## 2025-03-18 NOTE — ANESTHESIA PREPROCEDURE EVALUATION
Anesthesia Pre-Procedure Evaluation    Patient: Krystal Parra   MRN: [id number] : 1940        Procedure : Procedure(s):  Left total knee arthroplasty          Past Medical History:   Diagnosis Date    Basal cell carcinoma     Bone spur 4th toe Right     Hypertension     Lumbar disc herniation     Moderate aortic regurgitation 2016    Osteopenia     Pathologic fracture of distal radius and ulna     right in , left       Past Surgical History:   Procedure Laterality Date    CARPAL TUNNEL RELEASE RT/LT Right 10/20/2021    Right carpal tunnel release under local anesthetic, Dr. Linus Conrad, [facility]    CARPAL TUNNEL RELEASE RT/LT Left 2022    Left carpal tunnel release under local anesthesia, Dr. Linus Conrad, [facility].    cataract  2011    bilateral    HC TOOTH EXTRACTION W/FORCEP      ZZC LIGATE FALLOPIAN TUBE,POSTPARTUM      Tubal Ligation      Allergies   Allergen Reactions    Sulfa Antibiotics       Social History     Tobacco Use    Smoking status: Never     Passive exposure: Yes    Smokeless tobacco: Never    Tobacco comments:      smoked in house   Substance Use Topics    Alcohol use: No      Wt Readings from Last 1 Encounters:   25 71.9 kg (158 lb 9.6 oz)        Anesthesia Evaluation            ROS/MED HX  ENT/Pulmonary:  - neg pulmonary ROS     Neurologic:  - neg neurologic ROS     Cardiovascular:     (+)  hypertension- -  CAD angina-  - -                                 Previous cardiac testing   Echo: Date: Results:    Stress Test:  Date:  Results:       The nuclear stress test is abnormal.     There is a small area of a mild degree of infarction in the apical segment(s) of the left ventricle. This appears to be in the left anterior descending distribution.     Left ventricular function is normal.     The left ventricular ejection fraction at rest is 55%.  The left ventricular ejection fraction at stress is 56%.     There is no prior study  for comparison.    ECG Reviewed:  Date: Results:    Cath:  Date: Results:      METS/Exercise Tolerance:     Hematologic:     (+)      anemia,          Musculoskeletal:   (+)  arthritis,             GI/Hepatic:  - neg GI/hepatic ROS     Renal/Genitourinary:  - neg Renal ROS     Endo:     (+)          thyroid problem, hypothyroidism,           Psychiatric/Substance Use:  - neg psychiatric ROS     Infectious Disease:  - neg infectious disease ROS     Malignancy:       Other:            Physical Exam    Airway        Mallampati: II   TM distance: > 3 FB   Neck ROM: full   Mouth opening: > 3 cm    Respiratory Devices and Support         Dental       (+) Modest Abnormalities - crowns, retainers, 1 or 2 missing teeth      Cardiovascular   cardiovascular exam normal          Pulmonary   pulmonary exam normal                OUTSIDE LABS:  CBC:   Lab Results   Component Value Date    WBC 7.0 02/24/2025    WBC 7.1 04/04/2023    HGB 11.5 (L) 02/24/2025    HGB 12.3 04/04/2023    HCT 34.0 (L) 02/24/2025    HCT 36.7 04/04/2023     02/24/2025     04/04/2023     BMP:   Lab Results   Component Value Date     02/24/2025     12/31/2024    POTASSIUM 4.3 02/24/2025    POTASSIUM 4.6 12/31/2024    CHLORIDE 103 02/24/2025    CHLORIDE 103 12/31/2024    CO2 25 02/24/2025    CO2 26 12/31/2024    BUN 28.3 (H) 02/24/2025    BUN 25.7 (H) 12/31/2024    CR 0.72 02/24/2025    CR 0.72 12/31/2024     (H) 02/24/2025     (H) 12/31/2024     COAGS:   Lab Results   Component Value Date    PTT 29 06/07/2016    INR 0.96 06/07/2016     POC:   Lab Results   Component Value Date     (H) 06/07/2016     HEPATIC:   Lab Results   Component Value Date    ALBUMIN 4.2 09/23/2024    PROTTOTAL 7.1 09/23/2024    ALT 21 09/23/2024    AST 22 09/23/2024    ALKPHOS 65 09/23/2024    BILITOTAL 0.3 09/23/2024     OTHER:   Lab Results   Component Value Date    A1C 5.8 06/07/2016    LAMAR 9.6 02/24/2025    TSH 3.84 09/23/2024    T4  0.85 07/19/2018    T3 85 01/15/2016    CRP 0.6 04/20/2015       Anesthesia Plan    ASA Status:  3       Anesthesia Type: General.     - Airway: LMA   Induction: Intravenous.   Maintenance: Balanced.        Consents    Anesthesia Plan(s) and associated risks, benefits, and realistic alternatives discussed. Questions answered and patient/representative(s) expressed understanding.     - Discussed:     - Discussed with:  Patient      - Extended Intubation/Ventilatory Support Discussed: No.      - Patient is DNR/DNI Status: No     Use of blood products discussed: No .     Postoperative Care    Pain management: IV analgesics, Oral pain medications, Multi-modal analgesia.   PONV prophylaxis: Dexamethasone or Solumedrol, Ondansetron (or other 5HT-3)     Comments:               Dilip Love MD    I have reviewed the pertinent notes and labs in the chart from the past 30 days and (re)examined the patient.  Any updates or changes from those notes are reflected in this note.    Clinically Significant Risk Factors Present on Admission                 # Drug Induced Platelet Defect: home medication list includes an antiplatelet medication   # Hypertension: Noted on problem list           # Obesity: Estimated body mass index is 30.97 kg/m  as calculated from the following:    Height as of this encounter: 1.524 m (5').    Weight as of this encounter: 71.9 kg (158 lb 9.6 oz).

## 2025-03-19 ENCOUNTER — APPOINTMENT (OUTPATIENT)
Dept: PHYSICAL THERAPY | Facility: CLINIC | Age: 85
End: 2025-03-19
Attending: STUDENT IN AN ORGANIZED HEALTH CARE EDUCATION/TRAINING PROGRAM
Payer: COMMERCIAL

## 2025-03-19 ENCOUNTER — APPOINTMENT (OUTPATIENT)
Dept: OCCUPATIONAL THERAPY | Facility: CLINIC | Age: 85
End: 2025-03-19
Attending: STUDENT IN AN ORGANIZED HEALTH CARE EDUCATION/TRAINING PROGRAM
Payer: COMMERCIAL

## 2025-03-19 LAB
FASTING STATUS PATIENT QL REPORTED: ABNORMAL
GLUCOSE SERPL-MCNC: 110 MG/DL (ref 70–99)
HGB BLD-MCNC: 8.7 G/DL (ref 11.7–15.7)

## 2025-03-19 PROCEDURE — 97116 GAIT TRAINING THERAPY: CPT | Mod: GP

## 2025-03-19 PROCEDURE — 82947 ASSAY GLUCOSE BLOOD QUANT: CPT | Performed by: STUDENT IN AN ORGANIZED HEALTH CARE EDUCATION/TRAINING PROGRAM

## 2025-03-19 PROCEDURE — 97535 SELF CARE MNGMENT TRAINING: CPT | Mod: GO | Performed by: REHABILITATION PRACTITIONER

## 2025-03-19 PROCEDURE — 85018 HEMOGLOBIN: CPT | Performed by: STUDENT IN AN ORGANIZED HEALTH CARE EDUCATION/TRAINING PROGRAM

## 2025-03-19 PROCEDURE — 97110 THERAPEUTIC EXERCISES: CPT | Mod: GP

## 2025-03-19 PROCEDURE — 250N000013 HC RX MED GY IP 250 OP 250 PS 637

## 2025-03-19 PROCEDURE — 250N000011 HC RX IP 250 OP 636: Performed by: STUDENT IN AN ORGANIZED HEALTH CARE EDUCATION/TRAINING PROGRAM

## 2025-03-19 PROCEDURE — 97165 OT EVAL LOW COMPLEX 30 MIN: CPT | Mod: GO | Performed by: REHABILITATION PRACTITIONER

## 2025-03-19 PROCEDURE — 36415 COLL VENOUS BLD VENIPUNCTURE: CPT | Performed by: STUDENT IN AN ORGANIZED HEALTH CARE EDUCATION/TRAINING PROGRAM

## 2025-03-19 PROCEDURE — 250N000013 HC RX MED GY IP 250 OP 250 PS 637: Performed by: STUDENT IN AN ORGANIZED HEALTH CARE EDUCATION/TRAINING PROGRAM

## 2025-03-19 RX ORDER — ASPIRIN 81 MG/1
81 TABLET ORAL 2 TIMES DAILY
DISCHARGE
Start: 2025-03-19 | End: 2025-03-20

## 2025-03-19 RX ORDER — IBUPROFEN 600 MG/1
600 TABLET, FILM COATED ORAL EVERY 6 HOURS PRN
DISCHARGE
Start: 2025-03-19 | End: 2025-03-27

## 2025-03-19 RX ORDER — TAMSULOSIN HYDROCHLORIDE 0.4 MG/1
0.4 CAPSULE ORAL DAILY
Qty: 7 CAPSULE | Refills: 0 | Status: SHIPPED | OUTPATIENT
Start: 2025-03-19

## 2025-03-19 RX ORDER — POLYETHYLENE GLYCOL 3350 17 G/17G
17 POWDER, FOR SOLUTION ORAL DAILY
DISCHARGE
Start: 2025-03-19 | End: 2025-03-24 | Stop reason: DRUGHIGH

## 2025-03-19 RX ORDER — OXYCODONE HYDROCHLORIDE 5 MG/1
5-10 TABLET ORAL EVERY 4 HOURS PRN
Qty: 26 TABLET | Refills: 0 | Status: SHIPPED | OUTPATIENT
Start: 2025-03-19 | End: 2025-03-24 | Stop reason: DRUGHIGH

## 2025-03-19 RX ORDER — AMOXICILLIN 250 MG
1-2 CAPSULE ORAL 2 TIMES DAILY
DISCHARGE
Start: 2025-03-19 | End: 2025-03-24

## 2025-03-19 RX ORDER — ACETAMINOPHEN 325 MG/1
650 TABLET ORAL EVERY 4 HOURS PRN
DISCHARGE
Start: 2025-03-19 | End: 2025-03-24

## 2025-03-19 RX ORDER — TAMSULOSIN HYDROCHLORIDE 0.4 MG/1
0.4 CAPSULE ORAL ONCE
Status: COMPLETED | OUTPATIENT
Start: 2025-03-19 | End: 2025-03-19

## 2025-03-19 RX ADMIN — ASPIRIN 81 MG: 81 TABLET, COATED ORAL at 20:18

## 2025-03-19 RX ADMIN — METOPROLOL SUCCINATE 50 MG: 50 TABLET, EXTENDED RELEASE ORAL at 08:35

## 2025-03-19 RX ADMIN — CEFAZOLIN 1 G: 1 INJECTION, POWDER, FOR SOLUTION INTRAMUSCULAR; INTRAVENOUS at 02:24

## 2025-03-19 RX ADMIN — ACETAMINOPHEN 975 MG: 325 TABLET, FILM COATED ORAL at 16:16

## 2025-03-19 RX ADMIN — POLYETHYLENE GLYCOL 3350 17 G: 17 POWDER, FOR SOLUTION ORAL at 08:35

## 2025-03-19 RX ADMIN — ASPIRIN 81 MG: 81 TABLET, COATED ORAL at 08:36

## 2025-03-19 RX ADMIN — SENNOSIDES AND DOCUSATE SODIUM 1 TABLET: 50; 8.6 TABLET ORAL at 08:35

## 2025-03-19 RX ADMIN — OXYCODONE HYDROCHLORIDE 5 MG: 5 TABLET ORAL at 23:05

## 2025-03-19 RX ADMIN — OXYCODONE HYDROCHLORIDE 5 MG: 5 TABLET ORAL at 14:18

## 2025-03-19 RX ADMIN — ACETAMINOPHEN 975 MG: 325 TABLET, FILM COATED ORAL at 08:35

## 2025-03-19 RX ADMIN — ACETAMINOPHEN 975 MG: 325 TABLET, FILM COATED ORAL at 23:05

## 2025-03-19 RX ADMIN — SENNOSIDES AND DOCUSATE SODIUM 1 TABLET: 50; 8.6 TABLET ORAL at 20:18

## 2025-03-19 RX ADMIN — TRAZODONE HYDROCHLORIDE 50 MG: 50 TABLET ORAL at 23:05

## 2025-03-19 RX ADMIN — LOSARTAN POTASSIUM 100 MG: 100 TABLET, FILM COATED ORAL at 08:36

## 2025-03-19 RX ADMIN — ATORVASTATIN CALCIUM 20 MG: 20 TABLET, FILM COATED ORAL at 08:36

## 2025-03-19 RX ADMIN — TAMSULOSIN HYDROCHLORIDE 0.4 MG: 0.4 CAPSULE ORAL at 08:44

## 2025-03-19 RX ADMIN — ACETAMINOPHEN 975 MG: 325 TABLET, FILM COATED ORAL at 01:07

## 2025-03-19 ASSESSMENT — ACTIVITIES OF DAILY LIVING (ADL)
ADLS_ACUITY_SCORE: 38
DEPENDENT_IADLS:: INDEPENDENT
ADLS_ACUITY_SCORE: 38

## 2025-03-19 NOTE — CONSULTS
Care Management Initial Consult    General Information  Assessment completed with: Patient, Family, patient, Krystal  Type of CM/SW Visit: Initial Assessment    Primary Care Provider verified and updated as needed: Yes   Readmission within the last 30 days: no previous admission in last 30 days      Reason for Consult: discharge planning  Advance Care Planning:       General Information Comments: Lives alone in an independent apartment. No services    Communication Assessment  Patient's communication style: spoken language (English or Bilingual)    Hearing Difficulty or Deaf: no   Wear Glasses or Blind: yes    Cognitive  Cognitive/Neuro/Behavioral: WDL                      Living Environment:   People in home: alone     Current living Arrangements: apartment      Able to return to prior arrangements: no  Living Arrangement Comments: Independent apartment    Family/Social Support:  Care provided by: self  Provides care for:    Marital Status:   Support system: Children, Friend          Description of Support System: Supportive, Involved    Support Assessment: Adequate family and caregiver support, Adequate social supports    Current Resources:   Patient receiving home care services:          Community Resources:    Equipment currently used at home: cane, straight, walker, rolling  Supplies currently used at home:      Employment/Financial:  Employment Status: retired        Financial Concerns:             Does the patient's insurance plan have a 3 day qualifying hospital stay waiver?  Yes     Which insurance plan 3 day waiver is available? Alternative insurance waiver    Will the waiver be used for post-acute placement? Yes    Lifestyle & Psychosocial Needs:  Social Drivers of Health     Food Insecurity: Low Risk  (3/19/2025)    Food Insecurity     Within the past 12 months, did you worry that your food would run out before you got money to buy more?: No     Within the past 12 months, did the food you bought  just not last and you didn t have money to get more?: No   Depression: Not at risk (2/24/2025)    PHQ-2     PHQ-2 Score: 0   Housing Stability: Low Risk  (3/19/2025)    Housing Stability     Do you have housing? : Yes     Are you worried about losing your housing?: No   Tobacco Use: Medium Risk (3/18/2025)    Patient History     Smoking Tobacco Use: Never     Smokeless Tobacco Use: Never     Passive Exposure: Yes   Financial Resource Strain: Low Risk  (3/19/2025)    Financial Resource Strain     Within the past 12 months, have you or your family members you live with been unable to get utilities (heat, electricity) when it was really needed?: No   Alcohol Use: Not on file   Transportation Needs: Low Risk  (3/19/2025)    Transportation Needs     Within the past 12 months, has lack of transportation kept you from medical appointments, getting your medicines, non-medical meetings or appointments, work, or from getting things that you need?: No   Physical Activity: Unknown (9/23/2024)    Exercise Vital Sign     Days of Exercise per Week: 0 days     Minutes of Exercise per Session: Not on file   Interpersonal Safety: Low Risk  (3/18/2025)    Interpersonal Safety     Do you feel physically and emotionally safe where you currently live?: Yes     Within the past 12 months, have you been hit, slapped, kicked or otherwise physically hurt by someone?: No     Within the past 12 months, have you been humiliated or emotionally abused in other ways by your partner or ex-partner?: No   Stress: No Stress Concern Present (9/23/2024)    Citizen of [place] of Occupational Health - Occupational Stress Questionnaire     Feeling of Stress : Not at all   Social Connections: Unknown (9/23/2024)    Social Connection and Isolation Panel [NHANES]     Frequency of Communication with Friends and Family: Not on file     Frequency of Social Gatherings with Friends and Family: Three times a week     Attends Scientologist Services: Not on file     Active  Member of Clubs or Organizations: Not on file     Attends Club or Organization Meetings: Not on file     Marital Status: Not on file   Health Literacy: Not on file       Functional Status:  Prior to admission patient needed assistance:   Dependent ADLs:: Independent  Dependent IADLs:: Independent       Mental Health Status:  Mental Health Status: No Current Concerns       Chemical Dependency Status:  Chemical Dependency Status: No Current Concerns             Values/Beliefs:  Spiritual, Cultural Beliefs, Orthodoxy Practices, Values that affect care:                 Discussed  Partnership in Safe Discharge Planning  document with patient/family: Yes:     Additional Information:  JOSELO met with patient and daughter-in-law to discuss recommendations for TCU. Patient and family in agreement that patient will need TCU. We discussed her outpatient status and her insurance [facility] covering without a 3 day inpatient stay.     Referrals to TCU made.    Next Steps: JOSELO will assist with TCU placement.    JUDY Wilks   Inpatient Care Coordination   Supervisor    [phone]      JUDY Chang

## 2025-03-19 NOTE — PLAN OF CARE
"Goal Outcome Evaluation:      Plan of Care Reviewed With: patient    Overall Patient Progress: improvingOverall Patient Progress: improving    Outcome Evaluation: POD1. Pt AOx4. RA. Ax1 walker/GB. Pain managed with oxycodone and tylenol. Last bladder scan - 13ml. Discharge TBD.      Problem: Delirium  Goal: Optimal Coping  Outcome: Progressing  Goal: Improved Behavioral Control  Outcome: Progressing  Intervention: Minimize Safety Risk  Recent Flowsheet Documentation  Taken 3/19/2025 0107 by Dahiana Castillo RN  Enhanced Safety Measures:   assistive devices when indicated   pain management   room near unit station  Trust Relationship/Rapport:   care explained   empathic listening provided   thoughts/feelings acknowledged   questions encouraged  Goal: Improved Attention and Thought Clarity  Outcome: Progressing  Goal: Improved Sleep  Outcome: Progressing     Problem: Adult Inpatient Plan of Care  Goal: Plan of Care Review  Description: The Plan of Care Review/Shift note should be completed every shift.  The Outcome Evaluation is a brief statement about your assessment that the patient is improving, declining, or no change.  This information will be displayed automatically on your shift  note.  Outcome: Progressing  Flowsheets (Taken 3/19/2025 0610)  Outcome Evaluation: POD1. Pt AOx4. RA. Ax1 walker/GB. Pain managed with oxycodone and tylenol. Last bladder scan - 13ml. Discharge TBD.  Plan of Care Reviewed With: patient  Overall Patient Progress: improving  Goal: Patient-Specific Goal (Individualized)  Description: You can add care plan individualizations to a care plan. Examples of Individualization might be:  \"Parent requests to be called daily at 9am for status\", \"I have a hard time hearing out of my right ear\", or \"Do not touch me to wake me up as it startles  me\".  Outcome: Progressing  Goal: Absence of Hospital-Acquired Illness or Injury  Outcome: Progressing  Intervention: Identify and Manage Fall " Risk  Recent Flowsheet Documentation  Taken 3/19/2025 0107 by Dahiana Castillo RN  Safety Promotion/Fall Prevention:   assistive device/personal items within reach   clutter free environment maintained   mobility aid in reach   room near nurse's station   safety round/check completed  Intervention: Prevent Skin Injury  Recent Flowsheet Documentation  Taken 3/19/2025 0107 by Dahiana Castillo RN  Body Position: supine, head elevated  Intervention: Prevent and Manage VTE (Venous Thromboembolism) Risk  Recent Flowsheet Documentation  Taken 3/19/2025 0107 by Dahiana Castillo RN  VTE Prevention/Management: SCDs on (sequential compression devices)  Intervention: Prevent Infection  Recent Flowsheet Documentation  Taken 3/19/2025 0107 by Dahiana Castillo RN  Infection Prevention:   single patient room provided   rest/sleep promoted   hand hygiene promoted  Goal: Optimal Comfort and Wellbeing  Outcome: Progressing  Intervention: Monitor Pain and Promote Comfort  Recent Flowsheet Documentation  Taken 3/19/2025 0107 by Dahiana Castillo RN  Pain Management Interventions:   medication (see MAR)   cold applied  Intervention: Provide Person-Centered Care  Recent Flowsheet Documentation  Taken 3/19/2025 0107 by Dahiana Castillo RN  Trust Relationship/Rapport:   care explained   empathic listening provided   thoughts/feelings acknowledged   questions encouraged  Goal: Readiness for Transition of Care  Outcome: Progressing

## 2025-03-19 NOTE — PLAN OF CARE
" Goal Outcome Evaluation:      Plan of Care Reviewed With: patient    Overall Patient Progress: improvingOverall Patient Progress: improving    Outcome Evaluation: A&O x4, on RA, capno on, CMS intact, dressing CDI, pain mngd w/ oxycodone & tylenol, Ax1 GB & walker, straight cath for 500 mL @ 2000 still due to void, IVF infusing, discharge plan tbd    Problem: Delirium  Goal: Optimal Coping  Outcome: Progressing  Goal: Improved Behavioral Control  Outcome: Progressing  Intervention: Minimize Safety Risk  Recent Flowsheet Documentation  Taken 3/18/2025 1544 by Salome Morris, RN  Enhanced Safety Measures: pain management  Goal: Improved Attention and Thought Clarity  Outcome: Progressing  Goal: Improved Sleep  Outcome: Progressing     Problem: Adult Inpatient Plan of Care  Goal: Plan of Care Review  Description: The Plan of Care Review/Shift note should be completed every shift.  The Outcome Evaluation is a brief statement about your assessment that the patient is improving, declining, or no change.  This information will be displayed automatically on your shift  note.  Outcome: Progressing  Flowsheets (Taken 3/18/2025 1743)  Outcome Evaluation: A&O x4, on RA, capno on, CMS intact, dressing CDI, pain mngd w/ oxycodone & tylenol, Ax1 GB & walker, straight cath for 500 mL @ 2000 still due to void, IVF infusing, discharge plan tbd  Plan of Care Reviewed With: patient  Overall Patient Progress: improving  Goal: Patient-Specific Goal (Individualized)  Description: You can add care plan individualizations to a care plan. Examples of Individualization might be:  \"Parent requests to be called daily at 9am for status\", \"I have a hard time hearing out of my right ear\", or \"Do not touch me to wake me up as it startles  me\".  Outcome: Progressing  Goal: Absence of Hospital-Acquired Illness or Injury  Outcome: Progressing  Intervention: Identify and Manage Fall Risk  Recent Flowsheet Documentation  Taken 3/18/2025 1544 by " Grabinger, Salome, RN  Safety Promotion/Fall Prevention:   safety round/check completed   room near nurse's station  Intervention: Prevent Skin Injury  Recent Flowsheet Documentation  Taken 3/18/2025 1544 by Salome Morris RN  Body Position: supine, head elevated  Intervention: Prevent and Manage VTE (Venous Thromboembolism) Risk  Recent Flowsheet Documentation  Taken 3/18/2025 1544 by Salome Morris RN  VTE Prevention/Management: SCDs on (sequential compression devices)  Intervention: Prevent Infection  Recent Flowsheet Documentation  Taken 3/18/2025 1544 by Salome Morris RN  Infection Prevention: hand hygiene promoted  Goal: Optimal Comfort and Wellbeing  Outcome: Progressing  Intervention: Monitor Pain and Promote Comfort  Recent Flowsheet Documentation  Taken 3/18/2025 2258 by Salome Morris RN  Pain Management Interventions:   medication (see MAR)   cold applied   rest   repositioned  Taken 3/18/2025 1639 by Salome Morris RN  Pain Management Interventions:   medication (see MAR)   cold applied  Taken 3/18/2025 1542 by Salome Morris RN  Pain Management Interventions: cold applied  Goal: Readiness for Transition of Care  Outcome: Progressing

## 2025-03-19 NOTE — PROGRESS NOTES
Care Management Discharge Note    Discharge Date: 03/20/2025       Discharge Disposition: Transitional Care @ [facility]     Discharge Services:  TCU    Discharge DME:  TCU to provide    Discharge Transportation: Reviewed out of pocket cost for [facility] transport, $90.70 base and $6.08 per mile to the destination. Spoke with Leo mckinnon, they expressed understanding and are agreeable to this.       Private pay costs discussed: private room/amenity fees and transportation costs    Does the patient's insurance plan have a 3 day qualifying hospital stay waiver?  yes    PAS Confirmation Code:    Patient/family educated on Medicare website which has current facility and service quality ratings: yes    Education Provided on the Discharge Plan:  yes  Persons Notified of Discharge Plans: patient, son and unit  Patient/Family in Agreement with the Plan:  yes    Handoff Referral Completed: No, handoff not indicated or clinically appropriate    Additional Information:  Patient has been accepted at [facility] for admission tomorrow. Met with patient and talked to Leo mckinnon on the phone. They are agreeable with disposition.    Plan: discharge tomorrow 3/20/25 to [facility] via Wheelchair between [id number].    JUDY Wilks   Inpatient Care Coordination   Supervisor  [facility]      JUDY Chang

## 2025-03-19 NOTE — PLAN OF CARE
"Goal Outcome Evaluation:      Plan of Care Reviewed With: patient, family    Overall Patient Progress: improvingOverall Patient Progress: improving    Outcome Evaluation: A&OX4. Up with assist of 1, GB and walker. Pt. stated pain is now well-controlled wit schedule Tylenol and PRN oxycodone. Frequent voids with small amounts of output. PVR is done. SW is following discharge. Will continue to monitor.      Problem: Delirium  Goal: Optimal Coping  Outcome: Progressing  Goal: Improved Behavioral Control  Outcome: Progressing  Intervention: Minimize Safety Risk  Recent Flowsheet Documentation  Taken 3/19/2025 0839 by Christian Casillas RN  Enhanced Safety Measures:   assistive devices when indicated   pain management   room near unit station  Trust Relationship/Rapport:   care explained   empathic listening provided   thoughts/feelings acknowledged   questions encouraged  Goal: Improved Attention and Thought Clarity  Outcome: Progressing  Goal: Improved Sleep  Outcome: Progressing     Problem: Adult Inpatient Plan of Care  Goal: Plan of Care Review  Description: The Plan of Care Review/Shift note should be completed every shift.  The Outcome Evaluation is a brief statement about your assessment that the patient is improving, declining, or no change.  This information will be displayed automatically on your shift  note.  Outcome: Progressing  Flowsheets (Taken 3/19/2025 1500)  Outcome Evaluation: A&OX4. Up with assist of 1, GB and walker. Pt. stated pain is now well-controlled wit schedule Tylenol and PRN oxycodone. Frequent voids with small amounts of output. PVR is done. SW is following discharge. Will continue to monitor.  Plan of Care Reviewed With:   patient   family  Overall Patient Progress: improving  Goal: Patient-Specific Goal (Individualized)  Description: You can add care plan individualizations to a care plan. Examples of Individualization might be:  \"Parent requests to be called daily at 9am for " "status\", \"I have a hard time hearing out of my right ear\", or \"Do not touch me to wake me up as it startles  me\".  Outcome: Progressing  Goal: Absence of Hospital-Acquired Illness or Injury  Outcome: Progressing  Intervention: Identify and Manage Fall Risk  Recent Flowsheet Documentation  Taken 3/19/2025 0839 by Christian Casillas RN  Safety Promotion/Fall Prevention:   assistive device/personal items within reach   clutter free environment maintained   mobility aid in reach   room near nurse's station   safety round/check completed  Intervention: Prevent Skin Injury  Recent Flowsheet Documentation  Taken 3/19/2025 0839 by Christian Casillas RN  Body Position: position changed independently  Intervention: Prevent and Manage VTE (Venous Thromboembolism) Risk  Recent Flowsheet Documentation  Taken 3/19/2025 0839 by Christian Casillas RN  VTE Prevention/Management: SCDs on (sequential compression devices)  Intervention: Prevent Infection  Recent Flowsheet Documentation  Taken 3/19/2025 0839 by Christian Casillas RN  Infection Prevention:   single patient room provided   rest/sleep promoted   hand hygiene promoted  Goal: Optimal Comfort and Wellbeing  Outcome: Progressing  Intervention: Monitor Pain and Promote Comfort  Recent Flowsheet Documentation  Taken 3/19/2025 0839 by Christian Casillas RN  Pain Management Interventions:   medication (see MAR)   cold applied  Intervention: Provide Person-Centered Care  Recent Flowsheet Documentation  Taken 3/19/2025 0839 by Christian Casillas RN  Trust Relationship/Rapport:   care explained   empathic listening provided   thoughts/feelings acknowledged   questions encouraged  Goal: Readiness for Transition of Care  Outcome: Progressing       "

## 2025-03-19 NOTE — PROGRESS NOTES
[facility]                                                                                   OUTPATIENT OCCUPATIONAL THERAPY    PLAN OF TREATMENT FOR OUTPATIENT REHABILITATION   Patient's Last Name, First Name, Krystal Yoon YOB: 1940   Provider's Name   [facility]   Medical Record No.  [id number]     Onset Date: 03/18/25 Start of Care Date: 03/19/25     Medical Diagnosis:  L TKA               OT Diagnosis:  decreased ADL/IADls Certification Dates:  From: 03/19/25  To: 03/22/25     See note for plan of treatment, functional goals, and certification details.    I CERTIFY THE NEED FOR THESE SERVICES FURNISHED UNDER        THIS PLAN OF TREATMENT AND WHILE UNDER MY CARE (Physician co-signature of this document indicates review and certification of the therapy plan).                               03/19/25 0991   Appointment Info   Signing Clinician's Name / Credentials (OT) MATHIEU Sherman/L   Quick Adds   Quick Adds [place] Auth & Certification;Certification   Living Environment   People in Home alone   Current Living Arrangements independent living facility   Home Accessibility no concerns   Number of Stairs, Within Home, Primary none   Transportation Anticipated family or friend will provide   Living Environment Comments Lives alone in ILF. Reports no family can stay with or assist, pt planning for TCU.  Walk in shower, slightly elevated toilet   Self-Care   Usual Activity Tolerance good   Current Activity Tolerance moderate   Regular Exercise No   Equipment Currently Used at Home walker, rolling;cane, straight;grab bar, toilet;grab bar, tub/shower;shower chair  (4ww)   Fall history within last six months no   Activity/Exercise/Self-Care Comment IND at baseline for ADls, uses 4WW outside of apt, uses SEC out in community to ambulate when alone as she cannot lift 4ww in/out of car. sometimes no AD in apt. Famiy A with shopping or goes  with pt.   Instrumental Activities of Daily Living (IADL)   IADL Comments family to complete as needed with cleaning, shopping   General Information   Onset of Illness/Injury or Date of Surgery 03/18/25   Referring Physician Jeremiah Perez MD   Patient/Family Therapy Goal Statement (OT) pt is expected to dc tyo TCU   Additional Occupational Profile Info/Pertinent History of Current Problem patient is POD #1 for LTKA   Left Lower Extremity (Weight-bearing Status) weight-bearing as tolerated (WBAT)   General Observations and Info patient was in bed and agreeable to OT session   Cognitive Status Examination   Orientation Status orientation to person, place and time   Visual Perception   Visual Impairment/Limitations WFL   Sensory   Sensory Comments numbness in B hands ar baseline   Pain Assessment   Patient Currently in Pain Yes, see Vital Sign flowsheet  (3.5/10, in knee and reports pain in L calf muscle, RN notified)   Posture   Posture not impaired   Range of Motion Comprehensive   General Range of Motion bilateral upper extremity ROM WFL   Strength Comprehensive (MMT)   Comment, General Manual Muscle Testing (MMT) Assessment decreased strength in L LE to be expected   Muscle Tone Assessment   Muscle Tone Quick Adds No deficits were identified   Coordination   Upper Extremity Coordination No deficits were identified   Bed Mobility   Comment (Bed Mobility) min A   Transfers   Transfer Comments CGA, fww   Balance   Balance Comments LOB was not noted, general unsteadiness to be expected   Activities of Daily Living   BADL Assessment/Intervention toileting;bathing;lower body dressing   Bathing Assessment/Intervention   Wells Level (Bathing) minimum assist (75% patient effort)   Comment, (Bathing) clinical judgement   Lower Body Dressing Assessment/Training   Comment, (Lower Body Dressing) clinical judgement   Wells Level (Lower Body Dressing) moderate assist (50% patient effort)   Toileting    [place]son Level (Toileting) minimum assist (75% patient effort);toileting skills   Clinical Impression   Criteria for Skilled Therapeutic Interventions Met (OT) Yes, treatment indicated   OT Diagnosis decreased ADL/IADls   OT Problem List-Impairments impacting ADL activity tolerance impaired;balance;range of motion (ROM);strength;pain   Assessment of Occupational Performance 5 or more Performance Deficits   Identified Performance Deficits dsg, toilteing, bathing, functional/community mobility, houeshold chores, driving ,errands   Planned Therapy Interventions (OT) ADL retraining;progressive activity/exercise;transfer training   Clinical Decision Making Complexity (OT) problem focused assessment/low complexity   Risk & Benefits of therapy have been explained evaluation/treatment results reviewed;care plan/treatment goals reviewed;risks/benefits reviewed;current/potential barriers reviewed;participants voiced agreement with care plan;patient   OT Total Evaluation Time   OT Eval, Low Complexity Minutes (08855) 8   Therapy Certification   Medical Diagnosis L TKA   Start of Care Date 03/19/25   Certification date from 03/19/25   Certification date to 03/22/25   [facility] Authorization Information   Condition type Initial onset (within last 3 months)   Cause of current episode Post Surgical   Nature of treatment Rehabilitative   Functional ability Moderate functional limitations   Documented POC (choose all that apply) Measurable short and long term/discharge treatment goals related to physical and functional deficits.   Briefly describe symptoms unpredicable pain,   How did the symptoms start after surgery   Last 24H: avg pain/symptom intensity  3/10   Past wk: avg pain/symptom intensity 3/10   Frequency of Symptoms Constantly (% of the time)   Symptom impact on ADLs Quite a bit   Condition change since eval N/A (first visit)   General health reported by patient Very good   Type of surgery 5-Joint Replacement   OT  Goals   Therapy Frequency (OT) 5 times/week    OT Goals Lower Body Dressing;Transfers;Toilet Transfer/Toileting;OT Goal 1;OT Goal 2   OT: Lower Body Dressing using adaptive equipment  (CGA)   OT: Transfer Minimal assist;with assistive device  (walk in shower)   OT: Toilet Transfer/Toileting toilet transfer;cleaning and garment management;using adaptive equipment;Supervision/stand-by assist   OT: Goal 1 Patient will verbalize at least 2-3 adaptations to ADL routine at home to accommodate energy level and safety needs.   OT: Goal 2 Patient will demonstrate safe use of  walker during ADls.   OT Discharge Planning   OT Plan Le dressing, toileting, g/h sinkside   OT Rationale for DC Rec defer to ortho team for dc plan- Pt lives alone in ILF, reports no family can stay or assist. Currently CGA-SBA for basic ADls and in room ambulation. Pt planning for TCU at discharge.   OT Brief overview of current status CGA, fww toileting, sinkside cares   OT Total Distance Amb During Session (feet) 40   Total Session Time   Total Session Time (sum of timed and untimed services) 8

## 2025-03-19 NOTE — DISCHARGE SUMMARY
[facility]  Discharge Summary            Interval History:     85 year old female admitted postoperatively for elective left total knee arthroplasty with Dr. Jeremiah Perez due to DJD unresponsive to non operative treatment.  There were no notable intraoperative complications.  Patient being discharged post op Day # 2.  Krystal Parra received skilled nursing, PT, OT, SW inquiry.  There was no hospitalist care during the stay.   Krystal Parra has progressed satisfactorily with ambulation and pain management and without medical complication.  Patient prefers to be discharged to TCU due to living alone and inability to safely perform ADLs independently.   consult ordered for TCU placement -placement excepted and will be transferred today.. Krystal Parra leaves the hospital in stable condition with good chance for recovery.  Yesterday, Krystal was improving since her surgery.  She notes that she had difficulty sleeping at night due to people coming in the room and pain. She initially had difficulty voiding and had a straight cath placed x1.  Flomax prescription was provided.  Today, Krystal notes her pain has been intermittent and somewhat worse this a.m.  Pain is localized to both her lumbar spine and her knee.  She has been up ambulating and voiding regularly.  She has been eating and drinking regularly.    Pain: Moderate  Nausea: None  Light headedness : none  Chest pain: none  Shortness of breath: none              Assessment and Plan:     S/P left TKA day # 2.  Final Diagnosis: Status post left TKA postop day # 2, with now resolved postoperative urinary retention  VSS, Hemodynamically asymptomatic, pain management adequate.    PLAN:  Given that she lives alone and is having difficulty with ambulating and mobility, I believe she should be transferred to TCU for assistance until she is able to safely function independently.  She has been accepted for placement and will be transferred  today following PT.  Urinary retention: Continue to use tamsulosin daily to assist with voiding.  1 week tamsulosin prescription placed for discharge medication.  She may discontinue if she is able to void spontaneously.  Discussed that she should follow-up with her primary care or urology for further evaluation.  DVT prophylaxis with ASA, as patient has no history of VTE.  Pain management with Hydroxyzine, Tylenol, oxycodone -added 15 mg Toradol and 25 mg of hydroxyzine while admitted to assist with pain management. May increase oxycodone from 5mg to 10mg until pain is well managed. Monitor for signs of medication induced delirium.   Bowel Regimen.  RICE  Assistive devices as determined by Physical therapy.  OP PT.  Patient instructions attached to discharge.      Discharge to TCU  Follow up in clinic as previously scheduled, approx 10-14 days post op.    [facility]  [address] [place]  [address]  [phone] fax  [phone] for scheduling                      Physical Exam:   Patient Vitals for the past 12 hrs:   BP Temp Temp src Pulse Resp SpO2   03/18/25 2240 124/62 98.1  F (36.7  C) Temporal 95 20 94 %   03/18/25 2154 -- -- -- -- (!) 34 --     Hemoglobin   Date Value Ref Range Status   03/19/2025 8.7 (L) 11.7 - 15.7 g/dL Final   02/24/2025 11.5 (L) 11.7 - 15.7 g/dL Final   04/13/2021 11.6 (L) 11.7 - 15.7 g/dL Final   07/29/2019 12.6 11.7 - 15.7 g/dL Final       Patient is alert and oriented.  Vitals: stable  Neurovascular status: Grossly intact to light sensation bilateral lower extremities  Dressing: clean and dry  Able to perform SLR  PT/DP pulses: 2+  DF/PF strength: 5/5   Calves: soft and non tender            Volodymyr Stevens PA-C  [facility] - Surgery    3/20/2025

## 2025-03-20 ENCOUNTER — LAB REQUISITION (OUTPATIENT)
Dept: LAB | Facility: CLINIC | Age: 85
End: 2025-03-20
Payer: COMMERCIAL

## 2025-03-20 ENCOUNTER — DOCUMENTATION ONLY (OUTPATIENT)
Dept: GERIATRICS | Facility: CLINIC | Age: 85
End: 2025-03-20
Payer: COMMERCIAL

## 2025-03-20 ENCOUNTER — APPOINTMENT (OUTPATIENT)
Dept: PHYSICAL THERAPY | Facility: CLINIC | Age: 85
End: 2025-03-20
Attending: STUDENT IN AN ORGANIZED HEALTH CARE EDUCATION/TRAINING PROGRAM
Payer: COMMERCIAL

## 2025-03-20 ENCOUNTER — TELEPHONE (OUTPATIENT)
Dept: ORTHOPEDICS | Facility: CLINIC | Age: 85
End: 2025-03-20
Payer: COMMERCIAL

## 2025-03-20 VITALS
HEART RATE: 97 BPM | WEIGHT: 158.6 LBS | SYSTOLIC BLOOD PRESSURE: 164 MMHG | RESPIRATION RATE: 18 BRPM | OXYGEN SATURATION: 97 % | DIASTOLIC BLOOD PRESSURE: 62 MMHG | BODY MASS INDEX: 31.14 KG/M2 | HEIGHT: 60 IN | TEMPERATURE: 100.1 F

## 2025-03-20 DIAGNOSIS — Z11.1 ENCOUNTER FOR SCREENING FOR RESPIRATORY TUBERCULOSIS: ICD-10-CM

## 2025-03-20 LAB
FASTING STATUS PATIENT QL REPORTED: ABNORMAL
GLUCOSE SERPL-MCNC: 123 MG/DL (ref 70–99)
HGB BLD-MCNC: 8.5 G/DL (ref 11.7–15.7)

## 2025-03-20 PROCEDURE — 250N000013 HC RX MED GY IP 250 OP 250 PS 637: Performed by: STUDENT IN AN ORGANIZED HEALTH CARE EDUCATION/TRAINING PROGRAM

## 2025-03-20 PROCEDURE — 250N000013 HC RX MED GY IP 250 OP 250 PS 637

## 2025-03-20 PROCEDURE — 36415 COLL VENOUS BLD VENIPUNCTURE: CPT | Performed by: STUDENT IN AN ORGANIZED HEALTH CARE EDUCATION/TRAINING PROGRAM

## 2025-03-20 PROCEDURE — 85018 HEMOGLOBIN: CPT | Performed by: STUDENT IN AN ORGANIZED HEALTH CARE EDUCATION/TRAINING PROGRAM

## 2025-03-20 PROCEDURE — 97116 GAIT TRAINING THERAPY: CPT | Mod: GP

## 2025-03-20 PROCEDURE — 97110 THERAPEUTIC EXERCISES: CPT | Mod: GP

## 2025-03-20 PROCEDURE — 250N000011 HC RX IP 250 OP 636

## 2025-03-20 PROCEDURE — 82947 ASSAY GLUCOSE BLOOD QUANT: CPT | Performed by: STUDENT IN AN ORGANIZED HEALTH CARE EDUCATION/TRAINING PROGRAM

## 2025-03-20 RX ORDER — TAMSULOSIN HYDROCHLORIDE 0.4 MG/1
0.4 CAPSULE ORAL DAILY
Status: DISCONTINUED | OUTPATIENT
Start: 2025-03-20 | End: 2025-03-20 | Stop reason: HOSPADM

## 2025-03-20 RX ORDER — OXYCODONE HYDROCHLORIDE 5 MG/1
5 TABLET ORAL ONCE
Status: COMPLETED | OUTPATIENT
Start: 2025-03-20 | End: 2025-03-20

## 2025-03-20 RX ORDER — HYDROXYZINE HYDROCHLORIDE 25 MG/1
25 TABLET, FILM COATED ORAL 3 TIMES DAILY PRN
Qty: 20 TABLET | Refills: 0 | DISCHARGE
Start: 2025-03-20 | End: 2025-03-24

## 2025-03-20 RX ORDER — ASPIRIN 81 MG/1
81 TABLET ORAL 2 TIMES DAILY
Qty: 60 TABLET | Refills: 0 | Status: SHIPPED | OUTPATIENT
Start: 2025-03-20

## 2025-03-20 RX ORDER — KETOROLAC TROMETHAMINE 15 MG/ML
15 INJECTION, SOLUTION INTRAMUSCULAR; INTRAVENOUS EVERY 6 HOURS PRN
Status: DISCONTINUED | OUTPATIENT
Start: 2025-03-20 | End: 2025-03-20 | Stop reason: HOSPADM

## 2025-03-20 RX ORDER — HYDROXYZINE HYDROCHLORIDE 25 MG/1
25 TABLET, FILM COATED ORAL EVERY 6 HOURS PRN
Status: DISCONTINUED | OUTPATIENT
Start: 2025-03-20 | End: 2025-03-20 | Stop reason: HOSPADM

## 2025-03-20 RX ORDER — HYDROXYZINE HYDROCHLORIDE 50 MG/1
50 TABLET, FILM COATED ORAL EVERY 6 HOURS PRN
Status: DISCONTINUED | OUTPATIENT
Start: 2025-03-20 | End: 2025-03-20 | Stop reason: HOSPADM

## 2025-03-20 RX ADMIN — KETOROLAC TROMETHAMINE 15 MG: 15 INJECTION, SOLUTION INTRAMUSCULAR; INTRAVENOUS at 09:14

## 2025-03-20 RX ADMIN — LOSARTAN POTASSIUM 100 MG: 100 TABLET, FILM COATED ORAL at 08:56

## 2025-03-20 RX ADMIN — OXYCODONE HYDROCHLORIDE 5 MG: 5 TABLET ORAL at 14:44

## 2025-03-20 RX ADMIN — ATORVASTATIN CALCIUM 20 MG: 20 TABLET, FILM COATED ORAL at 08:56

## 2025-03-20 RX ADMIN — ACETAMINOPHEN 975 MG: 325 TABLET, FILM COATED ORAL at 08:56

## 2025-03-20 RX ADMIN — POLYETHYLENE GLYCOL 3350 17 G: 17 POWDER, FOR SOLUTION ORAL at 08:56

## 2025-03-20 RX ADMIN — TAMSULOSIN HYDROCHLORIDE 0.4 MG: 0.4 CAPSULE ORAL at 13:37

## 2025-03-20 RX ADMIN — OXYCODONE HYDROCHLORIDE 5 MG: 5 TABLET ORAL at 06:45

## 2025-03-20 RX ADMIN — HYDROXYZINE HYDROCHLORIDE 25 MG: 25 TABLET, FILM COATED ORAL at 09:11

## 2025-03-20 RX ADMIN — ASPIRIN 81 MG: 81 TABLET, COATED ORAL at 08:56

## 2025-03-20 RX ADMIN — OXYCODONE HYDROCHLORIDE 5 MG: 5 TABLET ORAL at 11:02

## 2025-03-20 RX ADMIN — METOPROLOL SUCCINATE 50 MG: 50 TABLET, EXTENDED RELEASE ORAL at 08:56

## 2025-03-20 RX ADMIN — SENNOSIDES AND DOCUSATE SODIUM 1 TABLET: 50; 8.6 TABLET ORAL at 08:56

## 2025-03-20 ASSESSMENT — ACTIVITIES OF DAILY LIVING (ADL)
ADLS_ACUITY_SCORE: 38

## 2025-03-20 NOTE — PLAN OF CARE
Occupational Therapy Discharge Summary    Reason for therapy discharge:    Discharged to transitional care facility.    Progress towards therapy goal(s). See goals on Care Plan in [facility] electronic health record for goal details.  Goals not met.  Barriers to achieving goals:   discharge from facility.    Therapy recommendation(s):    Defer to ortho. Pt planning to discharge to TCU.

## 2025-03-20 NOTE — PLAN OF CARE
"Goal Outcome Evaluation:      Plan of Care Reviewed With: patient    Overall Patient Progress: improvingOverall Patient Progress: improving    Outcome Evaluation: POD2. Pt AOx4. RA. Ax1 walker/GB. Pain managed with oxycodone and tylenol. Plan is to discharge to TCU today; 1040 - 1120 wheelchair transport per SW note.      Problem: Delirium  Goal: Optimal Coping  Outcome: Progressing  Goal: Improved Behavioral Control  Outcome: Progressing  Intervention: Minimize Safety Risk  Recent Flowsheet Documentation  Taken 3/19/2025 6394 by Dahiana Castillo RN  Trust Relationship/Rapport:   care explained   empathic listening provided   questions encouraged   thoughts/feelings acknowledged   questions answered  Goal: Improved Attention and Thought Clarity  Outcome: Progressing  Goal: Improved Sleep  Outcome: Progressing     Problem: Adult Inpatient Plan of Care  Goal: Plan of Care Review  Description: The Plan of Care Review/Shift note should be completed every shift.  The Outcome Evaluation is a brief statement about your assessment that the patient is improving, declining, or no change.  This information will be displayed automatically on your shift  note.  Outcome: Progressing  Flowsheets (Taken 3/20/2025 0658)  Outcome Evaluation:   POD2. Pt AOx4. RA. Ax1 walker/GB. Pain managed with oxycodone and tylenol. Plan is to discharge to TCU today   1040 - 1120 wheelchair transport per SW note.  Plan of Care Reviewed With: patient  Overall Patient Progress: improving  Goal: Patient-Specific Goal (Individualized)  Description: You can add care plan individualizations to a care plan. Examples of Individualization might be:  \"Parent requests to be called daily at 9am for status\", \"I have a hard time hearing out of my right ear\", or \"Do not touch me to wake me up as it startles  me\".  Outcome: Progressing  Goal: Absence of Hospital-Acquired Illness or Injury  Outcome: Progressing  Intervention: Prevent Skin Injury  Recent " Flowsheet Documentation  Taken 3/19/2025 2324 by Dahiana Castillo RN  Skin Protection: adhesive use limited  Taken 3/19/2025 2000 by Dahiana Castillo RN  Body Position: position changed independently  Intervention: Prevent and Manage VTE (Venous Thromboembolism) Risk  Recent Flowsheet Documentation  Taken 3/19/2025 2324 by Dahiana Castillo RN  VTE Prevention/Management: SCDs off (sequential compression devices)  Goal: Optimal Comfort and Wellbeing  Outcome: Progressing  Intervention: Monitor Pain and Promote Comfort  Recent Flowsheet Documentation  Taken 3/20/2025 0645 by Dahiana Castillo RN  Pain Management Interventions:   medication (see MAR)   cold applied  Intervention: Provide Person-Centered Care  Recent Flowsheet Documentation  Taken 3/19/2025 2324 by Dahiana Castillo RN  Trust Relationship/Rapport:   care explained   empathic listening provided   questions encouraged   thoughts/feelings acknowledged   questions answered  Goal: Readiness for Transition of Care  Outcome: Progressing

## 2025-03-20 NOTE — TELEPHONE ENCOUNTER
Patient's son (Leo) called nursing desk regarding his mother's status and potential transfer to TCU and requested a phone call.    Called and spoke with Leo regarding his mother and his concern about her pain and transfer to TCU.  Discussed that after I saw her this a.m., provided additive pain medication to assist with her discomfort.  Plan to follow-up with her prior to discharge to assess current pain control.  Leo noted that he was worried about increasing her pain during the transfer.  I ensured him that the transfer should not cause her much discomfort at all and that we will make sure her pain is well-managed prior to discharge.  Discussed the services a TCU provides and that she will have adequate care once discharged.  All questions and concerns were answered.  Leo stated that he was content with the plan.    Volodymyr Stevens PA-C  Physician Assistant   Oncology and Adult Reconstructive Surgery  Dept Orthopaedic Surgery, [facility]

## 2025-03-21 PROCEDURE — 36415 COLL VENOUS BLD VENIPUNCTURE: CPT | Mod: ORL | Performed by: NURSE PRACTITIONER

## 2025-03-21 PROCEDURE — P9604 ONE-WAY ALLOW PRORATED TRIP: HCPCS | Mod: ORL | Performed by: NURSE PRACTITIONER

## 2025-03-21 PROCEDURE — 86481 TB AG RESPONSE T-CELL SUSP: CPT | Mod: ORL | Performed by: NURSE PRACTITIONER

## 2025-03-21 NOTE — PROGRESS NOTES
[facility] GERIATRICS    PRIMARY CARE PROVIDER AND CLINIC:  Tasha Winkler MD, [address] [facility]  / [place] [address]  Chief Complaint   Patient presents with    [facility] Medical Record Number:  [id number]  Place of Service where encounter took place:  [facility] (TCU) [[id number]]    Krystal Parra  is a 85 year old  (1940), admitted to the above facility from  [facility]. Hospital stay 3/18/25 through 3/20/25..       HPI:      Admitted to [facility] 3/18-3/20/25 due to elective left total knee arthroplasty with Dr. Perez on 3/28/25 due to DJD. Developed postop urinary retention, straight cath x 1 and was started on flomax.     Transferred to [facility] on 3/20/25.      Today's concerns:    During exam, patient seen sitting in wheelchair. Has been participating in therapy. States pain to L knee is variable, ranges 2-8/10. Requesting oxycodone be scheduled vs PRN. Admits to constipation. Denies abdominal pain, nausea. Reports appetite improved. Not sleeping well at night, takes trazodone 100mg at bedtime at home. Denies chest pain, SOB, headache, syncope.      CODE STATUS/ADVANCE DIRECTIVES DISCUSSION:  CPR/Full code   ALLERGIES:   Allergies   Allergen Reactions    Sulfa Antibiotics       PAST MEDICAL HISTORY:   Past Medical History:   Diagnosis Date    Basal cell carcinoma     Bone spur 4th toe Right     Hypertension     Lumbar disc herniation     Moderate aortic regurgitation 06/18/2016    Osteopenia     Pathologic fracture of distal radius and ulna     right in 2003, left 1992      PAST SURGICAL HISTORY:   has a past surgical history that includes LIGATE FALLOPIAN TUBE,POSTPARTUM; TOOTH EXTRACTION W/FORCEP; cataract (01/01/2011); carpal tunnel release rt/lt (Right, 10/20/2021); carpal tunnel release rt/lt (Left, 03/02/2022); and Arthroplasty knee (Left, 3/18/2025).  FAMILY HISTORY: family history includes Cancer in her  sister; Cardiovascular in her mother; Cerebrovascular Disease in her father; Diabetes in her father; Gastrointestinal Disease in her sister; Hypertension in her sister; Lipids in her sister; Neurologic Disorder in her sister.  SOCIAL HISTORY:   reports that she has never smoked. She has been exposed to tobacco smoke. She has never used smokeless tobacco. She reports that she does not drink alcohol and does not use drugs.  Patient's living condition:  lives alone in an independent living facility    Post Discharge Medication Reconciliation Status:   MED REC REQUIRED  Post Medication Reconciliation Status: discharge medications reconciled and changed, per note/orders       Current Outpatient Medications   Medication Sig Dispense Refill    acetaminophen (TYLENOL) 325 MG tablet Take 650 mg by mouth 3 times daily. And 650 mg once daily as needed      aspirin 81 MG EC tablet Take 1 tablet (81 mg) by mouth 2 times daily. 60 tablet 0    atorvastatin (LIPITOR) 20 MG tablet Take 1 tablet (20 mg) by mouth daily. 90 tablet 11    cetirizine (ZYRTEC) 10 MG tablet Take 10 mg by mouth daily.      Cholecalciferol (VITAMIN D) 2000 UNIT CAPS Take 1 tablet by mouth daily.      coenzyme Q10 (CO-Q10) 30 MG capsule Take 1 capsule (30 mg) by mouth daily 30 capsule 0    fish oil-omega-3 fatty acids 1000 MG capsule Take 2 g by mouth 2 times daily       ibuprofen (ADVIL/MOTRIN) 600 MG tablet Take 1 tablet (600 mg) by mouth every 6 hours as needed for mild pain.      losartan (COZAAR) 100 MG tablet Take 1 tablet (100 mg) by mouth daily. 90 tablet 3    MAGNESIUM GLYCINATE PO Take 315 mg by mouth daily.      metoprolol succinate ER (TOPROL XL) 50 MG 24 hr tablet Take 1 tablet (50 mg) by mouth daily. 90 tablet 3    OVER-THE-COUNTER Take 1 tablet by mouth daily. (Melaleuca vitamins)   HOLD WHILE IN TCU      oxyCODONE (ROXICODONE) 5 MG tablet Take 1-2 tablets (5-10 mg) by mouth every 4 hours as needed for moderate to severe pain. 26 tablet 0     polyethylene glycol (MIRALAX) 17 GM/Dose powder Take 17 g by mouth daily.      senna-docusate (SENOKOT-S/PERICOLACE) 8.6-50 MG tablet Take 1 tablet by mouth 2 times daily. And 1 tablet twice daily as needed      tamsulosin (FLOMAX) 0.4 MG capsule Take 1 capsule (0.4 mg) by mouth daily. May discontinue if able to void spontaneously without urine retention 7 capsule 0    traZODone (DESYREL) 50 MG tablet Take 50 mg by mouth at bedtime.       No current facility-administered medications for this visit.       ROS:  10 point ROS of systems including Constitutional, Eyes, Respiratory, Cardiovascular, Gastroenterology, Genitourinary, Integumentary, Musculoskeletal, Psychiatric were all negative except for pertinent positives noted in my HPI.    Vitals:  /65   Pulse 94   Temp 98.3  F (36.8  C)   Resp 18   Ht 1.524 m (5')   Wt 74.9 kg (165 lb 1.6 oz)   LMP  (LMP Unknown)   SpO2 93%   BMI 32.24 kg/m    Exam:  GENERAL APPEARANCE:  Alert, in no distress, oriented, cooperative  ENT:  Mouth and posterior oropharynx normal, moist mucous membranes, normal hearing acuity  EYES:  EOM, conjunctivae, lids, pupils and irises normal, PERRL  RESP:  respiratory effort and palpation of chest normal, lungs clear to auscultation , no respiratory distress  CV:  regular rate and rhythm, no murmur, rub, or gallop, no edema  ABDOMEN:  normal bowel sounds, soft, nontender, no guarding or rebound  M/S:   Gait and station abnormal , sitting in wheelchair.  SKIN:  Inspection of skin and subcutaneous tissue baseline, Palpation of skin and subcutaneous tissue baseline, Unable to visualize L knee dressing today; +LLE edema.  NEURO:   Cranial nerves 2-12 are normal tested and grossly at patient's baseline, Examination of sensation by touch normal  PSYCH:  oriented X 3, affect and mood normal        Lab/Diagnostic data:  Recent labs in [facility] reviewed by me today.    Most Recent 3 CBC's:  Recent Labs   Lab Test 03/20/25  0734 03/19/25  0719  02/24/25  1649 04/04/23  1307 09/12/22  1159   WBC  --   --  7.0 7.1 7.5   HGB 8.5* 8.7* 11.5* 12.3 12.2   MCV  --   --  90 91 92   PLT  --   --  214 211 252     Most Recent 3 BMP's:  Recent Labs   Lab Test 03/20/25  0734 03/19/25  0707 02/24/25  1649 12/31/24  1346 09/23/24  1438   NA  --   --  139 139 141   POTASSIUM  --   --  4.3 4.6 5.0   CHLORIDE  --   --  103 103 104   CO2  --   --  25 26 26   BUN  --   --  28.3* 25.7* 25.1*   CR  --   --  0.72 0.72 0.73   ANIONGAP  --   --  11 10 11   LAMAR  --   --  9.6 10.4 9.7   * 110* 100* 103* 99     Most Recent 2 LFT's:  Recent Labs   Lab Test 09/23/24  1438 09/18/23  1212   AST 22 21   ALT 21 18   ALKPHOS 65 56   BILITOTAL 0.3 0.3         ASSESSMENT/PLAN:    (M17.12) Primary osteoarthritis of left knee  (primary encounter diagnosis)  (Z96.652) Status post total left knee replacement  (R53.81) Physical deconditioning  (K59.03) Drug induced constipation  Comment: Osteoarthritis of L knee, elective s/p left total knee arthroplasty with Dr. Perez on 3/28/25.  PTA lives alone. Ambulates w/walker.   Plan:   - Discontinue current oxycodone orders - scheduled & PRN  - Add oxycodone 10mg QID at 8am, 12PM, 4PM, 8PM dx postop pain. Take oxycodone 5-10mg BID PRN (do not give within 4hrs of scheduled oxycodone;  5mg for pain 4-6/10; 10mg for pain 7--10/10).  dx postop pain   - Clarification: Aspirin 81mg BID x 30 days dx DVT prophylaxis, then start Aspiring 81mg every day on 4/18/25 dx CAD  - Give additional miralax 17g BID x 2 days, then take 17g po every day dx constipation; hold if loose stools  - Increase senna-s to 2 tabs BID dx constipation; hold if loose stools  - Add senna-s 1 tab BID PRN dx constipation  - Elevate L leg w/pillow TID during the day and at bedtime dx LLE edema  - Check BMP, Hgb on 3/25/25 dx anemia, postop urinary retention  - Patient to follow-up with Ortho on 3/28/25   - Continue PT, OT  - SW following for discharge planning. Goal is to  discharge home.    (D64.9) Postoperative anemia  Comment: Acute postop anemia, Hgb dropped from 11.5 to 8.5 on 3/20/25.   Plan:   - Check Hgb on 3/25/25    (N99.89,  R33.8) Postoperative urinary retention  Comment: Postop urinary retention, required straight cath x 1 during hospital stay  Plan:   - Change administration time of flomax to at bedtime, start on 3/25/25 dx urinary retention  - Check PVR TID x 3, if PVR > 300cc then straight cath. If straight cath x 2, then place armendariz catheter.    (I25.119) Coronary artery disease involving native coronary artery of native heart with angina pectoris  (I10) Benign essential hypertension  Comment: Chronic CAD, HTN  Plan:   - Continue ASA, lipitor, losartan    (E03.8) Subclinical hypothyroidism  Comment: Chronic  Plan:   - Continue levothyroxine    (F51.01) Primary insomnia  Comment: Uncontrolled  Plan:   - Increase trazodone to 100mg at bedtime dx insomnia       Orders:  - Clarification: Aspirin 81mg BID x 30 days dx DVT prophylaxis, then start Aspiring 81mg every day on 4/18/25 dx CAD  - Check BMP, Hgb on 3/25/25 dx anemia, postop urinary retention  - Check PVR TID x 3, if PVR > 300cc then straight cath. If straight cath x 2, then place armendariz catheter.   - Increase trazodone to 100mg at bedtime dx insomnia   - Discontinue current oxycodone orders  - Add oxycodone 10mg QID at 8am, 12PM, 4PM, 8PM dx postop pain  - Add oxycodone 5-10mg every 4hrs PRN (do not give within 4hrs of scheduled oxycodone;  5mg for pain 4-6/10; 10mg for pain 7--10/10).  dx postop pain   - Give additional miralax 17g BID x 2 days, then take 17g po every day dx constipation; hold if loose stools  - Increase senna-s to 2 tabs BID dx constipation; hold if loose stools  - Add senna-s 1 tab BID PRN dx constipation  - Elevate L leg w/pillow TID during the day and at bedtime dx LLE edema  - Change administration time of flomax to at bedtime, start on 3/25/25 dx urinary retention    UPDATE:   - Discontinue  current oxycodone orders - scheduled & PRN  - Add oxycodone 10mg QID at 8am, 12PM, 4PM, 8PM dx postop pain. Take oxycodone 5-10mg BID PRN (do not give within 4hrs of scheduled oxycodone;  5mg for pain 4-6/10; 10mg for pain 7--10/10).  dx postop pain       Total time spent during today's visit was 50 mins including patient visit and review of past records.       Electronically signed by:  OZIEL Mendez CNP

## 2025-03-21 NOTE — PLAN OF CARE
Physical Therapy Discharge Summary    Reason for therapy discharge:    Discharged to transitional care facility.    Progress towards therapy goal(s). See goals on Care Plan in [facility] electronic health record for goal details.  Goals not met.  Barriers to achieving goals:   discharge from facility.    Therapy recommendation(s):    Continued therapy is recommended.  Rationale/Recommendations:  TCU recommended for further progression of strength and IND mobility.

## 2025-03-21 NOTE — PLAN OF CARE
Goal Outcome Evaluation:    Discharge Note    Patient discharged to TCU via transportation service  accompanied by son.  IV: Discontinued  Prescriptions faxed to pharmacy.   Belongings reviewed and sent with family.   Home medications returned to patient: NA  Equipment sent with: and family.   patient verbalizes understanding of discharge instructions. AVS given to patient.  Additional education completed? Wound Care

## 2025-03-22 LAB
GAMMA INTERFERON BACKGROUND BLD IA-ACNC: 0.03 IU/ML
M TB IFN-G BLD-IMP: NEGATIVE
M TB IFN-G CD4+ BCKGRND COR BLD-ACNC: 4.94 IU/ML
MITOGEN IGNF BCKGRD COR BLD-ACNC: 0.01 IU/ML
MITOGEN IGNF BCKGRD COR BLD-ACNC: 0.05 IU/ML
QUANTIFERON MITOGEN: 4.97 IU/ML
QUANTIFERON NIL TUBE: 0.03 IU/ML
QUANTIFERON TB1 TUBE: 0.04 IU/ML
QUANTIFERON TB2 TUBE: 0.08

## 2025-03-24 ENCOUNTER — TELEPHONE (OUTPATIENT)
Dept: ORTHOPEDICS | Facility: CLINIC | Age: 85
End: 2025-03-24

## 2025-03-24 ENCOUNTER — TRANSITIONAL CARE UNIT VISIT (OUTPATIENT)
Dept: GERIATRICS | Facility: CLINIC | Age: 85
End: 2025-03-24
Payer: COMMERCIAL

## 2025-03-24 VITALS
OXYGEN SATURATION: 93 % | HEART RATE: 94 BPM | HEIGHT: 60 IN | RESPIRATION RATE: 18 BRPM | SYSTOLIC BLOOD PRESSURE: 138 MMHG | BODY MASS INDEX: 32.41 KG/M2 | DIASTOLIC BLOOD PRESSURE: 65 MMHG | TEMPERATURE: 98.3 F | WEIGHT: 165.1 LBS

## 2025-03-24 DIAGNOSIS — M17.12 PRIMARY OSTEOARTHRITIS OF LEFT KNEE: Primary | ICD-10-CM

## 2025-03-24 DIAGNOSIS — E03.8 SUBCLINICAL HYPOTHYROIDISM: ICD-10-CM

## 2025-03-24 DIAGNOSIS — I10 BENIGN ESSENTIAL HYPERTENSION: ICD-10-CM

## 2025-03-24 DIAGNOSIS — R33.8 POSTOPERATIVE URINARY RETENTION: ICD-10-CM

## 2025-03-24 DIAGNOSIS — D64.9 POSTOPERATIVE ANEMIA: ICD-10-CM

## 2025-03-24 DIAGNOSIS — Z96.652 STATUS POST TOTAL LEFT KNEE REPLACEMENT: ICD-10-CM

## 2025-03-24 DIAGNOSIS — I25.119 CORONARY ARTERY DISEASE INVOLVING NATIVE CORONARY ARTERY OF NATIVE HEART WITH ANGINA PECTORIS: ICD-10-CM

## 2025-03-24 DIAGNOSIS — N99.89 POSTOPERATIVE URINARY RETENTION: ICD-10-CM

## 2025-03-24 DIAGNOSIS — K59.03 DRUG INDUCED CONSTIPATION: ICD-10-CM

## 2025-03-24 DIAGNOSIS — F51.01 PRIMARY INSOMNIA: ICD-10-CM

## 2025-03-24 DIAGNOSIS — R53.81 PHYSICAL DECONDITIONING: ICD-10-CM

## 2025-03-24 RX ORDER — SENNA AND DOCUSATE SODIUM 50; 8.6 MG/1; MG/1
TABLET, FILM COATED ORAL
Qty: 60 TABLET | Refills: 0 | Status: SHIPPED | OUTPATIENT
Start: 2025-03-24

## 2025-03-24 RX ORDER — POLYETHYLENE GLYCOL 3350 17 G/17G
POWDER, FOR SOLUTION ORAL
Qty: 30 EACH | Refills: 2 | Status: SHIPPED | OUTPATIENT
Start: 2025-03-24 | End: 2025-03-27

## 2025-03-24 RX ORDER — AMOXICILLIN 250 MG
1 CAPSULE ORAL 2 TIMES DAILY
COMMUNITY
End: 2025-03-24 | Stop reason: DRUGHIGH

## 2025-03-24 RX ORDER — CETIRIZINE HYDROCHLORIDE 10 MG/1
10 TABLET ORAL DAILY
COMMUNITY

## 2025-03-24 RX ORDER — OXYCODONE HYDROCHLORIDE 5 MG/1
TABLET ORAL
Qty: 60 TABLET | Refills: 0 | Status: SHIPPED | OUTPATIENT
Start: 2025-03-24 | End: 2025-03-25

## 2025-03-24 RX ORDER — ACETAMINOPHEN 325 MG/1
650 TABLET ORAL 3 TIMES DAILY
COMMUNITY
End: 2025-03-27 | Stop reason: DRUGHIGH

## 2025-03-24 RX ORDER — TRAZODONE HYDROCHLORIDE 50 MG/1
50 TABLET ORAL AT BEDTIME
COMMUNITY
End: 2025-03-24 | Stop reason: DRUGHIGH

## 2025-03-24 RX ORDER — TRAZODONE HYDROCHLORIDE 100 MG/1
100 TABLET ORAL AT BEDTIME
Qty: 30 TABLET | Refills: 0 | Status: SHIPPED | OUTPATIENT
Start: 2025-03-24

## 2025-03-24 NOTE — TELEPHONE ENCOUNTER
[facility] Call Center    Phone Message    May a detailed message be left on voicemail: yes     Reason for Call: Olivia Calling Regarding Iceless Cold  Compression  System   . Olivia Need  Orders for This. Please call for Detail.     Action Taken: Message routed to:  [facility] ([facility]): lorie    Travel Screening: Not Applicable     Date of Service:

## 2025-03-24 NOTE — TELEPHONE ENCOUNTER
Spoke with RN at [facility]. Faxed NICE1 referral via Patient Care order per request to [phone].    Jasmin Burnett RN on 3/24/2025 at 3:24 PM

## 2025-03-24 NOTE — PATIENT INSTRUCTIONS
[facility]   2025     Name: Krystal Parra   : 1940       Orders:  - Clarification: Aspirin 81mg BID x 30 days dx DVT prophylaxis, then start Aspiring 81mg every day on 25 dx CAD  - Check BMP, Hgb on 3/25/25 dx anemia, postop urinary retention  - Check PVR TID x 3, if PVR > 300cc then straight cath. If straight cath x 2, then place armendariz catheter.   - Increase trazodone to 100mg at bedtime dx insomnia   - Discontinue current oxycodone orders  - Add oxycodone 10mg QID at 8am, 12PM, 4PM, 8PM dx postop pain  - Add oxycodone 5-10mg every 4hrs PRN (do not give within 4hrs of scheduled oxycodone;  5mg for pain 4-6/10; 10mg for pain 7--10/10).  dx postop pain   - Give additional miralax 17g BID x 2 days, then take 17g po every day dx constipation; hold if loose stools  - Increase senna-s to 2 tabs BID dx constipation; hold if loose stools  - Add senna-s 1 tab BID PRN dx constipation  - Elevate L leg w/pillow TID during the day and at bedtime dx LLE edema  - Change administration time of flomax to at bedtime, start on 3/25/25 dx urinary retention        Electronically signed by  OZIEL Mendez CNP on 3/24/2025 at 4:46 PM           [facility]   2025     Name: Krystal Parra   : 1940     Orders:  - Discontinue current oxycodone orders - scheduled & PRN  - Add oxycodone 10mg QID at 8am, 12PM, 4PM, 8PM dx postop pain. Take oxycodone 5-10mg BID PRN (do not give within 4hrs of scheduled oxycodone;  5mg for pain 4-6/10; 10mg for pain 7--10/10).  dx postop pain       Electronically signed by   OZIEL Mendez CNP on 3/25/2025 at 12:56 PM         Unremarkable

## 2025-03-25 ENCOUNTER — LAB REQUISITION (OUTPATIENT)
Dept: LAB | Facility: CLINIC | Age: 85
End: 2025-03-25
Payer: COMMERCIAL

## 2025-03-25 DIAGNOSIS — N40.3 NODULAR PROSTATE WITH LOWER URINARY TRACT SYMPTOMS: ICD-10-CM

## 2025-03-25 DIAGNOSIS — N99.89 OTHER POSTPROCEDURAL COMPLICATIONS AND DISORDERS OF GENITOURINARY SYSTEM: ICD-10-CM

## 2025-03-25 DIAGNOSIS — D64.9 ANEMIA, UNSPECIFIED: ICD-10-CM

## 2025-03-25 RX ORDER — OXYCODONE HYDROCHLORIDE 5 MG/1
TABLET ORAL
Qty: 60 TABLET | Refills: 0 | Status: SHIPPED | OUTPATIENT
Start: 2025-03-25 | End: 2025-03-27 | Stop reason: DRUGHIGH

## 2025-03-26 ENCOUNTER — TELEPHONE (OUTPATIENT)
Dept: GERIATRICS | Facility: CLINIC | Age: 85
End: 2025-03-26

## 2025-03-26 ENCOUNTER — TELEPHONE (OUTPATIENT)
Dept: ORTHOPEDICS | Facility: CLINIC | Age: 85
End: 2025-03-26

## 2025-03-26 ENCOUNTER — MYC MEDICAL ADVICE (OUTPATIENT)
Dept: ORTHOPEDICS | Facility: CLINIC | Age: 85
End: 2025-03-26

## 2025-03-26 ENCOUNTER — DOCUMENTATION ONLY (OUTPATIENT)
Dept: OTHER | Facility: CLINIC | Age: 85
End: 2025-03-26
Payer: COMMERCIAL

## 2025-03-26 VITALS
DIASTOLIC BLOOD PRESSURE: 54 MMHG | HEIGHT: 60 IN | SYSTOLIC BLOOD PRESSURE: 131 MMHG | TEMPERATURE: 98.7 F | BODY MASS INDEX: 32.83 KG/M2 | RESPIRATION RATE: 18 BRPM | WEIGHT: 167.2 LBS | HEART RATE: 92 BPM | OXYGEN SATURATION: 92 %

## 2025-03-26 DIAGNOSIS — M17.12 OSTEOARTHROSIS, LOCALIZED, PRIMARY, KNEE, LEFT: ICD-10-CM

## 2025-03-26 LAB
ANION GAP SERPL CALCULATED.3IONS-SCNC: 8 MMOL/L (ref 7–15)
BUN SERPL-MCNC: 28.9 MG/DL (ref 8–23)
CALCIUM SERPL-MCNC: 9.1 MG/DL (ref 8.8–10.4)
CHLORIDE SERPL-SCNC: 105 MMOL/L (ref 98–107)
CREAT SERPL-MCNC: 0.82 MG/DL (ref 0.51–0.95)
EGFRCR SERPLBLD CKD-EPI 2021: 69 ML/MIN/1.73M2
GLUCOSE SERPL-MCNC: 107 MG/DL (ref 70–99)
HCO3 SERPL-SCNC: 25 MMOL/L (ref 22–29)
HGB BLD-MCNC: 7.6 G/DL (ref 11.7–15.7)
POTASSIUM SERPL-SCNC: 4.4 MMOL/L (ref 3.4–5.3)
SODIUM SERPL-SCNC: 138 MMOL/L (ref 135–145)

## 2025-03-26 PROCEDURE — 36415 COLL VENOUS BLD VENIPUNCTURE: CPT | Mod: ORL | Performed by: NURSE PRACTITIONER

## 2025-03-26 PROCEDURE — P9604 ONE-WAY ALLOW PRORATED TRIP: HCPCS | Mod: ORL | Performed by: NURSE PRACTITIONER

## 2025-03-26 PROCEDURE — 80048 BASIC METABOLIC PNL TOTAL CA: CPT | Mod: ORL | Performed by: NURSE PRACTITIONER

## 2025-03-26 PROCEDURE — 85018 HEMOGLOBIN: CPT | Mod: ORL | Performed by: NURSE PRACTITIONER

## 2025-03-26 NOTE — PROGRESS NOTES
[facility] GERIATRICS    Chief Complaint   Patient presents with    RECHECK     HPI:  Krystal Parra is a 85 year old  (1940), who is being seen today for an episodic care visit at: [facility] ([facility]) [[id number]].       Today's concern is:     Patient seen today to follow-up pain management and review recent labs.     During exam, patient seen resting in bed. Reports doing well. Has been participating in therapy, ambulates short distances w/walker. States NICE machine has helped, along with scheduled oxycodone. Does endorse ongoing constipation. Admits to good appetite. Denies bloody stools or active sx of bleeding. Denies chest pain, SOB, headache, syncope.      Allergies, and PMH/PSH reviewed in [facility] today.    REVIEW OF SYSTEMS:  4 point ROS including Respiratory, CV, GI and , other than that noted in the HPI,  is negative      Objective:   /54   Pulse 92   Temp 98.7  F (37.1  C)   Resp 18   Ht 1.524 m (5')   Wt 75.8 kg (167 lb 3.2 oz)   LMP  (LMP Unknown)   SpO2 92%   BMI 32.65 kg/m    GENERAL APPEARANCE:  Alert, in no distress, oriented, cooperative  RESP:  no respiratory distress  CV: no edema  M/S:   Gait and station abnormal, resting in bed.  NEURO:   Cranial nerves 2-12 are normal tested and grossly at patient's baseline  PSYCH:  oriented X 3, affect and mood normal    Wt Readings from Last 4 Encounters:   03/26/25 75.8 kg (167 lb 3.2 oz)   03/24/25 74.9 kg (165 lb 1.6 oz)   03/18/25 71.9 kg (158 lb 9.6 oz)   02/24/25 73.4 kg (161 lb 14.4 oz)       Recent labs in [facility] reviewed by me today.  Most Recent 3 CBC's:  Recent Labs   Lab Test 03/26/25  0520 03/20/25  0734 03/19/25  0707 02/24/25  1649 04/04/23  1307 09/12/22  1159   WBC  --   --   --  7.0 7.1 7.5   HGB 7.6* 8.5* 8.7* 11.5* 12.3 12.2   MCV  --   --   --  90 91 92   PLT  --   --   --  214 211 252     Most Recent 3 BMP's:  Recent Labs   Lab Test 03/26/25  0520 03/20/25  0734 03/19/25  0707  02/24/25  1649 12/31/24  1346     --   --  139 139   POTASSIUM 4.4  --   --  4.3 4.6   CHLORIDE 105  --   --  103 103   CO2 25  --   --  25 26   BUN 28.9*  --   --  28.3* 25.7*   CR 0.82  --   --  0.72 0.72   ANIONGAP 8  --   --  11 10   LAMAR 9.1  --   --  9.6 10.4   * 123* 110* 100* 103*         Assessment/Plan:    (M17.12) Primary osteoarthritis of left knee  (primary encounter diagnosis)  (Z96.652) Status post total left knee replacement  (G89.18) Postoperative pain  (R53.81) Physical deconditioning  (K59.03) Drug induced constipation  Comment: Osteoarthritis of L knee, elective s/p left total knee arthroplasty with Dr. Perez on 3/28/25. Postop pain controlled. Constipation uncontrolled. Ongoing physical deconditioning.   PTA lives alone. Ambulates w/walker. SLUMS 24/30.  Last therapy update:   Transfer SBA-CGA  Ambulation  50' CGA  Plan:   - Give additional senna-s 2 tabs today dx constipation  - Discontinue current miralax orders   - Add miralax 17g po BID dx constipation  - Check CBC, CMP, A1c on 3/28/25 dx hyperglycemia, postop anemia, HTN  - Hold ibuprofen PRN due to anemia  - Discontinue current scheduled and PRN oxycodone orders  - Add oxycodone to 5mg QID dx postop pain  - Add oxycodone 5mg every 4hrs PRN dx postop pain  - Discontinue current tylenol orders  - Add tylenol 1000mg TID and 1000mg at bedtime PRN dx postop pain  - Check iron level, iron saturation, ferritin level on 3/28/25 dx anemia  - Check vitamin B12, folic acid level on 3/28/25 dx anemia  - Hold ibuprofen PRN due to anemia    (I10) Benign essential hypertension  Comment: BP controlled  Plan:   - Continue losartan  - Check BMP on 3/28/25    (D64.9) Postoperative anemia  Comment: Ongoing postop anemia, Hgb continues to trend down following surgery. Last Hgb 7.6 on 3/26/25.   Plan:   - Check CBC on 3/28/25; transfuse if Hgb < 7.0.   - Check iron level, iron saturation, ferritin level on 3/28/25 dx anemia  - Check vitamin  B12, folic acid level on 3/28/25 dx anemia  - Hold ibuprofen PRN due to anemia  - Reviewed patient status and treatment plan with Iwona (daughter).   - Discussed patient status and above treatment plan with DON      Orders:  - Give additional senna-s 2 tabs today dx constipation  - Discontinue current miralax orders   - Add miralax 17g po BID dx constipation  - Check CBC, CMP, A1c on 3/28/25 dx hyperglycemia, postop anemia, HTN  - Check iron level, iron saturation, ferritin level on 3/28/25 dx anemia  - Check vitamin B12, folic acid level on 3/28/25 dx anemia  - Hold ibuprofen PRN due to anemia  - Discontinue current scheduled and PRN oxycodone orders  - Add oxycodone to 5mg QID dx postop pain  - Add oxycodone 5mg every 4hrs PRN dx postop pain  - Discontinue current tylenol orders  - Add tylenol 1000mg TID and 1000mg at bedtime PRN dx postop pain        Total time spent during today's visit was 55 mins including patient visit and review of past records. Time also spent coordinating care with RN staff as well as reviewing patient status and treatment plan with Iwona (daughter).    Electronically signed by: OZIEL Mendez CNP

## 2025-03-26 NOTE — TELEPHONE ENCOUNTER
Received medication questions via [employer].  Waiting for response from provider and will respond.    Jasmin Burnett RN on 3/26/2025 at 11:13 AM

## 2025-03-26 NOTE — TELEPHONE ENCOUNTER
[facility] Geriatrics Lab Note     Provider: OZIEL Steel CNP   Facility: [facility] Facility Type:  TCU    Allergies   Allergen Reactions    Sulfa Antibiotics        Labs Reviewed by provider: BMP, Hgb     Verbal Order/Direction given by Provider: Recheck Hgb, iron level, iron saturation, ferritin level on 3/28/25 dx postop anemia     Provider giving Order:  OZIEL Steel CNP     Verbal Order given to: Elsa Joy RN

## 2025-03-27 ENCOUNTER — TRANSITIONAL CARE UNIT VISIT (OUTPATIENT)
Dept: GERIATRICS | Facility: CLINIC | Age: 85
End: 2025-03-27
Payer: COMMERCIAL

## 2025-03-27 ENCOUNTER — LAB REQUISITION (OUTPATIENT)
Dept: LAB | Facility: CLINIC | Age: 85
End: 2025-03-27
Payer: COMMERCIAL

## 2025-03-27 DIAGNOSIS — D64.9 ANEMIA, UNSPECIFIED: ICD-10-CM

## 2025-03-27 DIAGNOSIS — G89.18 POSTOPERATIVE PAIN: ICD-10-CM

## 2025-03-27 DIAGNOSIS — D64.9 POSTOPERATIVE ANEMIA: ICD-10-CM

## 2025-03-27 DIAGNOSIS — I10 BENIGN ESSENTIAL HYPERTENSION: ICD-10-CM

## 2025-03-27 DIAGNOSIS — Z96.652 STATUS POST TOTAL LEFT KNEE REPLACEMENT: ICD-10-CM

## 2025-03-27 DIAGNOSIS — R53.81 PHYSICAL DECONDITIONING: ICD-10-CM

## 2025-03-27 DIAGNOSIS — K59.03 DRUG INDUCED CONSTIPATION: ICD-10-CM

## 2025-03-27 DIAGNOSIS — R73.9 HYPERGLYCEMIA, UNSPECIFIED: ICD-10-CM

## 2025-03-27 DIAGNOSIS — I10 ESSENTIAL (PRIMARY) HYPERTENSION: ICD-10-CM

## 2025-03-27 DIAGNOSIS — M17.12 PRIMARY OSTEOARTHRITIS OF LEFT KNEE: Primary | ICD-10-CM

## 2025-03-27 RX ORDER — IBUPROFEN 600 MG/1
600 TABLET, FILM COATED ORAL EVERY 6 HOURS PRN
Qty: 90 TABLET | Refills: 1 | Status: SHIPPED | OUTPATIENT
Start: 2025-03-27

## 2025-03-27 RX ORDER — POLYETHYLENE GLYCOL 3350 17 G/17G
1 POWDER, FOR SOLUTION ORAL 2 TIMES DAILY
Qty: 30 EACH | Refills: 2 | Status: SHIPPED | OUTPATIENT
Start: 2025-03-27

## 2025-03-27 RX ORDER — ACETAMINOPHEN 500 MG
TABLET ORAL
Qty: 120 TABLET | Refills: 0 | Status: SHIPPED | OUTPATIENT
Start: 2025-03-27

## 2025-03-27 RX ORDER — OXYCODONE HYDROCHLORIDE 5 MG/1
TABLET ORAL
Qty: 40 TABLET | Refills: 0 | Status: SHIPPED | OUTPATIENT
Start: 2025-03-27

## 2025-03-27 NOTE — PATIENT INSTRUCTIONS
[facility]   2025     Name: Krystal OREILLY Aida   : 1940       Orders:  - Give additional senna-s 2 tabs today dx constipation  - Discontinue current miralax orders   - Add miralax 17g po BID dx constipation      Electronically signed by  OZIEL Mendez CNP on 3/27/2025 at 1:07 PM           [facility]   2025     Name: Krystal Parra   : 1940       Orders:  - Check CBC, CMP, A1c on 3/28/25 dx hyperglycemia, postop anemia, HTN  - Check iron level, iron saturation, ferritin level on 3/28/25 dx anemia  - Check vitamin B12, folic acid level on 3/28/25 dx anemia  - Hold ibuprofen PRN due to anemia  - Discontinue current scheduled and PRN oxycodone orders  - Add oxycodone to 5mg QID dx postop pain  - Add oxycodone 5mg every 4hrs PRN dx postop pain  - Discontinue current tylenol orders  - Add tylenol 1000mg TID and 1000mg at bedtime PRN dx postop pain      Electronically signed by  OZIEL Mendez CNP on 3/27/2025 at 5:50 PM

## 2025-03-27 NOTE — TELEPHONE ENCOUNTER
Called [facility] at [phone] to inform nursing staff of the new ibuprofen order.   Order sent via e-prescribe to [facility] pharmacy.  Also faxed order to the TCU at [phone].  Informed patient daughter this was done. Aggie Pollack RN

## 2025-03-28 PROBLEM — D50.9 ANEMIA, IRON DEFICIENCY: Status: ACTIVE | Noted: 2025-03-28

## 2025-03-28 PROBLEM — D64.9 POSTOPERATIVE ANEMIA: Status: ACTIVE | Noted: 2025-03-28

## 2025-03-28 LAB
ALBUMIN SERPL BCG-MCNC: 3.1 G/DL (ref 3.5–5.2)
ALP SERPL-CCNC: 75 U/L (ref 40–150)
ALT SERPL W P-5'-P-CCNC: 19 U/L (ref 0–50)
ANION GAP SERPL CALCULATED.3IONS-SCNC: 9 MMOL/L (ref 7–15)
AST SERPL W P-5'-P-CCNC: 25 U/L (ref 0–45)
BILIRUB SERPL-MCNC: 0.4 MG/DL
BUN SERPL-MCNC: 28.6 MG/DL (ref 8–23)
CALCIUM SERPL-MCNC: 9 MG/DL (ref 8.8–10.4)
CHLORIDE SERPL-SCNC: 102 MMOL/L (ref 98–107)
CREAT SERPL-MCNC: 0.84 MG/DL (ref 0.51–0.95)
EGFRCR SERPLBLD CKD-EPI 2021: 67 ML/MIN/1.73M2
ERYTHROCYTE [DISTWIDTH] IN BLOOD BY AUTOMATED COUNT: 13.8 % (ref 10–15)
EST. AVERAGE GLUCOSE BLD GHB EST-MCNC: 103 MG/DL
FERRITIN SERPL-MCNC: 221 NG/ML (ref 11–328)
FOLATE SERPL-MCNC: 19.2 NG/ML (ref 4.6–34.8)
GLUCOSE SERPL-MCNC: 108 MG/DL (ref 70–99)
HBA1C MFR BLD: 5.2 %
HCO3 SERPL-SCNC: 25 MMOL/L (ref 22–29)
HCT VFR BLD AUTO: 24 % (ref 35–47)
HGB BLD-MCNC: 7.8 G/DL (ref 11.7–15.7)
IRON BINDING CAPACITY (ROCHE): 220 UG/DL (ref 240–430)
IRON SATN MFR SERPL: 14 % (ref 15–46)
IRON SERPL-MCNC: 31 UG/DL (ref 37–145)
MCH RBC QN AUTO: 30.5 PG (ref 26.5–33)
MCHC RBC AUTO-ENTMCNC: 32.5 G/DL (ref 31.5–36.5)
MCV RBC AUTO: 94 FL (ref 78–100)
PLATELET # BLD AUTO: 296 10E3/UL (ref 150–450)
POTASSIUM SERPL-SCNC: 4.7 MMOL/L (ref 3.4–5.3)
PROT SERPL-MCNC: 6.1 G/DL (ref 6.4–8.3)
RBC # BLD AUTO: 2.56 10E6/UL (ref 3.8–5.2)
SODIUM SERPL-SCNC: 136 MMOL/L (ref 135–145)
VIT B12 SERPL-MCNC: 1412 PG/ML (ref 232–1245)
WBC # BLD AUTO: 8.4 10E3/UL (ref 4–11)

## 2025-03-28 PROCEDURE — 82607 VITAMIN B-12: CPT | Mod: ORL | Performed by: NURSE PRACTITIONER

## 2025-03-28 PROCEDURE — 85027 COMPLETE CBC AUTOMATED: CPT | Mod: ORL | Performed by: NURSE PRACTITIONER

## 2025-03-28 PROCEDURE — 83550 IRON BINDING TEST: CPT | Mod: ORL | Performed by: NURSE PRACTITIONER

## 2025-03-28 PROCEDURE — 82728 ASSAY OF FERRITIN: CPT | Mod: ORL | Performed by: NURSE PRACTITIONER

## 2025-03-28 PROCEDURE — 36415 COLL VENOUS BLD VENIPUNCTURE: CPT | Mod: ORL | Performed by: NURSE PRACTITIONER

## 2025-03-28 PROCEDURE — 83036 HEMOGLOBIN GLYCOSYLATED A1C: CPT | Mod: ORL | Performed by: NURSE PRACTITIONER

## 2025-03-28 PROCEDURE — 80053 COMPREHEN METABOLIC PANEL: CPT | Mod: ORL | Performed by: NURSE PRACTITIONER

## 2025-03-28 PROCEDURE — P9604 ONE-WAY ALLOW PRORATED TRIP: HCPCS | Mod: ORL | Performed by: NURSE PRACTITIONER

## 2025-03-28 PROCEDURE — 82746 ASSAY OF FOLIC ACID SERUM: CPT | Mod: ORL | Performed by: NURSE PRACTITIONER

## 2025-03-31 ENCOUNTER — TRANSITIONAL CARE UNIT VISIT (OUTPATIENT)
Dept: GERIATRICS | Facility: CLINIC | Age: 85
End: 2025-03-31
Payer: COMMERCIAL

## 2025-03-31 VITALS
WEIGHT: 167.2 LBS | HEIGHT: 60 IN | SYSTOLIC BLOOD PRESSURE: 140 MMHG | RESPIRATION RATE: 18 BRPM | DIASTOLIC BLOOD PRESSURE: 66 MMHG | BODY MASS INDEX: 32.83 KG/M2 | HEART RATE: 100 BPM | TEMPERATURE: 97.7 F | OXYGEN SATURATION: 94 %

## 2025-03-31 DIAGNOSIS — G89.18 POSTOPERATIVE PAIN: ICD-10-CM

## 2025-03-31 RX ORDER — OXYCODONE HYDROCHLORIDE 5 MG/1
TABLET ORAL
Qty: 40 TABLET | Refills: 0 | Status: SHIPPED | OUTPATIENT
Start: 2025-03-31

## 2025-03-31 NOTE — PROGRESS NOTES
[facility] GERIATRICS    Chief Complaint   Patient presents with    RECHECK     HPI:  Krystal Parra is a 85 year old  (1940), who is being seen today for an episodic care visit at: [facility] ([facility]) [[id number]].       Today's concern is: ***    Admits to good appetite.            Allergies, and PMH/PSH reviewed in [facility] today.    REVIEW OF SYSTEMS:  {rjgwmg12:163012}      Objective:   BP (!) 140/66   Pulse 100   Temp 97.7  F (36.5  C)   Resp 18   Ht 1.524 m (5')   Wt 75.8 kg (167 lb 3.2 oz)   LMP  (LMP Unknown)   SpO2 94%   BMI 32.65 kg/m    {Nursing home physical exam :189080}      Recent labs in [facility] reviewed by me today.   Most Recent 3 CBC's:  Recent Labs   Lab Test 03/28/25  0614 03/26/25  0520 03/20/25  0734 03/19/25  0707 02/24/25  1649 04/04/23  1307   WBC 8.4  --   --   --  7.0 7.1   HGB 7.8* 7.6* 8.5*   < > 11.5* 12.3   MCV 94  --   --   --  90 91     --   --   --  214 211    < > = values in this interval not displayed.     Most Recent 3 BMP's:  Recent Labs   Lab Test 03/28/25  0614 03/26/25  0520 03/20/25  0734 03/19/25  0707 02/24/25  1649    138  --   --  139   POTASSIUM 4.7 4.4  --   --  4.3   CHLORIDE 102 105  --   --  103   CO2 25 25  --   --  25   BUN 28.6* 28.9*  --   --  28.3*   CR 0.84 0.82  --   --  0.72   ANIONGAP 9 8  --   --  11   LAMAR 9.0 9.1  --   --  9.6   * 107* 123*   < > 100*    < > = values in this interval not displayed.         Assessment/Plan:    {S DX2:209467}        Orders:  - Increase oxycodone to 5mg five time/day at 0800, 1200, 1600, 2000, 0000 dx postop pain  - Continue oxycodone 5mg every 4hrs PRN; ok to take between scheduled oxycodone doses dx postop pain  - Compression stockings: on in the morning and off at night       Electronically signed by: OZIEL Mendez CNP

## 2025-03-31 NOTE — PATIENT INSTRUCTIONS
[facility] Geriatrics   2025     Name: Krystal Parra   : 1940     Orders:  - Increase oxycodone to 5mg five time/day at 0800, 1200, 1600, 2000, 0000 dx postop pain  - Continue oxycodone 5mg every 4hrs PRN; ok to take between scheduled oxycodone doses dx postop pain  - Compression stockings: on in the morning and off at night       Electronically signed by   OZIEL Mendez CNP on 3/31/2025 at 5:31 PM

## 2025-04-01 DIAGNOSIS — G89.18 POSTOPERATIVE PAIN: ICD-10-CM

## 2025-04-01 RX ORDER — ACETAMINOPHEN 500 MG
1000 TABLET ORAL 4 TIMES DAILY
Qty: 120 TABLET | Refills: 0 | Status: SHIPPED | OUTPATIENT
Start: 2025-04-01

## 2025-04-01 NOTE — CONFIDENTIAL NOTE
[facility] Geriatrics   2025     Name: Krystal Parra   : 1940       Orders:  - Discontinue current tylenol orders   - Add tylenol 1000mg QID at 0600, 1000, 1400, 1800 dx postop pain       Electronically signed by   OZIEL Mendez CNP on 2025 at 10:21 AM

## 2025-04-03 ENCOUNTER — DISCHARGE SUMMARY NURSING HOME (OUTPATIENT)
Dept: GERIATRICS | Facility: CLINIC | Age: 85
End: 2025-04-03
Payer: COMMERCIAL

## 2025-04-03 VITALS
RESPIRATION RATE: 22 BRPM | DIASTOLIC BLOOD PRESSURE: 65 MMHG | OXYGEN SATURATION: 97 % | HEIGHT: 60 IN | SYSTOLIC BLOOD PRESSURE: 151 MMHG | WEIGHT: 167.6 LBS | HEART RATE: 94 BPM | TEMPERATURE: 98.7 F | BODY MASS INDEX: 32.9 KG/M2

## 2025-04-03 NOTE — PROGRESS NOTES
[facility] GERIATRICS DISCHARGE SUMMARY    PATIENT'S NAME: Krystal Parra  YOB: 1940  MEDICAL RECORD NUMBER:  [id number]  Place of Service where encounter took place:  [facility] ([facility]) [[id number]]    PRIMARY CARE PROVIDER AND CLINIC RESPONSIBLE AFTER TRANSFER:   Tasha Winkler MD, [address]  / [place] [address]    [facility] Provider     Transferring providers: OZIEL Mendez CNP; Dayron Mondragon MD  Recent Hospitalization/ED:  [facility] Hospital stay 3/18/25 to 3/20/25.  Date of SNF Admission:  3/20/25  Date of SNF (anticipated) Discharge:  4/6/25  Discharged to: previous independent living facility  Cognitive Scores: {fgscog1:562297}  Physical Function: {fgsphysicalfunction:450328}  DME: {fgsdmedc:947698}      CODE STATUS/ADVANCE DIRECTIVES DISCUSSION:  Full Code     ALLERGIES: Sulfa antibiotics      NURSING FACILITY COURSE     Medication Changes/Rationale:   ***        Summary of nursing facility stay:     {FGS DX2:924037}        Discharge Medications:    MED REC REQUIRED{TIP  Click the link below to document or use med rec list, use list to pull in response :910786}  Post Medication Reconciliation Status: discharge medications reconciled and changed, per note/orders     Current Outpatient Medications   Medication Sig Dispense Refill    acetaminophen (TYLENOL) 500 MG tablet Take 2 tablets (1,000 mg) by mouth 4 times daily. 120 tablet 0    aspirin 81 MG EC tablet Take 1 tablet (81 mg) by mouth 2 times daily. 60 tablet 0    atorvastatin (LIPITOR) 20 MG tablet Take 1 tablet (20 mg) by mouth daily. 90 tablet 11    cetirizine (ZYRTEC) 10 MG tablet Take 10 mg by mouth daily.      Cholecalciferol (VITAMIN D) 2000 UNIT CAPS Take 1 tablet by mouth daily.      coenzyme Q10 (CO-Q10) 30 MG capsule Take 1 capsule (30 mg) by mouth daily 30 capsule 0    fish oil-omega-3 fatty acids 1000 MG capsule Take 2 g by mouth 2 times daily        ibuprofen (ADVIL/MOTRIN) 600 MG tablet Take 1 tablet (600 mg) by mouth every 6 hours as needed for mild pain. Take with food (Patient not taking: Reported on 3/27/2025) 90 tablet 1    losartan (COZAAR) 100 MG tablet Take 1 tablet (100 mg) by mouth daily. 90 tablet 3    MAGNESIUM GLYCINATE PO Take 315 mg by mouth daily.      metoprolol succinate ER (TOPROL XL) 50 MG 24 hr tablet Take 1 tablet (50 mg) by mouth daily. 90 tablet 3    OVER-THE-COUNTER Take 1 tablet by mouth daily. (Melaleuca vitamins)   HOLD WHILE IN TCU      oxyCODONE (ROXICODONE) 5 MG tablet Take 1 tablet (5 mg) by mouth 5 times daily. May also take 1 tablet (5 mg) every 4 hours as needed for pain. 40 tablet 0    polyethylene glycol (MIRALAX) 17 g packet Take 17 g by mouth 2 times daily. Take 1 packet BID x 3 days, then take 1 packet every day 30 each 2    SENNA-docusate sodium (SENNA S) 8.6-50 MG tablet Take 2 tablets by mouth 2 times daily. May also take 1 tablet 2 times daily as needed (constipation). 60 tablet 0    tamsulosin (FLOMAX) 0.4 MG capsule Take 1 capsule (0.4 mg) by mouth daily. May discontinue if able to void spontaneously without urine retention 7 capsule 0    traZODone (DESYREL) 100 MG tablet Take 1 tablet (100 mg) by mouth at bedtime. 30 tablet 0        Controlled medications:   {fgscontrolledmeds1:241627}       Past Medical History:   Past Medical History:   Diagnosis Date    Basal cell carcinoma     Bone spur 4th toe Right     Hypertension     Lumbar disc herniation     Moderate aortic regurgitation 06/18/2016    Osteopenia     Pathologic fracture of distal radius and ulna     right in 2003, left 1992     Physical Exam:   Vitals: BP (!) 151/65   Pulse 94   Temp 98.7  F (37.1  C)   Resp 22   Ht 1.524 m (5')   Wt 76 kg (167 lb 9.6 oz)   LMP  (LMP Unknown)   SpO2 97%   BMI 32.73 kg/m    BMI: Body mass index is 32.73 kg/m .  {NURSING HOME PHYSICAL EXAM:220601}       [facility] labs: {fgslabdischarge:595665}        DISCHARGE  PLAN:    Follow up labs: {fgsfuturelabs1:237430}    Medical Follow Up:      {fgsdischargefollowup:201085}    [facility] scheduled appointments:  Appointments in Next Year      Apr 11, 2025 10:30 AM  Infusion Visit with  CANCER INFUSION NURSE,  INFUSION CHAIR  [facility] ([facility] ) [phone]     Apr 22, 2025 2:30 PM  (Arrive by 2:15 PM)   MSK LIMITED with [facility]SC[facility], [facility] MSK  RAD  [facility] ([facility] ) [phone]     Apr 30, 2025 11:40 AM  (Arrive by 11:25 AM)  Post-Op Knee Replacement with Jeremiah Perez MD  [facility] ([facility] ) [phone]     Sep 23, 2025 2:00 PM  (Arrive by 1:40 PM)  Annual Wellness Visit with Tasha Winkler MD  [facility] ([facility] ) [phone]             Discharge Services: {fgsdcservices1:927580}      Discharge Instructions Verbalized to Patient at Discharge:   {fgsdischargeinstructions1:908794}          TOTAL DISCHARGE TIME:   Greater than 30 minutes    Electronically signed by:  OZIEL Mendez CNP            details… detailed exam

## 2025-04-03 NOTE — LETTER
4/3/2025      Krystal Parra  [address]  [address]  [place] [address]        No notes on file      Sincerely,        OZIEL Mendez CNP    Electronically signed

## 2025-04-07 DIAGNOSIS — Z09 HOSPITAL DISCHARGE FOLLOW-UP: ICD-10-CM

## 2025-04-08 ENCOUNTER — MEDICAL CORRESPONDENCE (OUTPATIENT)
Dept: HEALTH INFORMATION MANAGEMENT | Facility: CLINIC | Age: 85
End: 2025-04-08

## 2025-04-09 ENCOUNTER — TELEPHONE (OUTPATIENT)
Dept: PEDIATRICS | Facility: CLINIC | Age: 85
End: 2025-04-09
Payer: COMMERCIAL

## 2025-04-09 ENCOUNTER — PATIENT OUTREACH (OUTPATIENT)
Dept: CARE COORDINATION | Facility: CLINIC | Age: 85
End: 2025-04-09
Payer: COMMERCIAL

## 2025-04-09 ENCOUNTER — MYC MEDICAL ADVICE (OUTPATIENT)
Dept: ORTHOPEDICS | Facility: CLINIC | Age: 85
End: 2025-04-09
Payer: COMMERCIAL

## 2025-04-09 NOTE — TELEPHONE ENCOUNTER
Home Care is calling regarding an established patient with [facility].  Requesting orders from: Tasha Winkler  RN APPROVED: RN able to provide verbal orders.  Home Care will send orders for signature.  RN will close encounter.  Is this a request for a temporary pause in the home care episode?  No    Orders Requested    Skilled Nursing  Request for initial certification (first set of orders)   Frequency: 2x weekly for 1 week, then 1x weekly for 5 weeks.   RN gave verbal order: Yes      Phone number Home Care can be reached at: [phone]  Okay to leave a detailed message?: Yes      GRISEL Arechiga [facility]    Merrick Monteiro RN

## 2025-04-09 NOTE — PROGRESS NOTES
Clinic Care Coordination Contact  Transitions of Care Outreach  Chief Complaint   Patient presents with    Clinic Care Coordination - Homecare/TCU     TCM       Most Recent Admission Date: 3/20/2025   Most Recent Admission Diagnosis: Total Knee Arthroplasty    Most Recent Discharge Date: 4/6/2025   Most Recent Discharge Diagnosis: Rehabilitation and return to previous independent living facility.     Transitions of Care Assessment    Discharge Assessment  How are you doing now that you are home?: I'm doing better  How are your symptoms? (Red Flag symptoms escalate to triage hotline per guidelines): Improved  Do you know how to contact your clinic care team if you have future questions or changes to your health status? : Yes  Does the patient have their discharge instructions? : Yes  Does the patient have questions regarding their discharge instructions? : No  Were you started on any new medications or were there changes to any of your previous medications? : Yes  Does the patient have all of their medications?: Yes  Do you have questions regarding any of your medications? : No  Do you have all of your needed medical supplies or equipment (DME)?  (i.e. oxygen tank, CPAP, cane, etc.): Yes         Care Mgmt Encounter Assessment  [facility] outreached to pt on this date. Pt shares she is doing well. Pt has had support from both children who have checked in on her. Verifies home care is coming out. Awaiting start of OT/PT for home care. No needs. Pt was explained Care Coordination role and support. Pt denied any ongoing needs at this time. Pt declined enrollment.     Follow up Plan     Discharge Follow-Up  Discharge follow up appointment scheduled in alignment with recommended follow up timeframe or Transitions of Risk Category? (Low = within 30 days; Moderate= within 14 days; High= within 7 days): Yes  Discharge Follow Up Appointment Date: 04/15/25  Discharge Follow Up Appointment Scheduled with?: Primary Care  Provider    Future Appointments   Date Time Provider Department Center   4/11/2025 10:30 AM  CANCER INFUSION NURSE MATTHEW PETERSON [facility]   4/15/2025 10:30 AM Tasha Winkler MD [facility]   4/22/2025  2:30 PM UCSCUS3 [facility]   4/23/2025  2:00 PM [facility]   4/30/2025 11:40 AM Jeremiah Perez MD [facility]   9/23/2025  2:00 PM Tasha Winkler MD [facility]       Outpatient Plan as outlined on AVS reviewed with patient.    For any urgent concerns, please contact our 24 hour nurse triage line: [phone] ([phone])       No further outreaches will be made at this time unless a new referral is made or a change in the pt's status occurs. Patient was provided with this writer's contact information and encouraged to call with any questions or concerns.     TIMOTHY Brennan  Clinic Care Coordinator  [facility]  [phone]  [email or web address]

## 2025-04-14 ENCOUNTER — INFUSION THERAPY VISIT (OUTPATIENT)
Dept: INFUSION THERAPY | Facility: CLINIC | Age: 85
End: 2025-04-14
Payer: COMMERCIAL

## 2025-04-14 VITALS
HEART RATE: 69 BPM | RESPIRATION RATE: 20 BRPM | SYSTOLIC BLOOD PRESSURE: 169 MMHG | TEMPERATURE: 97.4 F | DIASTOLIC BLOOD PRESSURE: 78 MMHG | OXYGEN SATURATION: 98 %

## 2025-04-14 DIAGNOSIS — D50.9 IRON DEFICIENCY ANEMIA, UNSPECIFIED IRON DEFICIENCY ANEMIA TYPE: ICD-10-CM

## 2025-04-14 DIAGNOSIS — D64.9 POSTOPERATIVE ANEMIA: Primary | ICD-10-CM

## 2025-04-14 PROCEDURE — 250N000011 HC RX IP 250 OP 636: Mod: JZ | Performed by: NURSE PRACTITIONER

## 2025-04-14 PROCEDURE — 258N000003 HC RX IP 258 OP 636: Performed by: NURSE PRACTITIONER

## 2025-04-14 PROCEDURE — 96374 THER/PROPH/DIAG INJ IV PUSH: CPT

## 2025-04-14 RX ORDER — HEPARIN SODIUM,PORCINE 10 UNIT/ML
5-20 VIAL (ML) INTRAVENOUS DAILY PRN
OUTPATIENT
Start: 2025-04-14

## 2025-04-14 RX ORDER — ALBUTEROL SULFATE 0.83 MG/ML
2.5 SOLUTION RESPIRATORY (INHALATION)
OUTPATIENT
Start: 2025-04-14

## 2025-04-14 RX ORDER — EPINEPHRINE 1 MG/ML
0.3 INJECTION, SOLUTION INTRAMUSCULAR; SUBCUTANEOUS EVERY 5 MIN PRN
OUTPATIENT
Start: 2025-04-14

## 2025-04-14 RX ORDER — METHYLPREDNISOLONE SODIUM SUCCINATE 40 MG/ML
40 INJECTION INTRAMUSCULAR; INTRAVENOUS
Start: 2025-04-14

## 2025-04-14 RX ORDER — DIPHENHYDRAMINE HYDROCHLORIDE 50 MG/ML
50 INJECTION, SOLUTION INTRAMUSCULAR; INTRAVENOUS
Start: 2025-04-14

## 2025-04-14 RX ORDER — ALBUTEROL SULFATE 90 UG/1
1-2 INHALANT RESPIRATORY (INHALATION)
Start: 2025-04-14

## 2025-04-14 RX ORDER — DIPHENHYDRAMINE HYDROCHLORIDE 50 MG/ML
25 INJECTION, SOLUTION INTRAMUSCULAR; INTRAVENOUS
Start: 2025-04-14

## 2025-04-14 RX ORDER — MEPERIDINE HYDROCHLORIDE 25 MG/ML
25 INJECTION INTRAMUSCULAR; INTRAVENOUS; SUBCUTANEOUS
OUTPATIENT
Start: 2025-04-14

## 2025-04-14 RX ORDER — HEPARIN SODIUM (PORCINE) LOCK FLUSH IV SOLN 100 UNIT/ML 100 UNIT/ML
5 SOLUTION INTRAVENOUS
OUTPATIENT
Start: 2025-04-14

## 2025-04-14 RX ADMIN — FERUMOXYTOL 510 MG: 510 INJECTION INTRAVENOUS at 11:10

## 2025-04-14 NOTE — PROGRESS NOTES
Infusion Nursing Note:  Krystal Parra presents today for Feraheme.    Patient seen by provider today: No   present during visit today: Not Applicable.    Note: N/A.      Intravenous Access:  Peripheral IV placed.    Treatment Conditions:  Not Applicable.      Post Infusion Assessment:  Patient tolerated infusion without incident.  Patient observed for 30 minutes post Feraheme per protocol.  Blood return noted pre and post infusion.  Site patent and intact, free from redness, edema or discomfort.  No evidence of extravasations.  Access discontinued per protocol.       Discharge Plan:   Patient declined prescription refills.  Discharge instructions reviewed with: Patient and Family.  Patient and family verbalized understanding of discharge instructions and all questions answered.  AVS to patient via [employer].  Patient will return as scheduled for next appointment.   Patient discharged in stable condition accompanied by: self and FARA Grossman.  Departure Mode: Wheelchair.      Christianne Panda RN

## 2025-04-15 ENCOUNTER — OFFICE VISIT (OUTPATIENT)
Dept: PEDIATRICS | Facility: CLINIC | Age: 85
End: 2025-04-15
Payer: COMMERCIAL

## 2025-04-15 ENCOUNTER — TELEPHONE (OUTPATIENT)
Dept: PEDIATRICS | Facility: CLINIC | Age: 85
End: 2025-04-15

## 2025-04-15 VITALS
DIASTOLIC BLOOD PRESSURE: 70 MMHG | BODY MASS INDEX: 32.98 KG/M2 | RESPIRATION RATE: 20 BRPM | WEIGHT: 168 LBS | TEMPERATURE: 98.7 F | HEART RATE: 70 BPM | SYSTOLIC BLOOD PRESSURE: 148 MMHG | OXYGEN SATURATION: 97 % | HEIGHT: 60 IN

## 2025-04-15 DIAGNOSIS — I10 BENIGN ESSENTIAL HYPERTENSION: ICD-10-CM

## 2025-04-15 DIAGNOSIS — D64.9 POSTOPERATIVE ANEMIA: ICD-10-CM

## 2025-04-15 DIAGNOSIS — M25.511 RIGHT SHOULDER PAIN, UNSPECIFIED CHRONICITY: ICD-10-CM

## 2025-04-15 DIAGNOSIS — Z96.652 S/P TOTAL KNEE ARTHROPLASTY, LEFT: Primary | ICD-10-CM

## 2025-04-15 LAB
ERYTHROCYTE [DISTWIDTH] IN BLOOD BY AUTOMATED COUNT: 15.7 % (ref 10–15)
HCT VFR BLD AUTO: 29.8 % (ref 35–47)
HGB BLD-MCNC: 9.5 G/DL (ref 11.7–15.7)
MCH RBC QN AUTO: 31.1 PG (ref 26.5–33)
MCHC RBC AUTO-ENTMCNC: 31.9 G/DL (ref 31.5–36.5)
MCV RBC AUTO: 98 FL (ref 78–100)
PLATELET # BLD AUTO: 259 10E3/UL (ref 150–450)
RBC # BLD AUTO: 3.05 10E6/UL (ref 3.8–5.2)
WBC # BLD AUTO: 8.4 10E3/UL (ref 4–11)

## 2025-04-15 PROCEDURE — 1125F AMNT PAIN NOTED PAIN PRSNT: CPT | Performed by: PEDIATRICS

## 2025-04-15 PROCEDURE — 99214 OFFICE O/P EST MOD 30 MIN: CPT | Performed by: PEDIATRICS

## 2025-04-15 PROCEDURE — 36415 COLL VENOUS BLD VENIPUNCTURE: CPT | Performed by: PEDIATRICS

## 2025-04-15 PROCEDURE — 1111F DSCHRG MED/CURRENT MED MERGE: CPT | Performed by: PEDIATRICS

## 2025-04-15 PROCEDURE — 3077F SYST BP >= 140 MM HG: CPT | Performed by: PEDIATRICS

## 2025-04-15 PROCEDURE — 3078F DIAST BP <80 MM HG: CPT | Performed by: PEDIATRICS

## 2025-04-15 PROCEDURE — G2211 COMPLEX E/M VISIT ADD ON: HCPCS | Performed by: PEDIATRICS

## 2025-04-15 PROCEDURE — 85027 COMPLETE CBC AUTOMATED: CPT | Performed by: PEDIATRICS

## 2025-04-15 ASSESSMENT — PAIN SCALES - GENERAL: PAINLEVEL_OUTOF10: MODERATE PAIN (5)

## 2025-04-15 NOTE — PROGRESS NOTES
Assessment & Plan       ICD-10-CM    1. S/P total knee arthroplasty, left  Z96.652 Recovering well.   Continuing to wean on oxycodone and down to using once per day.  No constipation issues.    Plan to allow for some low dose ibuprofen use with food.       Patient has home therapies and orthopedics follow up arranged      2. Postoperative anemia  D64.9 CBC with platelets     CBC with platelets    Due for recheck - now s/p 2 iron infusions        3. Benign essential hypertension  I10 Albumin Random Urine Quantitative with Creat Ratio    BP up slightly, but in pain - will keep BP regimen the same for now and reassess in July      4. Right shoulder pain, unspecified chronicity  M25.511 Patient to alert me if not improving for new ortho referral          The longitudinal plan of care for the diagnosis(es)/condition(s) as documented were addressed during this visit. Due to the added complexity in care, I will continue to support Krystal in the subsequent management and with ongoing continuity of care.       MED REC REQUIRED  Post Medication Reconciliation Status: discharge medications reconciled, continue medications without change      Subjective   Krystal is a 85 year old, presenting for the following health issues:  Hospital F/U (Knee replacement)        4/15/2025    10:10 AM   Additional Questions   Roomed by Judy RODRIGUEZ   Accompanied by None         4/15/2025    10:10 AM   Patient Reported Additional Medications   Patient reports taking the following new medications None     [facility]          4/9/2025   Post Discharge Outreach   How are you doing now that you are home? I'm doing better   How are your symptoms? (Red Flag symptoms escalate to triage hotline per guidelines) Improved   Does the patient have their discharge instructions?  Yes   Does the patient have questions regarding their discharge instructions?  No   Were you started on any new medications or were there changes to any of your previous medications?  Yes    Does the patient have all of their medications? Yes   Do you have questions regarding any of your medications?  No   Do you have all of your needed medical supplies or equipment (DME)?  (i.e. oxygen tank, CPAP, cane, etc.) Yes   Discharge Follow Up Appointment Date 4/15/2025   Discharge Follow Up Appointment Scheduled with? 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  No  Do you have any other stressors you would like to discuss with your provider? OTHER: Shoulder pain since she was at [facility], wondering if she could switch back to ibuprofen in the nighttime    Problems taking medications regularly:  None  Medication changes since discharge: None  Problems adhering to non-medication therapy:  None    Summary of hospitalization:  [facility] discharge summary reviewed  Diagnostic Tests/Treatments reviewed.  Follow up needed: CBC  Other Healthcare Providers Involved in Patient s Care:         Surgical follow-up appointment - with orthopedics and home PHYSICAL THERAPY/OT  Update since discharge: improved.    Plan of care communicated with patient and family     Doing well post operatively - down to oxycodone once daily and plans to stop soon.  No concern for infection.  Getting therapies at home.    Hemoglobin was low on discharge - now has had 2 iron infusions and is due for recheck    Has orthopedics follow up 4/30    Using tylenol in addition to oxycodone, but  wondering about being able to use ibuprofen sparingly again    No infection concerns    BP has been elevated, but has been in pain    Right shoulder has been bothering her a lot since surgery - limited ROM    Chronic lumbar back pain - problematic with more limitations in positioning from knee surgery        Objective    BP (!) 148/70   Pulse 70   Temp 98.7  F (37.1  C) (Temporal)   Resp 20   Ht 1.524 m (5')   Wt 76.2 kg (168 lb)   LMP  (LMP Unknown)   SpO2 97%   BMI 32.81 kg/m    Body mass index is 32.81 kg/m .  Wt Readings from Last 4 Encounters:   04/15/25 76.2 kg (168 lb)   04/03/25 76 kg (167 lb 9.6 oz)   04/03/25 76 kg (167 lb 9.6 oz)   03/31/25 75.8 kg (167 lb 3.2 oz)       Physical Exam   GENERAL: alert and no distress  CV: regular rates and rhythm, normal S1 S2, no S3 or S4, and no murmur, click or rub  MS: unable to evaluate left knee today - patient had on jeans - no tenderness to palpation, 1+ edema distal to replaced knee  Right shoulder tender with any movements  PSYCH: mentation appears normal, affect normal/bright    Labs pending        Signed Electronically by: Tasha Winkler MD

## 2025-04-15 NOTE — TELEPHONE ENCOUNTER
Received a call from ADELINA Rehman with [facility]   - ADELINA Rehman states that the patient has declined OT services stating that she does not need the services   - ADELINA Rehman states that the patient is receiving home care Physical Therapy and Skilled Nursing services     Home Care is calling regarding an established patient with [facility].  Requesting orders from: Tasha Winkler  FYI: RN able to provide verbal orders.  Sending as FYI only.  Is this a request for a temporary pause in the home care episode?  No    Orders Requested    Occupational Therapy  Request for delay in care, service is not able to be provided within 7 days of previously scheduled day.   RN gave verbal order: Yes  FYI: OT services not rescheduled. Patient is receiving home care Physical Therapy and Skilled Nursing services at this time.       Phone number Home Care can be reached at: [phone]   Okay to leave a detailed message?: Yes      Marlin Beaver RN

## 2025-04-15 NOTE — PATIENT INSTRUCTIONS
We'll recheck your hemoglobin today and I will send you a message with the results    OK to start taking ibuprofen twice daily for the next few weeks - make sure to take with food.  Try to get back down to once daily after that.    Keep up your therapies    We'll visit again in July and check in on your blood pressure then

## 2025-04-17 ENCOUNTER — TELEPHONE (OUTPATIENT)
Dept: PEDIATRICS | Facility: CLINIC | Age: 85
End: 2025-04-17
Payer: COMMERCIAL

## 2025-04-17 NOTE — TELEPHONE ENCOUNTER
Home Care is calling regarding an established patient with [facility].  Requesting orders from: Tasha Winkler  FYI: RN able to provide verbal orders.  Sending as FYI only.  Is this a request for a temporary pause in the home care episode?  No    Orders Requested    Skilled Nursing  Request for discontinuation of care   Goals have not been met/not progressing.  FYI: Barriers to care:  Patient is requesting to cancel this service. Per PT, patient states she is overwhelmed  RN gave verbal order: Yes      Physical Therapy  Request for continuation of care with decrease in frequency   Goals have been met/progressing.  Frequency: Once a week for 8 weeks  RN gave verbal order: Yes      Phone number Home Care can be reached at: [phone]  Okay to leave a detailed message?: Yes    Contacts       Contact Date/Time Type Contact Phone/Fax    04/17/2025 12:13 PM CDT Phone (Incoming) [place] [phone]     PT with [facility]          Vianey Whelan RN

## 2025-04-21 ENCOUNTER — TELEPHONE (OUTPATIENT)
Dept: PEDIATRICS | Facility: CLINIC | Age: 85
End: 2025-04-21
Payer: COMMERCIAL

## 2025-04-21 NOTE — TELEPHONE ENCOUNTER
Home Care is calling regarding an established patient with [facility].  Requesting orders from: Tasha Winkler RN APPROVED: RN able to provide verbal orders.  Home Care will send orders for signature.  RN will close encounter.  Is this a request for a temporary pause in the home care episode?  No    Orders Requested    Occupational Therapy  Request for initial certification (first set of orders)   Frequency: every other week x4 wks  RN gave verbal order: Yes        Phone number Home Care can be reached at: [phone]   Okay to leave a detailed message?: Yes      Mechelle Ogden RN

## 2025-04-21 NOTE — TELEPHONE ENCOUNTER
Home Care is calling regarding an established patient with [facility].  Requesting orders from: Tasha Winkler  FYI: RN able to provide verbal orders.  Sending as FYI only.  Is this a request for a temporary pause in the home care episode?  No    Orders Requested    Skilled Nursing  Request for discontinuation of care   Goals have not been met/not progressing.  FYI: Barriers to care:  Patient refusing all skilled nursing care. Home health RN attempted to complete a visit to discharge the patient from home care skilled nursing and patient refused. LILY Calloway completing a no visit discharge today (4/21/2025). LILY Calloway states that the patient is continuing with home health therapies at this time.   RN gave verbal order: Yes      Phone number Home Care can be reached at: [phone]  Okay to leave a detailed message?: Yes      Routing to Dr. Winkler as an FYI per the Home Care protocol.     Marlin Beaver RN

## 2025-04-21 NOTE — TELEPHONE ENCOUNTER
Forms/Letter Request    Type of form/letter: OTHER: [facility]: [id number], [id number]     Do we have the form/letter: Yes: Form is on the provider's desk for review    Who is the form from? Home care    Where did/will the form come from? form was faxed in

## 2025-04-22 ENCOUNTER — ANCILLARY PROCEDURE (OUTPATIENT)
Dept: ULTRASOUND IMAGING | Facility: CLINIC | Age: 85
End: 2025-04-22
Attending: STUDENT IN AN ORGANIZED HEALTH CARE EDUCATION/TRAINING PROGRAM
Payer: COMMERCIAL

## 2025-04-22 ENCOUNTER — MEDICAL CORRESPONDENCE (OUTPATIENT)
Dept: HEALTH INFORMATION MANAGEMENT | Facility: CLINIC | Age: 85
End: 2025-04-22

## 2025-04-22 DIAGNOSIS — G56.03 BILATERAL CARPAL TUNNEL SYNDROME: ICD-10-CM

## 2025-04-22 DIAGNOSIS — M25.531 WRIST PAIN, RIGHT: ICD-10-CM

## 2025-04-22 PROCEDURE — 76882 US LMTD JT/FCL EVL NVASC XTR: CPT | Performed by: RADIOLOGY

## 2025-04-22 NOTE — PROGRESS NOTES
Medication Therapy Management (MTM) Encounter    ASSESSMENT:                            Medication Adherence/Access: No issues identified.    Pain/Recent TKA  Recommend patient utilize acetaminophen more frequently as needed for pain, can take up to 4000 mg daily per orders. Follow-up with sports medicine next week to assess progress. Educated ok to take with ibuprofen, and would not recommend increased ibuprofen dose.     Hypertension /CAD/Hx TIA  Most recent clinic blood pressure not at goal <130/80 mmHg, but patient reports home numbers are better. Because patient has been in pain and unable to check blood pressure in clinic today, ok to continue current regimen. Continue to monitor.   Recommend patient decrease aspirin to once daily as intended.   Due to age and taking both aspirin and ibuprofen daily, patient is at moderate risk for NSAID GI toxicity and is indicated for PPI therapy per ACG 2009 guidelines. Discussed with patient, and she would much prefer not to start another medication, but said that she would like to hear Dr. Winkler's opinion. Will connect with provider.     Hyperlipidemia   LDL at goal < 70 mg/dL. Could consider LDL goal <55 mg/dL due to secondary prevention, however, due to age, <70 is appropriate per clinical judgement.     Allergy   Stable, continue current regimen.     Constipation   Stable, continue current regimen.     Insomnia   Stable, continue current regimen.     Supplements   Patient likely not getting benefit from fish oil, multivitamin, turmeric, ginko, or lions gerry due to insufficient evidence. Additionally, fish oil and ginko can contribute to bleeding risk, especially while patient is on aspirin and NSAID. Discussed this with patient and she prefers to remain on supplements.     Immunizations  Due for RSV vaccine(s) per CDC/ACIP recommendations. Patient is not agreeable to plan.    PLAN:                            Can take acetaminophen 1000 mg every 6 hours as needed (up to  four times daily).     Decrease aspirin to 81 mg once daily     Will discuss starting an acid-suppressing therapy with Dr. Winkler to lower your risk of stomach ulcers while you are on both ibuprofen and aspirin.     Follow-up: Return in about 1 year (around 4/23/2026) for Medication Therapy Management or earlier if questions/concerns.    SUBJECTIVE/OBJECTIVE:                          Krystal Parra is a 85 year old female seen for a transitions of care visit. She was discharged from [facility] on 3/20/25 for TKA. [facility]. Patient was accompanied by daughter.     Reason for visit: Checking medications since home from [facility] - no specific patient concerns.     Allergies/ADRs: Reviewed in chart  Past Medical History: Reviewed in chart  Tobacco: She reports that she has never smoked. She has been exposed to tobacco smoke. She has never used smokeless tobacco.  Alcohol: not currently using    Medication Adherence/Access: Patient takes medications directly from bottles following chart that her daughter made to keep track.   Patient takes medications 4 time(s) per day.   Per patient, misses medication 0 time(s) per week.   The patient fills medications at [place]: NO, fills medications at [facility].    Pain/Recent TKA  Acetaminophen 1000 mg every 6 hours as needed (takes twice daily, alternates with ibuprofen)  Ibuprofen 400 mg every 6 hours as needed (takes twice daily, alternates with acetaminophen)   Oxycodone 5 mg every 4 hours as needed - twice daily right now; thinks it helps but its hard to tell because pain comes and goes     No side effects.   Pain comes from her back and shoulders the most. Knee maybe felt better for little after surgery (TKA 3/18/25), but now feels the same as before surgery. When she is sitting it is ok, but walking is tough and sometimes gets shooting pains.   Does not find patches, creams, or gels helpful.   Following with sports medicine and physical therapy.     Hypertension  /CAD/Hx TIA  Aspirin 81 mg twice daily - per discharge orders, is supposed to switch back to once daily on 4/17/25  Losartan 100 mg daily   Metoprolol ER 50 mg daily     Patient reports no current medication side effects  Patient self monitors blood pressure.  Home BP monitoring reports SBP has been in the 120s at home in the last couple weeks     Hyperlipidemia   Atorvastatin 20 mg daily   Patient reports no significant myalgias or other side effects.     Allergy   Cetirizine 10 mg daily     Patient reports no current medication side effects.    Primary symptoms are itchiness on her skin.   Patient feels that current therapy is effective.      Constipation   Senna-S 8.6/50 mg 1 tablets twice daily    Has also been drinking prune juice which has helped a lot. Bms almost every day.   Patient feels that current therapy  effective.   Patient reports no current medication side effects.       Insomnia   Trazodone 100 mg every night at bedtime     Patient reports no issues at this time. Has a lot of trouble falling asleep usually, but with trazodone is able to have a restful night. No side effects reported.      Supplements   Vitamin D3 2000 units daily   Coenzyme Q10 30 mg daily   Fish oil 2g twice daily   Magnesium 35 mg tablets - for leg cramps, finds helpful  Maleleuca multivitamin  Turmeric 1 tablet once daily - for brain health   Ginko 1 tablet once daily - for brain health   Lion's Macario 1 tablet once daily - for brain health     No reported issues at this time.   Patient is not interested in stopping/changing supplements. Has been taking for many years without issue.      Immunizations  Most Recent Immunizations   Administered Date(s) Administered    COVID-19 MONOVALENT 12+ (Pfizer) 12/29/2021    Pneumococcal 23 valent 08/17/2005    TD,PF 7+ (Tenivac) 07/18/2017    Td (Adult), Adsorbed 07/18/2017       Today's Vitals: LMP  (LMP Unknown)   ----------------  Post Discharge Medication Reconciliation Status: discharge  medications reconciled and changed, per note/orders.    I spent 30 minutes with this patient today. All changes were made via collaborative practice agreement with Tasha Winkler MD.     A summary of these recommendations was sent via [employer].    Teagan Rainey PharmD   Medication Therapy Management   Pharmacy Resident  Pager: [phone]    Krystal Parra was seen independently by Dr. Velma Rainey.  I have reviewed and agree with the resident note and plan of care.      Tomeka Albarran PharmD [facility]  Medication Therapy Management Provider  [phone]    Telemedicine Visit Details  The patient's medications can be safely assessed via a telemedicine encounter.  Type of service:  Telephone visit  Originating Location (pt. Location): Home    Distant Location (provider location):  On-site  Start Time:  2:00PM  End Time:  2:30PM     Medication Therapy Recommendations  S/P total knee arthroplasty, left   1 Current Medication: acetaminophen (TYLENOL) 500 MG tablet   Current Medication Sig: Take 2 tablets (1,000 mg) by mouth 4 times daily.   Rationale: Dose too low - Dosage too low - Effectiveness   Recommendation: Increase Frequency   Status: Accepted - no CPA Needed   Identified Date: 4/23/2025 Completed Date: 4/23/2025   Note: Can take up to 4000 mg daily as prescribed.         2 Current Medication: aspirin 81 MG EC tablet   Current Medication Sig: Take 1 tablet (81 mg) by mouth 2 times daily.   Rationale: Dose too high - Dosage too high - Safety   Recommendation: Decrease Frequency   Status: Accepted - no CPA Needed   Identified Date: 4/23/2025 Completed Date: 4/23/2025   Note: Because 30D out from surgery, reduce dosing to once daily per orders.         Takes dietary supplements   1 Current Medication: fish oil-omega-3 fatty acids 1000 MG capsule   Current Medication Sig: Take 2 g by mouth daily.   Rationale: No medical indication at this time - Unnecessary medication therapy - Indication   Recommendation:  Discontinue Medication   Status: Declined per Patient   Identified Date: 4/23/2025 Completed Date: 4/23/2025         Vaccine counseling   1 Rationale: Preventive therapy - Needs additional medication therapy - Indication   Recommendation: Order Vaccine - RSVPREF3 VAC RECOMB ADJUVANTED   Status: Declined per Patient   Identified Date: 4/23/2025 Completed Date: 4/23/2025

## 2025-04-23 ENCOUNTER — VIRTUAL VISIT (OUTPATIENT)
Dept: PHARMACY | Facility: CLINIC | Age: 85
End: 2025-04-23
Attending: PEDIATRICS
Payer: COMMERCIAL

## 2025-04-23 DIAGNOSIS — L29.9 ITCHING: ICD-10-CM

## 2025-04-23 DIAGNOSIS — I25.119 CORONARY ARTERY DISEASE INVOLVING NATIVE CORONARY ARTERY OF NATIVE HEART WITH ANGINA PECTORIS: ICD-10-CM

## 2025-04-23 DIAGNOSIS — K59.03 DRUG INDUCED CONSTIPATION: ICD-10-CM

## 2025-04-23 DIAGNOSIS — Z96.652 S/P TOTAL KNEE ARTHROPLASTY, LEFT: Primary | ICD-10-CM

## 2025-04-23 DIAGNOSIS — Z78.9 TAKES DIETARY SUPPLEMENTS: ICD-10-CM

## 2025-04-23 DIAGNOSIS — Z71.85 VACCINE COUNSELING: ICD-10-CM

## 2025-04-23 DIAGNOSIS — M51.26 DISPLACEMENT OF LUMBAR INTERVERTEBRAL DISC WITHOUT MYELOPATHY: ICD-10-CM

## 2025-04-23 DIAGNOSIS — E78.5 HYPERLIPIDEMIA LDL GOAL <100: ICD-10-CM

## 2025-04-23 DIAGNOSIS — G47.00 INSOMNIA, UNSPECIFIED TYPE: ICD-10-CM

## 2025-04-23 DIAGNOSIS — G45.9 TIA (TRANSIENT ISCHEMIC ATTACK): ICD-10-CM

## 2025-04-23 DIAGNOSIS — I10 BENIGN ESSENTIAL HYPERTENSION: ICD-10-CM

## 2025-04-23 PROCEDURE — 99207 PR NO CHARGE LOS: CPT | Mod: 93

## 2025-04-23 RX ORDER — SENNA AND DOCUSATE SODIUM 50; 8.6 MG/1; MG/1
1 TABLET, FILM COATED ORAL 2 TIMES DAILY PRN
COMMUNITY
Start: 2025-04-23

## 2025-04-23 RX ORDER — TURMERIC ROOT EXTRACT 500 MG
1 TABLET ORAL DAILY
COMMUNITY

## 2025-04-23 NOTE — PATIENT INSTRUCTIONS
"Recommendations from today's MTM visit:                                                      Can take acetaminophen 1000 mg every 6 hours as needed (up to four times daily).     Decrease aspirin to 81 mg once daily     Follow-up: Return in about 1 year (around 4/23/2026) for Medication Therapy Management or earlier if questions/concerns.    It was great speaking with you today.  I value your experience and would be very thankful for your time in providing feedback in our clinic survey. In the next few days, you may receive an email or text message from [employer] with a link to a survey related to your  clinical pharmacist.\"     To schedule another MTM appointment, please call the clinic directly or you may call the MTM scheduling line at [phone] or toll-free at [phone].     My Clinical Pharmacist's contact information:                                                      Please feel free to contact me with any questions or concerns you have.      Teagan Rainey, PharmD   Medication Therapy Management   Pharmacy Resident  Pager: [phone]     "

## 2025-04-25 ENCOUNTER — TELEPHONE (OUTPATIENT)
Dept: PEDIATRICS | Facility: CLINIC | Age: 85
End: 2025-04-25
Payer: COMMERCIAL

## 2025-04-25 NOTE — TELEPHONE ENCOUNTER
Forms/Letter Request    Type of form/letter: OTHER: [facility]: [id number]     Do we have the form/letter: Yes: Form is on the provider's desk for review    Who is the form from? Home care    Where did/will the form come from? form was faxed in

## 2025-04-28 ENCOUNTER — MEDICAL CORRESPONDENCE (OUTPATIENT)
Dept: HEALTH INFORMATION MANAGEMENT | Facility: CLINIC | Age: 85
End: 2025-04-28

## 2025-04-30 ENCOUNTER — OFFICE VISIT (OUTPATIENT)
Dept: ORTHOPEDICS | Facility: CLINIC | Age: 85
End: 2025-04-30
Payer: COMMERCIAL

## 2025-04-30 ENCOUNTER — ANCILLARY PROCEDURE (OUTPATIENT)
Dept: GENERAL RADIOLOGY | Facility: CLINIC | Age: 85
End: 2025-04-30
Attending: STUDENT IN AN ORGANIZED HEALTH CARE EDUCATION/TRAINING PROGRAM
Payer: COMMERCIAL

## 2025-04-30 DIAGNOSIS — Z96.652 S/P TOTAL KNEE ARTHROPLASTY, LEFT: ICD-10-CM

## 2025-04-30 DIAGNOSIS — Z96.652 S/P TOTAL KNEE ARTHROPLASTY, LEFT: Primary | ICD-10-CM

## 2025-04-30 PROCEDURE — 73562 X-RAY EXAM OF KNEE 3: CPT | Mod: TC | Performed by: RADIOLOGY

## 2025-04-30 NOTE — LETTER
4/30/2025      Krystal Parra  [address]  [address]  [place] [address]      Dear Colleague,    Thank you for referring your patient, Krystal Parra, to the [facility] [place]. Please see a copy of my visit note below.        [facility]  Orthopaedic Surgery Consultation by Jeremiah Perez M.D.    Krystal Parra MRN# [id number]   Age: 84 year old YOB: 1940     Requesting physician: Albert Yeo Dunn, Christina Tove     Background history:  DX:  Coronary artery disease on aspirin 81 mg  Hyperlipidemia  Hypertension  TIA  Moderate aortic regurgitation    TREATMENTS:  3/18/2025, Left total knee arthroplasty, Harish Crowdre           History of Present Illness:     4/30/25: 85-year-old female who presents today now 6 weeks status post left total knee arthroplasty (DOS 3/18/2025).  Overall, she notes that she is improving.  She does state that she is having more pain than she expected, but feels that she continues to get stronger.  She is taking Tylenol, Advil and oxycodone. She is taking oxycodone twice daily- she notes this is at the direction of her PCP and PT. She has home PT and is working on a HEP. She presents in a wheelchair today but notes she ambulates at home with a walker. She struggles with walking due to her knee and low back.  Denies any chest pain, calf pain, fever, chills.           Physical Exam:     EXAMINATION pertinent findings:   PSYCH: Pleasant, healthy-appearing, alert, oriented x3, cooperative. Normal mood and affect.  VITAL SIGNS: There were no vitals taken for this visit.  Reviewed nursing intake notes.   There is no height or weight on file to calculate BMI.  RESP: non labored breathing   ABD: benign, soft, non-tender, no acute peritoneal findings  SKIN: grossly normal   LYMPHATIC: grossly normal, no adenopathy, no extremity edema  NEURO: grossly normal , no motor deficits  VASCULAR: satisfactory perfusion of all  extremities   MUSCULOSKELETAL:     L knee:The incision is clean, dry, and intact with no erythema, dehiscence, or discharge.  -0-0 .  Small to moderate joint effusion.  Ligamentously stable in both ML and AP direction. Normal PF tracking.  Moderate peripheral edema.  Calves are soft nontender with negative Homans' sign    Bilateral LE:   Thigh and leg compartments soft and compressible   +Quad/TA/GSC/FHL/EHL   SILT DP/SP/Guzman/Saph/Tib nerve distributions   Palpable dorsalis pedis pulse          Data:   All laboratory data reviewed  All imaging studies reviewed by me personally.    XR left knee on 4/30/25 and 3/18/2025:  My interpretation: Status post left total knee arthroplasty.  Adequate sizing, fixation and orientation of components.  No signs of immediate postoperative complications.           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  Insufficiently responding to nonsurgical treatment.  Patient therefore underwent a left total knee arthroplasty on 3/18/2025.  Recovering appropriately     Plan:  I extensively discussed my findings with the patient.  Patient appears to be recovering appropriately.  Patient will continue to work with physical therapy on range of motion, strengthening and gait training.  All questions were answered.  Patient understands and agrees to the treatment plan as set forth.  We will follow-up with patient at the 1 year postoperative date with renewed radiographic imaging studies.       Volodymyr Stevens PA-C  Physician Assistant   - Participated in today's visit.  Dr. Jeremiah Perez evaluated the patient, reviewed and agrees to the plan.       This note was created using dictation software and may contain errors.  Please contact the creator for any clarifications that are needed.      Again, thank you for allowing me to participate in the care of your  patient.        Sincerely,        Jeremiah Perez MD    Electronically signed

## 2025-04-30 NOTE — PROGRESS NOTES
[facility]  Orthopaedic Surgery Consultation by Jeremiah Perez M.D.    Krystal Parra MRN# [id number]   Age: 84 year old YOB: 1940     Requesting physician: Albert Yeo Dunn, Christina Tove     Background history:  DX:  Coronary artery disease on aspirin 81 mg  Hyperlipidemia  Hypertension  TIA  Moderate aortic regurgitation    TREATMENTS:  3/18/2025, Left total knee arthroplasty, Harish Crowder           History of Present Illness:     4/30/25: 85-year-old female who presents today now 6 weeks status post left total knee arthroplasty (DOS 3/18/2025).  Overall, she notes that she is improving.  She does state that she is having more pain than she expected, but feels that she continues to get stronger.  She is taking Tylenol, Advil and oxycodone. She is taking oxycodone twice daily- she notes this is at the direction of her PCP and PT. She has home PT and is working on a HEP. She presents in a wheelchair today but notes she ambulates at home with a walker. She struggles with walking due to her knee and low back.  Denies any chest pain, calf pain, fever, chills.           Physical Exam:     EXAMINATION pertinent findings:   PSYCH: Pleasant, healthy-appearing, alert, oriented x3, cooperative. Normal mood and affect.  VITAL SIGNS: There were no vitals taken for this visit.  Reviewed nursing intake notes.   There is no height or weight on file to calculate BMI.  RESP: non labored breathing   ABD: benign, soft, non-tender, no acute peritoneal findings  SKIN: grossly normal   LYMPHATIC: grossly normal, no adenopathy, no extremity edema  NEURO: grossly normal , no motor deficits  VASCULAR: satisfactory perfusion of all extremities   MUSCULOSKELETAL:     L knee:The incision is clean, dry, and intact with no erythema, dehiscence, or discharge.  -0-0 .  Small to moderate joint effusion.  Ligamentously stable in both ML and AP direction. Normal PF tracking.  Moderate peripheral edema.   Calves are soft nontender with negative Homans' sign    Bilateral LE:   Thigh and leg compartments soft and compressible   +Quad/TA/GSC/FHL/EHL   SILT DP/SP/Guzman/Saph/Tib nerve distributions   Palpable dorsalis pedis pulse          Data:   All laboratory data reviewed  All imaging studies reviewed by me personally.    XR left knee on 4/30/25 and 3/18/2025:  My interpretation: Status post left total knee arthroplasty.  Adequate sizing, fixation and orientation of components.  No signs of immediate postoperative complications.           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  Insufficiently responding to nonsurgical treatment.  Patient therefore underwent a left total knee arthroplasty on 3/18/2025.  Recovering appropriately     Plan:  I extensively discussed my findings with the patient.  Patient appears to be recovering appropriately.  Patient will continue to work with physical therapy on range of motion, strengthening and gait training.  All questions were answered.  Patient understands and agrees to the treatment plan as set forth.  We will follow-up with patient at the 1 year postoperative date with renewed radiographic imaging studies.       Volodymyr Stevens PA-C  Physician Assistant   - Participated in today's visit.  Dr. Jeremiah Perez evaluated the patient, reviewed and agrees to the plan.       This note was created using dictation software and may contain errors.  Please contact the creator for any clarifications that are needed.

## 2025-05-09 ENCOUNTER — TELEPHONE (OUTPATIENT)
Dept: PEDIATRICS | Facility: CLINIC | Age: 85
End: 2025-05-09
Payer: COMMERCIAL

## 2025-05-09 NOTE — TELEPHONE ENCOUNTER
Call received from Lor(PT) from [facility]:    - pt's BP during visit was 172/70(sitting) on R arm  - after 10 mins of resting, BP was 172/62 on R arm, BP on L arm was also similar  - has increased shoulder pain & 3+ edema in L ankle & 1+ edema in R ankle  - recently had total knee replacement & recovering well  - haven't been wearing the compression stockings lately & that might be cause for swelling  - pt is asymptomatic, denies HTN/cardiac/stroke sx's  - takes losartan 100 mg in the morning & metoprolol 50 mg in the evening  - haven't taken the metoprolol yet  - other vitals: Pulse 73, O2 sat 96% & R 17  - not trained to do lung sounds    Advised to take metoprolol 50 mg now, recheck BP in 30-60 mins(pt has BP machine at home). If its still over 160/90 advised to call nurse line([phone]). With any rapid worsening sx's, advised to call nurse line as well. Pt & physical therapist agrees to the plan.    Late Report:  - pt fell at home on 5/6/25  - tried to turn from counter to the table w/o walker, lost balance & fell  - denies head injury, passed out, HA, dizziness, bruises or other concerning sx's    Michelle PHILLIPS  Clinic RN  [facility]

## 2025-05-12 NOTE — TELEPHONE ENCOUNTER
Called pt to check, not available, so left detailed message requesting call back with any concerns.     Michelle PHILLIPS  Clinic RN  [facility]

## 2025-05-13 ENCOUNTER — TELEPHONE (OUTPATIENT)
Dept: PEDIATRICS | Facility: CLINIC | Age: 85
End: 2025-05-13
Payer: COMMERCIAL

## 2025-05-13 NOTE — TELEPHONE ENCOUNTER
Home Care is calling regarding an established patient with [facility].  Requesting orders from: Tasha Winkler RN APPROVED: RN able to provide verbal orders.  Home Care will send orders for signature.  RN will close encounter.  Is this a request for a temporary pause in the home care episode?  No    Orders Requested    Skilled Nursing  Request for initial evaluation and treatment (one time) as the patient is having higher blood pressure readings.   RN gave verbal order: Yes      Phone number Home Care can be reached at: [phone]  Okay to leave a detailed message?: Yes      Marlin Beaver RN

## 2025-05-14 ENCOUNTER — TELEPHONE (OUTPATIENT)
Dept: PEDIATRICS | Facility: CLINIC | Age: 85
End: 2025-05-14
Payer: COMMERCIAL

## 2025-05-14 ENCOUNTER — MEDICAL CORRESPONDENCE (OUTPATIENT)
Dept: HEALTH INFORMATION MANAGEMENT | Facility: CLINIC | Age: 85
End: 2025-05-14

## 2025-05-14 NOTE — TELEPHONE ENCOUNTER
Forms/Letter Request    Type of form/letter: OTHER: [facility]: [id number]     Do we have the form/letter: Yes: Form is on the provider's desk for review    Who is the form from? Home care    Where did/will the form come from? form was faxed in

## 2025-05-15 ENCOUNTER — TELEPHONE (OUTPATIENT)
Dept: PEDIATRICS | Facility: CLINIC | Age: 85
End: 2025-05-15
Payer: COMMERCIAL

## 2025-05-15 NOTE — TELEPHONE ENCOUNTER
Home Care is calling regarding an established patient with [facility].  Requesting orders from: Tasha Winkler RN APPROVED: RN able to provide verbal orders.  Home Care will send orders for signature.  RN will close encounter.  Is this a request for a temporary pause in the home care episode?  No    Orders Requested    Skilled Nursing  Request for initial certification (first set of orders)   Frequency: 1x/wk for 3 weeks to monitor HTN  RN gave verbal order: Yes        Phone number Home Care can be reached at: [phone]  Okay to leave a detailed message?: Yes    Contacts       Contact Date/Time Type Contact Phone/Fax    05/15/2025 03:54 PM CDT Phone (Incoming) LILY Stapleton [facility] [phone]     Confidential VM          Cherelle Desai RN

## 2025-05-20 ENCOUNTER — MEDICAL CORRESPONDENCE (OUTPATIENT)
Dept: HEALTH INFORMATION MANAGEMENT | Facility: CLINIC | Age: 85
End: 2025-05-20
Payer: COMMERCIAL

## 2025-05-20 ENCOUNTER — TELEPHONE (OUTPATIENT)
Dept: PEDIATRICS | Facility: CLINIC | Age: 85
End: 2025-05-20
Payer: COMMERCIAL

## 2025-05-20 NOTE — TELEPHONE ENCOUNTER
Forms/Letter Request    Type of form/letter: OTHER: [facility]: [id number]     Do we have the form/letter: Yes: Form is on the provider's desk for review    Who is the form from? Home care    Where did/will the form come from? form was faxed in

## 2025-05-22 ENCOUNTER — TELEPHONE (OUTPATIENT)
Dept: PEDIATRICS | Facility: CLINIC | Age: 85
End: 2025-05-22
Payer: COMMERCIAL

## 2025-05-22 NOTE — TELEPHONE ENCOUNTER
Home Care is calling regarding an established patient with [facility].  Requesting orders from: Tasha Winkler RN APPROVED: RN able to provide verbal orders.  Home Care will send orders for signature.  RN will close encounter.  Is this a request for a temporary pause in the home care episode?  No    Orders Requested    Skilled Nursing  Request for continuation of care with decrease in frequency   Goals have been met/progressing.  Frequency: 1x every 2 weeks for 2 weeks. Patient requested a decrease in frequency, per HC RN, patient has a lot of medical appointments next week and wanted to cancel her HC visit for next week.   RN gave verbal order: Yes        Phone number Home Care can be reached at: [phone]  Okay to leave a detailed message?: Yes      Merrick Monteiro RN

## 2025-05-27 ENCOUNTER — TELEPHONE (OUTPATIENT)
Dept: PEDIATRICS | Facility: CLINIC | Age: 85
End: 2025-05-27
Payer: COMMERCIAL

## 2025-05-27 NOTE — TELEPHONE ENCOUNTER
Forms/Letter Request    Type of form/letter: OTHER: [facility]: [id number]     Do we have the form/letter: Yes: Form is on the provider's desk for review    Who is the form from? Home care    Where did/will the form come from? form was faxed in

## 2025-05-28 ENCOUNTER — TELEPHONE (OUTPATIENT)
Dept: PEDIATRICS | Facility: CLINIC | Age: 85
End: 2025-05-28
Payer: COMMERCIAL

## 2025-05-28 ENCOUNTER — MEDICAL CORRESPONDENCE (OUTPATIENT)
Dept: HEALTH INFORMATION MANAGEMENT | Facility: CLINIC | Age: 85
End: 2025-05-28

## 2025-05-28 NOTE — TELEPHONE ENCOUNTER
Patient returning call. Relayed provider message below. Scheduled for OV with Flor Milton 6/4/25. States she has to coordinate a ride so may need to call back to reschedule to a different time. Instructed patient to call [phone] for new or worsening symptoms or further questions. Patient verbalized understanding and agrees with the plan.     Marlin Heath RN

## 2025-05-28 NOTE — TELEPHONE ENCOUNTER
I'd recommend having her come in for a clinic visit with extender in the next week.   Please review with her and have her see Flor Rose.    Tasha Winkler MD

## 2025-05-28 NOTE — TELEPHONE ENCOUNTER
Routing to provider to please advise if any medication changes advised at this time with average BPs prior to follow-up from 7/21. Pharmacy pended.    Received call from Derian nurse from [facility]. She is calling to report elevated BP readings.     She notes that patient has been experiencing elevated BP readings since elected knee surgery on 3/18.     Average Diastolic Pressures:  170-200    Average Systolic Pressures:  70-80    Patient is currently checking her BPs in home, and weekly by home care. Patient currently taking Losartan 100 mg and Metoprolol 50 mg daily.     Patient denies any symptoms at this time, denies pain.    Patient does have upcoming appointment with PCP on 7/21. Home care is wondering if PCP would recommend any medication changes recommended prior to follow-up?    Noted that patient has discontinued oxycodone.    GEORGINA Whitley RN  [facility]

## 2025-05-28 NOTE — TELEPHONE ENCOUNTER
Outgoing call to patient - attempt #1. Left VM requesting call back to [phone] and ask to speak with a nurse. Awaiting call back at this time.     RN called and spoke with LILY Menard from [facility]. Derian states she will text patient to have her schedule an appt with Flor Rose PA-C for next week. Patient prefers text.     Upon call back:  - Relay recommendation from Dr. Winkler regarding BP follow-up   - Schedule OV with Flor Rose PA-C for next week     Talia ELLIS RN  [facility]

## 2025-06-03 ENCOUNTER — TELEPHONE (OUTPATIENT)
Dept: PEDIATRICS | Facility: CLINIC | Age: 85
End: 2025-06-03
Payer: COMMERCIAL

## 2025-06-03 NOTE — TELEPHONE ENCOUNTER
Home Care is calling regarding an established patient with [facility].  Requesting orders from: Tasha Winkler RN APPROVED: RN able to provide verbal orders.  Home Care will send orders for signature.  RN will close encounter.  Is this a request for a temporary pause in the home care episode?  No    Orders Requested    Physical Therapy  Request for continuation of care with no increase or decrease in frequency  Frequency: 1x/week for 5 weeks, then once every other week for 4 weeks   RN gave verbal order: Yes        Phone number Home Care can be reached at: [phone]   Okay to leave a detailed message?: Yes    Contacts       Contact Date/Time Type Contact Phone/Fax    06/03/2025 03:03 PM CDT Phone (Incoming) CONI Purcell with [facility] (Home Care) [phone]          Talia ELLIS RN  [facility]

## 2025-06-04 ENCOUNTER — OFFICE VISIT (OUTPATIENT)
Dept: PEDIATRICS | Facility: CLINIC | Age: 85
End: 2025-06-04
Payer: COMMERCIAL

## 2025-06-04 VITALS
WEIGHT: 154.2 LBS | RESPIRATION RATE: 16 BRPM | BODY MASS INDEX: 30.12 KG/M2 | HEART RATE: 81 BPM | TEMPERATURE: 97 F | OXYGEN SATURATION: 97 % | SYSTOLIC BLOOD PRESSURE: 165 MMHG | DIASTOLIC BLOOD PRESSURE: 63 MMHG

## 2025-06-04 DIAGNOSIS — I10 BENIGN ESSENTIAL HYPERTENSION: Primary | ICD-10-CM

## 2025-06-04 PROCEDURE — 99214 OFFICE O/P EST MOD 30 MIN: CPT | Performed by: PHYSICIAN ASSISTANT

## 2025-06-04 PROCEDURE — 3078F DIAST BP <80 MM HG: CPT | Performed by: PHYSICIAN ASSISTANT

## 2025-06-04 PROCEDURE — G2211 COMPLEX E/M VISIT ADD ON: HCPCS | Performed by: PHYSICIAN ASSISTANT

## 2025-06-04 PROCEDURE — 3077F SYST BP >= 140 MM HG: CPT | Performed by: PHYSICIAN ASSISTANT

## 2025-06-04 PROCEDURE — 1125F AMNT PAIN NOTED PAIN PRSNT: CPT | Performed by: PHYSICIAN ASSISTANT

## 2025-06-04 ASSESSMENT — PAIN SCALES - GENERAL: PAINLEVEL_OUTOF10: MILD PAIN (2)

## 2025-06-04 NOTE — PROGRESS NOTES
Assessment & Plan     Benign essential hypertension  Recommend adding hydrochlorothiazide at this time.   Start 12.5mg once daily in AM. If tolerating well in the future consider combined losartan/hydrochlorothiazide prescription.   Discuss this with PCP.   Recommend sending updated BP in a few weeks.   Consider follow-up in 3 weeks to discuss adjustments as needed.   Will complete BMP at next visit due to starting new medication    FUTURE APPOINTMENTS:  -Follow-up 3-4 weeks      Addy iRce is a 85 year old, presenting for the following health issues:  Follow Up        6/4/2025     1:36 PM   Additional Questions   Roomed by MEGAN         6/4/2025     1:36 PM   Patient Reported Additional Medications   Patient reports taking the following new medications none     History of Present Illness       Hypertension: She presents for follow up of hypertension.  She does not check blood pressure  regularly outside of the clinic. Outpatient blood pressures have not been over 140/90. She follows a low salt diet.     She eats 2-3 servings of fruits and vegetables daily.She consumes 1 sweetened beverage(s) daily.She exercises with enough effort to increase her heart rate 20 to 29 minutes per day.  She exercises with enough effort to increase her heart rate 3 or less days per week.   She is taking medications regularly.      History of Present Illness-  Krystal Parra, 85-year-old female    - High blood pressure noted at home since knee surgery in March, with readings often reaching 180s.  - Currently on Metoprolol ER 50mg and Losartan 100mg for blood pressure management.  - No recent changes to BP medications but even at appointments she is noticing it elevated  - Losartan dose increased in Feb from 75mg to 100mg. Pt unsure if it really did anything  - At one time elevated BP thought to be elevated due to pain.   - Patient is taking both Tylenol and Ibuprofen for pain relief  - Denies headaches, lightheadedness, chest pain,  presyncope  - Experiences anxiety about taking blood pressure readings at home, leading to avoidance.  - Reports taking Metoprolol in the morning and losartan at night  - tries to check BP 2 hrs after taking the metoprolol     BP Readings from Last 6 Encounters:   06/04/25 (!) 165/63   04/15/25 (!) 148/70   04/14/25 (!) 169/78   04/11/25 (!) 184/63   04/03/25 (!) 151/65   04/03/25 138/61           Review of Systems  Constitutional, HEENT, cardiovascular, pulmonary, gi and gu systems are negative, except as otherwise noted.      Objective    BP (!) 165/63 (BP Location: Right arm, Patient Position: Sitting, Cuff Size: Adult Regular)   Pulse 81   Temp 97  F (36.1  C) (Temporal)   Resp 16   Wt 69.9 kg (154 lb 3.2 oz)   LMP  (LMP Unknown)   SpO2 97%   BMI 30.12 kg/m    Body mass index is 30.12 kg/m .    Physical Exam   GENERAL: alert and no distress  EYES: Eyes grossly normal to inspection, PERRL and conjunctivae and sclerae normal  RESP: lungs clear to auscultation - no rales, rhonchi or wheezes  CV: regular rate and rhythm, normal S1 S2, no S3 or S4, no murmur, click or rub, no peripheral edema  MS: no gross musculoskeletal defects noted, no edema  SKIN: no suspicious lesions or rashes  PSYCH: mentation appears normal, affect normal/bright        The longitudinal plan of care for the diagnosis(es)/condition(s) as documented were addressed during this visit. Due to the added complexity in care, I will continue to support Krystal in the subsequent management and with ongoing continuity of care.    Signed Electronically by: Flor Rose PA-C

## 2025-06-05 ENCOUNTER — TELEPHONE (OUTPATIENT)
Dept: PEDIATRICS | Facility: CLINIC | Age: 85
End: 2025-06-05
Payer: COMMERCIAL

## 2025-06-05 DIAGNOSIS — I10 BENIGN ESSENTIAL HYPERTENSION: Primary | ICD-10-CM

## 2025-06-05 RX ORDER — HYDROCHLOROTHIAZIDE 12.5 MG/1
12.5 TABLET ORAL DAILY
Qty: 30 TABLET | Refills: 1 | Status: SHIPPED | OUTPATIENT
Start: 2025-06-05

## 2025-06-05 NOTE — TELEPHONE ENCOUNTER
Received a call back from patient. Advised of provider message. Patient is agreeable to trying new BP medication. Scheduled for BP and BMP in 3 weeks.  Vianey GARCIA RN, BSN  Clinic RN  [facility]

## 2025-06-05 NOTE — TELEPHONE ENCOUNTER
Called patient to discuss BP medication options discussed yesterday. Left VM.     I reviewed pt hx with PCP and feel that we should add a diuretic instead of increasing Metoprolol.     -Continue on Losartan 100mg daily  -Continue on Metoprolol Er 50mg    -Start hydrochlorothiazide 12.5 daily    In the future we likely can get patient a combined losartan/hydrochlorothiazide pill but for now lets add it separately. Sent to [facility] pharmacy.     Would like to get updated BP readings in 2-3 weeks and complete BMP. If she would like to follow-up with me in the office instead that would also work.     ANDRIA McdonaldC

## 2025-06-11 ENCOUNTER — TELEPHONE (OUTPATIENT)
Dept: PEDIATRICS | Facility: CLINIC | Age: 85
End: 2025-06-11
Payer: COMMERCIAL

## 2025-06-11 NOTE — TELEPHONE ENCOUNTER
Home Care is calling regarding an established patient with [facility].  Requesting orders from: Tasha Winkler RN APPROVED: RN able to provide verbal orders.  Home Care will send orders for signature.  RN will close encounter.  Is this a request for a temporary pause in the home care episode?  No    Orders Requested    Physical Therapy  Request for delay in care, service to be provided within 7 days of previously   Service rescheduled to 6/20/25  Patient was supposed to be seen this week. Having back pain and opted to see chiropractor instead. She declined PT this week and would like to do one visit next week.  RN gave verbal order: Yes        Phone number Home Care can be reached at: [phone]  Okay to leave a detailed message?: Yes    Contacts       Contact Date/Time Type Contact Phone/Fax    06/11/2025 08:48 AM CDT Phone (Incoming) Jayme [phone]     PT with [facility]          Vianey Whelan RN

## 2025-07-17 ENCOUNTER — TELEPHONE (OUTPATIENT)
Dept: PEDIATRICS | Facility: CLINIC | Age: 85
End: 2025-07-17
Payer: COMMERCIAL

## 2025-07-17 DIAGNOSIS — Z53.9 DIAGNOSIS NOT YET DEFINED: Primary | ICD-10-CM

## 2025-07-17 NOTE — TELEPHONE ENCOUNTER
Forms/Letter Request [facility]: [id number]    Type of form/letter: Home Health Certification      Do we have the form/letter: Yes: Form is on the provider's desk for review    Who is the form from? Home care    Where did/will the form come from? form was faxed in

## 2025-07-21 ENCOUNTER — OFFICE VISIT (OUTPATIENT)
Dept: PEDIATRICS | Facility: CLINIC | Age: 85
End: 2025-07-21
Payer: COMMERCIAL

## 2025-07-21 VITALS
HEIGHT: 60 IN | TEMPERATURE: 98.4 F | HEART RATE: 78 BPM | BODY MASS INDEX: 29.84 KG/M2 | OXYGEN SATURATION: 97 % | RESPIRATION RATE: 18 BRPM | SYSTOLIC BLOOD PRESSURE: 137 MMHG | DIASTOLIC BLOOD PRESSURE: 68 MMHG | WEIGHT: 152 LBS

## 2025-07-21 DIAGNOSIS — D64.9 POSTOPERATIVE ANEMIA: ICD-10-CM

## 2025-07-21 DIAGNOSIS — D50.9 IRON DEFICIENCY ANEMIA, UNSPECIFIED IRON DEFICIENCY ANEMIA TYPE: ICD-10-CM

## 2025-07-21 DIAGNOSIS — I10 BENIGN ESSENTIAL HYPERTENSION: Primary | ICD-10-CM

## 2025-07-21 LAB
ERYTHROCYTE [DISTWIDTH] IN BLOOD BY AUTOMATED COUNT: 13.9 % (ref 10–15)
HCT VFR BLD AUTO: 34.5 % (ref 35–47)
HGB BLD-MCNC: 11.6 G/DL (ref 11.7–15.7)
MCH RBC QN AUTO: 31.4 PG (ref 26.5–33)
MCHC RBC AUTO-ENTMCNC: 33.6 G/DL (ref 31.5–36.5)
MCV RBC AUTO: 93 FL (ref 78–100)
PLATELET # BLD AUTO: 215 10E3/UL (ref 150–450)
RBC # BLD AUTO: 3.7 10E6/UL (ref 3.8–5.2)
WBC # BLD AUTO: 6.3 10E3/UL (ref 4–11)

## 2025-07-21 PROCEDURE — 83540 ASSAY OF IRON: CPT | Performed by: PEDIATRICS

## 2025-07-21 PROCEDURE — 82728 ASSAY OF FERRITIN: CPT | Performed by: PEDIATRICS

## 2025-07-21 PROCEDURE — 3078F DIAST BP <80 MM HG: CPT | Performed by: PEDIATRICS

## 2025-07-21 PROCEDURE — 85027 COMPLETE CBC AUTOMATED: CPT | Performed by: PEDIATRICS

## 2025-07-21 PROCEDURE — 80048 BASIC METABOLIC PNL TOTAL CA: CPT | Performed by: PEDIATRICS

## 2025-07-21 PROCEDURE — 83550 IRON BINDING TEST: CPT | Performed by: PEDIATRICS

## 2025-07-21 PROCEDURE — 99213 OFFICE O/P EST LOW 20 MIN: CPT | Performed by: PEDIATRICS

## 2025-07-21 PROCEDURE — 3075F SYST BP GE 130 - 139MM HG: CPT | Performed by: PEDIATRICS

## 2025-07-21 PROCEDURE — G2211 COMPLEX E/M VISIT ADD ON: HCPCS | Performed by: PEDIATRICS

## 2025-07-21 PROCEDURE — 36415 COLL VENOUS BLD VENIPUNCTURE: CPT | Performed by: PEDIATRICS

## 2025-07-21 RX ORDER — HYDROCHLOROTHIAZIDE 12.5 MG/1
12.5 TABLET ORAL DAILY
Qty: 90 TABLET | Refills: 3 | Status: SHIPPED | OUTPATIENT
Start: 2025-07-21

## 2025-07-21 NOTE — PATIENT INSTRUCTIONS
Your blood pressure looks wonderful today!    Please continue your hydrochlorothiazide and the losartan at your current dosing    Repeat labs today    I'll look forward to seeing you in late September!

## 2025-07-21 NOTE — PROGRESS NOTES
Assessment & Plan       ICD-10-CM    1. Benign essential hypertension  I10 Basic metabolic panel  (Ca, Cl, CO2, Creat, Gluc, K, Na, BUN)     hydroCHLOROthiazide 12.5 MG tablet    After many iterations of BP regimens, controlled on current medications and tolerating well - will continue.  Due for BMP recheck today after starting hydrochlorothiazide.    Patient continuing to follow BP at home, next follow up at [facility] in Sept.      2. Iron deficiency anemia, unspecified iron deficiency anemia type  D50.9 CBC with platelets     Iron and iron binding capacity     Ferritin    Due for recheck after significant anemia following knee replacement earlier this year      3. Postoperative anemia  D64.9 See above        The longitudinal plan of care for the diagnosis(es)/condition(s) as documented were addressed during this visit. Due to the added complexity in care, I will continue to support Krystal in the subsequent management and with ongoing continuity of care.         Addy Rice is a 85 year old, presenting for the following health issues:  Hypertension        7/21/2025     1:04 PM   Additional Questions   Roomed by selwyn richardson   Accompanied by self     History of Present Illness       Hypertension: She presents for follow up of hypertension.  She does check blood pressure  regularly outside of the clinic. Outpatient blood pressures have not been over 140/90. She follows a low salt diet.     She eats 2-3 servings of fruits and vegetables daily.She consumes 0 sweetened beverage(s) daily.She exercises with enough effort to increase her heart rate 9 or less minutes per day.  She exercises with enough effort to increase her heart rate 3 or less days per week.   She is taking medications regularly.        At last visit, added hydrochlorothiazide to ARB therapy.      BP at home has been 120's-150's systolic at home.  Usually in the 120's systolic    Tolerating hydrochlorothiazide well at current dose.    No new cardiac  symptoms.    Knee rehab going well.    Due for follow up on anemia - energy level feeling adequate            Objective    /68   Pulse 78   Temp 98.4  F (36.9  C) (Tympanic)   Resp 18   Ht 1.524 m (5')   Wt 68.9 kg (152 lb)   LMP  (LMP Unknown)   SpO2 97%   BMI 29.69 kg/m    Body mass index is 29.69 kg/m .  Wt Readings from Last 4 Encounters:   07/21/25 68.9 kg (152 lb)   06/04/25 69.9 kg (154 lb 3.2 oz)   04/15/25 76.2 kg (168 lb)   04/03/25 76 kg (167 lb 9.6 oz)       Physical Exam   GENERAL: alert and no distress  CV: regular rate and rhythm, normal S1 S2, no S3 or S4, no murmur, click or rub, no peripheral edema   PSYCH: mentation appears normal, affect normal/bright    Labs pending        Signed Electronically by: Tasha Winkler MD

## 2025-07-22 LAB
ANION GAP SERPL CALCULATED.3IONS-SCNC: 11 MMOL/L (ref 7–15)
BUN SERPL-MCNC: 29.8 MG/DL (ref 8–23)
CALCIUM SERPL-MCNC: 9.8 MG/DL (ref 8.8–10.4)
CHLORIDE SERPL-SCNC: 99 MMOL/L (ref 98–107)
CREAT SERPL-MCNC: 0.82 MG/DL (ref 0.51–0.95)
EGFRCR SERPLBLD CKD-EPI 2021: 69 ML/MIN/1.73M2
FERRITIN SERPL-MCNC: 441 NG/ML (ref 11–328)
GLUCOSE SERPL-MCNC: 93 MG/DL (ref 70–99)
HCO3 SERPL-SCNC: 27 MMOL/L (ref 22–29)
IRON BINDING CAPACITY (ROCHE): 250 UG/DL (ref 240–430)
IRON SATN MFR SERPL: 48 % (ref 15–46)
IRON SERPL-MCNC: 119 UG/DL (ref 37–145)
POTASSIUM SERPL-SCNC: 4.2 MMOL/L (ref 3.4–5.3)
SODIUM SERPL-SCNC: 137 MMOL/L (ref 135–145)

## (undated) DEVICE — DRAPE STERI U 1015

## (undated) DEVICE — TOURNIQUET SGL  BLADDER 30"X4" BLUE 5921030135

## (undated) DEVICE — DRSG TEGADERM 4X10" 1627

## (undated) DEVICE — LINEN FULL SHEET 5511

## (undated) DEVICE — SET HANDPIECE INTERPULSE W/COAXIAL FAN SPRAY TIP 0210118000

## (undated) DEVICE — SUCTION MANIFOLD NEPTUNE 2 SYS 4 PORT 0702-020-000

## (undated) DEVICE — HOOD SURG T7PLUS PEEL AWAY FACE SHIELD STRL LF 0416-801-100

## (undated) DEVICE — DRSG STERI STRIP 1/2X4" R1547

## (undated) DEVICE — DRSG SILVERCEL 4.25X4.25" 900404

## (undated) DEVICE — GLOVE BIOGEL PI SZ 7.5 40875

## (undated) DEVICE — LINEN ORTHO ACL PACK 5447

## (undated) DEVICE — SUTURE MONOCRYL+ 3-0 PS-1 27" UNDYED MCP936H

## (undated) DEVICE — SU VICRYL+ 0 MO-4 8X18IN VIO VCP701D

## (undated) DEVICE — GLOVE BIOGEL PI MICRO INDICATOR UNDERGLOVE SZ 8.5 48985

## (undated) DEVICE — Device

## (undated) DEVICE — BAG CLEAR TRASH 1.3M 39X33" P4040C

## (undated) DEVICE — GLOVE BIOGEL PI SZ 8.5 40885

## (undated) DEVICE — SU VICRYL 2-0 CT-1 27" UND J259H

## (undated) DEVICE — SPONGE LAP 18X18" X8435

## (undated) DEVICE — BNDG ELASTIC 6" DBL LENGTH UNSTERILE 6611-16

## (undated) DEVICE — ESU PENCIL SMOKE EVAC W/ROCKER SWITCH 0703-047-000

## (undated) DEVICE — BLADE SAW SAGITTAL STRK 13X90X1.27MM HD SYS 6 6113-127-090

## (undated) DEVICE — SU VICRYL+ 1 MO-4 18" DYED VCP702D

## (undated) DEVICE — PREP CHLORAPREP 26ML TINTED HI-LITE ORANGE 930815

## (undated) DEVICE — PACK TOTAL KNEE BOXED LATEX FREE PO15TKFCT

## (undated) DEVICE — SOLUTION IV IRRIGATION 0.9% NACL 3L R8206

## (undated) DEVICE — GLOVE BIOGEL PI MICRO INDICATOR UNDERGLOVE SZ 8.0 48980

## (undated) RX ORDER — PROPOFOL 10 MG/ML
INJECTION, EMULSION INTRAVENOUS
Status: DISPENSED
Start: 2025-03-18

## (undated) RX ORDER — REGADENOSON 0.08 MG/ML
INJECTION, SOLUTION INTRAVENOUS
Status: DISPENSED
Start: 2023-03-13

## (undated) RX ORDER — LABETALOL HYDROCHLORIDE 5 MG/ML
INJECTION, SOLUTION INTRAVENOUS
Status: DISPENSED
Start: 2025-03-18

## (undated) RX ORDER — FENTANYL CITRATE 50 UG/ML
INJECTION, SOLUTION INTRAMUSCULAR; INTRAVENOUS
Status: DISPENSED
Start: 2025-03-18

## (undated) RX ORDER — TRANEXAMIC ACID 650 MG/1
TABLET ORAL
Status: DISPENSED
Start: 2025-03-18

## (undated) RX ORDER — CEFAZOLIN SODIUM/WATER 2 G/20 ML
SYRINGE (ML) INTRAVENOUS
Status: DISPENSED
Start: 2025-03-18

## (undated) RX ORDER — FENTANYL CITRATE-0.9 % NACL/PF 10 MCG/ML
PLASTIC BAG, INJECTION (ML) INTRAVENOUS
Status: DISPENSED
Start: 2025-03-18